# Patient Record
Sex: FEMALE | Race: BLACK OR AFRICAN AMERICAN | Employment: FULL TIME | URBAN - METROPOLITAN AREA
[De-identification: names, ages, dates, MRNs, and addresses within clinical notes are randomized per-mention and may not be internally consistent; named-entity substitution may affect disease eponyms.]

---

## 2017-02-22 ENCOUNTER — MED REC SCAN ONLY (OUTPATIENT)
Dept: NEUROLOGY | Facility: CLINIC | Age: 47
End: 2017-02-22

## 2017-02-23 ENCOUNTER — TELEPHONE (OUTPATIENT)
Dept: NEUROLOGY | Facility: CLINIC | Age: 47
End: 2017-02-23

## 2017-02-24 ENCOUNTER — OFFICE VISIT (OUTPATIENT)
Dept: NEUROLOGY | Facility: CLINIC | Age: 47
End: 2017-02-24

## 2017-02-24 ENCOUNTER — TELEPHONE (OUTPATIENT)
Dept: NEUROLOGY | Facility: CLINIC | Age: 47
End: 2017-02-24

## 2017-02-24 VITALS
RESPIRATION RATE: 16 BRPM | SYSTOLIC BLOOD PRESSURE: 128 MMHG | HEART RATE: 103 BPM | HEIGHT: 62 IN | DIASTOLIC BLOOD PRESSURE: 80 MMHG

## 2017-02-24 DIAGNOSIS — G89.29 CHRONIC BILATERAL LOW BACK PAIN WITH BILATERAL SCIATICA: ICD-10-CM

## 2017-02-24 DIAGNOSIS — M79.89 LEG SWELLING: ICD-10-CM

## 2017-02-24 DIAGNOSIS — M25.561 ACUTE PAIN OF BOTH KNEES: ICD-10-CM

## 2017-02-24 DIAGNOSIS — M54.41 CHRONIC BILATERAL LOW BACK PAIN WITH BILATERAL SCIATICA: ICD-10-CM

## 2017-02-24 DIAGNOSIS — D50.0 IRON DEFICIENCY ANEMIA DUE TO CHRONIC BLOOD LOSS: ICD-10-CM

## 2017-02-24 DIAGNOSIS — M54.42 CHRONIC BILATERAL LOW BACK PAIN WITH BILATERAL SCIATICA: ICD-10-CM

## 2017-02-24 DIAGNOSIS — Z79.01 ANTICOAGULATED BY ANTICOAGULATION TREATMENT: ICD-10-CM

## 2017-02-24 DIAGNOSIS — M54.16 LUMBAR RADICULOPATHY: ICD-10-CM

## 2017-02-24 DIAGNOSIS — M51.9 LUMBAR DISC DISEASE: Primary | ICD-10-CM

## 2017-02-24 DIAGNOSIS — M25.562 ACUTE PAIN OF BOTH KNEES: ICD-10-CM

## 2017-02-24 PROCEDURE — 99214 OFFICE O/P EST MOD 30 MIN: CPT | Performed by: PHYSICAL MEDICINE & REHABILITATION

## 2017-02-24 RX ORDER — METHOCARBAMOL 750 MG/1
TABLET ORAL WEEKLY
Refills: 6 | COMMUNITY
Start: 2017-01-20 | End: 2017-02-24 | Stop reason: DRUGHIGH

## 2017-02-24 RX ORDER — TRAMADOL HYDROCHLORIDE 50 MG/1
50 TABLET ORAL EVERY 6 HOURS PRN
Qty: 30 TABLET | Refills: 0 | Status: SHIPPED | OUTPATIENT
Start: 2017-02-24 | End: 2017-03-26

## 2017-02-24 RX ORDER — GABAPENTIN 300 MG/1
300 CAPSULE ORAL 3 TIMES DAILY
Qty: 90 CAPSULE | Refills: 2 | Status: SHIPPED | OUTPATIENT
Start: 2017-02-24 | End: 2018-08-22

## 2017-02-24 RX ORDER — INSULIN DETEMIR 100 [IU]/ML
40 INJECTION, SOLUTION SUBCUTANEOUS NIGHTLY
Status: ON HOLD | COMMUNITY
Start: 2017-02-23 | End: 2017-06-23 | Stop reason: ALTCHOICE

## 2017-02-24 RX ORDER — MECLIZINE HYDROCHLORIDE 25 MG/1
25 TABLET ORAL AS NEEDED
Refills: 0 | COMMUNITY
Start: 2016-12-15 | End: 2018-01-16

## 2017-02-24 RX ORDER — LIRAGLUTIDE 6 MG/ML
1.8 INJECTION SUBCUTANEOUS EVERY MORNING
Status: ON HOLD | COMMUNITY
Start: 2017-02-23 | End: 2017-06-23 | Stop reason: ALTCHOICE

## 2017-02-24 RX ORDER — INSULIN ASPART 100 [IU]/ML
25 INJECTION, SOLUTION INTRAVENOUS; SUBCUTANEOUS 2 TIMES DAILY
Status: ON HOLD | COMMUNITY
Start: 2017-02-23 | End: 2017-04-11

## 2017-02-24 RX ORDER — SPIRONOLACTONE 50 MG/1
TABLET, FILM COATED ORAL DAILY
Refills: 0 | COMMUNITY
Start: 2016-12-29 | End: 2017-02-24

## 2017-02-24 RX ORDER — CARVEDILOL 12.5 MG/1
25 TABLET ORAL 2 TIMES DAILY WITH MEALS
COMMUNITY
Start: 2017-02-23

## 2017-02-24 RX ORDER — BACLOFEN 20 MG/1
20 TABLET ORAL 2 TIMES DAILY
Qty: 60 TABLET | Refills: 2 | Status: SHIPPED | OUTPATIENT
Start: 2017-02-24 | End: 2018-06-27

## 2017-02-24 RX ORDER — FUROSEMIDE 20 MG/1
40 TABLET ORAL DAILY
COMMUNITY
Start: 2017-02-23

## 2017-02-24 NOTE — TELEPHONE ENCOUNTER
Below reviewed with Dr. Marge Haas who states to add patient to schedule today at 12:30p  Spoke to patient and offered her an appt today 2/24/17 at 12:30p.  Patient confirmed appt

## 2017-02-24 NOTE — PATIENT INSTRUCTIONS
As of October 6th 2014, the Drug Enforcement Agency [facility]) is reclassifying all hydrocodone combination medications from Schedule III to Schedule II. This includes medications such as Norco, Vicodin, Lortab, Zohydro, and Vicoprofen.     What this means for y will perform bilateral L5 TFESI(s). She will speak with her hematologist first about when she would be able to have this. She will speak to her PCP about what she needs to do to prevent a yeast infection after having the injections.     I refilled her Zayra Ybarra

## 2017-02-24 NOTE — TELEPHONE ENCOUNTER
Pt. left information with Christofer Kerr RN for us to request authorization through W/KIRSTIE L/M on Viridiana Muir v/m requesting authorization for bilateral L5 TFESIs cpt code [id number] and PT. Await return call.

## 2017-02-24 NOTE — PROGRESS NOTES
Low Back Pain H & P    Chief Complaint: Patient presents with:  Low Back Pain: pt here for follow up with c/o lower back pain and spasms that occur when applying pressure on the left leg with numbness in both feet. left>right.  LOV 11/16/16      Patient was History     Social History   Marital Status: Single  Spouse Name: N/A    Years of Education: N/A  Number of Children: N/A     Occupational History  None on file     Social History Main Topics   Smoking status: Never Smoker     Smokeless tobacco: Never Used Leg  arch of the LEFT foot  lateral RIGHT foot  first dorsal web space of the LEFT foot   LE Muscle Strength:  All LE strength measurements 5/5 except:  Hamstring RIGHT:   4+/5  Hamstring LEFT:   4-/5  Knee Extension RIGHT:   5/5  Knee Extension LEFT:   3+/

## 2017-02-28 NOTE — TELEPHONE ENCOUNTER
Faxed clinical notes to the Dept.  Of Labor W/C for authorization of bilateral L5 TFESIs and PT pending approval.

## 2017-04-10 ENCOUNTER — HOSPITAL ENCOUNTER (INPATIENT)
Facility: HOSPITAL | Age: 47
LOS: 1 days | Discharge: HOME | DRG: 812 | End: 2017-04-11
Attending: EMERGENCY MEDICINE | Admitting: INTERNAL MEDICINE
Payer: COMMERCIAL

## 2017-04-10 DIAGNOSIS — D64.9 ANEMIA, UNSPECIFIED TYPE: Primary | ICD-10-CM

## 2017-04-10 PROBLEM — D62 ANEMIA DUE TO BLOOD LOSS, ACUTE: Status: ACTIVE | Noted: 2017-04-10

## 2017-04-10 PROCEDURE — 86922 COMPATIBILITY TEST ANTIGLOB: CPT

## 2017-04-10 PROCEDURE — 86905 BLOOD TYPING RBC ANTIGENS: CPT | Performed by: EMERGENCY MEDICINE

## 2017-04-10 PROCEDURE — 86870 RBC ANTIBODY IDENTIFICATION: CPT | Performed by: EMERGENCY MEDICINE

## 2017-04-10 PROCEDURE — 80048 BASIC METABOLIC PNL TOTAL CA: CPT

## 2017-04-10 PROCEDURE — 86900 BLOOD TYPING SEROLOGIC ABO: CPT

## 2017-04-10 PROCEDURE — 30233N1 TRANSFUSION OF NONAUTOLOGOUS RED BLOOD CELLS INTO PERIPHERAL VEIN, PERCUTANEOUS APPROACH: ICD-10-PCS | Performed by: INTERNAL MEDICINE

## 2017-04-10 PROCEDURE — 93005 ELECTROCARDIOGRAM TRACING: CPT

## 2017-04-10 PROCEDURE — 93010 ELECTROCARDIOGRAM REPORT: CPT | Performed by: INTERNAL MEDICINE

## 2017-04-10 PROCEDURE — 86901 BLOOD TYPING SEROLOGIC RH(D): CPT

## 2017-04-10 PROCEDURE — 85060 BLOOD SMEAR INTERPRETATION: CPT | Performed by: EMERGENCY MEDICINE

## 2017-04-10 PROCEDURE — 93010 ELECTROCARDIOGRAM REPORT: CPT

## 2017-04-10 PROCEDURE — 85025 COMPLETE CBC W/AUTO DIFF WBC: CPT

## 2017-04-10 PROCEDURE — 86880 COOMBS TEST DIRECT: CPT | Performed by: EMERGENCY MEDICINE

## 2017-04-10 PROCEDURE — 82962 GLUCOSE BLOOD TEST: CPT

## 2017-04-10 PROCEDURE — 86850 RBC ANTIBODY SCREEN: CPT

## 2017-04-10 PROCEDURE — 99285 EMERGENCY DEPT VISIT HI MDM: CPT

## 2017-04-10 PROCEDURE — 36415 COLL VENOUS BLD VENIPUNCTURE: CPT

## 2017-04-10 RX ORDER — ZOLPIDEM TARTRATE 10 MG/1
10 TABLET ORAL NIGHTLY PRN
Status: DISCONTINUED | OUTPATIENT
Start: 2017-04-10 | End: 2017-04-11

## 2017-04-10 RX ORDER — MECLIZINE HYDROCHLORIDE 25 MG/1
25 TABLET ORAL AS NEEDED
Status: DISCONTINUED | OUTPATIENT
Start: 2017-04-10 | End: 2017-04-10

## 2017-04-10 RX ORDER — LOSARTAN POTASSIUM 100 MG/1
100 TABLET ORAL DAILY
Status: DISCONTINUED | OUTPATIENT
Start: 2017-04-10 | End: 2017-04-10

## 2017-04-10 RX ORDER — DIPHENHYDRAMINE HCL 25 MG
25 CAPSULE ORAL EVERY 6 HOURS PRN
Status: DISCONTINUED | OUTPATIENT
Start: 2017-04-10 | End: 2017-04-11

## 2017-04-10 RX ORDER — CARVEDILOL 12.5 MG/1
12.5 TABLET ORAL 2 TIMES DAILY WITH MEALS
Status: DISCONTINUED | OUTPATIENT
Start: 2017-04-10 | End: 2017-04-11

## 2017-04-10 RX ORDER — ASPIRIN 81 MG/1
81 TABLET ORAL DAILY
Status: DISCONTINUED | OUTPATIENT
Start: 2017-04-10 | End: 2017-04-11

## 2017-04-10 RX ORDER — METHYLPREDNISOLONE SODIUM SUCCINATE 40 MG/ML
40 INJECTION, POWDER, LYOPHILIZED, FOR SOLUTION INTRAMUSCULAR; INTRAVENOUS ONCE
Status: COMPLETED | OUTPATIENT
Start: 2017-04-10 | End: 2017-04-10

## 2017-04-10 RX ORDER — GABAPENTIN 300 MG/1
300 CAPSULE ORAL 3 TIMES DAILY
Status: DISCONTINUED | OUTPATIENT
Start: 2017-04-10 | End: 2017-04-11

## 2017-04-10 RX ORDER — CARVEDILOL 12.5 MG/1
12.5 TABLET ORAL 2 TIMES DAILY WITH MEALS
Status: DISCONTINUED | OUTPATIENT
Start: 2017-04-10 | End: 2017-04-10

## 2017-04-10 RX ORDER — IRBESARTAN 300 MG/1
300 TABLET ORAL
Status: ON HOLD | COMMUNITY
End: 2017-04-11

## 2017-04-10 RX ORDER — TRAMADOL HYDROCHLORIDE 50 MG/1
50 TABLET ORAL EVERY 6 HOURS PRN
Status: DISCONTINUED | OUTPATIENT
Start: 2017-04-10 | End: 2017-04-11

## 2017-04-10 RX ORDER — INSULIN LISPRO 100 [IU]/ML
25 INJECTION, SOLUTION INTRAVENOUS; SUBCUTANEOUS 2 TIMES DAILY
Status: ON HOLD | COMMUNITY
End: 2017-09-29

## 2017-04-10 RX ORDER — LOSARTAN POTASSIUM 100 MG/1
100 TABLET ORAL DAILY
Status: DISCONTINUED | OUTPATIENT
Start: 2017-04-10 | End: 2017-04-11

## 2017-04-10 RX ORDER — CARVEDILOL 12.5 MG/1
12.5 TABLET ORAL
Status: ON HOLD | COMMUNITY
End: 2017-04-11

## 2017-04-10 RX ORDER — BACLOFEN 10 MG/1
20 TABLET ORAL 2 TIMES DAILY
Status: DISCONTINUED | OUTPATIENT
Start: 2017-04-10 | End: 2017-04-11

## 2017-04-10 RX ORDER — SODIUM CHLORIDE 0.9 % (FLUSH) 0.9 %
10 SYRINGE (ML) INJECTION AS NEEDED
Status: DISCONTINUED | OUTPATIENT
Start: 2017-04-10 | End: 2017-04-11

## 2017-04-10 RX ORDER — ASPIRIN 81 MG/1
81 TABLET ORAL
Status: ON HOLD | COMMUNITY
End: 2017-06-24

## 2017-04-10 RX ORDER — SODIUM CHLORIDE 9 MG/ML
INJECTION, SOLUTION INTRAVENOUS ONCE
Status: DISCONTINUED | OUTPATIENT
Start: 2017-04-10 | End: 2017-04-11

## 2017-04-10 RX ORDER — SODIUM CHLORIDE 9 MG/ML
INJECTION, SOLUTION INTRAVENOUS
Status: COMPLETED
Start: 2017-04-10 | End: 2017-04-10

## 2017-04-10 RX ORDER — FUROSEMIDE 20 MG/1
20 TABLET ORAL DAILY
Status: DISCONTINUED | OUTPATIENT
Start: 2017-04-10 | End: 2017-04-11

## 2017-04-10 NOTE — ED PROVIDER NOTES
Patient Seen in: [facility] AND [facility] Emergency Department    History   Patient presents with:  Fatigue (constitutional, neurologic)    Stated Complaint: Fatigue     HPI    Patient is a 78-year-old female with a history of a PE she is on Xarelto she has his Take 300 mg by mouth daily. MetFORMIN HCl (GLUCOPHAGE) 1000 MG Oral Tab,  Take 1,000 mg by mouth 2 (two) times daily with meals. No family history on file.       Smoking Status: Never Smoker                      Smokeless Status: Never Used No cervical adenopathy. Neurological: Alert and oriented to person, place, and time. Normal reflexes. No cranial nerve deficit. No motor os sensory defecits noted Coordination normal.   Skin: Skin is warm and dry. Psychiatric: Normal mood and affect.  B axes as noted on EKG Report.   Rate: 92  Rhythm: Sinus Rhythm  Reading: Sinus rhythm no acute ST-T wave change            MDM           Disposition and Plan     Clinical Impression:  Anemia, unspecified type  (primary encounter diagnosis)    Disposition:  A

## 2017-04-10 NOTE — ED INITIAL ASSESSMENT (HPI)
Pt  States she has severe anemia, hx of blood transfusions, pt thinks she needs one now, c/o being tired x 2 wks, hx URI x 2 wks also

## 2017-04-11 VITALS
SYSTOLIC BLOOD PRESSURE: 112 MMHG | RESPIRATION RATE: 18 BRPM | HEIGHT: 62 IN | HEART RATE: 101 BPM | DIASTOLIC BLOOD PRESSURE: 68 MMHG | OXYGEN SATURATION: 99 % | WEIGHT: 227 LBS | TEMPERATURE: 99 F | BODY MASS INDEX: 41.77 KG/M2

## 2017-04-11 PROCEDURE — 82962 GLUCOSE BLOOD TEST: CPT

## 2017-04-11 PROCEDURE — 85025 COMPLETE CBC W/AUTO DIFF WBC: CPT | Performed by: INTERNAL MEDICINE

## 2017-04-11 RX ORDER — PREDNISONE 20 MG/1
20 TABLET ORAL ONCE
Status: COMPLETED | OUTPATIENT
Start: 2017-04-11 | End: 2017-04-11

## 2017-04-11 RX ORDER — DIPHENHYDRAMINE HCL 25 MG
25 CAPSULE ORAL EVERY 6 HOURS PRN
Qty: 50 CAPSULE | Refills: 1 | Status: SHIPPED | OUTPATIENT
Start: 2017-04-11 | End: 2018-11-14

## 2017-04-11 RX ORDER — TRAMADOL HYDROCHLORIDE 50 MG/1
50 TABLET ORAL EVERY 6 HOURS PRN
Qty: 50 TABLET | Refills: 0 | Status: SHIPPED | OUTPATIENT
Start: 2017-04-11 | End: 2017-07-26

## 2017-04-11 RX ORDER — PREDNISONE 10 MG/1
TABLET ORAL
Qty: 15 TABLET | Refills: 0 | Status: SHIPPED | OUTPATIENT
Start: 2017-04-11 | End: 2017-05-31

## 2017-04-11 NOTE — PLAN OF CARE
Diabetes/Glucose Control    • Glucose maintained within prescribed range Progressing        HEMATOLOGIC - ADULT    • Maintains hematologic stability Progressing    • Free from bleeding injury Progressing        PAIN - ADULT    • Verbalizes/displays adequat

## 2017-04-11 NOTE — PLAN OF CARE
Diabetes/Glucose Control    • Glucose maintained within prescribed range Adequate for Discharge        HEMATOLOGIC - ADULT    • Maintains hematologic stability Adequate for Discharge    • Free from bleeding injury Adequate for Discharge        PAIN - ADULT

## 2017-04-11 NOTE — H&P
[facility]    History and Physical    Lorilee Mcburney Patient Status:  Inpatient    1970 MRN [id number]   Location [facility] 4W/SW/SE Attending Will Fritz MD   Hosp Day # 0 PCP Alyssa So MD     Date:  4/10/2017  Date o (GLUCOPHAGE) 1000 MG Oral Tab Take 1,000 mg by mouth 2 (two) times daily with meals. carvedilol 12.5 MG Oral Tab Take 12.5 mg by mouth. insulin detemir 100 UNIT/ML Subcutaneous Solution Inject 40 Units into the skin nightly.    Irbesartan 300 MG Oral no tenderness. Neurological: She is alert and oriented to person, place, and time. No cranial nerve deficit, sensory deficit or motor deficit. Skin: There is pallor. Psychiatric: She has a normal mood and affect.      Cervical Papanicolaou done within

## 2017-04-12 ENCOUNTER — TELEPHONE (OUTPATIENT)
Dept: MEDSURG UNIT | Facility: HOSPITAL | Age: 47
End: 2017-04-12

## 2017-05-31 ENCOUNTER — TELEPHONE (OUTPATIENT)
Dept: NEUROLOGY | Facility: CLINIC | Age: 47
End: 2017-05-31

## 2017-05-31 ENCOUNTER — OFFICE VISIT (OUTPATIENT)
Dept: NEUROLOGY | Facility: CLINIC | Age: 47
End: 2017-05-31

## 2017-05-31 VITALS
OXYGEN SATURATION: 96 % | BODY MASS INDEX: 41.41 KG/M2 | WEIGHT: 225 LBS | RESPIRATION RATE: 98 BRPM | DIASTOLIC BLOOD PRESSURE: 102 MMHG | HEART RATE: 94 BPM | SYSTOLIC BLOOD PRESSURE: 192 MMHG | HEIGHT: 62 IN

## 2017-05-31 DIAGNOSIS — M54.42 CHRONIC BILATERAL LOW BACK PAIN WITH BILATERAL SCIATICA: ICD-10-CM

## 2017-05-31 DIAGNOSIS — M54.41 CHRONIC BILATERAL LOW BACK PAIN WITH BILATERAL SCIATICA: ICD-10-CM

## 2017-05-31 DIAGNOSIS — I89.0 LYMPHEDEMA OF BOTH LOWER EXTREMITIES: Primary | ICD-10-CM

## 2017-05-31 DIAGNOSIS — G89.29 CHRONIC BILATERAL LOW BACK PAIN WITH BILATERAL SCIATICA: ICD-10-CM

## 2017-05-31 DIAGNOSIS — M54.16 LUMBAR RADICULOPATHY: ICD-10-CM

## 2017-05-31 DIAGNOSIS — M51.9 LUMBAR DISC DISEASE: ICD-10-CM

## 2017-05-31 PROCEDURE — 99214 OFFICE O/P EST MOD 30 MIN: CPT | Performed by: PHYSICAL MEDICINE & REHABILITATION

## 2017-05-31 RX ORDER — HYDROCODONE BITARTRATE AND ACETAMINOPHEN 5; 325 MG/1; MG/1
1 TABLET ORAL EVERY 6 HOURS PRN
Qty: 20 TABLET | Refills: 0 | Status: SHIPPED | OUTPATIENT
Start: 2017-05-31 | End: 2017-06-10

## 2017-05-31 NOTE — PATIENT INSTRUCTIONS
Refill policies:    • Allow 2 business days for refills; controlled substances may take longer.   • Contact your pharmacy at least 5 days prior to running out of medication and have them send an electronic request or submit request through the “request re your physician has recommended that you have a procedure or additional testing performed. [facility] will contact your insurance carrier to obtain pre-certification or prior authorization.     Unfortunately, RADHA has seen an increas

## 2017-05-31 NOTE — PROGRESS NOTES
Low Back Pain H & P    Chief Complaint: Patient presents with: Follow - Up: LOV 02/24/17. Pt having low back pain with sciatica. Pt was to have bilateral L5 TFESI but because of anemia had blood tarnsfusion instead.  Pt stated that the injection can not be Family History   History reviewed. No pertinent family history.     Social History     Social History   Marital Status: Single  Spouse Name: N/A    Years of Education: N/A  Number of Children: N/A     Occupational History  None on file     Social Hist LEFT:   3/5  Hip Flexion LEFT:   3+/5  Extensor Hallucis Longus RIGHT:   3/5  Extensor Hallucis Longus LEFT:   3/5   RIGHT plantar reflexes: downward response   LEFT plantar reflexes: downward response   Reflexes: 2+ in bilateral lower extremities except:

## 2017-05-31 NOTE — TELEPHONE ENCOUNTER
Called patient to advise insurance was verified and PT is a covered benefit and does not require authorization for initial evaluation, patient stated that she was still in Dr [employer] and that she would schedule when she got home and thanked me for my

## 2017-06-16 ENCOUNTER — TELEPHONE (OUTPATIENT)
Dept: NEUROLOGY | Facility: CLINIC | Age: 47
End: 2017-06-16

## 2017-06-16 NOTE — TELEPHONE ENCOUNTER
Called patient re forms to be completed Patient states she does not need any forms completed and that she asked if we could hold on to forms and not to complete until she calls her supervisor.  Pt will call back if forms do need to be  completed

## 2017-06-23 ENCOUNTER — APPOINTMENT (OUTPATIENT)
Dept: GENERAL RADIOLOGY | Facility: HOSPITAL | Age: 47
DRG: 812 | End: 2017-06-23
Attending: EMERGENCY MEDICINE
Payer: COMMERCIAL

## 2017-06-23 ENCOUNTER — HOSPITAL ENCOUNTER (INPATIENT)
Facility: HOSPITAL | Age: 47
LOS: 1 days | Discharge: HOME | DRG: 812 | End: 2017-06-24
Attending: EMERGENCY MEDICINE | Admitting: HOSPITALIST
Payer: COMMERCIAL

## 2017-06-23 DIAGNOSIS — D64.9 ANEMIA, UNSPECIFIED TYPE: Primary | ICD-10-CM

## 2017-06-23 DIAGNOSIS — D50.0 IRON DEFICIENCY ANEMIA DUE TO CHRONIC BLOOD LOSS: ICD-10-CM

## 2017-06-23 PROCEDURE — 86900 BLOOD TYPING SEROLOGIC ABO: CPT | Performed by: EMERGENCY MEDICINE

## 2017-06-23 PROCEDURE — 82962 GLUCOSE BLOOD TEST: CPT

## 2017-06-23 PROCEDURE — 96360 HYDRATION IV INFUSION INIT: CPT

## 2017-06-23 PROCEDURE — 84484 ASSAY OF TROPONIN QUANT: CPT | Performed by: EMERGENCY MEDICINE

## 2017-06-23 PROCEDURE — 71010 XR CHEST AP PORTABLE  (CPT=71010): CPT | Performed by: EMERGENCY MEDICINE

## 2017-06-23 PROCEDURE — 86905 BLOOD TYPING RBC ANTIGENS: CPT | Performed by: EMERGENCY MEDICINE

## 2017-06-23 PROCEDURE — 99285 EMERGENCY DEPT VISIT HI MDM: CPT

## 2017-06-23 PROCEDURE — 80076 HEPATIC FUNCTION PANEL: CPT | Performed by: EMERGENCY MEDICINE

## 2017-06-23 PROCEDURE — 81001 URINALYSIS AUTO W/SCOPE: CPT | Performed by: EMERGENCY MEDICINE

## 2017-06-23 PROCEDURE — 83036 HEMOGLOBIN GLYCOSYLATED A1C: CPT | Performed by: HOSPITALIST

## 2017-06-23 PROCEDURE — 80048 BASIC METABOLIC PNL TOTAL CA: CPT | Performed by: EMERGENCY MEDICINE

## 2017-06-23 PROCEDURE — 93010 ELECTROCARDIOGRAM REPORT: CPT | Performed by: EMERGENCY MEDICINE

## 2017-06-23 PROCEDURE — 84466 ASSAY OF TRANSFERRIN: CPT | Performed by: HOSPITALIST

## 2017-06-23 PROCEDURE — 86850 RBC ANTIBODY SCREEN: CPT | Performed by: EMERGENCY MEDICINE

## 2017-06-23 PROCEDURE — 81025 URINE PREGNANCY TEST: CPT

## 2017-06-23 PROCEDURE — 86922 COMPATIBILITY TEST ANTIGLOB: CPT

## 2017-06-23 PROCEDURE — 30233N1 TRANSFUSION OF NONAUTOLOGOUS RED BLOOD CELLS INTO PERIPHERAL VEIN, PERCUTANEOUS APPROACH: ICD-10-PCS | Performed by: HOSPITALIST

## 2017-06-23 PROCEDURE — 85025 COMPLETE CBC W/AUTO DIFF WBC: CPT | Performed by: EMERGENCY MEDICINE

## 2017-06-23 PROCEDURE — 86901 BLOOD TYPING SEROLOGIC RH(D): CPT | Performed by: EMERGENCY MEDICINE

## 2017-06-23 PROCEDURE — 93005 ELECTROCARDIOGRAM TRACING: CPT

## 2017-06-23 PROCEDURE — 36430 TRANSFUSION BLD/BLD COMPNT: CPT

## 2017-06-23 PROCEDURE — 83540 ASSAY OF IRON: CPT | Performed by: HOSPITALIST

## 2017-06-23 PROCEDURE — 82728 ASSAY OF FERRITIN: CPT | Performed by: HOSPITALIST

## 2017-06-23 RX ORDER — SODIUM CHLORIDE 9 MG/ML
INJECTION, SOLUTION INTRAVENOUS CONTINUOUS
Status: DISCONTINUED | OUTPATIENT
Start: 2017-06-23 | End: 2017-06-23

## 2017-06-23 RX ORDER — LOSARTAN POTASSIUM 100 MG/1
100 TABLET ORAL DAILY
Status: DISCONTINUED | OUTPATIENT
Start: 2017-06-23 | End: 2017-06-25

## 2017-06-23 RX ORDER — MELATONIN
325 2 TIMES DAILY WITH MEALS
Status: DISCONTINUED | OUTPATIENT
Start: 2017-06-23 | End: 2017-06-25

## 2017-06-23 RX ORDER — HYDROCODONE BITARTRATE AND ACETAMINOPHEN 10; 325 MG/1; MG/1
1 TABLET ORAL EVERY 6 HOURS PRN
COMMUNITY
End: 2017-07-26

## 2017-06-23 RX ORDER — SODIUM CHLORIDE 9 MG/ML
INJECTION, SOLUTION INTRAVENOUS
Status: COMPLETED
Start: 2017-06-23 | End: 2017-06-23

## 2017-06-23 RX ORDER — BACLOFEN 10 MG/1
20 TABLET ORAL 2 TIMES DAILY
Status: DISCONTINUED | OUTPATIENT
Start: 2017-06-23 | End: 2017-06-25

## 2017-06-23 RX ORDER — DEXTROSE MONOHYDRATE 25 G/50ML
50 INJECTION, SOLUTION INTRAVENOUS AS NEEDED
Status: DISCONTINUED | OUTPATIENT
Start: 2017-06-23 | End: 2017-06-25

## 2017-06-23 RX ORDER — HYDROCODONE BITARTRATE AND ACETAMINOPHEN 5; 325 MG/1; MG/1
1 TABLET ORAL ONCE
Status: COMPLETED | OUTPATIENT
Start: 2017-06-23 | End: 2017-06-23

## 2017-06-23 RX ORDER — MORPHINE SULFATE 2 MG/ML
1 INJECTION, SOLUTION INTRAMUSCULAR; INTRAVENOUS ONCE
Status: COMPLETED | OUTPATIENT
Start: 2017-06-23 | End: 2017-06-23

## 2017-06-23 RX ORDER — ACETAMINOPHEN 325 MG/1
650 TABLET ORAL EVERY 6 HOURS PRN
Status: DISCONTINUED | OUTPATIENT
Start: 2017-06-23 | End: 2017-06-25

## 2017-06-23 RX ORDER — HYDROCODONE BITARTRATE AND ACETAMINOPHEN 10; 325 MG/1; MG/1
1 TABLET ORAL EVERY 6 HOURS PRN
Status: DISCONTINUED | OUTPATIENT
Start: 2017-06-23 | End: 2017-06-25

## 2017-06-23 RX ORDER — ONDANSETRON 2 MG/ML
4 INJECTION INTRAMUSCULAR; INTRAVENOUS EVERY 6 HOURS PRN
Status: DISCONTINUED | OUTPATIENT
Start: 2017-06-23 | End: 2017-06-25

## 2017-06-23 RX ORDER — CARVEDILOL 12.5 MG/1
12.5 TABLET ORAL 2 TIMES DAILY WITH MEALS
Status: DISCONTINUED | OUTPATIENT
Start: 2017-06-23 | End: 2017-06-25

## 2017-06-23 RX ORDER — ZOLPIDEM TARTRATE 10 MG/1
10 TABLET ORAL NIGHTLY PRN
COMMUNITY
End: 2018-11-27

## 2017-06-23 RX ORDER — GABAPENTIN 300 MG/1
300 CAPSULE ORAL 3 TIMES DAILY
Status: DISCONTINUED | OUTPATIENT
Start: 2017-06-23 | End: 2017-06-25

## 2017-06-23 RX ORDER — ZOLPIDEM TARTRATE 10 MG/1
10 TABLET ORAL NIGHTLY PRN
Status: DISCONTINUED | OUTPATIENT
Start: 2017-06-23 | End: 2017-06-25

## 2017-06-23 RX ORDER — DIPHENHYDRAMINE HCL 25 MG
25 CAPSULE ORAL EVERY 6 HOURS PRN
Status: DISCONTINUED | OUTPATIENT
Start: 2017-06-23 | End: 2017-06-25

## 2017-06-23 NOTE — H&P
[facility] Hospitalist H&P       CC: Patient presents with:  Fatigue (constitutional, neurologic)       PCP: Brooke Hunt MD    History of Present Illness: Patient is a 40year old female with PMH sig for DM, HTN, Hs of PE on xarelto, known iron def ane intact. Nose: Nares normal. Septum midline. Mucosa normal. No drainage. Throat: Lips, mucosa, and tongue normal. Teeth and gums normal.   Neck: Supple    Lungs:   Clear to auscultation bilaterally.  Normal effort   Chest wall:  No tenderness or deformi IS  Lines: PIV    Dispo: pending clinical course    Outpatient records or previous hospital records reviewed. Further recommendations pending patient's clinical course.   [facility] hospitalist to continue to follow patient while in house    Patient and/or pat

## 2017-06-23 NOTE — ED PROVIDER NOTES
102 no prescription yet  Patient Seen in: [facility] AND [facility] Emergency Department    History   Patient presents with:  Fatigue (constitutional, neurologic)    Stated Complaint:     HPI    Patient is a 43-year-old female with a history of hypertension diabet UNIT/ML Subcutaneous Solution Pen-injector,  Inject 40 Units into the skin nightly. VICTOZA 18 MG/3ML Subcutaneous Solution Pen-injector,  Inject 1.8 Units into the skin every morning. Meclizine HCl 25 MG Oral Tab,  Take by mouth as needed.    armando clear and moist.  patient has a tender discolored right upper premolar. Eyes: Conjunctivae and EOM are normal. Pupils are equal, round, and reactive to light. Neck: Neck supple.    Cardiovascular: Normal rate, regular rhythm, normal heart sounds and [place] WITH DIFFERENTIAL WITH PLATELET.   Procedure                               Abnormality         Status                     ---------                               -----------         ------                     CBC W/ DIFFERENTIAL[[id number]]          Abnormal

## 2017-06-23 NOTE — ED INITIAL ASSESSMENT (HPI)
C/o feeling weak, dizzy, sob, achy all over. Started approx 2 wks ago.   Saw pcp, had labs drawn, hgb 6.5 on 6/21

## 2017-06-24 VITALS
HEART RATE: 84 BPM | WEIGHT: 220 LBS | TEMPERATURE: 99 F | OXYGEN SATURATION: 100 % | RESPIRATION RATE: 18 BRPM | DIASTOLIC BLOOD PRESSURE: 70 MMHG | BODY MASS INDEX: 40 KG/M2 | SYSTOLIC BLOOD PRESSURE: 153 MMHG

## 2017-06-24 PROCEDURE — 82962 GLUCOSE BLOOD TEST: CPT

## 2017-06-24 PROCEDURE — 85025 COMPLETE CBC W/AUTO DIFF WBC: CPT | Performed by: HOSPITALIST

## 2017-06-24 PROCEDURE — 85018 HEMOGLOBIN: CPT | Performed by: HOSPITALIST

## 2017-06-24 RX ORDER — SODIUM CHLORIDE 0.9 % (FLUSH) 0.9 %
10 SYRINGE (ML) INJECTION AS NEEDED
Status: DISCONTINUED | OUTPATIENT
Start: 2017-06-24 | End: 2017-06-25

## 2017-06-24 RX ORDER — SODIUM CHLORIDE 9 MG/ML
INJECTION, SOLUTION INTRAVENOUS ONCE
Status: DISCONTINUED | OUTPATIENT
Start: 2017-06-24 | End: 2017-06-25

## 2017-06-24 NOTE — PROGRESS NOTES
[facility] Hospitalist Progress Note     CC: Hospital Follow up    PCP: Charlie Vela MD       Assessment/Plan:   Patient is a 40year old female with PMH sig for DM, HTN, Hs of PE on xarelto, known iron def anemia, menorrhagia, who presents with fatigue a pressure 129/68, pulse 83, temperature 98.3 °F (36.8 °C), temperature source Oral, resp. rate 18, weight 220 lb (99.8 kg), last menstrual period 06/09/2017, SpO2 100 %, not currently breastfeeding. Temp:  [97.9 °F (36.6 °C)-98.6 °F (37 °C)] 98.3 °F (36. mg Oral Daily   • ferrous sulfate  325 mg Oral BID with meals   • insulin aspart  1-11 Units Subcutaneous TID CC   • Rivaroxaban  20 mg Oral Daily with food   • insulin detemir  40 Units Subcutaneous Nightly        Normal Saline Flush, acetaminophen, ondan

## 2017-06-25 ENCOUNTER — TELEPHONE (OUTPATIENT)
Dept: MEDSURG UNIT | Facility: HOSPITAL | Age: 47
End: 2017-06-25

## 2017-06-25 RX ORDER — DIPHENHYDRAMINE HYDROCHLORIDE 12.5 MG/5ML
50 SOLUTION ORAL 4 TIMES DAILY PRN
Qty: 236 ML | Refills: 0 | Status: SHIPPED | OUTPATIENT
Start: 2017-06-25 | End: 2018-11-27

## 2017-06-25 RX ORDER — PREDNISONE 10 MG/1
TABLET ORAL
Qty: 15 TABLET | Refills: 0 | Status: SHIPPED | OUTPATIENT
Start: 2017-06-25 | End: 2017-08-30

## 2017-06-25 NOTE — DISCHARGE SUMMARY
[facility] Hospitalist Discharge Summary   Patient ID:  Dennis Stevens  [id number]  37 year old  7/24/1970    Admit date: 6/23/2017  Discharge date: 6/24/2017  Primary Care Physician: Isidro Castorena MD   Attending Physician: No att. providers found   Consult lispro 25 units TID  - pt should likely be on lispro 25 TID rather than BID, can address with PCP on follow-up     HTN   - resumed home meds     Multiple joint pain  -pain at baseline but slightly worse here, states she usually gets discharged with steroid Tabs  Commonly known as:  LIORESAL  Take 1 tablet (20 mg total) by mouth 2 (two) times daily.      carvedilol 12.5 MG Tabs  Commonly known as:  COREG     cholecalciferol 5000 units Caps  Commonly known as:  VITAMIN D3     furosemide 20 MG Tabs  Commonly kno visit in 1 week.     Specialty:  OBSTETRICS & GYNECOLOGY  Why:  regarding your heavy menstrual cycle  Contact information:  [address]  [address] [phone]                   -PCP in [x] within 7 days [] within 14 days [] other

## 2017-07-07 ENCOUNTER — TELEPHONE (OUTPATIENT)
Dept: NEUROLOGY | Facility: CLINIC | Age: 47
End: 2017-07-07

## 2017-07-07 NOTE — TELEPHONE ENCOUNTER
Pt called stated that her pain medication is not helping her pain since she has been in hospitalized, newly diagnosed with lupus. Pt told that with new diagnosis she need to follow with rheumatologist. Discussed with Dr Silveira Sic, needs to se pt.  Pt called inf

## 2017-07-20 ENCOUNTER — TELEPHONE (OUTPATIENT)
Dept: NEUROLOGY | Facility: CLINIC | Age: 47
End: 2017-07-20

## 2017-07-21 NOTE — TELEPHONE ENCOUNTER
Both #  tried unable to leave message. NINI # to office left. Unable to contact pt. Missed visit 07/12/17.

## 2017-07-25 NOTE — TELEPHONE ENCOUNTER
Pt calling in a lot of pain, mostly in legs and hips. Pt PCP was referring her back to Dr Stanley Coburn for pain management and back issues.  Pt was given appointment 07/26/17 910am.

## 2017-07-26 ENCOUNTER — OFFICE VISIT (OUTPATIENT)
Dept: NEUROLOGY | Facility: CLINIC | Age: 47
End: 2017-07-26

## 2017-07-26 VITALS
WEIGHT: 228 LBS | DIASTOLIC BLOOD PRESSURE: 90 MMHG | HEIGHT: 62 IN | HEART RATE: 92 BPM | OXYGEN SATURATION: 96 % | SYSTOLIC BLOOD PRESSURE: 142 MMHG | RESPIRATION RATE: 16 BRPM | BODY MASS INDEX: 41.96 KG/M2

## 2017-07-26 DIAGNOSIS — M51.9 LUMBAR DISC DISEASE: Primary | ICD-10-CM

## 2017-07-26 DIAGNOSIS — M54.16 LUMBAR RADICULOPATHY: ICD-10-CM

## 2017-07-26 DIAGNOSIS — G89.29 CHRONIC BILATERAL LOW BACK PAIN WITH BILATERAL SCIATICA: ICD-10-CM

## 2017-07-26 DIAGNOSIS — M54.42 CHRONIC BILATERAL LOW BACK PAIN WITH BILATERAL SCIATICA: ICD-10-CM

## 2017-07-26 DIAGNOSIS — M54.41 CHRONIC BILATERAL LOW BACK PAIN WITH BILATERAL SCIATICA: ICD-10-CM

## 2017-07-26 PROCEDURE — 99214 OFFICE O/P EST MOD 30 MIN: CPT | Performed by: PHYSICAL MEDICINE & REHABILITATION

## 2017-07-26 RX ORDER — OXYCODONE AND ACETAMINOPHEN 10; 325 MG/1; MG/1
1 TABLET ORAL EVERY 6 HOURS PRN
Qty: 30 TABLET | Refills: 0 | Status: SHIPPED | OUTPATIENT
Start: 2017-07-26 | End: 2017-09-27

## 2017-07-26 NOTE — PROGRESS NOTES
Low Back Pain H & P    Chief Complaint: Patient presents with:  Back Pain: LOV 05/31/17. Pt continues to have back pain radiating to both hips down back of legs to ankle. Pt unable to lie right side, both feet go numb, tingling.  Pt legs are restless, pt ha [facility])        Past Surgical History   Past Surgical History:  No date: ANESTH, SECTION      Comment: 3x  No date: CHOLECYSTECTOMY  No date: INGUINAL HERNIA REPAIR  No date: REPAIR ROTATOR CUFF,ACUTE Left    Family History   Family History   Problem Piriformis Muscle: Tender at  bilateral Piriformis muscle(s)   Greater Trochanteric Bursa: Tender at  bilateral Greater Trochanteric Bursa(s)     Vascular lower extremity:   Dorsalis pedis pulse-RIGHT 1+   Dorsalis pedis pulse-LEFT 1+   Tibialis posterio

## 2017-07-26 NOTE — PATIENT INSTRUCTIONS
Refill policies:    • Allow 2 business days for refills; controlled substances may take longer.   • Contact your pharmacy at least 5 days prior to running out of medication and have them send an electronic request or submit request through the “request re your physician has recommended that you have a procedure or additional testing performed. [facility] will contact your insurance carrier to obtain pre-certification or prior authorization.     Unfortunately, RADHA has seen an increas

## 2017-08-30 ENCOUNTER — OFFICE VISIT (OUTPATIENT)
Dept: NEUROLOGY | Facility: CLINIC | Age: 47
End: 2017-08-30

## 2017-08-30 VITALS
RESPIRATION RATE: 16 BRPM | SYSTOLIC BLOOD PRESSURE: 132 MMHG | HEIGHT: 62 IN | DIASTOLIC BLOOD PRESSURE: 80 MMHG | WEIGHT: 223 LBS | BODY MASS INDEX: 41.04 KG/M2 | HEART RATE: 88 BPM

## 2017-08-30 DIAGNOSIS — G89.29 CHRONIC BILATERAL LOW BACK PAIN WITH BILATERAL SCIATICA: ICD-10-CM

## 2017-08-30 DIAGNOSIS — M54.42 CHRONIC BILATERAL LOW BACK PAIN WITH BILATERAL SCIATICA: ICD-10-CM

## 2017-08-30 DIAGNOSIS — M54.41 CHRONIC BILATERAL LOW BACK PAIN WITH BILATERAL SCIATICA: ICD-10-CM

## 2017-08-30 DIAGNOSIS — M54.16 LUMBAR RADICULOPATHY: Primary | ICD-10-CM

## 2017-08-30 DIAGNOSIS — M51.9 LUMBAR DISC DISEASE: ICD-10-CM

## 2017-08-30 PROCEDURE — 99214 OFFICE O/P EST MOD 30 MIN: CPT | Performed by: PHYSICAL MEDICINE & REHABILITATION

## 2017-08-30 RX ORDER — BUPRENORPHINE 7.5 UG/H
1 PATCH TRANSDERMAL WEEKLY
COMMUNITY
End: 2017-09-27

## 2017-08-30 NOTE — PROGRESS NOTES
Low Back Pain H & P    Chief Complaint: Patient presents with:  Low Back Pain: LOV: 7/26/17. Pt states she had episode last week pt states low back was severe. Pt states she was feeling muscle spams on low back and radiating towards spine.  Pt states due to Problem Relation Age of Onset   • Heart Disorder Father    • Hypertension Father    • Heart Disease Father    • Hypertension Mother    • Psychiatric Mother    • Heart Disease Other    • Blood Disorder Other        Social History     Social History  Socia of the RIGHT foot  first dorsal web space of the LEFT foot   LE Muscle Strength: Difficult to test due to her pain   RIGHT plantar reflexes: downward response   LEFT plantar reflexes: downward response   Reflexes: 2+ in bilateral lower extremities     Asse

## 2017-08-30 NOTE — PATIENT INSTRUCTIONS
As of October 6th 2014, the Drug Enforcement Agency [facility]) is reclassifying all hydrocodone combination medications from Schedule III to Schedule II. This includes medications such as Norco, Vicodin, Lortab, Zohydro, and Vicoprofen.     What this means for y chart.      Plan  She will await worker's compensation approval for the PT. She will continue to use the TENS unit. She will continue with the Butrans patch at 7.5 micrograms for the week. She will not take the Percocet at this time.     She will t

## 2017-09-19 ENCOUNTER — TELEPHONE (OUTPATIENT)
Dept: NEUROLOGY | Facility: CLINIC | Age: 47
End: 2017-09-19

## 2017-09-22 ENCOUNTER — MED REC SCAN ONLY (OUTPATIENT)
Dept: NEUROLOGY | Facility: CLINIC | Age: 47
End: 2017-09-22

## 2017-09-27 ENCOUNTER — OFFICE VISIT (OUTPATIENT)
Dept: NEUROLOGY | Facility: CLINIC | Age: 47
End: 2017-09-27

## 2017-09-27 VITALS
HEIGHT: 62 IN | DIASTOLIC BLOOD PRESSURE: 94 MMHG | SYSTOLIC BLOOD PRESSURE: 166 MMHG | RESPIRATION RATE: 18 BRPM | BODY MASS INDEX: 41.41 KG/M2 | OXYGEN SATURATION: 98 % | HEART RATE: 94 BPM | WEIGHT: 225 LBS

## 2017-09-27 DIAGNOSIS — G89.29 CHRONIC BILATERAL LOW BACK PAIN WITH BILATERAL SCIATICA: ICD-10-CM

## 2017-09-27 DIAGNOSIS — M54.16 LUMBAR RADICULOPATHY: ICD-10-CM

## 2017-09-27 DIAGNOSIS — M54.42 CHRONIC BILATERAL LOW BACK PAIN WITH BILATERAL SCIATICA: ICD-10-CM

## 2017-09-27 DIAGNOSIS — M54.41 CHRONIC BILATERAL LOW BACK PAIN WITH BILATERAL SCIATICA: ICD-10-CM

## 2017-09-27 DIAGNOSIS — M51.9 LUMBAR DISC DISEASE: Primary | ICD-10-CM

## 2017-09-27 PROCEDURE — 99214 OFFICE O/P EST MOD 30 MIN: CPT | Performed by: PHYSICAL MEDICINE & REHABILITATION

## 2017-09-27 RX ORDER — AZILSARTAN KAMEDOXOMIL 40 MG/1
40 TABLET ORAL DAILY
COMMUNITY

## 2017-09-27 RX ORDER — BUPRENORPHINE 15 UG/H
1 PATCH TRANSDERMAL WEEKLY
Qty: 4 PATCH | Refills: 0 | Status: SHIPPED | OUTPATIENT
Start: 2017-09-27 | End: 2017-12-20

## 2017-09-27 RX ORDER — OXYCODONE AND ACETAMINOPHEN 10; 325 MG/1; MG/1
1 TABLET ORAL EVERY 6 HOURS PRN
Qty: 30 TABLET | Refills: 0 | Status: SHIPPED | OUTPATIENT
Start: 2017-09-27 | End: 2017-12-20

## 2017-09-27 NOTE — PROGRESS NOTES
Low Back Pain H & P    Chief Complaint: Patient presents with:  Low Back Pain: LOV 08/30 Pt continues to do [facility] with Miguel A Woodruff. Pt continues to have low back pain and spasm. Left side is worse.  Pt havin pain and spasm going into lesley Smoking status: Former Smoker     Smokeless tobacco: Never Used    Alcohol use Yes  0.0 oz/week     Comment:  Occ    Drug use: No    Sexual activity: Not on file     Other Topics Concern    Caffeine Concern No    Exercise No     Social History Narrative discussing patient care and treatment. The patient understands and agrees with the stated plan. Swapnil Guadarrama MD  9/27/2017

## 2017-09-27 NOTE — PATIENT INSTRUCTIONS
Plan  She will continue with the PT. She will take the Baclofen as follows: one tablet every 6 hours. She will continue with the Percocet 10/325 one tablet at night time. She will take the Neurontin 300 mg 3 times a day.     I increased the Brittany

## 2017-09-28 ENCOUNTER — HOSPITAL ENCOUNTER (OUTPATIENT)
Facility: HOSPITAL | Age: 47
Setting detail: OBSERVATION
Discharge: HOME | End: 2017-10-01
Attending: EMERGENCY MEDICINE | Admitting: HOSPITALIST
Payer: COMMERCIAL

## 2017-09-28 DIAGNOSIS — D64.9 SYMPTOMATIC ANEMIA: Primary | ICD-10-CM

## 2017-09-29 PROBLEM — D64.9 SYMPTOMATIC ANEMIA: Status: ACTIVE | Noted: 2017-09-29

## 2017-09-29 PROCEDURE — 99220 INITIAL OBSERVATION CARE,LEVL III: CPT | Performed by: HOSPITALIST

## 2017-09-29 PROCEDURE — 30233N1 TRANSFUSION OF NONAUTOLOGOUS RED BLOOD CELLS INTO PERIPHERAL VEIN, PERCUTANEOUS APPROACH: ICD-10-PCS | Performed by: HOSPITALIST

## 2017-09-29 PROCEDURE — 99226 SUBSEQUENT OBSERVATION CARE: CPT | Performed by: HOSPITALIST

## 2017-09-29 RX ORDER — FUROSEMIDE 40 MG/1
40 TABLET ORAL DAILY
Status: DISCONTINUED | OUTPATIENT
Start: 2017-09-29 | End: 2017-10-01

## 2017-09-29 RX ORDER — OXYCODONE AND ACETAMINOPHEN 10; 325 MG/1; MG/1
1 TABLET ORAL EVERY 6 HOURS PRN
Status: DISCONTINUED | OUTPATIENT
Start: 2017-09-29 | End: 2017-10-01

## 2017-09-29 RX ORDER — GABAPENTIN 300 MG/1
300 CAPSULE ORAL 3 TIMES DAILY
Status: DISCONTINUED | OUTPATIENT
Start: 2017-09-29 | End: 2017-09-29

## 2017-09-29 RX ORDER — SODIUM CHLORIDE 9 MG/ML
INJECTION, SOLUTION INTRAVENOUS ONCE
Status: COMPLETED | OUTPATIENT
Start: 2017-09-29 | End: 2017-09-29

## 2017-09-29 RX ORDER — BACLOFEN 10 MG/1
20 TABLET ORAL 2 TIMES DAILY
Status: DISCONTINUED | OUTPATIENT
Start: 2017-09-29 | End: 2017-09-29

## 2017-09-29 RX ORDER — SODIUM CHLORIDE 0.9 % (FLUSH) 0.9 %
10 SYRINGE (ML) INJECTION AS NEEDED
Status: DISCONTINUED | OUTPATIENT
Start: 2017-09-29 | End: 2017-10-01

## 2017-09-29 RX ORDER — SODIUM CHLORIDE 9 MG/ML
INJECTION, SOLUTION INTRAVENOUS
Status: COMPLETED
Start: 2017-09-29 | End: 2017-09-29

## 2017-09-29 RX ORDER — LOSARTAN POTASSIUM 100 MG/1
100 TABLET ORAL DAILY
Status: DISCONTINUED | OUTPATIENT
Start: 2017-09-29 | End: 2017-10-01

## 2017-09-29 RX ORDER — CARVEDILOL 12.5 MG/1
12.5 TABLET ORAL 2 TIMES DAILY WITH MEALS
Status: DISCONTINUED | OUTPATIENT
Start: 2017-09-29 | End: 2017-10-01

## 2017-09-29 RX ORDER — DIPHENHYDRAMINE HYDROCHLORIDE 50 MG/ML
25 INJECTION INTRAMUSCULAR; INTRAVENOUS ONCE
Status: COMPLETED | OUTPATIENT
Start: 2017-09-29 | End: 2017-09-29

## 2017-09-29 RX ORDER — 0.9 % SODIUM CHLORIDE 0.9 %
VIAL (ML) INJECTION
Status: COMPLETED
Start: 2017-09-29 | End: 2017-09-29

## 2017-09-29 RX ORDER — BACLOFEN 10 MG/1
40 TABLET ORAL 2 TIMES DAILY
Status: DISCONTINUED | OUTPATIENT
Start: 2017-09-29 | End: 2017-10-01

## 2017-09-29 RX ORDER — ONDANSETRON 2 MG/ML
4 INJECTION INTRAMUSCULAR; INTRAVENOUS EVERY 6 HOURS PRN
Status: DISCONTINUED | OUTPATIENT
Start: 2017-09-29 | End: 2017-10-01

## 2017-09-29 RX ORDER — GABAPENTIN 300 MG/1
600 CAPSULE ORAL 2 TIMES DAILY
Status: DISCONTINUED | OUTPATIENT
Start: 2017-09-29 | End: 2017-10-01

## 2017-09-29 RX ORDER — INSULIN ASPART 100 [IU]/ML
15 INJECTION, SOLUTION INTRAVENOUS; SUBCUTANEOUS
COMMUNITY
End: 2018-11-27

## 2017-09-29 RX ORDER — DEXTROSE MONOHYDRATE 25 G/50ML
50 INJECTION, SOLUTION INTRAVENOUS AS NEEDED
Status: DISCONTINUED | OUTPATIENT
Start: 2017-09-29 | End: 2017-10-01

## 2017-09-29 RX ORDER — ACETAMINOPHEN 325 MG/1
650 TABLET ORAL EVERY 6 HOURS PRN
Status: DISCONTINUED | OUTPATIENT
Start: 2017-09-29 | End: 2017-10-01

## 2017-09-29 RX ORDER — DIPHENHYDRAMINE HCL 25 MG
25 CAPSULE ORAL EVERY 6 HOURS PRN
Status: DISCONTINUED | OUTPATIENT
Start: 2017-09-29 | End: 2017-10-01

## 2017-09-29 RX ORDER — ZOLPIDEM TARTRATE 10 MG/1
10 TABLET ORAL NIGHTLY PRN
Status: DISCONTINUED | OUTPATIENT
Start: 2017-09-29 | End: 2017-10-01

## 2017-09-29 RX ORDER — INSULIN ASPART 100 [IU]/ML
15 INJECTION, SOLUTION INTRAVENOUS; SUBCUTANEOUS
Status: DISCONTINUED | OUTPATIENT
Start: 2017-09-29 | End: 2017-10-01

## 2017-09-29 RX ORDER — SODIUM CHLORIDE 9 MG/ML
INJECTION, SOLUTION INTRAVENOUS
Status: DISPENSED
Start: 2017-09-29 | End: 2017-09-30

## 2017-09-29 NOTE — H&P
[facility] Patient Status:  Emergency    1970 MRN [id number]   Location [address] Attending Monica Paul MD   Hosp Day # 0 PCP Benito Sharp MD     Nish that she does not use drugs. Allergies:    Latex                   Hives    Comment:Pt also reports swelling    Home Medications:  Prior to Admission Medications   Prescriptions Last Dose Informant Patient Reported? Taking?    Azilsartan Medoxomil (EDARB Take 1 capsule (300 mg total) by mouth 3 (three) times daily. insulin detemir 100 UNIT/ML Subcutaneous Solution Taking  Yes Yes   Sig: Inject 40 Units into the skin nightly.    oxyCODONE-acetaminophen (PERCOCET)  MG Oral Tab Taking  No Yes   Sig: Ta Data:     Lab Results  Component Value Date   WBC 6.5 09/28/2017   HGB 7.1 09/28/2017   HCT 23.7 09/28/2017    09/28/2017       Imaging:  No exam resulted this encounter.     Assessment and Plan:    Acute blood loss anemia  Likely secondary to severe

## 2017-09-29 NOTE — PROGRESS NOTES
[facility]    Progress Note    Ishmael Otero Patient Status:  Observation    1970 MRN [id number]   Location [address] [facility] Attending Dorothea Browne,    Hosp Day # 0 PCP Adolfo Tijerina MD       Subjective:   Nettie Perdomo To 09/28/2017   HGB 9.0 (L) 06/24/2017      Lab Results  Component Value Date    (H) 09/29/2017    (H) 09/28/2017    06/24/2017       Recent Labs   Lab  09/29/17   0925   GLU  345*   BUN  8   CREATSERUM  0.70   GFRAA  >60   GFRNAA  >60

## 2017-09-29 NOTE — PLAN OF CARE
Problem: Patient Centered Care  Goal: Patient preferences are identified and integrated in the patient's plan of care  Interventions:  - What would you like us to know as we care for you?   - Provide timely, complete, and accurate information to patient/fa limb and body alignment per provider's orders  - Instruct and reinforce with patient and family use of appropriate assistive device and precautions (e.g. spinal or hip dislocation precautions)   Outcome: Progressing      Problem: PAIN - ADULT  Goal: Verbal

## 2017-09-29 NOTE — ED PROVIDER NOTES
Patient Seen in: [facility] AND [facility] Emergency Department    History   Patient presents with:  Abnormal Result (metabolic, cardiac)    Stated Complaint: Weakness, Sent from doctor \"Needs Transfusion\"    HPI    57 yo female with anemia.  She is on xarelto Physical Exam   Constitutional: She is oriented to person, place, and time. She appears well-developed and well-nourished. No distress. HENT:   Head: Normocephalic and atraumatic.    Mouth/Throat: Oropharynx is clear and moist.   Eyes: Conjunctiva Abnormality         Status                     ---------                               -----------         ------                     ABORH (BLOOD [facility])[[id number]]                               Final result               ANTIBODY AMJTAM[[id number]]

## 2017-09-30 ENCOUNTER — APPOINTMENT (OUTPATIENT)
Dept: GENERAL RADIOLOGY | Facility: HOSPITAL | Age: 47
End: 2017-09-30
Attending: HOSPITALIST
Payer: COMMERCIAL

## 2017-09-30 ENCOUNTER — APPOINTMENT (OUTPATIENT)
Dept: CV DIAGNOSTICS | Facility: HOSPITAL | Age: 47
End: 2017-09-30
Attending: HOSPITALIST
Payer: COMMERCIAL

## 2017-09-30 PROCEDURE — 93306 TTE W/DOPPLER COMPLETE: CPT | Performed by: HOSPITALIST

## 2017-09-30 PROCEDURE — 71010 XR CHEST AP PORTABLE  (CPT=71010): CPT | Performed by: HOSPITALIST

## 2017-09-30 NOTE — PLAN OF CARE
Problem: HEMATOLOGIC - ADULT  Goal: Maintains hematologic stability  INTERVENTIONS  - Assess for signs and symptoms of bleeding or hemorrhage  - Monitor labs and vital signs for trends  - Administer supportive blood products/factors, fluids and medications pt to call for assistance with activity based on assessment  - Modify environment to reduce risk of injury  - Provide assistive devices as appropriate  - Consider OT/PT consult to assist with strengthening/mobility  - Encourage toileting schedule   Outcome

## 2017-09-30 NOTE — PROGRESS NOTES
[facility]    Progress Note    Marshall Richter Patient Status:  Observation    1970 MRN [id number]   Location [address] [facility] Attending Salome Sneed,    Hosp Day # 0 PCP Alex Lund MD       Subjective:   Martir Razo To 09/29/2017        Lab Results  Component Value Date    09/30/2017    (H) 09/29/2017    (H) 09/28/2017       Recent Labs   Lab  09/29/17   0925   GLU  345*   BUN  8   CREATSERUM  0.70   GFRAA  >60   GFRNAA  >60   CA  8.7   NA  131*   K

## 2017-10-01 VITALS
TEMPERATURE: 98 F | HEIGHT: 62 IN | WEIGHT: 227.31 LBS | DIASTOLIC BLOOD PRESSURE: 78 MMHG | RESPIRATION RATE: 16 BRPM | SYSTOLIC BLOOD PRESSURE: 150 MMHG | OXYGEN SATURATION: 98 % | BODY MASS INDEX: 41.83 KG/M2 | HEART RATE: 82 BPM

## 2017-10-01 PROCEDURE — 99217 OBSERVATION CARE DISCHARGE: CPT | Performed by: HOSPITALIST

## 2017-10-01 NOTE — PLAN OF CARE
Per patient request, patient wanted it noted in her chart that when the nurse went to give insulin mid morning she had missed and went into the upper arm area. Patient c/o of some discomfort but wanted to let someone know the incident occurred.  Patient sh

## 2017-10-01 NOTE — PLAN OF CARE
Problem: SKIN/TISSUE INTEGRITY - ADULT  Goal: Skin integrity remains intact  INTERVENTIONS  - Assess and document risk factors for pressure ulcer development  - Assess and document skin integrity  - Monitor for areas of redness and/or skin breakdown  - Ini unsuccessful or patient reports new pain   Outcome: Progressing      Problem: SAFETY ADULT - FALL  Goal: Free from fall injury  INTERVENTIONS:  - Assess pt frequently for physical needs  - Identify cognitive and physical deficits and behaviors that affect

## 2017-10-01 NOTE — PLAN OF CARE
Problem: Patient Centered Care  Goal: Patient preferences are identified and integrated in the patient's plan of care  Interventions:  - What would you like us to know as we care for you?   - Provide timely, complete, and accurate information to patient/fa assistance  - Assess pain using appropriate pain scale  - Administer analgesics based on type and severity of pain and evaluate response  - Implement non-pharmacological measures as appropriate and evaluate response  - Consider cultural and social influenc

## 2017-10-02 ENCOUNTER — TELEPHONE (OUTPATIENT)
Dept: CARDIOLOGY UNIT | Facility: HOSPITAL | Age: 47
End: 2017-10-02

## 2017-10-04 NOTE — DISCHARGE SUMMARY
[facility]    Discharge Summary    Von [place]ol Patient Status:  Observation    1970 MRN [id number]   Location [facility] Attending No att. providers found   Hosp Day # 0 PCP Truong Rosado MD     Date of Admission female, with past medical history significant for pulmonary embolism chronic microcytic anemia secondary to dysfunctional uterine bleeding presents with complaint of generalized weakness fatigue and shortness of breath particularly on exertion.   She also c LIORESAL  What changed:  how much to take      Take 1 tablet (20 mg total) by mouth 2 (two) times daily.    Quantity:  60 tablet  Refills:  2     DiphenhydrAMINE HCl 25 MG Caps  Commonly known as:  BENADRYL  What changed:  Another medication with the same n Take 25 mg by mouth as needed. Refills:  0     MetFORMIN HCl 1000 MG Tabs  Commonly known as:  GLUCOPHAGE      Take 1,000 mg by mouth 2 (two) times daily with meals.    Refills:  0     oxyCODONE-acetaminophen  MG Tabs  Commonly known as:  PERCOCET

## 2017-11-28 PROCEDURE — 86356 MONONUCLEAR CELL ANTIGEN: CPT | Performed by: INTERNAL MEDICINE

## 2017-11-28 PROCEDURE — 83010 ASSAY OF HAPTOGLOBIN QUANT: CPT | Performed by: INTERNAL MEDICINE

## 2017-11-28 PROCEDURE — 86880 COOMBS TEST DIRECT: CPT | Performed by: INTERNAL MEDICINE

## 2017-12-13 NOTE — TELEPHONE ENCOUNTER
Medication request: Percocet 10-325mg daily prn pain and Butrans 15mcg weekly    LOV 9/27/17  NOV 1/24/18    [place]: Tramadol 50mg #90 filled on 10/30/17 prescribed by Nan Bryan, Percocet 10-325mg #30 and Butrans #4 were both filled on 10

## 2017-12-14 NOTE — TELEPHONE ENCOUNTER
Why was she given Ultram and who is this doctor. If she is getting pain medications form another provider, than she is in violation of the pain contract. ,

## 2017-12-14 NOTE — TELEPHONE ENCOUNTER
Dr. Dwayne Cooper is orthopedic surgeon.     LMTCB-to notify patient of the below message from Dr. Reba Rivera

## 2017-12-20 NOTE — TELEPHONE ENCOUNTER
Patient states she received the Tramadol from her orthopedic surgeon who did rotator cuff surgery in the past but now she is unable to have another sx due to blood transfusion and blood disorder.  Patient is aware that only one provider can prescribe her pa

## 2017-12-21 RX ORDER — OXYCODONE AND ACETAMINOPHEN 10; 325 MG/1; MG/1
1 TABLET ORAL DAILY PRN
Qty: 30 TABLET | Refills: 0 | Status: SHIPPED | OUTPATIENT
Start: 2017-12-21 | End: 2018-02-09

## 2017-12-21 RX ORDER — BUPRENORPHINE 15 UG/H
1 PATCH TRANSDERMAL WEEKLY
Qty: 4 PATCH | Refills: 0 | Status: SHIPPED | OUTPATIENT
Start: 2017-12-21 | End: 2018-02-09

## 2017-12-21 NOTE — TELEPHONE ENCOUNTER
Rx for patient Butran 15 mcg/HR transdermal patch one patch weekly #4 patiches and Oxycodone-Acetaminophen 325 mg Take one daily as needed for pain Both Rx placed printed and left at  for patient

## 2018-01-07 ENCOUNTER — HOSPITAL ENCOUNTER (INPATIENT)
Facility: HOSPITAL | Age: 48
LOS: 2 days | Discharge: HOME | DRG: 812 | End: 2018-01-09
Attending: EMERGENCY MEDICINE | Admitting: INTERNAL MEDICINE
Payer: COMMERCIAL

## 2018-01-07 DIAGNOSIS — I89.0 LYMPHEDEMA OF BOTH LOWER EXTREMITIES: ICD-10-CM

## 2018-01-07 DIAGNOSIS — D62 ANEMIA DUE TO BLOOD LOSS, ACUTE: Primary | ICD-10-CM

## 2018-01-07 DIAGNOSIS — D50.0 IRON DEFICIENCY ANEMIA DUE TO CHRONIC BLOOD LOSS: ICD-10-CM

## 2018-01-07 DIAGNOSIS — Q21.1 ATRIAL SEPTAL DEFECT: ICD-10-CM

## 2018-01-07 DIAGNOSIS — I26.99 RECURRENT PULMONARY EMBOLISM (HCC): ICD-10-CM

## 2018-01-07 LAB
ANION GAP SERPL CALC-SCNC: 8 MMOL/L (ref 0–18)
ANTIBODY SCREEN: NEGATIVE
APTT PPP: 26.4 SECONDS (ref 23.2–35.3)
BASOPHILS # BLD: 0 K/UL (ref 0–0.2)
BASOPHILS NFR BLD: 1 %
BUN SERPL-MCNC: 5 MG/DL (ref 8–20)
BUN/CREAT SERPL: 8.3 (ref 10–20)
CALCIUM SERPL-MCNC: 8.7 MG/DL (ref 8.5–10.5)
CHLORIDE SERPL-SCNC: 98 MMOL/L (ref 95–110)
CO2 SERPL-SCNC: 27 MMOL/L (ref 22–32)
CREAT SERPL-MCNC: 0.6 MG/DL (ref 0.5–1.5)
EOSINOPHIL # BLD: 0.2 K/UL (ref 0–0.7)
EOSINOPHIL NFR BLD: 4 %
ERYTHROCYTE [DISTWIDTH] IN BLOOD BY AUTOMATED COUNT: 21.1 % (ref 11–15)
GLUCOSE BLDC GLUCOMTR-MCNC: 300 MG/DL (ref 70–99)
GLUCOSE BLDC GLUCOMTR-MCNC: 305 MG/DL (ref 70–99)
GLUCOSE BLDC GLUCOMTR-MCNC: 370 MG/DL (ref 70–99)
GLUCOSE SERPL-MCNC: 312 MG/DL (ref 70–99)
HBA1C MFR BLD: 11.7 % (ref 4–6)
HCT VFR BLD AUTO: 24.2 % (ref 35–48)
HGB BLD-MCNC: 7.1 G/DL (ref 12–16)
INR BLD: 1.1 (ref 0.9–1.2)
LYMPHOCYTES # BLD: 1.6 K/UL (ref 1–4)
LYMPHOCYTES NFR BLD: 33 %
MCH RBC QN AUTO: 17.9 PG (ref 27–32)
MCHC RBC AUTO-ENTMCNC: 29.3 G/DL (ref 32–37)
MCV RBC AUTO: 61.2 FL (ref 80–100)
MONOCYTES # BLD: 0.5 K/UL (ref 0–1)
MONOCYTES NFR BLD: 10 %
NEUTROPHILS # BLD AUTO: 2.6 K/UL (ref 1.8–7.7)
NEUTROPHILS NFR BLD: 53 %
OSMOLALITY UR CALC.SUM OF ELEC: 285 MOSM/KG (ref 275–295)
PLATELET # BLD AUTO: 436 K/UL (ref 140–400)
PMV BLD AUTO: 8.6 FL (ref 7.4–10.3)
POTASSIUM SERPL-SCNC: 3.7 MMOL/L (ref 3.3–5.1)
PROTHROMBIN TIME: 13.6 SECONDS (ref 11.8–14.5)
RBC # BLD AUTO: 3.96 M/UL (ref 3.7–5.4)
RGTSCRN: 2
RH BLOOD TYPE: NEGATIVE
SODIUM SERPL-SCNC: 133 MMOL/L (ref 136–144)
WBC # BLD AUTO: 4.9 K/UL (ref 4–11)

## 2018-01-07 PROCEDURE — 85025 COMPLETE CBC W/AUTO DIFF WBC: CPT

## 2018-01-07 PROCEDURE — 83036 HEMOGLOBIN GLYCOSYLATED A1C: CPT | Performed by: INTERNAL MEDICINE

## 2018-01-07 PROCEDURE — 86850 RBC ANTIBODY SCREEN: CPT

## 2018-01-07 PROCEDURE — 86905 BLOOD TYPING RBC ANTIGENS: CPT | Performed by: EMERGENCY MEDICINE

## 2018-01-07 PROCEDURE — 86850 RBC ANTIBODY SCREEN: CPT | Performed by: EMERGENCY MEDICINE

## 2018-01-07 PROCEDURE — 85730 THROMBOPLASTIN TIME PARTIAL: CPT

## 2018-01-07 PROCEDURE — 86900 BLOOD TYPING SEROLOGIC ABO: CPT | Performed by: EMERGENCY MEDICINE

## 2018-01-07 PROCEDURE — 85025 COMPLETE CBC W/AUTO DIFF WBC: CPT | Performed by: EMERGENCY MEDICINE

## 2018-01-07 PROCEDURE — 36430 TRANSFUSION BLD/BLD COMPNT: CPT

## 2018-01-07 PROCEDURE — 85610 PROTHROMBIN TIME: CPT

## 2018-01-07 PROCEDURE — 80048 BASIC METABOLIC PNL TOTAL CA: CPT

## 2018-01-07 PROCEDURE — 85610 PROTHROMBIN TIME: CPT | Performed by: EMERGENCY MEDICINE

## 2018-01-07 PROCEDURE — 30233N1 TRANSFUSION OF NONAUTOLOGOUS RED BLOOD CELLS INTO PERIPHERAL VEIN, PERCUTANEOUS APPROACH: ICD-10-PCS | Performed by: INTERNAL MEDICINE

## 2018-01-07 PROCEDURE — 99285 EMERGENCY DEPT VISIT HI MDM: CPT

## 2018-01-07 PROCEDURE — 93005 ELECTROCARDIOGRAM TRACING: CPT

## 2018-01-07 PROCEDURE — 85730 THROMBOPLASTIN TIME PARTIAL: CPT | Performed by: EMERGENCY MEDICINE

## 2018-01-07 PROCEDURE — 36415 COLL VENOUS BLD VENIPUNCTURE: CPT

## 2018-01-07 PROCEDURE — 80048 BASIC METABOLIC PNL TOTAL CA: CPT | Performed by: EMERGENCY MEDICINE

## 2018-01-07 PROCEDURE — 86900 BLOOD TYPING SEROLOGIC ABO: CPT

## 2018-01-07 PROCEDURE — 82962 GLUCOSE BLOOD TEST: CPT

## 2018-01-07 PROCEDURE — 86901 BLOOD TYPING SEROLOGIC RH(D): CPT | Performed by: EMERGENCY MEDICINE

## 2018-01-07 PROCEDURE — 93010 ELECTROCARDIOGRAM REPORT: CPT | Performed by: EMERGENCY MEDICINE

## 2018-01-07 PROCEDURE — 86922 COMPATIBILITY TEST ANTIGLOB: CPT

## 2018-01-07 PROCEDURE — 86901 BLOOD TYPING SEROLOGIC RH(D): CPT

## 2018-01-07 RX ORDER — GABAPENTIN 300 MG/1
600 CAPSULE ORAL 2 TIMES DAILY
Status: DISCONTINUED | OUTPATIENT
Start: 2018-01-07 | End: 2018-01-09

## 2018-01-07 RX ORDER — DIPHENHYDRAMINE HCL 25 MG
12.5 CAPSULE ORAL ONCE
Status: COMPLETED | OUTPATIENT
Start: 2018-01-07 | End: 2018-01-07

## 2018-01-07 RX ORDER — DIPHENHYDRAMINE HCL 25 MG
25 CAPSULE ORAL EVERY 6 HOURS PRN
Status: DISCONTINUED | OUTPATIENT
Start: 2018-01-07 | End: 2018-01-07

## 2018-01-07 RX ORDER — INSULIN ASPART 100 [IU]/ML
15 INJECTION, SOLUTION INTRAVENOUS; SUBCUTANEOUS
Status: DISCONTINUED | OUTPATIENT
Start: 2018-01-07 | End: 2018-01-07

## 2018-01-07 RX ORDER — SODIUM CHLORIDE 9 MG/ML
INJECTION, SOLUTION INTRAVENOUS
Status: COMPLETED
Start: 2018-01-07 | End: 2018-01-07

## 2018-01-07 RX ORDER — SODIUM CHLORIDE 9 MG/ML
INJECTION, SOLUTION INTRAVENOUS ONCE
Status: COMPLETED | OUTPATIENT
Start: 2018-01-07 | End: 2018-01-07

## 2018-01-07 RX ORDER — ZOLPIDEM TARTRATE 5 MG/1
10 TABLET ORAL NIGHTLY PRN
Status: DISCONTINUED | OUTPATIENT
Start: 2018-01-07 | End: 2018-01-09

## 2018-01-07 RX ORDER — DIPHENHYDRAMINE HCL 25 MG
12.5 CAPSULE ORAL ONCE
Status: DISCONTINUED | OUTPATIENT
Start: 2018-01-07 | End: 2018-01-07

## 2018-01-07 RX ORDER — ACETAMINOPHEN 325 MG/1
650 TABLET ORAL EVERY 4 HOURS PRN
Status: DISCONTINUED | OUTPATIENT
Start: 2018-01-07 | End: 2018-01-09

## 2018-01-07 RX ORDER — DEXTROSE MONOHYDRATE 25 G/50ML
50 INJECTION, SOLUTION INTRAVENOUS AS NEEDED
Status: DISCONTINUED | OUTPATIENT
Start: 2018-01-07 | End: 2018-01-09

## 2018-01-07 RX ORDER — GABAPENTIN 300 MG/1
300 CAPSULE ORAL 3 TIMES DAILY
Status: DISCONTINUED | OUTPATIENT
Start: 2018-01-07 | End: 2018-01-07

## 2018-01-07 RX ORDER — ONDANSETRON 2 MG/ML
4 INJECTION INTRAMUSCULAR; INTRAVENOUS EVERY 6 HOURS PRN
Status: DISCONTINUED | OUTPATIENT
Start: 2018-01-07 | End: 2018-01-09

## 2018-01-07 RX ORDER — CARVEDILOL 12.5 MG/1
12.5 TABLET ORAL 2 TIMES DAILY WITH MEALS
Status: DISCONTINUED | OUTPATIENT
Start: 2018-01-07 | End: 2018-01-09

## 2018-01-07 RX ORDER — OXYCODONE AND ACETAMINOPHEN 10; 325 MG/1; MG/1
1 TABLET ORAL DAILY PRN
Status: DISCONTINUED | OUTPATIENT
Start: 2018-01-07 | End: 2018-01-09

## 2018-01-07 RX ORDER — FUROSEMIDE 20 MG/1
20 TABLET ORAL DAILY
Status: DISCONTINUED | OUTPATIENT
Start: 2018-01-07 | End: 2018-01-09

## 2018-01-07 RX ORDER — HYDRALAZINE HYDROCHLORIDE 25 MG/1
25 TABLET, FILM COATED ORAL 3 TIMES DAILY
Status: DISCONTINUED | OUTPATIENT
Start: 2018-01-07 | End: 2018-01-09

## 2018-01-07 RX ORDER — LOSARTAN POTASSIUM 100 MG/1
100 TABLET ORAL DAILY
Status: DISCONTINUED | OUTPATIENT
Start: 2018-01-07 | End: 2018-01-09

## 2018-01-07 RX ORDER — SODIUM CHLORIDE 0.9 % (FLUSH) 0.9 %
10 SYRINGE (ML) INJECTION AS NEEDED
Status: DISCONTINUED | OUTPATIENT
Start: 2018-01-07 | End: 2018-01-09

## 2018-01-07 NOTE — ED PROVIDER NOTES
Patient Seen in: [facility] AND [facility] Emergency Department    History   Patient presents with:  Fatigue (constitutional, neurologic)    Stated Complaint: weakness/states may need blood transfusion     HPI    Patient is a 60-year-old female with a history of (Room air)    Current:BP (!) 187/96   Pulse 87   Temp 98 °F (36.7 °C) (Oral)   Resp 18   Wt 103.4 kg   LMP 12/31/2017   SpO2 100%   BMI 41.70 kg/m²         Physical Exam    Constitutional: Oriented to person, place, and time.  Appears well-developed and wel DIFFERENTIAL WITH PLATELET    Narrative: The following orders were created for panel order CBC WITH DIFFERENTIAL WITH PLATELET.   Procedure                               Abnormality         Status                     ---------

## 2018-01-07 NOTE — ED NOTES
Pt to ed22 from home for c/o weakness and fatigue, w/ a low hgb from [facility] on Wednesday. Pt has hx of anemia w/ blood transfusions. Pt states she has generalized body aches as well. Pt on xarelto for hx of blood clots.  Pt aox4 w/ full rom, speaking

## 2018-01-07 NOTE — PROGRESS NOTES
Therapeutic Substitution Note    Azilsartan 40mg is non-formulary and was auto-substituted to Losartan 100mg per [employer] Policy.     Carmela Aviles, 01/07/18, 11:18 AM

## 2018-01-07 NOTE — ED INITIAL ASSESSMENT (HPI)
Weakness/fatigue/light-headedness. Started on Monday. Pt has history of blood transfusion from anemia.  Pt states her hgb was 6.9 at [facility] earlier this week, but didn't get blood transfusion because she has a special needs child at home, and is a [employer]
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
## 2018-01-08 LAB
BASOPHILS # BLD: 0 K/UL (ref 0–0.2)
BASOPHILS NFR BLD: 1 %
EOSINOPHIL # BLD: 0.2 K/UL (ref 0–0.7)
EOSINOPHIL NFR BLD: 3 %
ERYTHROCYTE [DISTWIDTH] IN BLOOD BY AUTOMATED COUNT: 23.9 % (ref 11–15)
GLUCOSE BLDC GLUCOMTR-MCNC: 186 MG/DL (ref 70–99)
GLUCOSE BLDC GLUCOMTR-MCNC: 205 MG/DL (ref 70–99)
GLUCOSE BLDC GLUCOMTR-MCNC: 224 MG/DL (ref 70–99)
GLUCOSE BLDC GLUCOMTR-MCNC: 225 MG/DL (ref 70–99)
HCT VFR BLD AUTO: 25 % (ref 35–48)
HCT VFR BLD AUTO: 27.9 % (ref 35–48)
HGB BLD-MCNC: 7.5 G/DL (ref 12–16)
HGB BLD-MCNC: 8.6 G/DL (ref 12–16)
LYMPHOCYTES # BLD: 2 K/UL (ref 1–4)
LYMPHOCYTES NFR BLD: 37 %
MCH RBC QN AUTO: 19.2 PG (ref 27–32)
MCHC RBC AUTO-ENTMCNC: 30.2 G/DL (ref 32–37)
MCV RBC AUTO: 63.7 FL (ref 80–100)
MONOCYTES # BLD: 0.5 K/UL (ref 0–1)
MONOCYTES NFR BLD: 10 %
NEUTROPHILS # BLD AUTO: 2.7 K/UL (ref 1.8–7.7)
NEUTROPHILS NFR BLD: 49 %
PLATELET # BLD AUTO: 413 K/UL (ref 140–400)
PMV BLD AUTO: 8.5 FL (ref 7.4–10.3)
RBC # BLD AUTO: 3.93 M/UL (ref 3.7–5.4)
WBC # BLD AUTO: 5.5 K/UL (ref 4–11)

## 2018-01-08 PROCEDURE — 82962 GLUCOSE BLOOD TEST: CPT

## 2018-01-08 PROCEDURE — 85025 COMPLETE CBC W/AUTO DIFF WBC: CPT | Performed by: INTERNAL MEDICINE

## 2018-01-08 PROCEDURE — 85018 HEMOGLOBIN: CPT | Performed by: INTERNAL MEDICINE

## 2018-01-08 PROCEDURE — 85014 HEMATOCRIT: CPT | Performed by: INTERNAL MEDICINE

## 2018-01-08 PROCEDURE — 36430 TRANSFUSION BLD/BLD COMPNT: CPT

## 2018-01-08 RX ORDER — SODIUM CHLORIDE 9 MG/ML
INJECTION, SOLUTION INTRAVENOUS
Status: DISPENSED
Start: 2018-01-08 | End: 2018-01-09

## 2018-01-08 RX ORDER — SODIUM CHLORIDE 9 MG/ML
INJECTION, SOLUTION INTRAVENOUS ONCE
Status: COMPLETED | OUTPATIENT
Start: 2018-01-08 | End: 2018-01-08

## 2018-01-08 RX ORDER — DIPHENHYDRAMINE HCL 25 MG
25 CAPSULE ORAL EVERY 8 HOURS PRN
Status: DISCONTINUED | OUTPATIENT
Start: 2018-01-08 | End: 2018-01-09

## 2018-01-08 RX ORDER — DIPHENHYDRAMINE HYDROCHLORIDE 50 MG/ML
25 INJECTION INTRAMUSCULAR; INTRAVENOUS ONCE
Status: COMPLETED | OUTPATIENT
Start: 2018-01-08 | End: 2018-01-08

## 2018-01-08 RX ORDER — 0.9 % SODIUM CHLORIDE 0.9 %
VIAL (ML) INJECTION
Status: COMPLETED
Start: 2018-01-08 | End: 2018-01-08

## 2018-01-08 RX ORDER — SODIUM CHLORIDE 0.9 % (FLUSH) 0.9 %
10 SYRINGE (ML) INJECTION AS NEEDED
Status: DISCONTINUED | OUTPATIENT
Start: 2018-01-08 | End: 2018-01-09

## 2018-01-08 NOTE — PAYOR COMM NOTE
--------------  ADMISSION REVIEW     Payor: CHAR REYES  Subscriber #:  [id number]  Authorization Number: N/A    Admit date: 1/7/18  Admit time: 4855       Admitting Physician: Timo Kolb MD  Attending Physician:  Timo Kolb, *  Kip date: CHOLECYSTECTOMY  No date: INGUINAL HERNIA REPAIR  No date: REPAIR ROTATOR CUFF,ACUTE Left        Smoking status: Former Smoker                                                              Packs/day: 0.00      Years: 0.00      Smokeless tobacco: Never Skin is warm and dry. Psychiatric: Normal mood and affect. Behavior is normal. Judgment and thought content normal.   Nursing note and vitals reviewed.           ED Course[KV.1]     Labs Reviewed   BASIC METABOLIC PANEL (8) - Abnormal; Notable for the fol EKG    Rate, intervals and axes as noted on EKG Report. Rate:[KV.1] 84[KV. 3]  Rhythm:[KV.1] Sinus Rhythm[KV. 3]  Reading:[KV.1] normal[KV. 3]           ED Course as of Jan 07 0832  ------------------------------------------------------------[KV. 2] Date Action Dose Route User    1/7/2018 1804 Given 5 Units Subcutaneous (Left Upper Arm) Oneyda Hercules RN      Insulin Degludec-Liraglutide 100-3.6 UNIT-MG/ML SOPN 40 Units     Date Action Dose Route User    1/7/2018 2131 Given 40 Units Subcutaneous (R

## 2018-01-08 NOTE — PLAN OF CARE
Diabetes/Glucose Control    • Glucose maintained within prescribed range Progressing        Patient/Family Goals    • Patient/Family Long Term Goal Progressing    • Patient/Family Short Term Goal Progressing        Patient received one unit PRBC, awaiting

## 2018-01-08 NOTE — PLAN OF CARE
Diabetes/Glucose Control    • Glucose maintained within prescribed range Progressing        Patient/Family Goals    • Patient/Family Long Term Goal Progressing    • Patient/Family Short Term Goal Progressing        Tolerating meals, voiding freely, feeling

## 2018-01-08 NOTE — H&P
[facility] Patient Status:  Inpatient    1970 MRN [id number]   Location [facility] 4W/SW/SE Attending Gian Soares, *   Hosp Day # 1 PCP Adolfo Tijerina MD     Date:   Packs/day: 0.00      Years: 0.00      Smokeless tobacco: Never Used                      Alcohol use: Yes           0.0 oz/week     Comment: Occ    Allergies/Medications:    Allergies:   Latex                   Hives    Comment: Oral Tab Take 25 mg by mouth as needed. cholecalciferol 5000 units Oral Cap Take 25,000 Units by mouth daily. Review of Systems:   Pertinent items are noted in HPI.     Physical Exam:   Vital Signs:  Blood pressure 140/80, pulse 90, temperature hospital rather than going to several different hospitals. –Hemodynamically stable  - will give another unit of prbc    Pulmonary embolism  –Xarelto  –According patient, hematologist do not want to discontinue Xarelto.     DM 2  Hypertension  Generalized p

## 2018-01-09 VITALS
OXYGEN SATURATION: 97 % | SYSTOLIC BLOOD PRESSURE: 147 MMHG | BODY MASS INDEX: 42 KG/M2 | RESPIRATION RATE: 18 BRPM | WEIGHT: 228 LBS | TEMPERATURE: 98 F | HEART RATE: 90 BPM | DIASTOLIC BLOOD PRESSURE: 79 MMHG

## 2018-01-09 PROBLEM — D50.9 MICROCYTIC ANEMIA: Status: ACTIVE | Noted: 2018-01-09

## 2018-01-09 LAB
BLOOD TYPE BARCODE: 9500
BLOOD TYPE BARCODE: 9500
FERRITIN SERPL IA-MCNC: 10 NG/ML (ref 11–307)
GLUCOSE BLDC GLUCOMTR-MCNC: 141 MG/DL (ref 70–99)
GLUCOSE BLDC GLUCOMTR-MCNC: 155 MG/DL (ref 70–99)
IRON SATN MFR SERPL: 106 % (ref 15–50)
IRON SERPL-MCNC: 355 MCG/DL (ref 28–170)
TIBC SERPL-MCNC: 335 MCG/DL (ref 228–428)
TRANSFERRIN SERPL-MCNC: 254 MG/DL (ref 192–382)

## 2018-01-09 PROCEDURE — 82728 ASSAY OF FERRITIN: CPT | Performed by: INTERNAL MEDICINE

## 2018-01-09 PROCEDURE — 82962 GLUCOSE BLOOD TEST: CPT

## 2018-01-09 PROCEDURE — 83540 ASSAY OF IRON: CPT | Performed by: INTERNAL MEDICINE

## 2018-01-09 PROCEDURE — 84466 ASSAY OF TRANSFERRIN: CPT | Performed by: INTERNAL MEDICINE

## 2018-01-09 RX ORDER — BACLOFEN 10 MG/1
20 TABLET ORAL 2 TIMES DAILY
Status: DISCONTINUED | OUTPATIENT
Start: 2018-01-09 | End: 2018-01-09

## 2018-01-09 NOTE — PLAN OF CARE
Diabetes/Glucose Control    • Glucose maintained within prescribed range Progressing        Integumentary status not within defined limits    • Pt's integumentary status will be adequate for discharge Progressing        Patient/Family Goals    • Patient/Fa

## 2018-01-09 NOTE — CONSULTS
[facility] HOSP - [facility]    Report of Consultation    Alfonzo Gallego Patient Status:  Inpatient    1970 MRN [id number]   Location [facility] 4W/SW/SE Attending Ze Stephens, *   Hosp Day # 1 PCP Truong Rosado MD     Date of Used                        Alcohol use: Yes           0.0 oz/week       Comment:  Occ    Drug use: No            Other Topics            Concern  Caffeine Concern        No  Exercise                No    Social History Narrative    The patient uses the fol 40 Units Subcutaneous Nightly       Prescriptions Prior to Admission:  oxyCODONE-acetaminophen (PERCOCET)  MG Oral Tab Take 1 tablet by mouth daily as needed for Pain.    insulin aspart 100 UNIT/ML Subcutaneous Solution Inject 15 Units into the skin 3 systems is negative with the exception  Prolonged menstrual periods, discomfort from abdominal hernia  Physical Exam:   Blood pressure 147/79, pulse 96, temperature 97.9 °F (36.6 °C), temperature source Oral, resp.  rate 20, weight 103.4 kg (228 lb), last m Percent      13 - 45 % 2.6 (L)     Imaging:           Impression:       Anemia due to blood loss, acute  Patient is now received 2 units of packed red blood cells. She has a long history of iron deficiency anemia related to chronic blood loss.     History

## 2018-01-09 NOTE — DISCHARGE SUMMARY
[facility]    Discharge Summary    Obed Malcolm Patient Status:  Inpatient    1970 MRN [id number]   Location [facility] 4W/SW/SE Attending Christy Echevarria, *   Hosp Day # 2 PCP Sang Gallardo MD     Date of Ad left AGAINST MEDICAL ADVICE. When patient presented to ER, she was afebrile and hemodynamically stable. Lab workup indicates hemoglobin of 7.1. She received 1 unit of PRBC. Hemoglobin improved to 7.5.     Hospital Course:   Severe microcytic anemia  Sym (three) times daily. Quantity:  90 capsule  Refills:  2        CONTINUE taking these medications      Instructions Prescription details   carvedilol 12.5 MG Tabs  Commonly known as:  COREG      Take 12.5 mg by mouth 2 (two) times daily with meals.    Refi

## 2018-01-10 ENCOUNTER — TELEPHONE (OUTPATIENT)
Dept: CARDIOLOGY UNIT | Facility: HOSPITAL | Age: 48
End: 2018-01-10

## 2018-01-24 ENCOUNTER — TELEPHONE (OUTPATIENT)
Dept: NEUROLOGY | Facility: CLINIC | Age: 48
End: 2018-01-24

## 2018-01-24 ENCOUNTER — OFFICE VISIT (OUTPATIENT)
Dept: NEUROLOGY | Facility: CLINIC | Age: 48
End: 2018-01-24

## 2018-01-24 VITALS
SYSTOLIC BLOOD PRESSURE: 144 MMHG | DIASTOLIC BLOOD PRESSURE: 86 MMHG | HEIGHT: 62 IN | HEART RATE: 96 BPM | BODY MASS INDEX: 41.96 KG/M2 | WEIGHT: 228 LBS | RESPIRATION RATE: 18 BRPM

## 2018-01-24 DIAGNOSIS — M54.16 LUMBAR RADICULOPATHY: ICD-10-CM

## 2018-01-24 DIAGNOSIS — M54.41 CHRONIC BILATERAL LOW BACK PAIN WITH BILATERAL SCIATICA: ICD-10-CM

## 2018-01-24 DIAGNOSIS — D64.9 ANEMIA, UNSPECIFIED TYPE: ICD-10-CM

## 2018-01-24 DIAGNOSIS — M54.42 CHRONIC BILATERAL LOW BACK PAIN WITH BILATERAL SCIATICA: ICD-10-CM

## 2018-01-24 DIAGNOSIS — G89.29 CHRONIC BILATERAL LOW BACK PAIN WITH BILATERAL SCIATICA: ICD-10-CM

## 2018-01-24 DIAGNOSIS — M51.9 LUMBAR DISC DISEASE: Primary | ICD-10-CM

## 2018-01-24 PROCEDURE — 99214 OFFICE O/P EST MOD 30 MIN: CPT | Performed by: PHYSICAL MEDICINE & REHABILITATION

## 2018-01-24 NOTE — PATIENT INSTRUCTIONS
Refill policies:    • Allow 2 business days for refills; controlled substances may take longer.   • Contact your pharmacy at least 5 days prior to running out of medication and have them send an electronic request or submit request through the “request re your physician has recommended that you have a procedure or additional testing performed. [facility] will contact your insurance carrier to obtain pre-certification or prior authorization.     Unfortunately, RADHA has seen an increas

## 2018-01-24 NOTE — PROGRESS NOTES
Low Back Pain H & P    Chief Complaint: Patient presents with:  Low Back Pain: LOV 09/27/17 Pt stopped physical therapy unable to finish due to family issues. Pt continues to have low back pain, shotting pain into both legs.  Pt states she is having spasm i after walking a long time and sitting for awhile.      Past Medical History   Past Medical History:   Diagnosis Date   • Anemia    • Asthma    • Atrial septal defect    • Back problem    • Diabetes [facility])    • Essential hypertension    • Fibromyalgia    • Hy Gait:    Gait: bilateral antalgic gait   Sit to Stand: moderate difficulty      Lumbar Spine:    Scoliosis: No scoliosis present with increased lordosis     Lumbar Spine Palpation:    Spinous Processes: Tender at L5 and S1   Z-joints: Tender at  [employer]

## 2018-01-24 NOTE — TELEPHONE ENCOUNTER
Pt was called message was left that procedure was approved. Pt was asked to call office so procedure can be scheduled.

## 2018-01-31 NOTE — PAYOR COMM NOTE
--------------  CONTINUED STAY REVIEW    Payor: [facility] PPO  Subscriber #:  [id number]  Authorization Number: N/A    Admit date: 1/7/18  Admit time: 7923    Admitting Physician: Huy Garcia MD  Attending Physician:  No att. providers found  Primary

## 2018-02-01 NOTE — PAYOR COMM NOTE
--------------  CONTINUED STAY REVIEW    Payor: [facility] PPO  Subscriber #:  [id number]  Authorization Number: N/A    Admit date: 1/7/18  Admit time: 2023    Admitting Physician: Ada Sarabia MD  Attending Physician:  No att. providers found  Primary

## 2018-02-07 ENCOUNTER — TELEPHONE (OUTPATIENT)
Dept: NEUROLOGY | Facility: CLINIC | Age: 48
End: 2018-02-07

## 2018-02-08 NOTE — TELEPHONE ENCOUNTER
LMTCB. Pt called stated she is having increased pain resuming butrans patch, pt has been auth for TFESI but will need medical clearance.

## 2018-02-09 RX ORDER — OXYCODONE AND ACETAMINOPHEN 10; 325 MG/1; MG/1
1 TABLET ORAL DAILY PRN
Qty: 30 TABLET | Refills: 0 | Status: SHIPPED | OUTPATIENT
Start: 2018-02-09 | End: 2018-03-11

## 2018-02-09 RX ORDER — BUPRENORPHINE 15 UG/H
1 PATCH TRANSDERMAL WEEKLY
Qty: 4 PATCH | Refills: 0 | Status: SHIPPED | OUTPATIENT
Start: 2018-02-09 | End: 2018-02-16

## 2018-02-09 NOTE — TELEPHONE ENCOUNTER
Since the narcotics make her tired, she would probably not be safe driving with the Fentanyl patch. I will refill the Buntrans patch and the Percocet.

## 2018-02-09 NOTE — TELEPHONE ENCOUNTER
Spoke to patient.  has been totally off Butrans patches. Had held Percocet for few days and then restarted 4 days ago. Continues to have pain on Percocet, went to urgent care yesterday due to pain and elevated b/p.    has 2 Percocet pills left a

## 2018-02-12 RX ORDER — BUPRENORPHINE 15 UG/H
1 PATCH TRANSDERMAL WEEKLY
Qty: 4 PATCH | Refills: 0 | Status: CANCELLED | OUTPATIENT
Start: 2018-02-12 | End: 2018-02-19

## 2018-02-13 ENCOUNTER — HOSPITAL ENCOUNTER (OUTPATIENT)
Age: 48
Discharge: HOME | End: 2018-02-13
Attending: EMERGENCY MEDICINE
Payer: COMMERCIAL

## 2018-02-13 ENCOUNTER — APPOINTMENT (OUTPATIENT)
Dept: GENERAL RADIOLOGY | Age: 48
End: 2018-02-13
Attending: EMERGENCY MEDICINE
Payer: COMMERCIAL

## 2018-02-13 VITALS
HEIGHT: 62 IN | RESPIRATION RATE: 18 BRPM | WEIGHT: 228 LBS | OXYGEN SATURATION: 100 % | DIASTOLIC BLOOD PRESSURE: 86 MMHG | TEMPERATURE: 99 F | BODY MASS INDEX: 41.96 KG/M2 | HEART RATE: 98 BPM | SYSTOLIC BLOOD PRESSURE: 165 MMHG

## 2018-02-13 DIAGNOSIS — J11.1 INFLUENZA-LIKE ILLNESS: ICD-10-CM

## 2018-02-13 DIAGNOSIS — I10 HYPERTENSION, UNSPECIFIED TYPE: Primary | ICD-10-CM

## 2018-02-13 DIAGNOSIS — R53.1 WEAKNESS GENERALIZED: ICD-10-CM

## 2018-02-13 DIAGNOSIS — IMO0002 HISTORY OF LUPUS: ICD-10-CM

## 2018-02-13 DIAGNOSIS — E11.65 TYPE 2 DIABETES MELLITUS WITH HYPERGLYCEMIA, WITH LONG-TERM CURRENT USE OF INSULIN (HCC): ICD-10-CM

## 2018-02-13 DIAGNOSIS — Z79.52 CURRENT CHRONIC USE OF SYSTEMIC STEROIDS: ICD-10-CM

## 2018-02-13 DIAGNOSIS — Z79.4 TYPE 2 DIABETES MELLITUS WITH HYPERGLYCEMIA, WITH LONG-TERM CURRENT USE OF INSULIN (HCC): ICD-10-CM

## 2018-02-13 LAB
GLUCOSE BLDC GLUCOMTR-MCNC: 495 MG/DL (ref 70–99)
URINE BILIRUBIN: NEGATIVE
URINE CLARITY: CLEAR
URINE COLOR: YELLOW
URINE GLUCOSE: >=1000 MG/DL
URINE KETONES: NEGATIVE MG/DL
URINE LEUKOCYTE ESTERASE: NEGATIVE
URINE NITRITE: NEGATIVE
URINE PH: 6
URINE PROTEIN: NEGATIVE MG /DL
URINE SPECIFIC GRAVITY: 1.01
URINE UROBILINOGEN: 0.2 MG/DL

## 2018-02-13 PROCEDURE — 82962 GLUCOSE BLOOD TEST: CPT

## 2018-02-13 PROCEDURE — 99214 OFFICE O/P EST MOD 30 MIN: CPT

## 2018-02-13 PROCEDURE — 99213 OFFICE O/P EST LOW 20 MIN: CPT

## 2018-02-13 PROCEDURE — 81002 URINALYSIS NONAUTO W/O SCOPE: CPT

## 2018-02-13 PROCEDURE — 71046 X-RAY EXAM CHEST 2 VIEWS: CPT | Performed by: EMERGENCY MEDICINE

## 2018-02-13 RX ORDER — OSELTAMIVIR PHOSPHATE 75 MG/1
75 CAPSULE ORAL 2 TIMES DAILY
Qty: 10 CAPSULE | Refills: 0 | Status: SHIPPED | OUTPATIENT
Start: 2018-02-13 | End: 2018-02-18

## 2018-02-14 NOTE — ED NOTES
Pt wanting to speak to MD at this time does not want to got to ED for hyperglycemia pt requesting prescription for Tamiflu, states will go home take evening dose of insulin and drink PO fluids.  Pt reports has not been drinking water has only been drinking

## 2018-02-14 NOTE — ED INITIAL ASSESSMENT (HPI)
Per pt having cough and body aches since yesterday reports chills denies fevers. Pt states child diagnosed with flu on Saturday.

## 2018-02-14 NOTE — ED PROVIDER NOTES
Patient Seen in: [address]    History   Patient presents with:  Cough/URI    Stated Complaint: FLU    HPI    The patient is a 23-year-old female with history of lupus, asthma, diabetes, hypertension, pulmonary embolism Pulse 98   Temp 98.7 °F (37.1 °C) (Oral)   Resp 18   Ht 157.5 cm (5' 2\")   Wt 103.4 kg   LMP 02/03/2018   SpO2 100%   BMI 41.70 kg/m²         Physical Exam    Alert female who appears fatigued  HEENT: Normocephalic atraumatic  Eyes: Conjunctiva noninjecte she is at risk for this. Patient continues to refuse and states she just wants a prescription for Tamiflu and that she will get home and hydrate herself, use her insulin, and follow-up with her doctor tomorrow.        Disposition and Plan     Clinical Impr

## 2018-03-08 ENCOUNTER — TELEPHONE (OUTPATIENT)
Dept: HEMATOLOGY/ONCOLOGY | Facility: HOSPITAL | Age: 48
End: 2018-03-08

## 2018-03-08 NOTE — TELEPHONE ENCOUNTER
Spoke with Romero Fitzpatrick- she states that she did see Dr. Judy Taveras after her discharge and received one dose of feraheme and then she got the flu.  She states she would like to have all MDs in one location so she would like to see Dr. Louisa Fernandez from now on-- let her kn

## 2018-03-12 ENCOUNTER — TELEPHONE (OUTPATIENT)
Dept: HEMATOLOGY/ONCOLOGY | Facility: HOSPITAL | Age: 48
End: 2018-03-12

## 2018-04-25 RX ORDER — BUPRENORPHINE 15 UG/H
1 PATCH TRANSDERMAL WEEKLY
Qty: 4 PATCH | Refills: 0 | Status: CANCELLED | OUTPATIENT
Start: 2018-04-25 | End: 2018-05-02

## 2018-04-25 RX ORDER — OXYCODONE AND ACETAMINOPHEN 10; 325 MG/1; MG/1
1 TABLET ORAL DAILY PRN
Qty: 30 TABLET | Refills: 0 | Status: CANCELLED | OUTPATIENT
Start: 2018-04-25 | End: 2018-05-25

## 2018-04-25 NOTE — TELEPHONE ENCOUNTER
Drug Butrans 15 mcg patch #4 pend to Dr Ronald Panda. Last refill 2/12/18    Last office visit 1/24/18    Next appointment 5/2/18    [place] oxycodone  #30 pend to Dr Ronald Panda    Last refill 2/12/18    [place]

## 2018-04-25 NOTE — TELEPHONE ENCOUNTER
Spoke to patient, states she is working part time but can not always go to work due to pain. States patches are too strong when she is driving so she cuts them into pieces.  States she went to [facility] yesterday and given norco po and unknown medication

## 2018-04-26 ENCOUNTER — HOSPITAL ENCOUNTER (EMERGENCY)
Facility: HOSPITAL | Age: 48
Discharge: HOME | End: 2018-04-26
Payer: COMMERCIAL

## 2018-04-26 ENCOUNTER — APPOINTMENT (OUTPATIENT)
Dept: GENERAL RADIOLOGY | Facility: HOSPITAL | Age: 48
End: 2018-04-26
Payer: COMMERCIAL

## 2018-04-26 VITALS
SYSTOLIC BLOOD PRESSURE: 110 MMHG | DIASTOLIC BLOOD PRESSURE: 70 MMHG | WEIGHT: 220 LBS | TEMPERATURE: 98 F | HEIGHT: 62 IN | RESPIRATION RATE: 18 BRPM | BODY MASS INDEX: 40.48 KG/M2 | OXYGEN SATURATION: 97 % | HEART RATE: 98 BPM

## 2018-04-26 DIAGNOSIS — E87.6 HYPOKALEMIA: Primary | ICD-10-CM

## 2018-04-26 DIAGNOSIS — R00.2 PALPITATIONS: ICD-10-CM

## 2018-04-26 PROCEDURE — 36415 COLL VENOUS BLD VENIPUNCTURE: CPT

## 2018-04-26 PROCEDURE — 85025 COMPLETE CBC W/AUTO DIFF WBC: CPT

## 2018-04-26 PROCEDURE — 85379 FIBRIN DEGRADATION QUANT: CPT | Performed by: EMERGENCY MEDICINE

## 2018-04-26 PROCEDURE — 93010 ELECTROCARDIOGRAM REPORT: CPT | Performed by: INTERNAL MEDICINE

## 2018-04-26 PROCEDURE — 84484 ASSAY OF TROPONIN QUANT: CPT

## 2018-04-26 PROCEDURE — 80048 BASIC METABOLIC PNL TOTAL CA: CPT

## 2018-04-26 PROCEDURE — 93005 ELECTROCARDIOGRAM TRACING: CPT

## 2018-04-26 PROCEDURE — 99284 EMERGENCY DEPT VISIT MOD MDM: CPT

## 2018-04-26 RX ORDER — POTASSIUM CHLORIDE 20 MEQ/1
80 TABLET, EXTENDED RELEASE ORAL ONCE
Status: COMPLETED | OUTPATIENT
Start: 2018-04-26 | End: 2018-04-26

## 2018-04-26 RX ORDER — POTASSIUM CHLORIDE 1.5 G/1.77G
20 POWDER, FOR SOLUTION ORAL 2 TIMES DAILY
Qty: 60 PACKET | Refills: 0 | Status: SHIPPED | OUTPATIENT
Start: 2018-04-26 | End: 2018-05-26

## 2018-04-26 NOTE — TELEPHONE ENCOUNTER
She cannot be cutting the patches. They are refilled. She can get them now. Does she want a lower dose of the Butrans patch since they are too strong?

## 2018-04-27 NOTE — TELEPHONE ENCOUNTER
I will not refill the medications until I know if she wants a lower dose on the Butrans patch since they are making her tired.

## 2018-04-27 NOTE — TELEPHONE ENCOUNTER
Pt called stated she is having recurrent back pain. Pt stated that she stopped Prednisone after taking for a week cause of yeast infection. When questioned discovered that pt was taking one per day and not as Rx.  Pt told that Dr Gato Pickering could not order medic Yes

## 2018-04-27 NOTE — ED PROVIDER NOTES
Patient Seen in: [facility] AND [facility] Emergency Department    History   Patient presents with:  Arrythmia/Palpitations (cardiovascular)      HPI    Patient presents to the ED complaining of intermittent palpitations, worse with exertion and associated short Narrative    The patient uses the following assistive device(s):  single-point cane. The patient does live in a home with stairs.             ROS  Pertinent Positives: Shortness of breath, palpitations  All other organ systems are reviewed and are nega CBC WITH DIFFERENTIAL WITH PLATELET    Narrative: The following orders were created for panel order CBC WITH DIFFERENTIAL WITH PLATELET.   Procedure                               Abnormality         Status                     --------- far lateral bulging discs; Urinary incontinence; Anemia; Anemia, unspecified type; Congenital anomaly of heart; Anemia due to blood loss, acute; Atrial septal defect; Hypercoagulable state ([facility] Lymphedema of both lower extremities;  Symptomatic anemia; an

## 2018-05-01 NOTE — TELEPHONE ENCOUNTER
Spoke to patient, states she does not cut Butrans patches, uses 1 patch weekly. Has not been refilled recently due to cost.States will be able to refill now.    Patient states she cuts percocett in half when she takes it at work, unable to work or drive if

## 2018-05-02 ENCOUNTER — OFFICE VISIT (OUTPATIENT)
Dept: NEUROLOGY | Facility: CLINIC | Age: 48
End: 2018-05-02

## 2018-05-02 ENCOUNTER — TELEPHONE (OUTPATIENT)
Dept: NEUROLOGY | Facility: CLINIC | Age: 48
End: 2018-05-02

## 2018-05-02 VITALS
DIASTOLIC BLOOD PRESSURE: 70 MMHG | BODY MASS INDEX: 40.12 KG/M2 | SYSTOLIC BLOOD PRESSURE: 130 MMHG | RESPIRATION RATE: 16 BRPM | HEART RATE: 88 BPM | WEIGHT: 218 LBS | HEIGHT: 62 IN

## 2018-05-02 DIAGNOSIS — M51.9 LUMBAR DISC DISEASE: ICD-10-CM

## 2018-05-02 DIAGNOSIS — M54.41 CHRONIC BILATERAL LOW BACK PAIN WITH BILATERAL SCIATICA: ICD-10-CM

## 2018-05-02 DIAGNOSIS — G89.29 CHRONIC BILATERAL LOW BACK PAIN WITH BILATERAL SCIATICA: ICD-10-CM

## 2018-05-02 DIAGNOSIS — M54.42 CHRONIC BILATERAL LOW BACK PAIN WITH BILATERAL SCIATICA: ICD-10-CM

## 2018-05-02 DIAGNOSIS — M54.16 LUMBAR RADICULOPATHY: Primary | ICD-10-CM

## 2018-05-02 PROCEDURE — 99214 OFFICE O/P EST MOD 30 MIN: CPT | Performed by: PHYSICAL MEDICINE & REHABILITATION

## 2018-05-02 RX ORDER — BUPRENORPHINE 20 UG/H
1 PATCH TRANSDERMAL
Qty: 4 PATCH | Refills: 0 | Status: SHIPPED | OUTPATIENT
Start: 2018-05-02 | End: 2018-06-27

## 2018-05-02 RX ORDER — OXYCODONE AND ACETAMINOPHEN 10; 325 MG/1; MG/1
TABLET ORAL
Qty: 30 TABLET | Refills: 0 | Status: SHIPPED | OUTPATIENT
Start: 2018-05-02 | End: 2018-06-27

## 2018-05-02 NOTE — PROGRESS NOTES
Low Back Pain H & P    Chief Complaint: Patient presents with:  Low Back Pain: Patient presents for follow up on low back pain, last office visit 1/24/18. Pain radiates down both legs down to her feet.  Rated pain a 10/10, taking baclofen 20 mg bid, gabapen Relation Age of Onset   • Heart Disorder Father    • Hypertension Father    • Heart Disease Father    • Hypertension Mother    • Psychiatric Mother    • Heart Disease Other    • Blood Disorder Other        Social History     Social History  Social History Tibialis posterior pulse-LEFT 2+     Neurological Lower Extremity:    Light Touch Sensation: Intact in bilateral LE except:  Decreased in the  medial Left thigh  medial  Left Lower Leg  arch of the LEFT foot  lateral LEFT foot  first dorsal web space of

## 2018-05-02 NOTE — PATIENT INSTRUCTIONS
As of October 6th 2014, the Drug Enforcement Agency [facility]) is reclassifying all hydrocodone combination medications from Schedule III to Schedule II. This includes medications such as Norco, Vicodin, Lortab, Zohydro, and Vicoprofen.     What this means for y will perform bilateral L5 TFESI(s) under MAC if this is ok with her hematologist.    The patient will continue with their current pain medications, but I will increase the Butrans dose to 20 micrograms from the 15.   I will not adjust the doses until I see

## 2018-05-02 NOTE — TELEPHONE ENCOUNTER
Called [facility] for authorization of approval of bilateral L5 TFESIs cpt codes [id number], [id number]. They are closed.  Will call in the am.

## 2018-05-03 NOTE — TELEPHONE ENCOUNTER
Called [facility] for authorization of approval of bilateral L5 TFESIs. Talked to Bravo THACKERwho states no authorization is required. Reference # [id number]. Will  inform Nursing.

## 2018-05-04 NOTE — TELEPHONE ENCOUNTER
LMTCB-to schedule below injection. Patient instructed to call back if she would like to proceed. -second attempt to contact patient

## 2018-06-04 ENCOUNTER — OFFICE VISIT (OUTPATIENT)
Dept: FAMILY MEDICINE CLINIC | Facility: CLINIC | Age: 48
End: 2018-06-04

## 2018-06-04 VITALS
RESPIRATION RATE: 14 BRPM | DIASTOLIC BLOOD PRESSURE: 82 MMHG | TEMPERATURE: 98 F | SYSTOLIC BLOOD PRESSURE: 122 MMHG | HEART RATE: 82 BPM

## 2018-06-04 DIAGNOSIS — J01.00 SUBACUTE MAXILLARY SINUSITIS: Primary | ICD-10-CM

## 2018-06-04 PROCEDURE — 99202 OFFICE O/P NEW SF 15 MIN: CPT | Performed by: NURSE PRACTITIONER

## 2018-06-04 RX ORDER — METOLAZONE 5 MG/1
5 TABLET ORAL
COMMUNITY
Start: 2018-05-09

## 2018-06-04 RX ORDER — AMOXICILLIN AND CLAVULANATE POTASSIUM 875; 125 MG/1; MG/1
1 TABLET, FILM COATED ORAL 2 TIMES DAILY
Qty: 14 TABLET | Refills: 0 | Status: SHIPPED | OUTPATIENT
Start: 2018-06-04 | End: 2018-06-11

## 2018-06-04 RX ORDER — POTASSIUM CHLORIDE 20 MEQ/1
20 TABLET, EXTENDED RELEASE ORAL 3 TIMES DAILY
COMMUNITY
Start: 2018-05-11

## 2018-06-04 NOTE — PROGRESS NOTES
CHIEF COMPLAINT:   Patient presents with:  Sinusitis      HPI:   Sunitha Miller is a 56year old female who presents with URI symptoms for 4 days. Onset was quick, made worse by recent air travel. Symptoms are progressing.  Location is reported as mid face gabapentin 300 MG Oral Cap Take 1 capsule (300 mg total) by mouth 3 (three) times daily.  (Patient taking differently: Take 600 mg by mouth 2 (two) times daily.  ) Disp: 90 capsule Rfl: 2   Rivaroxaban (XARELTO) 20 MG Oral Tab Take 1 tablet (20 mg total) by REVIEW OF SYSTEMS:   GENERAL: Patient feels sick without fever. Denies chills, sweats or fatigue. SKIN: no rashes or abnormal skin lesions  HEAD: See HPI  EYES: reports stable vision, no eye symptoms.   EARS: reports mild ear pressure, but denies any heari NEURO: Motor function and sensation grossly intact bilaterally. Cranial nerves II through XII are grossly intact. ASSESSMENT AND PLAN:   Win Fitzpatrick is a 56year old female who presents with Sinusitis.  Symptoms are consistent with: Subacute maxil The sinuses are air-filled spaces within the bones of the face. They connect to the inside of the nose. Sinusitis is an inflammation of the tissue lining the sinus cavity. Sinus inflammation can occur during a cold.  It can also be due to allergies to polle · Do not use nasal rinses or irrigation during an acute sinus infection, unless told to by your health care provider. Rinsing may spread the infection to other sinuses.   · Use acetaminophen or ibuprofen to control pain, unless another pain medicine was pre

## 2018-06-27 NOTE — TELEPHONE ENCOUNTER
Medication request: Percocet 10-325mg ~0.5 tab bid, Baclofen 20mg bid and Butrans 20mcg q 7 days    LOV 5/2/18  NOV 8/22/18    [place]: Percocet 10-325mg #30 and Butrans 20mcg #4 both filled 5/3/18  Baclofen 20mg #60 r-2 last filled 2/24/17 per E

## 2018-06-28 RX ORDER — BUPRENORPHINE 20 UG/H
1 PATCH TRANSDERMAL
Qty: 4 PATCH | Refills: 0 | Status: SHIPPED | OUTPATIENT
Start: 2018-06-28 | End: 2018-08-15

## 2018-06-28 RX ORDER — BACLOFEN 20 MG/1
20 TABLET ORAL 2 TIMES DAILY
Qty: 60 TABLET | Refills: 0 | Status: SHIPPED | OUTPATIENT
Start: 2018-06-28

## 2018-06-28 RX ORDER — OXYCODONE AND ACETAMINOPHEN 10; 325 MG/1; MG/1
TABLET ORAL
Qty: 30 TABLET | Refills: 0 | Status: SHIPPED | OUTPATIENT
Start: 2018-06-28 | End: 2018-08-15

## 2018-08-15 DIAGNOSIS — M54.42 CHRONIC BILATERAL LOW BACK PAIN WITH BILATERAL SCIATICA: ICD-10-CM

## 2018-08-15 DIAGNOSIS — G89.29 CHRONIC BILATERAL LOW BACK PAIN WITH BILATERAL SCIATICA: ICD-10-CM

## 2018-08-15 DIAGNOSIS — M51.9 LUMBAR DISC DISEASE: ICD-10-CM

## 2018-08-15 DIAGNOSIS — M54.41 CHRONIC BILATERAL LOW BACK PAIN WITH BILATERAL SCIATICA: ICD-10-CM

## 2018-08-15 DIAGNOSIS — N39.41 URGE INCONTINENCE OF URINE: ICD-10-CM

## 2018-08-15 DIAGNOSIS — M54.16 LUMBAR RADICULOPATHY: Primary | ICD-10-CM

## 2018-08-15 NOTE — TELEPHONE ENCOUNTER
LMTCB - regarding urination issue. Medication refill orders have been pended. Medication request: oxyCODONE-acetaminophen (PERCOCET), #30, no refill  Take a half of a tablet 2 times a day.     [place] 7/10/18    Medication request: Keo Diaz

## 2018-08-17 RX ORDER — OXYCODONE AND ACETAMINOPHEN 10; 325 MG/1; MG/1
TABLET ORAL
Qty: 30 TABLET | Refills: 0 | Status: SHIPPED | OUTPATIENT
Start: 2018-08-17 | End: 2018-10-15

## 2018-08-17 RX ORDER — BUPRENORPHINE 20 UG/H
1 PATCH TRANSDERMAL
Qty: 4 PATCH | Refills: 0 | Status: SHIPPED | OUTPATIENT
Start: 2018-08-17 | End: 2018-10-15

## 2018-08-17 NOTE — TELEPHONE ENCOUNTER
2 Prescriptions ready for  at the [place] front office. Contacted patient who states she has been experiencing urinary incontinence with back spasms in the past 1-2 months.  Patient states she has a weakened bladder with leakage but feels inconti

## 2018-08-20 ENCOUNTER — TELEPHONE (OUTPATIENT)
Dept: NEUROLOGY | Facility: CLINIC | Age: 48
End: 2018-08-20

## 2018-08-20 NOTE — TELEPHONE ENCOUNTER
Left detailed message (per telephone preferences) with direction to get an MRI done prior to her NOV 8/22/18. Left number for central scheduling ([phone]) and told patient the order can be accessed when she arrives for MRI.

## 2018-08-20 NOTE — TELEPHONE ENCOUNTER
[employer] for authorization of approval for MRI L-spine wo. Approval was given with Authorization # [id number] effective 08/20/18 to 09/18/18. Will call Pt. To inform. Pt. Informed of approval. She will call later to schedule   appt.

## 2018-08-22 ENCOUNTER — OFFICE VISIT (OUTPATIENT)
Dept: NEUROLOGY | Facility: CLINIC | Age: 48
End: 2018-08-22
Payer: COMMERCIAL

## 2018-08-22 VITALS
DIASTOLIC BLOOD PRESSURE: 90 MMHG | HEIGHT: 62 IN | SYSTOLIC BLOOD PRESSURE: 140 MMHG | WEIGHT: 218 LBS | HEART RATE: 90 BPM | BODY MASS INDEX: 40.12 KG/M2

## 2018-08-22 DIAGNOSIS — M51.9 LUMBAR DISC DISEASE: ICD-10-CM

## 2018-08-22 DIAGNOSIS — M54.41 CHRONIC BILATERAL LOW BACK PAIN WITH BILATERAL SCIATICA: Primary | ICD-10-CM

## 2018-08-22 DIAGNOSIS — G89.29 CHRONIC BILATERAL LOW BACK PAIN WITH BILATERAL SCIATICA: Primary | ICD-10-CM

## 2018-08-22 DIAGNOSIS — D64.9 ANEMIA, UNSPECIFIED TYPE: ICD-10-CM

## 2018-08-22 DIAGNOSIS — N39.41 URGE INCONTINENCE OF URINE: ICD-10-CM

## 2018-08-22 DIAGNOSIS — M54.42 CHRONIC BILATERAL LOW BACK PAIN WITH BILATERAL SCIATICA: Primary | ICD-10-CM

## 2018-08-22 DIAGNOSIS — M54.16 LUMBAR RADICULOPATHY: ICD-10-CM

## 2018-08-22 PROCEDURE — 99214 OFFICE O/P EST MOD 30 MIN: CPT | Performed by: PHYSICAL MEDICINE & REHABILITATION

## 2018-08-22 RX ORDER — INSULIN ASPART 100 [IU]/ML
INJECTION, SOLUTION INTRAVENOUS; SUBCUTANEOUS
COMMUNITY
Start: 2018-08-18

## 2018-08-22 RX ORDER — GABAPENTIN 300 MG/1
300 CAPSULE ORAL 3 TIMES DAILY
Qty: 90 CAPSULE | Refills: 2 | Status: SHIPPED | OUTPATIENT
Start: 2018-08-22

## 2018-08-22 RX ORDER — SUVOREXANT 20 MG/1
TABLET, FILM COATED ORAL
Refills: 1 | COMMUNITY
Start: 2018-07-21

## 2018-08-22 RX ORDER — ROSUVASTATIN CALCIUM 5 MG/1
5 TABLET, COATED ORAL NIGHTLY
COMMUNITY
Start: 2018-08-18

## 2018-08-22 NOTE — PATIENT INSTRUCTIONS
As of October 6th 2014, the Drug Enforcement Agency [facility]) is reclassifying all hydrocodone combination medications from Schedule III to Schedule II. This includes medications such as Norco, Vicodin, Lortab, Zohydro, and Vicoprofen.     What this means for y will call me once she has had the MRI scan. The patient will continue with their current pain medications.     If the MRI does not show any evidence of worsening, then she will need to see a urologist.    If the MRI scan shows worsening, then she might n

## 2018-08-22 NOTE — TELEPHONE ENCOUNTER
Pt called stating that her daughter Avis Severance will be picking up her Rx. Daughter picked up, ID verified.

## 2018-08-22 NOTE — PROGRESS NOTES
Low Back Pain H & P    Chief Complaint: Patient presents with:  Low Back Pain: LOV: 05/02/18. Patient presents for a f/u for back pain. Patient states she has not done her MRI and was not aware of an MRI.  She did state that she skipped through voicemail me throbbing and muslce spasms sensation. · The pain is worse when bending, sitting, going from sitting to standing and in the morning. · The pain is better with sleeping. · There is weakness in both legs after she has been standing for a long time. kern icterus. Pupils are equal, round, and reactive to light. No redness or discharge bilaterally. Skin:  There are no rashes or lesions.     Vitals:   08/22/18  1154   BP: 140/90   Pulse: 90       Gait:    Gait: Normal gait   Sit to Stand: moderate diff need to see a urologist.    If the MRI scan shows worsening, then she might need to see as pine surgeon or try the lumbar MARYBETH's. She will continue with her current activities. She will follow up after having the MRI scan.     The patient understands a

## 2018-10-15 ENCOUNTER — TELEPHONE (OUTPATIENT)
Dept: NEUROLOGY | Facility: CLINIC | Age: 48
End: 2018-10-15

## 2018-10-15 RX ORDER — BUPRENORPHINE 20 UG/H
1 PATCH TRANSDERMAL
Qty: 4 PATCH | Refills: 0 | Status: SHIPPED | OUTPATIENT
Start: 2018-10-15 | End: 2018-11-14

## 2018-10-15 RX ORDER — OXYCODONE AND ACETAMINOPHEN 10; 325 MG/1; MG/1
TABLET ORAL
Qty: 30 TABLET | Refills: 0 | Status: SHIPPED | OUTPATIENT
Start: 2018-10-15 | End: 2018-11-14

## 2018-10-15 NOTE — TELEPHONE ENCOUNTER
Patient came to office to have work letter written on 8/22/18 signed by Dr Rito Schroeder.    Patient was given letter on 8/22/18 that did not have written signature, letter also had hand written in by medical assistant ELENA Leigh \"patient may work 5-6 hours minimal p

## 2018-10-15 NOTE — TELEPHONE ENCOUNTER
Medication request: Percocet 10-325mg daily and Butrans 20mcg q7 days    LOV 8/22/18  NOV 11/14/18    [place] both meds last filled 8[place]29/18  [place] consistently fills every 1-2 months.     Patient would like to  in [facility]

## 2018-10-26 NOTE — TELEPHONE ENCOUNTER
Pt would like to schedule MRI but approval has , would requesting MRI to be processed again, please advise

## 2018-11-06 NOTE — TELEPHONE ENCOUNTER
[employer] for authorization of approval for MRI L-spine wo. Approval was given with   New Authorization # [id number] effective 11/06/18 to 12/05/18. Will call Pt. To inform.   L/m advising of approval. Also left scheduling number so she can schedule MRI ap

## 2018-11-14 ENCOUNTER — OFFICE VISIT (OUTPATIENT)
Dept: NEUROLOGY | Facility: CLINIC | Age: 48
End: 2018-11-14
Payer: COMMERCIAL

## 2018-11-14 ENCOUNTER — TELEPHONE (OUTPATIENT)
Dept: NEUROLOGY | Facility: CLINIC | Age: 48
End: 2018-11-14

## 2018-11-14 VITALS
BODY MASS INDEX: 37.36 KG/M2 | HEIGHT: 62 IN | RESPIRATION RATE: 16 BRPM | SYSTOLIC BLOOD PRESSURE: 140 MMHG | WEIGHT: 203 LBS | HEART RATE: 84 BPM | DIASTOLIC BLOOD PRESSURE: 90 MMHG

## 2018-11-14 DIAGNOSIS — M54.41 CHRONIC BILATERAL LOW BACK PAIN WITH BILATERAL SCIATICA: ICD-10-CM

## 2018-11-14 DIAGNOSIS — D64.9 SYMPTOMATIC ANEMIA: ICD-10-CM

## 2018-11-14 DIAGNOSIS — I89.0 LYMPHEDEMA OF BOTH LOWER EXTREMITIES: ICD-10-CM

## 2018-11-14 DIAGNOSIS — D62 ANEMIA DUE TO BLOOD LOSS, ACUTE: ICD-10-CM

## 2018-11-14 DIAGNOSIS — M54.42 CHRONIC BILATERAL LOW BACK PAIN WITH BILATERAL SCIATICA: ICD-10-CM

## 2018-11-14 DIAGNOSIS — M51.9 LUMBAR DISC DISEASE: ICD-10-CM

## 2018-11-14 DIAGNOSIS — D68.59 HYPERCOAGULABLE STATE (HCC): ICD-10-CM

## 2018-11-14 DIAGNOSIS — G89.29 CHRONIC BILATERAL LOW BACK PAIN WITH BILATERAL SCIATICA: ICD-10-CM

## 2018-11-14 DIAGNOSIS — M54.16 LUMBAR RADICULOPATHY: Primary | ICD-10-CM

## 2018-11-14 PROCEDURE — 99214 OFFICE O/P EST MOD 30 MIN: CPT | Performed by: PHYSICAL MEDICINE & REHABILITATION

## 2018-11-14 RX ORDER — OXYCODONE AND ACETAMINOPHEN 10; 325 MG/1; MG/1
1 TABLET ORAL 2 TIMES DAILY
Qty: 60 TABLET | Refills: 0 | Status: SHIPPED | OUTPATIENT
Start: 2018-11-14

## 2018-11-14 RX ORDER — BUPRENORPHINE 20 UG/H
1 PATCH TRANSDERMAL
Qty: 4 PATCH | Refills: 0 | Status: SHIPPED | OUTPATIENT
Start: 2018-11-24 | End: 2018-12-22

## 2018-11-14 NOTE — PROGRESS NOTES
Chief Complaint:  Patient presents with:  Low Back Pain: Patient presents for follow up on low back pain, LOV:8/22/18. Patient states pain has worsened since last office visit, using tens unit more often.  Has MRI scheduled 11/20/18, requesting a medication Disease Other    • Blood Disorder Other        Social History   Social History    Socioeconomic History      Marital status: Single      Spouse name: Not on file      Number of children: Not on file      Years of education: Not on file      Highest educati rashes or lesions. She is pale. Lymph Nodes: The patient has no palpable submandibular, supraclavicular, and cervical lymph nodes. .    Vitals:   11/14/18  1416   BP: 140/90   Pulse: 84   Resp: 16     Hands:  Mild swelling of the bilateral hands in the her fatigue. She will continue with her current activities. She will follow up in 3 months or sooner depending on the results of the MRI scan. The patient understands and agrees with the stated plan. Clinton Hall MD  11/14/2018

## 2018-11-14 NOTE — PATIENT INSTRUCTIONS
Plan  She will get the MRI of the lumbar spine as scheduled. She will call me once she has had the imaging studies and I will review them and my office will get back in touch with the patient with any changes in the plan within 7-10 days.     She will

## 2018-11-20 ENCOUNTER — HOSPITAL ENCOUNTER (INPATIENT)
Facility: HOSPITAL | Age: 48
LOS: 3 days | Discharge: HOME | DRG: 812 | End: 2018-11-23
Attending: EMERGENCY MEDICINE | Admitting: HOSPITALIST
Payer: COMMERCIAL

## 2018-11-20 DIAGNOSIS — I26.99 OTHER PULMONARY EMBOLISM WITHOUT ACUTE COR PULMONALE, UNSPECIFIED CHRONICITY (HCC): ICD-10-CM

## 2018-11-20 DIAGNOSIS — D64.9 SYMPTOMATIC ANEMIA: Primary | ICD-10-CM

## 2018-11-20 DIAGNOSIS — D50.9 IRON DEFICIENCY ANEMIA, UNSPECIFIED IRON DEFICIENCY ANEMIA TYPE: ICD-10-CM

## 2018-11-20 DIAGNOSIS — D68.59 HYPERCOAGULABLE STATE (HCC): ICD-10-CM

## 2018-11-20 PROCEDURE — 30233N1 TRANSFUSION OF NONAUTOLOGOUS RED BLOOD CELLS INTO PERIPHERAL VEIN, PERCUTANEOUS APPROACH: ICD-10-PCS | Performed by: EMERGENCY MEDICINE

## 2018-11-20 PROCEDURE — 99222 1ST HOSP IP/OBS MODERATE 55: CPT | Performed by: HOSPITALIST

## 2018-11-20 RX ORDER — SODIUM CHLORIDE 9 MG/ML
INJECTION, SOLUTION INTRAVENOUS CONTINUOUS
Status: DISCONTINUED | OUTPATIENT
Start: 2018-11-20 | End: 2018-11-21

## 2018-11-20 RX ORDER — DEXTROSE MONOHYDRATE 25 G/50ML
50 INJECTION, SOLUTION INTRAVENOUS AS NEEDED
Status: DISCONTINUED | OUTPATIENT
Start: 2018-11-20 | End: 2018-11-23

## 2018-11-20 RX ORDER — SODIUM CHLORIDE 9 MG/ML
INJECTION, SOLUTION INTRAVENOUS
Status: COMPLETED
Start: 2018-11-20 | End: 2018-11-20

## 2018-11-20 RX ORDER — MORPHINE SULFATE 4 MG/ML
4 INJECTION, SOLUTION INTRAMUSCULAR; INTRAVENOUS ONCE
Status: COMPLETED | OUTPATIENT
Start: 2018-11-20 | End: 2018-11-20

## 2018-11-20 RX ORDER — DIPHENHYDRAMINE HYDROCHLORIDE 50 MG/ML
25 INJECTION INTRAMUSCULAR; INTRAVENOUS
Status: COMPLETED | OUTPATIENT
Start: 2018-11-21 | End: 2018-11-20

## 2018-11-20 RX ORDER — METOCLOPRAMIDE HYDROCHLORIDE 5 MG/ML
10 INJECTION INTRAMUSCULAR; INTRAVENOUS EVERY 8 HOURS PRN
Status: DISCONTINUED | OUTPATIENT
Start: 2018-11-20 | End: 2018-11-23

## 2018-11-20 RX ORDER — OXYCODONE AND ACETAMINOPHEN 10; 325 MG/1; MG/1
1 TABLET ORAL EVERY 4 HOURS PRN
Status: DISCONTINUED | OUTPATIENT
Start: 2018-11-20 | End: 2018-11-20

## 2018-11-20 RX ORDER — ONDANSETRON 2 MG/ML
4 INJECTION INTRAMUSCULAR; INTRAVENOUS EVERY 6 HOURS PRN
Status: DISCONTINUED | OUTPATIENT
Start: 2018-11-20 | End: 2018-11-23

## 2018-11-20 RX ORDER — SODIUM CHLORIDE 9 MG/ML
INJECTION, SOLUTION INTRAVENOUS ONCE
Status: COMPLETED | OUTPATIENT
Start: 2018-11-20 | End: 2018-11-20

## 2018-11-21 ENCOUNTER — APPOINTMENT (OUTPATIENT)
Dept: ULTRASOUND IMAGING | Facility: HOSPITAL | Age: 48
DRG: 812 | End: 2018-11-21
Attending: HOSPITALIST
Payer: COMMERCIAL

## 2018-11-21 ENCOUNTER — APPOINTMENT (OUTPATIENT)
Dept: CT IMAGING | Facility: HOSPITAL | Age: 48
DRG: 812 | End: 2018-11-21
Attending: HOSPITALIST
Payer: COMMERCIAL

## 2018-11-21 PROCEDURE — 93970 EXTREMITY STUDY: CPT | Performed by: HOSPITALIST

## 2018-11-21 PROCEDURE — 99223 1ST HOSP IP/OBS HIGH 75: CPT | Performed by: INTERNAL MEDICINE

## 2018-11-21 PROCEDURE — 74177 CT ABD & PELVIS W/CONTRAST: CPT | Performed by: HOSPITALIST

## 2018-11-21 PROCEDURE — 99233 SBSQ HOSP IP/OBS HIGH 50: CPT | Performed by: HOSPITALIST

## 2018-11-21 PROCEDURE — 90792 PSYCH DIAG EVAL W/MED SRVCS: CPT | Performed by: OTHER

## 2018-11-21 RX ORDER — LOSARTAN POTASSIUM 100 MG/1
100 TABLET ORAL DAILY
Status: DISCONTINUED | OUTPATIENT
Start: 2018-11-21 | End: 2018-11-23

## 2018-11-21 RX ORDER — MORPHINE SULFATE 2 MG/ML
2 INJECTION, SOLUTION INTRAMUSCULAR; INTRAVENOUS ONCE
Status: COMPLETED | OUTPATIENT
Start: 2018-11-21 | End: 2018-11-21

## 2018-11-21 RX ORDER — POTASSIUM CHLORIDE 20 MEQ/1
40 TABLET, EXTENDED RELEASE ORAL EVERY 4 HOURS
Status: DISCONTINUED | OUTPATIENT
Start: 2018-11-21 | End: 2018-11-21

## 2018-11-21 RX ORDER — OXYCODONE AND ACETAMINOPHEN 10; 325 MG/1; MG/1
1 TABLET ORAL 2 TIMES DAILY
Status: DISCONTINUED | OUTPATIENT
Start: 2018-11-21 | End: 2018-11-23

## 2018-11-21 RX ORDER — ROSUVASTATIN CALCIUM 5 MG/1
5 TABLET, COATED ORAL NIGHTLY
Status: DISCONTINUED | OUTPATIENT
Start: 2018-11-21 | End: 2018-11-23

## 2018-11-21 RX ORDER — MORPHINE SULFATE 2 MG/ML
2 INJECTION, SOLUTION INTRAMUSCULAR; INTRAVENOUS EVERY 4 HOURS PRN
Status: DISCONTINUED | OUTPATIENT
Start: 2018-11-21 | End: 2018-11-22

## 2018-11-21 RX ORDER — SODIUM CHLORIDE 9 MG/ML
INJECTION, SOLUTION INTRAVENOUS ONCE
Status: COMPLETED | OUTPATIENT
Start: 2018-11-21 | End: 2018-11-21

## 2018-11-21 RX ORDER — SODIUM CHLORIDE 0.9 % (FLUSH) 0.9 %
10 SYRINGE (ML) INJECTION AS NEEDED
Status: DISCONTINUED | OUTPATIENT
Start: 2018-11-21 | End: 2018-11-23

## 2018-11-21 RX ORDER — BUPRENORPHINE 20 UG/H
1 PATCH TRANSDERMAL
Status: DISCONTINUED | OUTPATIENT
Start: 2018-11-21 | End: 2018-11-21

## 2018-11-21 RX ORDER — DIPHENHYDRAMINE HYDROCHLORIDE 12.5 MG/5ML
50 SOLUTION ORAL 4 TIMES DAILY PRN
Status: DISCONTINUED | OUTPATIENT
Start: 2018-11-20 | End: 2018-11-23

## 2018-11-21 RX ORDER — BACLOFEN 20 MG/1
20 TABLET ORAL 2 TIMES DAILY
Status: DISCONTINUED | OUTPATIENT
Start: 2018-11-21 | End: 2018-11-23

## 2018-11-21 RX ORDER — GABAPENTIN 300 MG/1
300 CAPSULE ORAL 3 TIMES DAILY
Status: DISCONTINUED | OUTPATIENT
Start: 2018-11-21 | End: 2018-11-23

## 2018-11-21 RX ORDER — FUROSEMIDE 40 MG/1
40 TABLET ORAL DAILY
Status: DISCONTINUED | OUTPATIENT
Start: 2018-11-21 | End: 2018-11-23

## 2018-11-21 RX ORDER — ZOLPIDEM TARTRATE 10 MG/1
10 TABLET ORAL NIGHTLY PRN
Status: DISCONTINUED | OUTPATIENT
Start: 2018-11-20 | End: 2018-11-23

## 2018-11-21 RX ORDER — SODIUM CHLORIDE 9 MG/ML
INJECTION, SOLUTION INTRAVENOUS
Status: COMPLETED
Start: 2018-11-21 | End: 2018-11-21

## 2018-11-21 RX ORDER — POTASSIUM CHLORIDE 20 MEQ/1
20 TABLET, EXTENDED RELEASE ORAL 3 TIMES DAILY
Status: DISCONTINUED | OUTPATIENT
Start: 2018-11-21 | End: 2018-11-23

## 2018-11-21 RX ORDER — PANTOPRAZOLE SODIUM 40 MG/1
40 TABLET, DELAYED RELEASE ORAL
Status: DISCONTINUED | OUTPATIENT
Start: 2018-11-21 | End: 2018-11-23

## 2018-11-21 RX ORDER — FUROSEMIDE 10 MG/ML
40 INJECTION INTRAMUSCULAR; INTRAVENOUS ONCE
Status: COMPLETED | OUTPATIENT
Start: 2018-11-21 | End: 2018-11-21

## 2018-11-21 RX ORDER — BUPROPION HYDROCHLORIDE 150 MG/1
150 TABLET ORAL DAILY
Status: DISCONTINUED | OUTPATIENT
Start: 2018-11-21 | End: 2018-11-23

## 2018-11-21 RX ORDER — CARVEDILOL 25 MG/1
25 TABLET ORAL 2 TIMES DAILY WITH MEALS
Status: DISCONTINUED | OUTPATIENT
Start: 2018-11-21 | End: 2018-11-23

## 2018-11-21 RX ORDER — SODIUM CHLORIDE 9 MG/ML
INJECTION, SOLUTION INTRAVENOUS ONCE
Status: DISCONTINUED | OUTPATIENT
Start: 2018-11-21 | End: 2018-11-23

## 2018-11-21 NOTE — ED NOTES
Orders for admission, patient is aware of plan and ready to go upstairs.  Any questions, please call ED RN Alethea  at extension [phone]

## 2018-11-21 NOTE — PLAN OF CARE
Problem: Diabetes/Glucose Control  Goal: Glucose maintained within prescribed range  INTERVENTIONS:  - Monitor Blood Glucose as ordered  - Assess for signs and symptoms of hyperglycemia and hypoglycemia  - Administer ordered medications to maintain glucose Progressing      Problem: HEMATOLOGIC - ADULT  Goal: Maintains hematologic stability  INTERVENTIONS  - Assess for signs and symptoms of bleeding or hemorrhage  - Monitor labs and vital signs for trends  - Administer supportive blood products/factors, fluid

## 2018-11-21 NOTE — PROGRESS NOTES
IR asked to see patient with anemia, history of PE, DVT to discuss IVC filter placement. Explained procedure to patient including benefits and risks. Should she require hysterectomy, IVC filter can be placed prior to her surgery.

## 2018-11-21 NOTE — ED INITIAL ASSESSMENT (HPI)
Pt to ER with c/o increased generalized pain to all of body. 10/10. Pt states she is seen at Pain clinic for chronic pain \"fibromyalgia\". Pt states she also got a call from her PCP that her hgb low \"6.4\" that was drawn last week.  Pt states she just go

## 2018-11-21 NOTE — ED PROVIDER NOTES
Patient Seen in: [facility] AND [facility] Emergency Department    History   Patient presents with:  Fatigue (constitutional, neurologic)  Pain (neurologic)    Stated Complaint: abnormal blood work    HPI    The patient is a 52-year-old female with a history of Exam    Constitutional: Oriented to person, place, and time. Appears well-developed and well-nourished appears pale and slightly uncomfortable. Almeida Claude HEENT:   Head: Normocephalic and atraumatic.    Right Ear: External ear normal.   Left Ear: External ear zach Status                     ---------                               -----------         ------                     CBC W/ DIFFERENTIAL[[id number]]          Abnormal            Final result                 Please view results for these tests on the individual

## 2018-11-21 NOTE — PAYOR COMM NOTE
--------------  ADMISSION REVIEW     Payor: CHAR REYES  Subscriber #:  [id number]  Authorization Number: [id number]    Admit date: 11/20/18  Admit time: 2214         Patient Seen in: [facility] Emergency Department    History   Patient presents with: supple. Cardiovascular: Normal rate, regular rhythm, normal heart sounds and intact distal pulses. Pulmonary/Chest: Effort normal and breath sounds normal. No respiratory distress. Abdominal: Soft.  Bowel sounds are normal. Exhibits no distension and abdominal pain. No nausea or vomiting. She states that she always knows when her hemoglobin is low because she aches all over. She said she can feel it \"in her bones. \"  She was seen by her primary care physician last week and had a blood count drawn a quickly either. Again, she had no chest discomfort.     PAST MEDICAL HISTORY:  Significant for chronic anemia with history of multiple transfusions in the past; heavy menstrual periods, worse since she has been on Xarelto; asthma, which the patient is real distention. EXTREMITIES:  No clubbing, cyanosis. There is significant edema, some pitting and nonpitting, in the lower extremities, consistent with edema on top of lymphedema. Calves are very tender to palpation, as are her thighs.   I did not appreciate Xarelto, although there are no active signs of bleeding and consider CAT scan to rule out occult bleeding given her anticoagulation. We will check CT of abdomen to rule out occult bleeding. 8.   Lower extremity edema.   There is a chronic component that a

## 2018-11-21 NOTE — PROGRESS NOTES
[facility]    Progress Note    Holland Michael Patient Status:  Inpatient    1970 MRN [id number]   Location [facility] 5SW/SE Attending Maria De Jesus Gunn MD   Hosp Day # 1 PCP Ely Hills MD       Subjective:   Don Palmer 97 11/21/2018    CA 8.4 (L) 11/21/2018    ALB 3.5 06/23/2017    ALKPHO 51 06/23/2017    BILT 0.5 06/23/2017    TP 6.7 06/23/2017    AST 16 06/23/2017    ALT 12 (L) 06/23/2017    PTT 26.4 01/07/2018    INR 1.1 01/07/2018    DDIMER <0.27 04/26/2018    TROP 0 Symptomatic anemia/Acute blood loss anemia  Likely secondary to severe menstrual bleed  S/p 2 units prbcs  still symptomatic. Transfuse 1 more unit.  Repeat Hb after.      Menorrhagia  -has been advised to have hysterectomy  -with recurrent PE, will lik

## 2018-11-21 NOTE — PROGRESS NOTES
ADMISSION NOTE    53year old female with chronic anemia secondary to heavy menses. Has needed transfusions multiple times in the past presents with hemoglobin 5.8 . Available medical records partially reviewed. Dictation to follow.     Victor M Camilo

## 2018-11-21 NOTE — H&P
[facility]    PATIENT'S NAME: Bharati Torres   ATTENDING PHYSICIAN: Mabel Neville MD   PATIENT ACCOUNT#:   [de-identified]    LOCATION:  [address] [place] [address] #:   [id number]       YOB: 1970  ADMISSION DATE:       1 sciatica. This has not changed significantly for her. The patient states that her discomfort was so bad today at work that a coworker drove her to the emergency room for evaluation.   In the ER, a repeat hemoglobin was 5.8 with hematocrit of 19.2 and a pl gabapentin 300 mg 3 times a day; insulin NovoLog 15 units subcutaneous 3 times daily before meals; Victoza 1.8 units daily; metformin 1000 mg twice a day with meals; metolazone 5 mg Monday, Wednesday, and Friday; oxycodone/acetaminophen 10/325 one tablet t significant rashes. NEUROLOGIC:  The patient was awake, alert, oriented x3 with no focal neurologic deficits. PSYCHIATRIC:  Her mood and affect were pleasant and cooperative. BACK:  There was no costovertebral angle tenderness.     LABORATORY DATA:  Gluc This may simply be on the basis of high-output heart failure given her profound anemia. We will, however, rule out DVT. The patient does report compliance with Xarelto. 9.   GI prophylaxis. Protonix.   10.   The patient's current clinical status and pro

## 2018-11-21 NOTE — PROGRESS NOTES
I was asked to see this pt for heavy bleeding/symptomatic anemia requiring transfusion, on anticoagulation due to recurrent PE. I have not seen her in the office previously, but I have taken care of her other family members.  I have made an appt for this pt

## 2018-11-21 NOTE — CM/SW NOTE
CORNELL met with the pt. To provide resources. The pt. Is homeless. The pt. Stated she has been staying with family and friends and it changes typically daily. CORNELL provided housing resources for the [facility] and the pt.  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

## 2018-11-22 PROCEDURE — 99233 SBSQ HOSP IP/OBS HIGH 50: CPT | Performed by: HOSPITALIST

## 2018-11-22 PROCEDURE — 99232 SBSQ HOSP IP/OBS MODERATE 35: CPT | Performed by: INTERNAL MEDICINE

## 2018-11-22 PROCEDURE — 99233 SBSQ HOSP IP/OBS HIGH 50: CPT | Performed by: OTHER

## 2018-11-22 RX ORDER — DOCUSATE SODIUM 100 MG/1
100 CAPSULE, LIQUID FILLED ORAL 2 TIMES DAILY
Status: DISCONTINUED | OUTPATIENT
Start: 2018-11-22 | End: 2018-11-23

## 2018-11-22 RX ORDER — MAGNESIUM SULFATE HEPTAHYDRATE 40 MG/ML
2 INJECTION, SOLUTION INTRAVENOUS ONCE
Status: COMPLETED | OUTPATIENT
Start: 2018-11-22 | End: 2018-11-22

## 2018-11-22 RX ORDER — CYCLOBENZAPRINE HCL 10 MG
10 TABLET ORAL 3 TIMES DAILY PRN
Status: DISCONTINUED | OUTPATIENT
Start: 2018-11-22 | End: 2018-11-23

## 2018-11-22 RX ORDER — FUROSEMIDE 10 MG/ML
20 INJECTION INTRAMUSCULAR; INTRAVENOUS ONCE
Status: DISCONTINUED | OUTPATIENT
Start: 2018-11-22 | End: 2018-11-23

## 2018-11-22 RX ORDER — BISACODYL 10 MG
10 SUPPOSITORY, RECTAL RECTAL
Status: DISCONTINUED | OUTPATIENT
Start: 2018-11-22 | End: 2018-11-23

## 2018-11-22 RX ORDER — FUROSEMIDE 10 MG/ML
20 INJECTION INTRAMUSCULAR; INTRAVENOUS ONCE
Status: COMPLETED | OUTPATIENT
Start: 2018-11-22 | End: 2018-11-22

## 2018-11-22 RX ORDER — POTASSIUM CHLORIDE 20 MEQ/1
40 TABLET, EXTENDED RELEASE ORAL EVERY 4 HOURS
Status: COMPLETED | OUTPATIENT
Start: 2018-11-22 | End: 2018-11-22

## 2018-11-22 RX ORDER — DOCUSATE SODIUM 100 MG/1
100 CAPSULE, LIQUID FILLED ORAL 2 TIMES DAILY
Status: DISCONTINUED | OUTPATIENT
Start: 2018-11-22 | End: 2018-11-22

## 2018-11-22 RX ORDER — MORPHINE SULFATE 2 MG/ML
2 INJECTION, SOLUTION INTRAMUSCULAR; INTRAVENOUS
Status: DISCONTINUED | OUTPATIENT
Start: 2018-11-22 | End: 2018-11-23

## 2018-11-22 NOTE — PROGRESS NOTES
[place] [facility] - [facility]    Progress Note    [place] Patient Status:  Inpatient    1970 MRN [id number]   Location [facility] 5SW/SE Attending Brianna Porras # 2 PCP Santy German MD        Subjective: embolism  Continue Xarelto    Joint swelling/pain check joselo and lasix times one perhaps from fluid      obesity  BMI 38. 23.  Patient counseled encouraged diet and exercise.     Depression  -will ask psych to eval     Prophylaxis  Xarelto as per dr Nikhil Sánchez    diverticulosis. 6. Uterus with intramural and subserosal fibroids, some of which demonstrate calcific degeneration. 7. Lesser incidental findings as above. A preliminary report was issued by the [address] [place] [address] Radiology teleradiology service.  There are no donis

## 2018-11-22 NOTE — CONSULTS
[facility]    PATIENT'S NAME: Therese Syeda   ATTENDING PHYSICIAN: Darío Floyd MD   CONSULTING PHYSICIAN: Qiana Payton.  MD Elfego   PATIENT ACCOUNT#:   [id number]    LOCATION:  [address] [place] [address] Ave #:   [id number]       DATE OF BIRTH: fibromyalgia, history of cholecystectomy, inguinal hernia repair. MEDICATIONS:  Carvedilol, diphenhydramine, Lasix, insulin, losartan, morphine, Protonix, rivaroxaban, rosuvastatin. ALLERGIES:  No known drug allergies.       SOCIAL HISTORY:  She is a last episode of thrombosis was in 2014. Her first episode was provoked. She has no evidence of DVT at this time.   If she is planning to proceed with any surgical intervention, she does not necessarily need an IVC filter given she does not have any eviden

## 2018-11-22 NOTE — PLAN OF CARE
Problem: Diabetes/Glucose Control  Goal: Glucose maintained within prescribed range  INTERVENTIONS:  - Monitor Blood Glucose as ordered  - Assess for signs and symptoms of hyperglycemia and hypoglycemia  - Administer ordered medications to maintain glucose educated on the purpose of the medication.     Problem: HEMATOLOGIC - ADULT  Goal: Maintains hematologic stability  INTERVENTIONS  - Assess for signs and symptoms of bleeding or hemorrhage  - Monitor labs and vital signs for trends  - Administer supportive

## 2018-11-22 NOTE — PLAN OF CARE
Problem: Diabetes/Glucose Control  Goal: Glucose maintained within prescribed range  INTERVENTIONS:  - Monitor Blood Glucose as ordered  - Assess for signs and symptoms of hyperglycemia and hypoglycemia  - Administer ordered medications to maintain glucose HEMATOLOGIC - ADULT  Goal: Maintains hematologic stability  INTERVENTIONS  - Assess for signs and symptoms of bleeding or hemorrhage  - Monitor labs and vital signs for trends  - Administer supportive blood products/factors, fluids and medications as order complete, and accurate information to patient/family  - Incorporate patient and family knowledge, values, beliefs, and cultural backgrounds into the planning and delivery of care  - Encourage patient/family to participate in care and decision-making at the

## 2018-11-22 NOTE — CONSULTS
[facility]    Report of Consultation    [place] Michael Patient Status:  Inpatient    1970 MRN [id number]   Location [facility] 5SW/SE Attending Maria De Jesus Gunn MD   Hosp Day # 1 PCP Ely Hills MD     Date of Adm that she has been depressed and she has been crying frequently with difficulty sleeping, lack of energy, lack of motivation, poor concentration and sense of hopelessness and worthlessness.   Patient reported feeling guilty for not being there for her childr Disease Father    • Hypertension Mother    • Psychiatric Mother    • Heart Disease Other    • Blood Disorder Other        Social History  Social History    Tobacco Use      Smoking status: Former Smoker      Smokeless tobacco: Never Used    Alcohol use: Ehsan Mott liquid 15 g 15 g Oral Q15 Min PRN   Insulin Aspart Pen (NOVOLOG) 100 UNIT/ML flexpen 1-5 Units 1-5 Units Subcutaneous TID CC and HS       Medications Prior to Admission:  oxyCODONE-acetaminophen (PERCOCET)  MG Oral Tab Take 1 tablet by mouth 2 (two) Allergies    Latex                   HIVES    Comment:Pt also reports swelling      Review of Systems:     As by Admitting/Attending    Results:     Laboratory Data:  Lab Results   Component Value Date    WBC 4.4 11/21/2018    HGB 7.7 (L) 11/21/2018 major discrepancies.    Dictated by (CST): Zachariah Bonilla MD on 11/21/2018 at 9:16     Approved by (CST): Zachariah Bonilla MD on 11/21/2018 at 9:25            Vital Signs:     Blood pressure 130/69, pulse 88, temperature 98.3 °F (36.8 °C), temperature sourc Metabolic Panel (8)      CBC With Differential With Platelet      Basic Metabolic Panel (8)      Hemoglobin A1C      Potassium      Hemoglobin      Iron And Tibc      Basic Metabolic Panel (8)      CBC With Differential With Platelet      Magnesium      Ba

## 2018-11-23 VITALS
HEIGHT: 62 IN | RESPIRATION RATE: 18 BRPM | DIASTOLIC BLOOD PRESSURE: 82 MMHG | BODY MASS INDEX: 38.46 KG/M2 | WEIGHT: 209 LBS | SYSTOLIC BLOOD PRESSURE: 146 MMHG | TEMPERATURE: 98 F | HEART RATE: 86 BPM | OXYGEN SATURATION: 98 %

## 2018-11-23 DIAGNOSIS — D25.9 UTERINE LEIOMYOMA, UNSPECIFIED LOCATION: Primary | ICD-10-CM

## 2018-11-23 PROCEDURE — 99232 SBSQ HOSP IP/OBS MODERATE 35: CPT | Performed by: INTERNAL MEDICINE

## 2018-11-23 PROCEDURE — 99239 HOSP IP/OBS DSCHRG MGMT >30: CPT | Performed by: HOSPITALIST

## 2018-11-23 PROCEDURE — 99233 SBSQ HOSP IP/OBS HIGH 50: CPT | Performed by: OTHER

## 2018-11-23 RX ORDER — MAGNESIUM OXIDE 400 MG (241.3 MG MAGNESIUM) TABLET
400 TABLET ONCE
Status: COMPLETED | OUTPATIENT
Start: 2018-11-23 | End: 2018-11-23

## 2018-11-23 RX ORDER — BUPROPION HYDROCHLORIDE 150 MG/1
150 TABLET ORAL DAILY
Qty: 30 TABLET | Refills: 0 | Status: SHIPPED | OUTPATIENT
Start: 2018-11-23 | End: 2018-11-23

## 2018-11-23 RX ORDER — BUPROPION HYDROCHLORIDE 300 MG/1
300 TABLET ORAL DAILY
Status: DISCONTINUED | OUTPATIENT
Start: 2018-11-24 | End: 2018-11-23

## 2018-11-23 RX ORDER — FUROSEMIDE 10 MG/ML
20 INJECTION INTRAMUSCULAR; INTRAVENOUS ONCE
Status: COMPLETED | OUTPATIENT
Start: 2018-11-23 | End: 2018-11-23

## 2018-11-23 RX ORDER — POTASSIUM CHLORIDE 20 MEQ/1
40 TABLET, EXTENDED RELEASE ORAL EVERY 4 HOURS
Status: DISPENSED | OUTPATIENT
Start: 2018-11-23 | End: 2018-11-23

## 2018-11-23 RX ORDER — BUPROPION HYDROCHLORIDE 300 MG/1
300 TABLET ORAL DAILY
Qty: 1 TABLET | Refills: 0 | Status: SHIPPED | OUTPATIENT
Start: 2018-11-24

## 2018-11-23 NOTE — PROGRESS NOTES
[facility]    Hematology/Oncology   Progress Note    Win Fitzpatrick Patient Status:  Inpatient    1970 MRN [id number]   Location [facility] 5SW/SE Attending Brianna Porras Day # 3 PCP [address], have a similar appearance. 2. No gross imaging evidence of retroperitoneal hematoma. 3. Status post cholecystectomy with extrahepatic biliary dilatation. 4. Hepatomegaly with underlying hepatic steatosis. 5. Uncomplicated distal colonic diverticulosis. CBC and iron studies      Shy Ramírez MD

## 2018-11-23 NOTE — PROGRESS NOTES
Patient is a 53year old -American single mother female with multiple medical condition including fibromyalgia, diabetes, hypertension, asthma and anemia who has been exhibiting heavy menstrual periods and on Xarelto who presented to the hospital • Hypertension Mother    • Psychiatric Mother    • Heart Disease Other    • Blood Disorder Other        Social History  Social History    Tobacco Use      Smoking status: Former Smoker      Smokeless tobacco: Never Used    Alcohol use:  Yes      Alcohol/w HCl (REGLAN) injection 10 mg 10 mg Intravenous Q8H PRN   dextrose 50 % injection 50 mL 50 mL Intravenous PRN   Glucose-Vitamin C (DEX-4) 4-6 GM-MG chewable tab 4 tablet 4 tablet Oral Q15 Min PRN   glucose (DEX4) oral liquid 15 g 15 g Oral Q15 Min PRN   Ins times daily as needed for Itching. (Patient taking differently: Take 12.5 mg by mouth 4 (four) times daily as needed for Itching.  )   Zolpidem Tartrate 10 MG Oral Tab Take 10 mg by mouth nightly as needed for Sleep.        Allergies    Latex Uterus with intramural and subserosal fibroids, some of which demonstrate calcific degeneration. 7. Lesser incidental findings as above. A preliminary report was issued by the [address] [place] [address] Radiology teleradiology service. There are no major discrepancies. psychiatrist upon discharge. 6.  Follow-up the patient during this admission.     Orders This Visit:  Orders Placed This Encounter      CBC With Differential With Platelet      Basic Metabolic Panel (8)      CBC With Differential With Platelet      Basic M

## 2018-11-23 NOTE — DISCHARGE SUMMARY
[place] [facility] - [facility]    Discharge Summary    Paolo Interiano Patient Status:  Inpatient    1970 MRN [id number]   Location [facility] 5SW/SE Attending Nany Dwyer MD   Hosp Day # 3 PCP Bhakti Everett MD     Date of Admissio Anemia due to blood loss, acute     Atrial septal defect     Hypercoagulable state ([address]     Lymphedema of both lower extremities     Symptomatic anemia     Microcytic anemia     Other pulmonary embolism without acute cor pulmonale (HCC)     Severe episode history of diabetes, hypertension, fibromyalgia, hypercoagulable state and chronic pain syndrome for which she sees Dr. Ivan Villarreal.   She was to have a lumbar spine MRI done on November 20, but because of her pain and weakness was not able to present for the exa need to follow-up result  - lasix given for swelling (likely related to prbc given)    Obesity  BMI 38. 21.  Patient counseled encouraged diet and exercise.     Depression  -will ask psych to eval  -started wellbutrin    Prophylaxis  Xarelto as per dr Nikhil Sánchez furosemide 20 MG Tabs  Commonly known as:  LASIX      Take 40 mg by mouth daily. Refills:  0     gabapentin 300 MG Caps  Commonly known as:  NEURONTIN      Take 1 capsule (300 mg total) by mouth 3 (three) times daily.    Quantity:  90 capsule  Refills: MD  11/23/2018  8:54 AM    > 35 min

## 2018-11-23 NOTE — PLAN OF CARE
Problem: Diabetes/Glucose Control  Goal: Glucose maintained within prescribed range  INTERVENTIONS:  - Monitor Blood Glucose as ordered  - Assess for signs and symptoms of hyperglycemia and hypoglycemia  - Administer ordered medications to maintain glucose PAIN - ADULT  Goal: Verbalizes/displays adequate comfort level or patient's stated pain goal  INTERVENTIONS:  - Encourage pt to monitor pain and request assistance  - Assess pain using appropriate pain scale  - Administer analgesics based on type and sever

## 2018-11-23 NOTE — PROGRESS NOTES
Patient is a 53year old -American single mother female with multiple medical condition including fibromyalgia, diabetes, hypertension, asthma and anemia who has been exhibiting heavy menstrual periods and on Xarelto who presented to the hospital • CHOLECYSTECTOMY     • INGUINAL HERNIA REPAIR     • REPAIR ROTATOR CUFF,ACUTE Left        Family History  Family History   Problem Relation Age of Onset   • Heart Disorder Father    • Hypertension Father    • Heart Disease Father    • Hypertension Charles 0.9 % injection 10 mL 10 mL Intravenous PRN   0.9%  NaCl infusion  Intravenous Once   rivaroxaban (XARELTO) tab 10 mg 10 mg Oral Daily with food   iron sucrose (VENOFER) 400 mg in sodium chloride 0.9 % 250 mL IVPB 400 mg Intravenous Q24H   ondansetron HCl Inject 15 Units into the skin 3 (three) times daily before meals. Azilsartan Medoxomil (EDARBI) 40 MG Oral Tab Take 40 mg by mouth daily.    diphenhydrAMINE 12.5 MG/5ML Oral Elixir Take 20 mL (50 mg total) by mouth 4 (four) times daily as needed for Automatic Data may reflect ingested contents, but acute gastrointestinal hemorrhage could have a similar appearance. 2. No gross imaging evidence of retroperitoneal hematoma. 3. Status post cholecystectomy with extrahepatic biliary dilatation.   4. Hepatomegaly with und approach:     1. Focus on education and support. 2. Psychotherapy focusing on insight orientation and restructuring the negative thought. 3.  Increase Wellbutrin XL to 300 mg  every morning to improve energy and mood.   4.  Utilize Ambien as needed for in

## 2018-11-27 ENCOUNTER — OFFICE VISIT (OUTPATIENT)
Dept: OBGYN CLINIC | Facility: CLINIC | Age: 48
End: 2018-11-27
Payer: COMMERCIAL

## 2018-11-27 ENCOUNTER — HOSPITAL ENCOUNTER (OUTPATIENT)
Dept: ULTRASOUND IMAGING | Age: 48
Discharge: HOME | End: 2018-11-27
Attending: OBSTETRICS & GYNECOLOGY
Payer: COMMERCIAL

## 2018-11-27 VITALS
HEIGHT: 62 IN | WEIGHT: 225 LBS | SYSTOLIC BLOOD PRESSURE: 140 MMHG | BODY MASS INDEX: 41.41 KG/M2 | DIASTOLIC BLOOD PRESSURE: 88 MMHG

## 2018-11-27 DIAGNOSIS — N92.4 MENORRHAGIA, PREMENOPAUSAL: Primary | ICD-10-CM

## 2018-11-27 DIAGNOSIS — D25.9 UTERINE LEIOMYOMA, UNSPECIFIED LOCATION: ICD-10-CM

## 2018-11-27 PROBLEM — E66.9 OBESITY (BMI 30-39.9): Status: ACTIVE | Noted: 2018-11-27

## 2018-11-27 PROCEDURE — 76856 US EXAM PELVIC COMPLETE: CPT | Performed by: OBSTETRICS & GYNECOLOGY

## 2018-11-27 PROCEDURE — 99204 OFFICE O/P NEW MOD 45 MIN: CPT | Performed by: OBSTETRICS & GYNECOLOGY

## 2018-11-27 PROCEDURE — 76830 TRANSVAGINAL US NON-OB: CPT | Performed by: OBSTETRICS & GYNECOLOGY

## 2018-11-27 PROCEDURE — 88305 TISSUE EXAM BY PATHOLOGIST: CPT | Performed by: OBSTETRICS & GYNECOLOGY

## 2018-11-27 NOTE — PROGRESS NOTES
GYN H&P     2018  3:27 PM    CC: Patient is here for evaluation of heavy vaginal bleeding.     HPI: Patient is a 56year old  with h/o DM, CHTN, and recurrent PE on chronic Xarelto presents for evaluation of heavy vaginal bleeding with fibroid na  SEXUALLY ACTIVE: no  CONDOM USE: na  HPV VACCINE na  LAST MAMMOGRAM: 2016 @ [facility]. Has appt for FU.        Past Medical History:   Diagnosis Date   • Anemia     due to blood loss   • Asthma    • Atrial septal defect    • Chronic back pain    • Chronic con social security as     Tobacco Use      Smoking status: Former Smoker        Types: Cigarettes        Quit date: 2011        Years since quittin.3      Smokeless tobacco: Never Used    Substance and Sexual Activity      Alcohol use:  Yes appearing, no lesions, normal hair distribution   Urethral meatus appear wnl, no abnormal discharge or lesions noted.    Bladder: well supported, urethra wnl, no palpable tenderness or masses, no discharge  Vagina: normal pink mucosa, no lesions, normal jeb 1.6 x 1.7 cm. Calcified intramural fibroid in the lower uterine segment measuring 2.0 x 1.6 x 2.1 cm. Intramural fibroid in the superior fundus measuring 2.4   x 2.0 x 2.1 cm.      OVARIES AND ADNEXA:                RIGHT:              Normal appearance w

## 2018-11-29 ENCOUNTER — TELEPHONE (OUTPATIENT)
Dept: OBGYN CLINIC | Facility: CLINIC | Age: 48
End: 2018-11-29

## 2018-12-07 NOTE — TELEPHONE ENCOUNTER
Called pt. She is having heavy bleeding and is in [facility]. Recommend she see Her daughter has had her baby. Called and dw pt normal endometrial biopsy. She plans on returning to [place] [address] in 2 week.  I dw her option of submucous resection with endometrial abl

## 2020-01-25 NOTE — TELEPHONE ENCOUNTER
Contacted Nirmal at [facility] and transferred call to Dr. Rick Euceda
I spoke to Nirmal at [facility] and discussed the patient's treatment plan. She is concerned that the patient's pain is very limiting for her.
Please call [facility] tomorrow while I am in clinic and get the PT, Nirmal, so that I can talk to her about this patient.
She completed her PT eval last night. The therapist would like to speak with Dr Ivan Villarreal.
None

## 2020-09-21 NOTE — PROGRESS NOTES
[facility]    Hematology/Oncology   Progress Note    Sho Andre Patient Status:  Inpatient    1970 MRN [id number]   Location [facility] 5SW/SE Attending Brianna Porras Day # 2 PCP [address], a similar appearance. 2. No gross imaging evidence of retroperitoneal hematoma. 3. Status post cholecystectomy with extrahepatic biliary dilatation. 4. Hepatomegaly with underlying hepatic steatosis. 5. Uncomplicated distal colonic diverticulosis.   6. [FreeTextEntry1] : RIGHT Pulsatile tinnitus, likely from venous etiology.\par \par We discussed possible causes of pulsatile tinnitus. These include:\par ENT causes such as semicircular canal dehiscence.\par Cardiac causes such as aortic stenosis, valve disease, heart murmur.\par Hypertension.\par Anemia.\par Thyroid disease.\par Cerebrovascular problems: arteriovenous malformation (AVM), dural arteriovenous fistula (DAVF), Venous sinus stenosis, venous aneurysm, large trans-mastoid emissary vein, carotid stenosis.\par Idiopathic Intracranial Hypertension.\par \par Will review images and decide next steps.\par \par I also recommended she sees ENT and ophthalmologist.\par \par PLAN\par [1] Will send instructions to upload scans on power [facility]\par [2] ENT\par [3] Ophthalmology to rule out papilledema \par [4] Will have a follow-up call after I review the images

## 2021-11-02 NOTE — TELEPHONE ENCOUNTER
"Chief Complaint   Patient presents with     RECHECK     Patient is here for Adrenal insufficiency follow up       BP (!) 87/54 (BP Location: Left arm, Patient Position: Fowlers, Cuff Size: Adult Small)   Pulse 118   Temp 98  F (36.7  C) (Axillary)   Resp 24   Ht 1.194 m (3' 11.01\")   Wt 21.9 kg (48 lb 4.5 oz)   SpO2 100%   BMI 15.36 kg/m      Standing Height #119.5 cm   Standing Height #2 119.7 cm   Standing Height #3 119 cm   Average Standing Height 119.4 cm        Sitting Height #1 63.5 cm   Sitting Height #2 64 cm   Sitting Height #3 64.7 cm   Average Sitting Height 64.1 cm    I have reviewed the patient's allergy and medication lists.    Mari Jones, EMT  November 2, 2021  " Called [facility] [phone] for Authorization of Approval for Bilateral L5-S1 TFESI with CPT codes 23030-09 / 95373, t/t Onur Zavaleta, stated No Authorization needed with Ref #[id number], please call patient to inform of the Approval and to schedule at [facility]

## 2022-08-30 ENCOUNTER — TELEPHONE (OUTPATIENT)
Dept: HEMATOLOGY/ONCOLOGY | Facility: HOSPITAL | Age: 52
End: 2022-08-30

## 2022-08-30 NOTE — TELEPHONE ENCOUNTER
Call received to the Call Center from patient. Patient shares she recently relocated from [place] and is seeking care for a breast cancer diagnosis. Introduced myself to patient and shared my role as RN Navigator. Patient shares she was diagnosed with left breast cancer in January of 2022 at [facility] in [place]. Patient shares she did not seek care for this new diagnosis. Patient has recently relocated to the [facility] to be with family where she has more support, and wishes to pursue care at this time. Shared with patient the need to have records of her prior work-up. Patient will need all breast imaging on disc as well as pathology report. Patient shares she has limited medical records in transit from [place]. Asked that patient contact my office when she has records, and can make arrangements for her to present to the [facility] to sign NARINDER for any additional records required for consultation. My contact information provided to patient. She acknowledged and denied questions.

## 2022-10-12 ENCOUNTER — TELEPHONE (OUTPATIENT)
Dept: HEMATOLOGY/ONCOLOGY | Facility: HOSPITAL | Age: 52
End: 2022-10-12

## 2023-01-29 ENCOUNTER — HOSPITAL ENCOUNTER (OUTPATIENT)
Age: 53
Discharge: ANOTHER HOSPITAL | End: 2023-01-29
Payer: COMMERCIAL

## 2023-01-29 ENCOUNTER — HOSPITAL ENCOUNTER (EMERGENCY)
Facility: HOSPITAL | Age: 53
Discharge: HOME | End: 2023-01-29
Attending: EMERGENCY MEDICINE
Payer: COMMERCIAL

## 2023-01-29 ENCOUNTER — APPOINTMENT (OUTPATIENT)
Dept: CT IMAGING | Facility: HOSPITAL | Age: 53
End: 2023-01-29
Attending: EMERGENCY MEDICINE
Payer: COMMERCIAL

## 2023-01-29 VITALS
HEART RATE: 73 BPM | DIASTOLIC BLOOD PRESSURE: 81 MMHG | OXYGEN SATURATION: 97 % | TEMPERATURE: 99 F | SYSTOLIC BLOOD PRESSURE: 154 MMHG | WEIGHT: 205 LBS | RESPIRATION RATE: 12 BRPM | BODY MASS INDEX: 37 KG/M2

## 2023-01-29 VITALS
DIASTOLIC BLOOD PRESSURE: 106 MMHG | HEART RATE: 88 BPM | TEMPERATURE: 98 F | SYSTOLIC BLOOD PRESSURE: 182 MMHG | RESPIRATION RATE: 20 BRPM | OXYGEN SATURATION: 100 %

## 2023-01-29 DIAGNOSIS — R03.0 ELEVATED BLOOD PRESSURE READING: ICD-10-CM

## 2023-01-29 DIAGNOSIS — K59.00 CONSTIPATION, UNSPECIFIED CONSTIPATION TYPE: Primary | ICD-10-CM

## 2023-01-29 DIAGNOSIS — I10 HYPERTENSION, UNSPECIFIED TYPE: ICD-10-CM

## 2023-01-29 DIAGNOSIS — R10.9 ABDOMINAL PAIN, ACUTE: Primary | ICD-10-CM

## 2023-01-29 DIAGNOSIS — K46.9 HERNIA: ICD-10-CM

## 2023-01-29 LAB
ALBUMIN SERPL-MCNC: 3.6 G/DL (ref 3.4–5)
ALBUMIN/GLOB SERPL: 0.9 {RATIO} (ref 1–2)
ALP LIVER SERPL-CCNC: 115 U/L
ALT SERPL-CCNC: 21 U/L
ANION GAP SERPL CALC-SCNC: 3 MMOL/L (ref 0–18)
AST SERPL-CCNC: 13 U/L (ref 15–37)
BASOPHILS # BLD AUTO: 0.05 X10(3) UL (ref 0–0.2)
BASOPHILS NFR BLD AUTO: 1 %
BILIRUB SERPL-MCNC: 0.4 MG/DL (ref 0.1–2)
BILIRUB UR QL: NEGATIVE
BUN BLD-MCNC: 8 MG/DL (ref 7–18)
BUN/CREAT SERPL: 10.7 (ref 10–20)
CALCIUM BLD-MCNC: 9.5 MG/DL (ref 8.5–10.1)
CHLORIDE SERPL-SCNC: 100 MMOL/L (ref 98–112)
CO2 SERPL-SCNC: 31 MMOL/L (ref 21–32)
COLOR UR: YELLOW
CREAT BLD-MCNC: 0.75 MG/DL
DEPRECATED RDW RBC AUTO: 41.3 FL (ref 35.1–46.3)
EOSINOPHIL # BLD AUTO: 0.2 X10(3) UL (ref 0–0.7)
EOSINOPHIL NFR BLD AUTO: 3.8 %
ERYTHROCYTE [DISTWIDTH] IN BLOOD BY AUTOMATED COUNT: 13.5 % (ref 11–15)
GFR SERPLBLD BASED ON 1.73 SQ M-ARVRAT: 96 ML/MIN/1.73M2 (ref 60–?)
GLOBULIN PLAS-MCNC: 4.2 G/DL (ref 2.8–4.4)
GLUCOSE BLD-MCNC: 283 MG/DL (ref 70–99)
GLUCOSE UR-MCNC: 500 MG/DL
HCT VFR BLD AUTO: 41.9 %
HGB BLD-MCNC: 14 G/DL
HGB UR QL STRIP.AUTO: NEGATIVE
IMM GRANULOCYTES # BLD AUTO: 0.01 X10(3) UL (ref 0–1)
IMM GRANULOCYTES NFR BLD: 0.2 %
KETONES UR-MCNC: NEGATIVE MG/DL
LEUKOCYTE ESTERASE UR QL STRIP.AUTO: NEGATIVE
LIPASE SERPL-CCNC: 106 U/L (ref 73–393)
LYMPHOCYTES # BLD AUTO: 2.74 X10(3) UL (ref 1–4)
LYMPHOCYTES NFR BLD AUTO: 52.2 %
MCH RBC QN AUTO: 28 PG (ref 26–34)
MCHC RBC AUTO-ENTMCNC: 33.4 G/DL (ref 31–37)
MCV RBC AUTO: 83.8 FL
MONOCYTES # BLD AUTO: 0.42 X10(3) UL (ref 0.1–1)
MONOCYTES NFR BLD AUTO: 8 %
NEUTROPHILS # BLD AUTO: 1.83 X10 (3) UL (ref 1.5–7.7)
NEUTROPHILS # BLD AUTO: 1.83 X10(3) UL (ref 1.5–7.7)
NEUTROPHILS NFR BLD AUTO: 34.8 %
NITRITE UR QL STRIP.AUTO: NEGATIVE
OSMOLALITY SERPL CALC.SUM OF ELEC: 287 MOSM/KG (ref 275–295)
PH UR: 7.5 [PH] (ref 5–8)
PLATELET # BLD AUTO: 255 10(3)UL (ref 150–450)
POTASSIUM SERPL-SCNC: 3.1 MMOL/L (ref 3.5–5.1)
PROT SERPL-MCNC: 7.8 G/DL (ref 6.4–8.2)
RBC # BLD AUTO: 5 X10(6)UL
SODIUM SERPL-SCNC: 134 MMOL/L (ref 136–145)
SP GR UR STRIP: 1.02 (ref 1–1.03)
UROBILINOGEN UR STRIP-ACNC: 0.2
WBC # BLD AUTO: 5.3 X10(3) UL (ref 4–11)

## 2023-01-29 PROCEDURE — 99284 EMERGENCY DEPT VISIT MOD MDM: CPT

## 2023-01-29 PROCEDURE — 81015 MICROSCOPIC EXAM OF URINE: CPT | Performed by: EMERGENCY MEDICINE

## 2023-01-29 PROCEDURE — 99215 OFFICE O/P EST HI 40 MIN: CPT | Performed by: NURSE PRACTITIONER

## 2023-01-29 PROCEDURE — 93005 ELECTROCARDIOGRAM TRACING: CPT

## 2023-01-29 PROCEDURE — 85025 COMPLETE CBC W/AUTO DIFF WBC: CPT | Performed by: EMERGENCY MEDICINE

## 2023-01-29 PROCEDURE — 99285 EMERGENCY DEPT VISIT HI MDM: CPT

## 2023-01-29 PROCEDURE — 80053 COMPREHEN METABOLIC PANEL: CPT | Performed by: EMERGENCY MEDICINE

## 2023-01-29 PROCEDURE — 96374 THER/PROPH/DIAG INJ IV PUSH: CPT

## 2023-01-29 PROCEDURE — 81001 URINALYSIS AUTO W/SCOPE: CPT | Performed by: EMERGENCY MEDICINE

## 2023-01-29 PROCEDURE — 96375 TX/PRO/DX INJ NEW DRUG ADDON: CPT

## 2023-01-29 PROCEDURE — 83690 ASSAY OF LIPASE: CPT | Performed by: EMERGENCY MEDICINE

## 2023-01-29 PROCEDURE — 93010 ELECTROCARDIOGRAM REPORT: CPT

## 2023-01-29 PROCEDURE — 74177 CT ABD & PELVIS W/CONTRAST: CPT | Performed by: EMERGENCY MEDICINE

## 2023-01-29 RX ORDER — MORPHINE SULFATE 4 MG/ML
4 INJECTION, SOLUTION INTRAMUSCULAR; INTRAVENOUS ONCE
Status: COMPLETED | OUTPATIENT
Start: 2023-01-29 | End: 2023-01-29

## 2023-01-29 RX ORDER — HALOPERIDOL 5 MG/ML
5 INJECTION INTRAMUSCULAR ONCE
Status: COMPLETED | OUTPATIENT
Start: 2023-01-29 | End: 2023-01-29

## 2023-01-29 RX ORDER — HYDROCODONE BITARTRATE AND ACETAMINOPHEN 5; 325 MG/1; MG/1
1 TABLET ORAL EVERY 6 HOURS PRN
Qty: 10 TABLET | Refills: 0 | Status: SHIPPED | OUTPATIENT
Start: 2023-01-29 | End: 2023-02-05

## 2023-01-29 RX ORDER — POTASSIUM CHLORIDE 20 MEQ/1
40 TABLET, EXTENDED RELEASE ORAL ONCE
Status: COMPLETED | OUTPATIENT
Start: 2023-01-29 | End: 2023-01-29

## 2023-01-29 RX ORDER — ONDANSETRON 2 MG/ML
4 INJECTION INTRAMUSCULAR; INTRAVENOUS ONCE
Status: COMPLETED | OUTPATIENT
Start: 2023-01-29 | End: 2023-01-29

## 2023-01-29 RX ORDER — DIPHENHYDRAMINE HYDROCHLORIDE 50 MG/ML
25 INJECTION INTRAMUSCULAR; INTRAVENOUS ONCE
Status: COMPLETED | OUTPATIENT
Start: 2023-01-29 | End: 2023-01-29

## 2023-01-29 NOTE — ED INITIAL ASSESSMENT (HPI)
Pt came in due to upper left side abdominal pain radiating to left side and back for the past few days. Pt stated her pain became severe this morning. Pt denies any fall or injury. Pt stated she has a hard time moving or walking due to pain. Pt has hx of fibroids and hernia repair. Pt c/o of nausea. Pt stated she ahs hx of kidney disease. Pt stated she recently got diagnosed with breast CA. Pt denies any sob, cp, VD, ha, or any dizziness. Provider informed.

## 2023-01-29 NOTE — DISCHARGE INSTRUCTIONS
Take 1-2 caps of MiraLAX daily until you have a soft, regular bowel movement. Take Colace daily as directed on the package. See primary care and surgery for follow-up appointment. Return to the ER if you develop worsening symptoms, vomiting, or any emergent concerns.

## 2023-01-29 NOTE — ED INITIAL ASSESSMENT (HPI)
Patient with c/o abdominal pain, +bloating, back pain, since the 19th. Hx hernia. +nausea and constipation.

## 2023-01-30 LAB
ATRIAL RATE: 76 BPM
P AXIS: 42 DEGREES
P-R INTERVAL: 172 MS
Q-T INTERVAL: 414 MS
QRS DURATION: 86 MS
QTC CALCULATION (BEZET): 465 MS
R AXIS: -6 DEGREES
T AXIS: 18 DEGREES
VENTRICULAR RATE: 76 BPM

## 2023-02-20 ENCOUNTER — TELEPHONE (OUTPATIENT)
Dept: ENDOCRINOLOGY CLINIC | Facility: CLINIC | Age: 53
End: 2023-02-20

## 2023-02-20 NOTE — TELEPHONE ENCOUNTER
Pt was released from hospital and needs to be seen within a week for diabetes with Endo. The schedule is booked out.  Please follow up

## 2023-02-20 NOTE — TELEPHONE ENCOUNTER
Spoke to patient to offer apt in OP with KODAK Vicente - patient scheduled 3/7/23 with [facility]

## 2023-02-20 NOTE — TELEPHONE ENCOUNTER
Dr. Jamshid Ordaz,  Please advise if ok to schedule with [facility] - she has opening on 3/1 in [facility] and 3/7 in OP (patient lives in [address])  Thanks

## 2023-02-28 ENCOUNTER — OFFICE VISIT (OUTPATIENT)
Dept: SURGERY | Facility: CLINIC | Age: 53
End: 2023-02-28

## 2023-02-28 VITALS — BODY MASS INDEX: 36.25 KG/M2 | HEIGHT: 62 IN | WEIGHT: 197 LBS

## 2023-02-28 DIAGNOSIS — K43.9 VENTRAL HERNIA WITHOUT OBSTRUCTION OR GANGRENE: Primary | ICD-10-CM

## 2023-02-28 PROCEDURE — 99205 OFFICE O/P NEW HI 60 MIN: CPT | Performed by: SURGERY

## 2023-02-28 PROCEDURE — 3008F BODY MASS INDEX DOCD: CPT | Performed by: SURGERY

## 2023-02-28 RX ORDER — TRAMADOL HYDROCHLORIDE 50 MG/1
50 TABLET ORAL EVERY 6 HOURS PRN
Qty: 20 TABLET | Refills: 0 | Status: SHIPPED | OUTPATIENT
Start: 2023-02-28

## 2023-02-28 NOTE — PATIENT INSTRUCTIONS
Stop xarelto 2 days before surgery. Contact hematology Dr Reina Knows to ask any special prep before surgery [phone]    Plan laparoscopic repair of ventral hernia with mesh at [address] your preoperative testing. Prescription for tramadol. MiraLAX for constipation.

## 2023-03-07 ENCOUNTER — OFFICE VISIT (OUTPATIENT)
Dept: ENDOCRINOLOGY CLINIC | Facility: CLINIC | Age: 53
End: 2023-03-07

## 2023-03-07 ENCOUNTER — OFFICE VISIT (OUTPATIENT)
Dept: OBGYN CLINIC | Facility: CLINIC | Age: 53
End: 2023-03-07
Payer: COMMERCIAL

## 2023-03-07 VITALS
BODY MASS INDEX: 39.93 KG/M2 | WEIGHT: 217 LBS | SYSTOLIC BLOOD PRESSURE: 160 MMHG | HEIGHT: 62 IN | DIASTOLIC BLOOD PRESSURE: 102 MMHG

## 2023-03-07 VITALS
HEART RATE: 92 BPM | BODY MASS INDEX: 40 KG/M2 | DIASTOLIC BLOOD PRESSURE: 95 MMHG | SYSTOLIC BLOOD PRESSURE: 163 MMHG | WEIGHT: 217 LBS

## 2023-03-07 DIAGNOSIS — Z01.419 ENCOUNTER FOR GYNECOLOGICAL EXAMINATION WITHOUT ABNORMAL FINDING: Primary | ICD-10-CM

## 2023-03-07 DIAGNOSIS — Z12.11 COLON CANCER SCREENING: ICD-10-CM

## 2023-03-07 DIAGNOSIS — E11.65 UNCONTROLLED TYPE 2 DIABETES MELLITUS WITH HYPERGLYCEMIA (HCC): Primary | ICD-10-CM

## 2023-03-07 DIAGNOSIS — N63.11 MASS OF UPPER OUTER QUADRANT OF RIGHT BREAST: ICD-10-CM

## 2023-03-07 DIAGNOSIS — F32.2 CURRENT SEVERE EPISODE OF MAJOR DEPRESSIVE DISORDER WITHOUT PSYCHOTIC FEATURES, UNSPECIFIED WHETHER RECURRENT (HCC): ICD-10-CM

## 2023-03-07 LAB
CARTRIDGE LOT#: ABNORMAL NUMERIC
GLUCOSE BLOOD: 399
HEMOGLOBIN A1C: 13.4 % (ref 4.3–5.6)
TEST STRIP LOT #: NORMAL NUMERIC

## 2023-03-07 PROCEDURE — 3080F DIAST BP >= 90 MM HG: CPT

## 2023-03-07 PROCEDURE — 3077F SYST BP >= 140 MM HG: CPT

## 2023-03-07 PROCEDURE — 3077F SYST BP >= 140 MM HG: CPT | Performed by: OBSTETRICS & GYNECOLOGY

## 2023-03-07 PROCEDURE — 82947 ASSAY GLUCOSE BLOOD QUANT: CPT

## 2023-03-07 PROCEDURE — 87106 FUNGI IDENTIFICATION YEAST: CPT | Performed by: OBSTETRICS & GYNECOLOGY

## 2023-03-07 PROCEDURE — 87808 TRICHOMONAS ASSAY W/OPTIC: CPT | Performed by: OBSTETRICS & GYNECOLOGY

## 2023-03-07 PROCEDURE — 3046F HEMOGLOBIN A1C LEVEL >9.0%: CPT

## 2023-03-07 PROCEDURE — 3008F BODY MASS INDEX DOCD: CPT | Performed by: OBSTETRICS & GYNECOLOGY

## 2023-03-07 PROCEDURE — 3080F DIAST BP >= 90 MM HG: CPT | Performed by: OBSTETRICS & GYNECOLOGY

## 2023-03-07 PROCEDURE — 99243 OFF/OP CNSLTJ NEW/EST LOW 30: CPT

## 2023-03-07 PROCEDURE — 87491 CHLMYD TRACH DNA AMP PROBE: CPT | Performed by: OBSTETRICS & GYNECOLOGY

## 2023-03-07 PROCEDURE — 83036 HEMOGLOBIN GLYCOSYLATED A1C: CPT

## 2023-03-07 PROCEDURE — 87205 SMEAR GRAM STAIN: CPT | Performed by: OBSTETRICS & GYNECOLOGY

## 2023-03-07 PROCEDURE — 87591 N.GONORRHOEAE DNA AMP PROB: CPT | Performed by: OBSTETRICS & GYNECOLOGY

## 2023-03-07 PROCEDURE — 99204 OFFICE O/P NEW MOD 45 MIN: CPT | Performed by: OBSTETRICS & GYNECOLOGY

## 2023-03-07 PROCEDURE — 99386 PREV VISIT NEW AGE 40-64: CPT | Performed by: OBSTETRICS & GYNECOLOGY

## 2023-03-07 PROCEDURE — 87624 HPV HI-RISK TYP POOLED RSLT: CPT | Performed by: OBSTETRICS & GYNECOLOGY

## 2023-03-07 RX ORDER — PREDNISONE 20 MG/1
40 TABLET ORAL DAILY
COMMUNITY
Start: 2023-03-01

## 2023-03-07 RX ORDER — LOSARTAN POTASSIUM 50 MG/1
50 TABLET ORAL DAILY
Qty: 30 TABLET | Refills: 0 | COMMUNITY
Start: 2023-03-07 | End: 2023-03-07 | Stop reason: DRUGHIGH

## 2023-03-07 RX ORDER — CELECOXIB 200 MG/1
CAPSULE ORAL
COMMUNITY
Start: 2022-10-11

## 2023-03-07 RX ORDER — INSULIN ASPART 100 [IU]/ML
15 INJECTION, SOLUTION INTRAVENOUS; SUBCUTANEOUS 3 TIMES DAILY
Qty: 30 ML | Refills: 3 | Status: SHIPPED | OUTPATIENT
Start: 2023-03-07

## 2023-03-07 RX ORDER — CHLORHEXIDINE GLUCONATE 0.12 MG/ML
RINSE ORAL
COMMUNITY
Start: 2023-02-24

## 2023-03-07 RX ORDER — PEN NEEDLE, DIABETIC 30 GX3/16"
4 NEEDLE, DISPOSABLE MISCELLANEOUS DAILY
Qty: 200 EACH | Refills: 1 | Status: SHIPPED | OUTPATIENT
Start: 2023-03-07

## 2023-03-07 RX ORDER — ZOLPIDEM TARTRATE 10 MG/1
TABLET ORAL
COMMUNITY
Start: 2023-02-13

## 2023-03-07 RX ORDER — SEMAGLUTIDE 1.34 MG/ML
0.5 INJECTION, SOLUTION SUBCUTANEOUS WEEKLY
Qty: 1 EACH | Refills: 3 | Status: SHIPPED | OUTPATIENT
Start: 2023-03-07

## 2023-03-07 RX ORDER — CARVEDILOL 25 MG/1
25 TABLET ORAL 2 TIMES DAILY WITH MEALS
Qty: 60 TABLET | Refills: 0 | COMMUNITY
Start: 2023-03-07

## 2023-03-07 RX ORDER — EMPAGLIFLOZIN 25 MG/1
1 TABLET, FILM COATED ORAL DAILY
Qty: 90 TABLET | Refills: 1 | Status: SHIPPED | OUTPATIENT
Start: 2023-03-07 | End: 2023-04-06

## 2023-03-07 RX ORDER — FUROSEMIDE 40 MG/1
40 TABLET ORAL DAILY
Qty: 30 TABLET | Refills: 0 | COMMUNITY
Start: 2023-03-07

## 2023-03-07 RX ORDER — INSULIN GLARGINE 100 [IU]/ML
22 INJECTION, SOLUTION SUBCUTANEOUS NIGHTLY
Qty: 15 ML | Refills: 0 | Status: SHIPPED | OUTPATIENT
Start: 2023-03-07

## 2023-03-07 RX ORDER — MOLNUPIRAVIR 200 MG/1
CAPSULE ORAL
COMMUNITY
Start: 2023-03-04

## 2023-03-07 RX ORDER — FLUCONAZOLE 150 MG/1
150 TABLET ORAL WEEKLY
Qty: 4 TABLET | Refills: 0 | Status: SHIPPED | OUTPATIENT
Start: 2023-03-07 | End: 2023-03-29

## 2023-03-07 RX ORDER — LOSARTAN POTASSIUM 100 MG/1
100 TABLET ORAL DAILY
Qty: 30 TABLET | Refills: 3 | Status: SHIPPED | OUTPATIENT
Start: 2023-03-07

## 2023-03-07 NOTE — PATIENT INSTRUCTIONS
There are many great primary care physicians near our office. Here is a list.     Dr. Benji England and Dr. Sherry Magana see patients in our office. Dr. Robert Medina  [address]  [phone]  [email or web address]    Fredo Julio, Dr. Wyatt Charles, Dr. Rossy Villa, Dr. Argelia Bui  [email or web address]  [phone]    [facility]  Dr. Mae Jesus, Dr. Millie Miller, Dr. Jolie Dhillon, Dr. Gaviota Borjas, Dr. Anabel aRwls  [email or web address]  [phone]    My  is an internal medicine doctor in [facility].    oJseph Alicea MD  [phone]

## 2023-03-07 NOTE — PATIENT INSTRUCTIONS
Increase Basaglar to 22 units subcutaneous daily     Continue current dose of Novolog    Stop victoza    Start Ozempic- 0.5mg subcutaneous weekly     Start Jardiance 25mg by mouth once daily with breakfast    Continue Metformin     Start Diflucan 150mg once weekly to prevent yeast infection

## 2023-03-08 LAB
C TRACH DNA SPEC QL NAA+PROBE: NEGATIVE
HPV I/H RISK 1 DNA SPEC QL NAA+PROBE: NEGATIVE
N GONORRHOEA DNA SPEC QL NAA+PROBE: NEGATIVE

## 2023-03-09 LAB
GENITAL VAGINOSIS SCREEN: NEGATIVE
TRICHOMONAS SCREEN: NEGATIVE

## 2023-03-14 ENCOUNTER — OFFICE VISIT (OUTPATIENT)
Dept: HEMATOLOGY/ONCOLOGY | Facility: HOSPITAL | Age: 53
End: 2023-03-14
Attending: STUDENT IN AN ORGANIZED HEALTH CARE EDUCATION/TRAINING PROGRAM
Payer: COMMERCIAL

## 2023-03-14 VITALS
TEMPERATURE: 98 F | BODY MASS INDEX: 40 KG/M2 | HEART RATE: 101 BPM | WEIGHT: 216.63 LBS | DIASTOLIC BLOOD PRESSURE: 83 MMHG | OXYGEN SATURATION: 98 % | RESPIRATION RATE: 18 BRPM | SYSTOLIC BLOOD PRESSURE: 155 MMHG

## 2023-03-14 DIAGNOSIS — Z79.01 ANTICOAGULATION MANAGEMENT ENCOUNTER: ICD-10-CM

## 2023-03-14 DIAGNOSIS — Z51.81 ANTICOAGULATION MANAGEMENT ENCOUNTER: ICD-10-CM

## 2023-03-14 DIAGNOSIS — I26.99 RECURRENT PULMONARY EMBOLISM (HCC): Primary | ICD-10-CM

## 2023-03-14 PROCEDURE — 99244 OFF/OP CNSLTJ NEW/EST MOD 40: CPT | Performed by: STUDENT IN AN ORGANIZED HEALTH CARE EDUCATION/TRAINING PROGRAM

## 2023-03-16 ENCOUNTER — TELEPHONE (OUTPATIENT)
Dept: SURGERY | Facility: CLINIC | Age: 53
End: 2023-03-16

## 2023-03-16 ENCOUNTER — TELEPHONE (OUTPATIENT)
Dept: ENDOCRINOLOGY CLINIC | Facility: CLINIC | Age: 53
End: 2023-03-16

## 2023-03-16 NOTE — TELEPHONE ENCOUNTER
Merlyn Kaur PA department-   Mamie Shea over the phone-- PA approved by pharmacist Patti Valdes with A1C of 13.4% while patient is currently on Metformin   Approval 2/14/23- 3/14/23    Freestyle chayito 2 sensor- PA is not required. Called patient, no answer, left   Staff: relay message regarding Nathanael Alicea sensors update listed above and to follow up with pharmacy          .

## 2023-03-16 NOTE — TELEPHONE ENCOUNTER
Patient requesting to speak with nurse regarding upcoming procedure on 3/31. Please call at [phone].

## 2023-03-16 NOTE — TELEPHONE ENCOUNTER
Patient indicates she contacted [facility] and they show no record for script rx Jardiance or rx Blood Gluc Sensor. Please call to update patient at [phone],CNML.

## 2023-03-17 ENCOUNTER — LAB ENCOUNTER (OUTPATIENT)
Dept: LAB | Age: 53
End: 2023-03-17
Attending: OBSTETRICS & GYNECOLOGY
Payer: COMMERCIAL

## 2023-03-17 ENCOUNTER — HOSPITAL ENCOUNTER (OUTPATIENT)
Dept: MAMMOGRAPHY | Age: 53
Discharge: HOME | End: 2023-03-17
Attending: OBSTETRICS & GYNECOLOGY
Payer: COMMERCIAL

## 2023-03-17 ENCOUNTER — EKG ENCOUNTER (OUTPATIENT)
Dept: LAB | Age: 53
End: 2023-03-17
Attending: OBSTETRICS & GYNECOLOGY
Payer: COMMERCIAL

## 2023-03-17 DIAGNOSIS — N63.11 MASS OF UPPER OUTER QUADRANT OF RIGHT BREAST: ICD-10-CM

## 2023-03-17 DIAGNOSIS — E11.65 UNCONTROLLED TYPE 2 DIABETES MELLITUS WITH HYPERGLYCEMIA (HCC): ICD-10-CM

## 2023-03-17 DIAGNOSIS — K43.9 VENTRAL HERNIA WITHOUT OBSTRUCTION OR GANGRENE: ICD-10-CM

## 2023-03-17 LAB
ALBUMIN SERPL-MCNC: 3.4 G/DL (ref 3.4–5)
ALBUMIN/GLOB SERPL: 0.9 {RATIO} (ref 1–2)
ALP LIVER SERPL-CCNC: 110 U/L
ALT SERPL-CCNC: 19 U/L
ANION GAP SERPL CALC-SCNC: 7 MMOL/L (ref 0–18)
AST SERPL-CCNC: 16 U/L (ref 15–37)
ATRIAL RATE: 98 BPM
BASOPHILS # BLD AUTO: 0.03 X10(3) UL (ref 0–0.2)
BASOPHILS NFR BLD AUTO: 0.5 %
BILIRUB SERPL-MCNC: 0.6 MG/DL (ref 0.1–2)
BUN BLD-MCNC: 9 MG/DL (ref 7–18)
BUN/CREAT SERPL: 11.5 (ref 10–20)
CALCIUM BLD-MCNC: 9.1 MG/DL (ref 8.5–10.1)
CHLORIDE SERPL-SCNC: 104 MMOL/L (ref 98–112)
CHOLEST SERPL-MCNC: 258 MG/DL (ref ?–200)
CO2 SERPL-SCNC: 31 MMOL/L (ref 21–32)
CREAT BLD-MCNC: 0.78 MG/DL
CREAT UR-SCNC: 148 MG/DL
DEPRECATED RDW RBC AUTO: 42.5 FL (ref 35.1–46.3)
EOSINOPHIL # BLD AUTO: 0.14 X10(3) UL (ref 0–0.7)
EOSINOPHIL NFR BLD AUTO: 2.3 %
ERYTHROCYTE [DISTWIDTH] IN BLOOD BY AUTOMATED COUNT: 13.3 % (ref 11–15)
FASTING PATIENT LIPID ANSWER: NO
FASTING STATUS PATIENT QL REPORTED: NO
GFR SERPLBLD BASED ON 1.73 SQ M-ARVRAT: 92 ML/MIN/1.73M2 (ref 60–?)
GLOBULIN PLAS-MCNC: 3.9 G/DL (ref 2.8–4.4)
GLUCOSE BLD-MCNC: 192 MG/DL (ref 70–99)
HCT VFR BLD AUTO: 41.3 %
HDLC SERPL-MCNC: 44 MG/DL (ref 40–59)
HGB BLD-MCNC: 13.7 G/DL
IMM GRANULOCYTES # BLD AUTO: 0.01 X10(3) UL (ref 0–1)
IMM GRANULOCYTES NFR BLD: 0.2 %
LDLC SERPL CALC-MCNC: 149 MG/DL (ref ?–100)
LYMPHOCYTES # BLD AUTO: 3.12 X10(3) UL (ref 1–4)
LYMPHOCYTES NFR BLD AUTO: 50.8 %
MCH RBC QN AUTO: 28.8 PG (ref 26–34)
MCHC RBC AUTO-ENTMCNC: 33.2 G/DL (ref 31–37)
MCV RBC AUTO: 86.8 FL
MICROALBUMIN UR-MCNC: 9.13 MG/DL
MICROALBUMIN/CREAT 24H UR-RTO: 61.7 UG/MG (ref ?–30)
MONOCYTES # BLD AUTO: 0.47 X10(3) UL (ref 0.1–1)
MONOCYTES NFR BLD AUTO: 7.7 %
NEUTROPHILS # BLD AUTO: 2.37 X10 (3) UL (ref 1.5–7.7)
NEUTROPHILS # BLD AUTO: 2.37 X10(3) UL (ref 1.5–7.7)
NEUTROPHILS NFR BLD AUTO: 38.5 %
NONHDLC SERPL-MCNC: 214 MG/DL (ref ?–130)
OSMOLALITY SERPL CALC.SUM OF ELEC: 298 MOSM/KG (ref 275–295)
P AXIS: 55 DEGREES
P-R INTERVAL: 170 MS
PLATELET # BLD AUTO: 313 10(3)UL (ref 150–450)
POTASSIUM SERPL-SCNC: 3.5 MMOL/L (ref 3.5–5.1)
PROT SERPL-MCNC: 7.3 G/DL (ref 6.4–8.2)
Q-T INTERVAL: 370 MS
QRS DURATION: 86 MS
QTC CALCULATION (BEZET): 472 MS
R AXIS: -17 DEGREES
RBC # BLD AUTO: 4.76 X10(6)UL
SODIUM SERPL-SCNC: 142 MMOL/L (ref 136–145)
T AXIS: 20 DEGREES
TRIGL SERPL-MCNC: 349 MG/DL (ref 30–149)
VENTRICULAR RATE: 98 BPM
VLDLC SERPL CALC-MCNC: 68 MG/DL (ref 0–30)
WBC # BLD AUTO: 6.1 X10(3) UL (ref 4–11)

## 2023-03-17 PROCEDURE — 93010 ELECTROCARDIOGRAM REPORT: CPT | Performed by: INTERNAL MEDICINE

## 2023-03-17 PROCEDURE — 82043 UR ALBUMIN QUANTITATIVE: CPT

## 2023-03-17 PROCEDURE — 82570 ASSAY OF URINE CREATININE: CPT

## 2023-03-17 PROCEDURE — 3060F POS MICROALBUMINURIA REV: CPT | Performed by: FAMILY MEDICINE

## 2023-03-17 PROCEDURE — 3061F NEG MICROALBUMINURIA REV: CPT | Performed by: FAMILY MEDICINE

## 2023-03-17 PROCEDURE — 80061 LIPID PANEL: CPT

## 2023-03-17 PROCEDURE — 80053 COMPREHEN METABOLIC PANEL: CPT

## 2023-03-17 PROCEDURE — 36415 COLL VENOUS BLD VENIPUNCTURE: CPT

## 2023-03-17 PROCEDURE — 85025 COMPLETE CBC W/AUTO DIFF WBC: CPT

## 2023-03-17 PROCEDURE — 93005 ELECTROCARDIOGRAM TRACING: CPT

## 2023-03-17 NOTE — TELEPHONE ENCOUNTER
Letter written for recovery after surgery. Provider MD ANTONETTE gave me verbal permission. Letter emailed to Eliazar Loera .    CLARA

## 2023-03-17 NOTE — TELEPHONE ENCOUNTER
Received hard copy of PA from [facility] stating that authorization for Yris Cousins was approved and valid from 2/14/23 through 3/15/2024. Per TE below pt was notified. PA approval was placed in yellow scan bag.

## 2023-03-20 ENCOUNTER — TELEPHONE (OUTPATIENT)
Dept: ENDOCRINOLOGY CLINIC | Facility: CLINIC | Age: 53
End: 2023-03-20

## 2023-03-20 DIAGNOSIS — E11.65 UNCONTROLLED TYPE 2 DIABETES MELLITUS WITH HYPERGLYCEMIA (HCC): Primary | ICD-10-CM

## 2023-03-20 RX ORDER — ROSUVASTATIN CALCIUM 10 MG/1
10 TABLET, COATED ORAL NIGHTLY
Qty: 30 TABLET | Refills: 3 | Status: SHIPPED | OUTPATIENT
Start: 2023-03-20

## 2023-03-20 NOTE — TELEPHONE ENCOUNTER
Please call patient. I received her test results. Labs show normal kidney and liver function. Her urine protein test was elevated which can be the first sign that the blood sugars are affecting the kidneys. Continue to work on good blood sugar control. Cholesterol levels are very elevated. She has been taking her rosuvastatin 5mg daily? I do recommend we increase the dose to 10mg PO nightly. Please repeat lipids in 3 months.

## 2023-03-20 NOTE — TELEPHONE ENCOUNTER
Spoke to patient to relay message below - patient stated understanding and will call to schedule apt/establish care with PCP

## 2023-03-20 NOTE — TELEPHONE ENCOUNTER
Carmen Lowe and spoke to patient regarding your lab results below. Patient verbalized understanding. Patient requested if you could refill her prescription for Ambien. I told patient that you typically do not prescribe this, but I would send message. I told her to get in touch with [place] [facility] and schedule an appointment for refills on psychiatric medications.

## 2023-03-24 ENCOUNTER — TELEPHONE (OUTPATIENT)
Dept: OBGYN CLINIC | Facility: CLINIC | Age: 53
End: 2023-03-24

## 2023-03-24 DIAGNOSIS — N63.11 MASS OF UPPER OUTER QUADRANT OF RIGHT BREAST: Primary | ICD-10-CM

## 2023-03-24 NOTE — TELEPHONE ENCOUNTER
Pt contacted,  and name verified. Pt provided with lab result and msg from provider. Pt requested that new mammo order be placed and RN placed order. CS number provided. Pt verbalized understanding and agreed with POC.

## 2023-03-29 ENCOUNTER — OFFICE VISIT (OUTPATIENT)
Dept: PHYSICAL MEDICINE AND REHAB | Facility: CLINIC | Age: 53
End: 2023-03-29

## 2023-03-29 VITALS
SYSTOLIC BLOOD PRESSURE: 140 MMHG | HEIGHT: 62 IN | HEART RATE: 96 BPM | DIASTOLIC BLOOD PRESSURE: 94 MMHG | BODY MASS INDEX: 36.44 KG/M2 | WEIGHT: 198 LBS | OXYGEN SATURATION: 99 %

## 2023-03-29 DIAGNOSIS — M54.42 CHRONIC BILATERAL LOW BACK PAIN WITH BILATERAL SCIATICA: Primary | ICD-10-CM

## 2023-03-29 DIAGNOSIS — M54.41 CHRONIC BILATERAL LOW BACK PAIN WITH BILATERAL SCIATICA: Primary | ICD-10-CM

## 2023-03-29 DIAGNOSIS — G89.29 CHRONIC BILATERAL LOW BACK PAIN WITH BILATERAL SCIATICA: Primary | ICD-10-CM

## 2023-03-29 RX ORDER — CYCLOBENZAPRINE HCL 10 MG
10 TABLET ORAL NIGHTLY PRN
Qty: 30 TABLET | Refills: 0 | Status: SHIPPED | OUTPATIENT
Start: 2023-03-29

## 2023-03-29 RX ORDER — DULOXETIN HYDROCHLORIDE 60 MG/1
60 CAPSULE, DELAYED RELEASE ORAL DAILY
Qty: 30 CAPSULE | Refills: 0 | Status: SHIPPED | OUTPATIENT
Start: 2023-03-29

## 2023-03-30 ENCOUNTER — OFFICE VISIT (OUTPATIENT)
Facility: CLINIC | Age: 53
End: 2023-03-30
Payer: COMMERCIAL

## 2023-03-30 ENCOUNTER — TELEPHONE (OUTPATIENT)
Dept: HEMATOLOGY/ONCOLOGY | Facility: HOSPITAL | Age: 53
End: 2023-03-30

## 2023-03-30 VITALS
OXYGEN SATURATION: 98 % | BODY MASS INDEX: 40.65 KG/M2 | HEIGHT: 62 IN | DIASTOLIC BLOOD PRESSURE: 98 MMHG | HEART RATE: 94 BPM | WEIGHT: 220.88 LBS | SYSTOLIC BLOOD PRESSURE: 150 MMHG

## 2023-03-30 DIAGNOSIS — G89.29 OTHER CHRONIC PAIN: ICD-10-CM

## 2023-03-30 DIAGNOSIS — Z79.4 UNCONTROLLED TYPE 2 DIABETES MELLITUS WITH HYPERGLYCEMIA, WITH LONG-TERM CURRENT USE OF INSULIN (HCC): ICD-10-CM

## 2023-03-30 DIAGNOSIS — I26.99 RECURRENT PULMONARY EMBOLISM (HCC): Primary | ICD-10-CM

## 2023-03-30 DIAGNOSIS — Z86.16 HISTORY OF COVID-19: ICD-10-CM

## 2023-03-30 DIAGNOSIS — G47.00 INSOMNIA, UNSPECIFIED TYPE: Primary | ICD-10-CM

## 2023-03-30 DIAGNOSIS — F41.9 ANXIETY: ICD-10-CM

## 2023-03-30 DIAGNOSIS — F20.9 SCHIZOPHRENIA, UNSPECIFIED TYPE (HCC): ICD-10-CM

## 2023-03-30 DIAGNOSIS — G47.30 SLEEP APNEA, UNSPECIFIED TYPE: ICD-10-CM

## 2023-03-30 DIAGNOSIS — I10 ESSENTIAL HYPERTENSION: ICD-10-CM

## 2023-03-30 DIAGNOSIS — E66.01 CLASS 3 SEVERE OBESITY WITHOUT SERIOUS COMORBIDITY WITH BODY MASS INDEX (BMI) OF 40.0 TO 44.9 IN ADULT, UNSPECIFIED OBESITY TYPE (HCC): ICD-10-CM

## 2023-03-30 DIAGNOSIS — F31.60 BIPOLAR AFFECTIVE DISORDER, CURRENT EPISODE MIXED, CURRENT EPISODE SEVERITY UNSPECIFIED (HCC): ICD-10-CM

## 2023-03-30 DIAGNOSIS — E11.65 UNCONTROLLED TYPE 2 DIABETES MELLITUS WITH HYPERGLYCEMIA, WITH LONG-TERM CURRENT USE OF INSULIN (HCC): ICD-10-CM

## 2023-03-30 DIAGNOSIS — E78.5 DYSLIPIDEMIA: ICD-10-CM

## 2023-03-30 DIAGNOSIS — R41.3 MEMORY CHANGES: ICD-10-CM

## 2023-03-30 PROCEDURE — 99204 OFFICE O/P NEW MOD 45 MIN: CPT | Performed by: FAMILY MEDICINE

## 2023-03-30 PROCEDURE — 3080F DIAST BP >= 90 MM HG: CPT | Performed by: FAMILY MEDICINE

## 2023-03-30 PROCEDURE — 3077F SYST BP >= 140 MM HG: CPT | Performed by: FAMILY MEDICINE

## 2023-03-30 PROCEDURE — 3008F BODY MASS INDEX DOCD: CPT | Performed by: FAMILY MEDICINE

## 2023-03-30 RX ORDER — HYDROXYZINE HYDROCHLORIDE 25 MG/1
25 TABLET, FILM COATED ORAL NIGHTLY PRN
Qty: 60 TABLET | Refills: 0 | Status: SHIPPED | OUTPATIENT
Start: 2023-03-30

## 2023-03-30 RX ORDER — DULOXETIN HYDROCHLORIDE 30 MG/1
30 CAPSULE, DELAYED RELEASE ORAL DAILY
Qty: 30 CAPSULE | Refills: 0 | Status: SHIPPED | OUTPATIENT
Start: 2023-03-30

## 2023-03-30 NOTE — TELEPHONE ENCOUNTER
Writer called patient to confirm current dose. Patient stated that she is on 20mg of Xarelto and that this was ordered and filled with prescription from her doctor in [facility]. She confirmed that she has been holding her Xarelto in preparation for surgery tomorrow. She stated she only has one tablet left for when med is resumed.

## 2023-03-30 NOTE — TELEPHONE ENCOUNTER
Writer called [facility] to discuss prescriber for Xarelto 20mg. Per pharmacy staff, patient was never prescribed xarelto 20mg at their location, patient filled a prescription for 10mg on 1/31/23.

## 2023-03-31 ENCOUNTER — HOSPITAL ENCOUNTER (OUTPATIENT)
Facility: HOSPITAL | Age: 53
Setting detail: HOSPITAL OUTPATIENT SURGERY
Discharge: HOME | End: 2023-03-31
Attending: SURGERY | Admitting: SURGERY
Payer: COMMERCIAL

## 2023-03-31 ENCOUNTER — ANESTHESIA EVENT (OUTPATIENT)
Dept: SURGERY | Facility: HOSPITAL | Age: 53
End: 2023-03-31
Payer: COMMERCIAL

## 2023-03-31 ENCOUNTER — ANESTHESIA (OUTPATIENT)
Dept: SURGERY | Facility: HOSPITAL | Age: 53
End: 2023-03-31
Payer: COMMERCIAL

## 2023-03-31 ENCOUNTER — TELEPHONE (OUTPATIENT)
Facility: CLINIC | Age: 53
End: 2023-03-31

## 2023-03-31 ENCOUNTER — TELEPHONE (OUTPATIENT)
Dept: SURGERY | Facility: CLINIC | Age: 53
End: 2023-03-31

## 2023-03-31 VITALS
TEMPERATURE: 98 F | HEART RATE: 86 BPM | WEIGHT: 220 LBS | OXYGEN SATURATION: 98 % | BODY MASS INDEX: 40.48 KG/M2 | RESPIRATION RATE: 14 BRPM | DIASTOLIC BLOOD PRESSURE: 92 MMHG | HEIGHT: 62 IN | SYSTOLIC BLOOD PRESSURE: 175 MMHG

## 2023-03-31 DIAGNOSIS — Z01.818 PRE-OP TESTING: Primary | ICD-10-CM

## 2023-03-31 DIAGNOSIS — K43.2 INCISIONAL HERNIA, WITHOUT OBSTRUCTION OR GANGRENE: Primary | ICD-10-CM

## 2023-03-31 LAB — GLUCOSE BLDC GLUCOMTR-MCNC: 215 MG/DL (ref 70–99)

## 2023-03-31 PROCEDURE — 0WQF0ZZ REPAIR ABDOMINAL WALL, OPEN APPROACH: ICD-10-PCS | Performed by: SURGERY

## 2023-03-31 PROCEDURE — 82962 GLUCOSE BLOOD TEST: CPT

## 2023-03-31 RX ORDER — NICOTINE POLACRILEX 4 MG
30 LOZENGE BUCCAL
Status: DISCONTINUED | OUTPATIENT
Start: 2023-03-31 | End: 2023-03-31 | Stop reason: HOSPADM

## 2023-03-31 RX ORDER — DEXTROSE MONOHYDRATE 25 G/50ML
50 INJECTION, SOLUTION INTRAVENOUS
Status: DISCONTINUED | OUTPATIENT
Start: 2023-03-31 | End: 2023-03-31 | Stop reason: HOSPADM

## 2023-03-31 RX ORDER — ACETAMINOPHEN 500 MG
1000 TABLET ORAL ONCE
Status: COMPLETED | OUTPATIENT
Start: 2023-03-31 | End: 2023-03-31

## 2023-03-31 RX ORDER — SODIUM CHLORIDE, SODIUM LACTATE, POTASSIUM CHLORIDE, CALCIUM CHLORIDE 600; 310; 30; 20 MG/100ML; MG/100ML; MG/100ML; MG/100ML
INJECTION, SOLUTION INTRAVENOUS CONTINUOUS
Status: DISCONTINUED | OUTPATIENT
Start: 2023-03-31 | End: 2023-03-31

## 2023-03-31 RX ORDER — MIDAZOLAM HYDROCHLORIDE 1 MG/ML
INJECTION INTRAMUSCULAR; INTRAVENOUS AS NEEDED
Status: DISCONTINUED | OUTPATIENT
Start: 2023-03-31 | End: 2023-03-31 | Stop reason: SURG

## 2023-03-31 RX ORDER — AMLODIPINE BESYLATE 5 MG/1
5 TABLET ORAL DAILY
Qty: 30 TABLET | Refills: 0 | Status: SHIPPED | OUTPATIENT
Start: 2023-03-31

## 2023-03-31 RX ORDER — HYDRALAZINE HYDROCHLORIDE 20 MG/ML
INJECTION INTRAMUSCULAR; INTRAVENOUS AS NEEDED
Status: DISCONTINUED | OUTPATIENT
Start: 2023-03-31 | End: 2023-03-31 | Stop reason: SURG

## 2023-03-31 RX ORDER — FAMOTIDINE 20 MG/1
20 TABLET, FILM COATED ORAL ONCE
Status: COMPLETED | OUTPATIENT
Start: 2023-03-31 | End: 2023-03-31

## 2023-03-31 RX ORDER — CEFAZOLIN SODIUM/WATER 2 G/20 ML
2 SYRINGE (ML) INTRAVENOUS ONCE
Status: DISCONTINUED | OUTPATIENT
Start: 2023-03-31 | End: 2023-03-31 | Stop reason: HOSPADM

## 2023-03-31 RX ORDER — NICOTINE POLACRILEX 4 MG
15 LOZENGE BUCCAL
Status: DISCONTINUED | OUTPATIENT
Start: 2023-03-31 | End: 2023-03-31 | Stop reason: HOSPADM

## 2023-03-31 RX ORDER — METOCLOPRAMIDE 10 MG/1
10 TABLET ORAL ONCE
Status: COMPLETED | OUTPATIENT
Start: 2023-03-31 | End: 2023-03-31

## 2023-03-31 RX ADMIN — MIDAZOLAM HYDROCHLORIDE 2 MG: 1 INJECTION INTRAMUSCULAR; INTRAVENOUS at 07:42:00

## 2023-03-31 RX ADMIN — HYDRALAZINE HYDROCHLORIDE 5 MG: 20 INJECTION INTRAMUSCULAR; INTRAVENOUS at 07:45:00

## 2023-03-31 NOTE — TELEPHONE ENCOUNTER
Calledd patient states  need to reschedule surgery due elevated blood pressure. Will speak to Dr. Muriel Mae for next steps.

## 2023-03-31 NOTE — TELEPHONE ENCOUNTER
Patient was seen for the first time yesterday. Was scheduled for surgery this am but was canceled due to elevated BP. Patient was unable to take her BP meds this am prior to surgery. BP was 180/100 and patient was sent home and advised to follow up with PCP. She c/o headache today. Denies visual disturbance or blurred vision. Denies facial drooping or unilateral weakness, numbness or tingling. Denies chest pain or shortness of breath. She will call today for cardiology appt. She will purchase home BP monitor. Advised close monitoring of symptoms. If headache progresses or other symptoms develop she is to proceed to ER. She verbalized understanding and compliance. Dr. Bernadette Kang please advise.

## 2023-03-31 NOTE — TELEPHONE ENCOUNTER
To start amlodipine 5mg daily. I sent rx to pharmacy. I recommend she f/u with me in 1 week (any time next week, and it's okay to use SDA slot) if she cannot see cardiology. To go to ED for new/worsening symptoms in the meantime. Thank you.

## 2023-03-31 NOTE — TELEPHONE ENCOUNTER
Spoke with patient,  verified. Notified of Dr Maurizio Danielson orders and amlodipine script. She was not able to get through to cardiology. Scheduled follow up with Dr Zayra Ambrocio. Advised if symptoms worsen, go to ER. Patient verbalized understanding.     Future Appointments   Date Time Provider Brianna Guevara   4/3/2023 12:00 PM Emerson Mailman EMMG 14 FP EMMG 10 OP   2023 11:00 AM Bibb Mailman EMMG 14 FP EMMG 10 OP   5/10/2023 11:30 AM Juan Stiles MD PM&R [facility] 1100   2023 11:30 [facility]   2023  1:00 PM Pavel Otoole DO [facility] HEM ONC EMO

## 2023-03-31 NOTE — PROGRESS NOTES
Pt aware that BP is starting to come down after given medication. Choosing to follow up with PCP, will call Dr Zayra Ambrocio office today. She did c/o headache, but states she had it this morning when she woke up. Instructed to go to ER if headache persists or gets worse d/t elevated BP and risk for stroke. Pt in agreement. Daughter here to take patient home.

## 2023-04-03 ENCOUNTER — OFFICE VISIT (OUTPATIENT)
Facility: CLINIC | Age: 53
End: 2023-04-03
Payer: COMMERCIAL

## 2023-04-03 VITALS
SYSTOLIC BLOOD PRESSURE: 114 MMHG | WEIGHT: 218 LBS | HEART RATE: 89 BPM | DIASTOLIC BLOOD PRESSURE: 64 MMHG | HEIGHT: 62 IN | OXYGEN SATURATION: 97 % | BODY MASS INDEX: 40.12 KG/M2

## 2023-04-03 DIAGNOSIS — G89.29 OTHER CHRONIC PAIN: ICD-10-CM

## 2023-04-03 DIAGNOSIS — I10 PRIMARY HYPERTENSION: Primary | ICD-10-CM

## 2023-04-03 DIAGNOSIS — G47.00 INSOMNIA, UNSPECIFIED TYPE: ICD-10-CM

## 2023-04-03 PROCEDURE — 3008F BODY MASS INDEX DOCD: CPT | Performed by: FAMILY MEDICINE

## 2023-04-03 PROCEDURE — 3078F DIAST BP <80 MM HG: CPT | Performed by: FAMILY MEDICINE

## 2023-04-03 PROCEDURE — 99214 OFFICE O/P EST MOD 30 MIN: CPT | Performed by: FAMILY MEDICINE

## 2023-04-03 PROCEDURE — 3074F SYST BP LT 130 MM HG: CPT | Performed by: FAMILY MEDICINE

## 2023-04-03 RX ORDER — ADHESIVE BANDAGE 3/4"
BANDAGE TOPICAL
Qty: 1 EACH | Refills: 0 | Status: SHIPPED | OUTPATIENT
Start: 2023-04-03

## 2023-04-03 NOTE — TELEPHONE ENCOUNTER
Patient states she is returning a call she received this morning to schedule surgery.  Please advise

## 2023-04-06 ENCOUNTER — ANESTHESIA EVENT (OUTPATIENT)
Dept: SURGERY | Facility: HOSPITAL | Age: 53
End: 2023-04-06
Payer: COMMERCIAL

## 2023-04-06 ENCOUNTER — HOSPITAL ENCOUNTER (OUTPATIENT)
Facility: HOSPITAL | Age: 53
Setting detail: HOSPITAL OUTPATIENT SURGERY
Discharge: HOME | End: 2023-04-06
Attending: SURGERY | Admitting: SURGERY
Payer: COMMERCIAL

## 2023-04-06 ENCOUNTER — ANESTHESIA (OUTPATIENT)
Dept: SURGERY | Facility: HOSPITAL | Age: 53
End: 2023-04-06
Payer: COMMERCIAL

## 2023-04-06 VITALS
WEIGHT: 216 LBS | DIASTOLIC BLOOD PRESSURE: 80 MMHG | TEMPERATURE: 98 F | RESPIRATION RATE: 16 BRPM | BODY MASS INDEX: 39.75 KG/M2 | HEART RATE: 83 BPM | HEIGHT: 62 IN | SYSTOLIC BLOOD PRESSURE: 141 MMHG | OXYGEN SATURATION: 96 %

## 2023-04-06 LAB
GLUCOSE BLDC GLUCOMTR-MCNC: 135 MG/DL (ref 70–99)
GLUCOSE BLDC GLUCOMTR-MCNC: 170 MG/DL (ref 70–99)

## 2023-04-06 PROCEDURE — 49616 RPR AA HRN RCR 3-10 NCR/STRN: CPT | Performed by: SURGERY

## 2023-04-06 PROCEDURE — 0WUF0JZ SUPPLEMENT ABDOMINAL WALL WITH SYNTHETIC SUBSTITUTE, OPEN APPROACH: ICD-10-PCS | Performed by: SURGERY

## 2023-04-06 DEVICE — VENTRIO ST HERNIA PATCH
Type: IMPLANTABLE DEVICE | Site: ABDOMEN | Status: FUNCTIONAL
Brand: VENTRIO ST HERNIA PATCH

## 2023-04-06 RX ORDER — FAMOTIDINE 20 MG/1
20 TABLET, FILM COATED ORAL ONCE
Status: COMPLETED | OUTPATIENT
Start: 2023-04-06 | End: 2023-04-06

## 2023-04-06 RX ORDER — ACETAMINOPHEN 500 MG
1000 TABLET ORAL ONCE
Status: COMPLETED | OUTPATIENT
Start: 2023-04-06 | End: 2023-04-06

## 2023-04-06 RX ORDER — HYDROMORPHONE HYDROCHLORIDE 1 MG/ML
0.4 INJECTION, SOLUTION INTRAMUSCULAR; INTRAVENOUS; SUBCUTANEOUS EVERY 5 MIN PRN
Status: DISCONTINUED | OUTPATIENT
Start: 2023-04-06 | End: 2023-04-06

## 2023-04-06 RX ORDER — NICOTINE POLACRILEX 4 MG
30 LOZENGE BUCCAL
Status: DISCONTINUED | OUTPATIENT
Start: 2023-04-06 | End: 2023-04-06

## 2023-04-06 RX ORDER — BUPIVACAINE HYDROCHLORIDE 2.5 MG/ML
INJECTION, SOLUTION EPIDURAL; INFILTRATION; INTRACAUDAL AS NEEDED
Status: DISCONTINUED | OUTPATIENT
Start: 2023-04-06 | End: 2023-04-06 | Stop reason: HOSPADM

## 2023-04-06 RX ORDER — HYDROCODONE BITARTRATE AND ACETAMINOPHEN 10; 325 MG/1; MG/1
1 TABLET ORAL EVERY 6 HOURS PRN
Qty: 20 TABLET | Refills: 0 | Status: SHIPPED | OUTPATIENT
Start: 2023-04-06

## 2023-04-06 RX ORDER — HYDROMORPHONE HYDROCHLORIDE 1 MG/ML
0.2 INJECTION, SOLUTION INTRAMUSCULAR; INTRAVENOUS; SUBCUTANEOUS EVERY 5 MIN PRN
Status: DISCONTINUED | OUTPATIENT
Start: 2023-04-06 | End: 2023-04-06

## 2023-04-06 RX ORDER — NALOXONE HYDROCHLORIDE 0.4 MG/ML
80 INJECTION, SOLUTION INTRAMUSCULAR; INTRAVENOUS; SUBCUTANEOUS AS NEEDED
Status: DISCONTINUED | OUTPATIENT
Start: 2023-04-06 | End: 2023-04-06

## 2023-04-06 RX ORDER — SODIUM CHLORIDE, SODIUM LACTATE, POTASSIUM CHLORIDE, CALCIUM CHLORIDE 600; 310; 30; 20 MG/100ML; MG/100ML; MG/100ML; MG/100ML
INJECTION, SOLUTION INTRAVENOUS CONTINUOUS
Status: DISCONTINUED | OUTPATIENT
Start: 2023-04-06 | End: 2023-04-06

## 2023-04-06 RX ORDER — EPHEDRINE SULFATE 50 MG/ML
INJECTION, SOLUTION INTRAVENOUS AS NEEDED
Status: DISCONTINUED | OUTPATIENT
Start: 2023-04-06 | End: 2023-04-06 | Stop reason: SURG

## 2023-04-06 RX ORDER — HYDROMORPHONE HYDROCHLORIDE 1 MG/ML
0.6 INJECTION, SOLUTION INTRAMUSCULAR; INTRAVENOUS; SUBCUTANEOUS EVERY 5 MIN PRN
Status: DISCONTINUED | OUTPATIENT
Start: 2023-04-06 | End: 2023-04-06

## 2023-04-06 RX ORDER — NICOTINE POLACRILEX 4 MG
15 LOZENGE BUCCAL
Status: DISCONTINUED | OUTPATIENT
Start: 2023-04-06 | End: 2023-04-06

## 2023-04-06 RX ORDER — HYDROCODONE BITARTRATE AND ACETAMINOPHEN 10; 325 MG/1; MG/1
1 TABLET ORAL ONCE
Status: COMPLETED | OUTPATIENT
Start: 2023-04-06 | End: 2023-04-06

## 2023-04-06 RX ORDER — DEXTROSE MONOHYDRATE 25 G/50ML
50 INJECTION, SOLUTION INTRAVENOUS
Status: DISCONTINUED | OUTPATIENT
Start: 2023-04-06 | End: 2023-04-06 | Stop reason: HOSPADM

## 2023-04-06 RX ORDER — ROCURONIUM BROMIDE 10 MG/ML
INJECTION, SOLUTION INTRAVENOUS AS NEEDED
Status: DISCONTINUED | OUTPATIENT
Start: 2023-04-06 | End: 2023-04-06 | Stop reason: SURG

## 2023-04-06 RX ORDER — MORPHINE SULFATE 4 MG/ML
4 INJECTION, SOLUTION INTRAMUSCULAR; INTRAVENOUS EVERY 10 MIN PRN
Status: DISCONTINUED | OUTPATIENT
Start: 2023-04-06 | End: 2023-04-06

## 2023-04-06 RX ORDER — NICOTINE POLACRILEX 4 MG
30 LOZENGE BUCCAL
Status: DISCONTINUED | OUTPATIENT
Start: 2023-04-06 | End: 2023-04-06 | Stop reason: HOSPADM

## 2023-04-06 RX ORDER — ONDANSETRON 2 MG/ML
INJECTION INTRAMUSCULAR; INTRAVENOUS AS NEEDED
Status: DISCONTINUED | OUTPATIENT
Start: 2023-04-06 | End: 2023-04-06 | Stop reason: SURG

## 2023-04-06 RX ORDER — MIDAZOLAM HYDROCHLORIDE 1 MG/ML
INJECTION INTRAMUSCULAR; INTRAVENOUS AS NEEDED
Status: DISCONTINUED | OUTPATIENT
Start: 2023-04-06 | End: 2023-04-06 | Stop reason: SURG

## 2023-04-06 RX ORDER — NICOTINE POLACRILEX 4 MG
15 LOZENGE BUCCAL
Status: DISCONTINUED | OUTPATIENT
Start: 2023-04-06 | End: 2023-04-06 | Stop reason: HOSPADM

## 2023-04-06 RX ORDER — IBUPROFEN 600 MG/1
600 TABLET ORAL EVERY 6 HOURS PRN
Qty: 15 TABLET | Refills: 1 | Status: SHIPPED | OUTPATIENT
Start: 2023-04-06 | End: 2023-04-13

## 2023-04-06 RX ORDER — MORPHINE SULFATE 4 MG/ML
2 INJECTION, SOLUTION INTRAMUSCULAR; INTRAVENOUS EVERY 10 MIN PRN
Status: DISCONTINUED | OUTPATIENT
Start: 2023-04-06 | End: 2023-04-06

## 2023-04-06 RX ORDER — DEXTROSE MONOHYDRATE 25 G/50ML
50 INJECTION, SOLUTION INTRAVENOUS
Status: DISCONTINUED | OUTPATIENT
Start: 2023-04-06 | End: 2023-04-06

## 2023-04-06 RX ORDER — CEFAZOLIN SODIUM/WATER 2 G/20 ML
2 SYRINGE (ML) INTRAVENOUS ONCE
Status: COMPLETED | OUTPATIENT
Start: 2023-04-06 | End: 2023-04-06

## 2023-04-06 RX ORDER — MORPHINE SULFATE 10 MG/ML
6 INJECTION, SOLUTION INTRAMUSCULAR; INTRAVENOUS EVERY 10 MIN PRN
Status: DISCONTINUED | OUTPATIENT
Start: 2023-04-06 | End: 2023-04-06

## 2023-04-06 RX ADMIN — ROCURONIUM BROMIDE 30 MG: 10 INJECTION, SOLUTION INTRAVENOUS at 14:18:00

## 2023-04-06 RX ADMIN — EPHEDRINE SULFATE 5 MG: 50 INJECTION, SOLUTION INTRAVENOUS at 14:25:00

## 2023-04-06 RX ADMIN — SODIUM CHLORIDE, SODIUM LACTATE, POTASSIUM CHLORIDE, CALCIUM CHLORIDE: 600; 310; 30; 20 INJECTION, SOLUTION INTRAVENOUS at 14:06:00

## 2023-04-06 RX ADMIN — MIDAZOLAM HYDROCHLORIDE 2 MG: 1 INJECTION INTRAMUSCULAR; INTRAVENOUS at 14:06:00

## 2023-04-06 RX ADMIN — ONDANSETRON 4 MG: 2 INJECTION INTRAMUSCULAR; INTRAVENOUS at 14:37:00

## 2023-04-06 RX ADMIN — CEFAZOLIN SODIUM/WATER 2 G: 2 G/20 ML SYRINGE (ML) INTRAVENOUS at 14:17:00

## 2023-04-06 NOTE — ANESTHESIA PROCEDURE NOTES
Airway  Date/Time: 4/6/2023 2:15 PM  Urgency: Elective      General Information and Staff    Patient location during procedure: OR  Anesthesiologist: Tere Burgess MD  Resident/CRNA: Jorge Paredes CRNA  Performed: CRNA   Performed by: Jorge Paredes CRNA  Authorized by: Tere Burgess MD      Indications and Patient Condition  Indications for airway management: anesthesia  Sedation level: deep  Preoxygenated: yes  Patient position: sniffing  Mask difficulty assessment: 1 - vent by mask    Final Airway Details  Final airway type: endotracheal airway      Successful airway: ETT  Cuffed: yes   Successful intubation technique: direct laryngoscopy  Endotracheal tube insertion site: oral  Blade: GlideScope  Blade size: #3  ETT size (mm): 7.0    Cormack-Lehane Classification: grade I - full view of glottis  Placement verified by: chest auscultation and capnometry   Measured from: lips  ETT to lips (cm): 21  Number of attempts at approach: 1

## 2023-04-06 NOTE — INTERVAL H&P NOTE
Pre-op Diagnosis: Incisional hernia, without obstruction or gangrene [K43.2]    The above referenced H&P was reviewed by Killian Joaquin MD on 4/6/2023, the patient was examined and no significant changes have occurred in the patient's condition since the H&P was performed. I discussed with the patient and/or legal representative the potential benefits, risks and side effects of this procedure; the likelihood of the patient achieving goals; and potential problems that might occur during recuperation. I discussed reasonable alternatives to the procedure, including risks, benefits and side effects related to the alternatives and risks related to not receiving this procedure. We will proceed with procedure as planned.     Patient cleared from a medical standpoint blood pressure under control

## 2023-04-07 NOTE — OPERATIVE REPORT
[facility]    PATIENT'S NAME: Anastacio Snowden   ATTENDING PHYSICIAN: Yoli Rodrigez MD   OPERATING PHYSICIAN: Yoli Rodrigez MD   PATIENT ACCOUNT#:   [de-identified]    LOCATION:  [place] [address]  MEDICAL RECORD #:   [id number]       YOB: 1970  ADMISSION DATE:       04/06/2023      OPERATION DATE:  04/06/2023    OPERATIVE REPORT    PREOPERATIVE DIAGNOSIS:  Recurrent incarcerated incisional hernia. POSTOPERATIVE DIAGNOSIS:  Recurrent incarcerated incisional hernia, extensive adhesions. PROCEDURE:  Open repair of incarcerated recurrent ventral hernia with Ventrio mesh, lysis of adhesions. ASSISTANT:  ASHANTI Rene. ESTIMATED BLOOD LOSS:  10 mL. COMPLICATIONS:  None. ANESTHESIA:  General.    DISPOSITION:  To Recovery, tolerated well. INDICATIONS:  Patient is 47 who presents with a painful bulge in the upper abdomen. CT scan confirms a hernia. Consent obtained. OPERATIVE TECHNIQUE:  He was taken to surgery. He was prepped and draped in usual sterile fashion. I attempted to place a Veress needle at [place] point but could not access it. Therefore, I decided to make an incision directly over the hernia. I was able to gain access to the abdominal cavity where I found extremely matted and dense adhesions which would not make the laparoscopic approach viable. We freed up enough adhesions. There are 3 separate Swiss cheese-like defects in the abdominal wall, totally measuring 8 cm. The adhesiolysis is continued so the mesh will lay flat. A Ventrio with approximately 3 cm of underlap is placed into the abdomen. Then, 4-0 Nurolon tension setting sutures were placed in the corners, and using SecureStrap, it was secured to the posterior fascia circumferentially. The redundant sac and fascia are closed over using 0 looped PDS. Skin closed with 2-0 chromic, 3-0 Monocryl, and Dermabond. Marcaine infiltrated for local block.     Dictated By Yoli Rodrigez, MD  d: 04/06/2023 14:56:42  t: 04/06/2023 20:52:35  [facility] [id number]  GB/    cc: Santi Baker MD

## 2023-04-13 ENCOUNTER — TELEPHONE (OUTPATIENT)
Dept: SURGERY | Facility: CLINIC | Age: 53
End: 2023-04-13

## 2023-04-13 NOTE — TELEPHONE ENCOUNTER
Spoke with patient - she is in severe pain she states. Unable to sleep. Is wearing her binder, applying ice to the area and taking Motrin alternating with her Norco.  Is having bowel movements. No fevers. Please advise.

## 2023-04-13 NOTE — TELEPHONE ENCOUNTER
Patient states her incision is in a lot of pain and her whole body is in a lot of pain. Surgery done 04/06.   Please advise

## 2023-04-14 RX ORDER — GUAIFENESIN 100 MG/5ML
1 SYRUP ORAL EVERY 6 HOURS PRN
Qty: 10 TABLET | Refills: 0 | Status: SHIPPED | OUTPATIENT
Start: 2023-04-14

## 2023-04-14 NOTE — TELEPHONE ENCOUNTER
Patient calling to f/u on medication and would also wants to talk to a nurse. Please advise thank you.

## 2023-04-17 ENCOUNTER — TELEPHONE (OUTPATIENT)
Dept: OBGYN CLINIC | Facility: CLINIC | Age: 53
End: 2023-04-17

## 2023-04-17 NOTE — TELEPHONE ENCOUNTER
Pt aware mammogram overdue central scheduling number given. Pt expressed understanding with no concerns.

## 2023-04-24 RX ORDER — DULOXETIN HYDROCHLORIDE 60 MG/1
CAPSULE, DELAYED RELEASE ORAL
Qty: 30 CAPSULE | Refills: 0 | Status: SHIPPED | OUTPATIENT
Start: 2023-04-24

## 2023-04-24 NOTE — TELEPHONE ENCOUNTER
Refill Request    Medication request: DULoxetine (CYMBALTA) 60 MG Oral Cap DR Particles    LOV: 3/29/23 Jakob Payan DO   RTC: 6 weeks  NOV: 5/10/2023 Kim Waggoner MD      [facility] 3/29/23 #76    UDS: (if applicable): None  Pain contract: None    LOV plan (if weaning or changing medications): Cymbalta 60mg daily

## 2023-04-26 ENCOUNTER — OFFICE VISIT (OUTPATIENT)
Dept: SURGERY | Facility: CLINIC | Age: 53
End: 2023-04-26

## 2023-04-26 VITALS — HEIGHT: 62 IN | BODY MASS INDEX: 39.75 KG/M2 | WEIGHT: 216 LBS

## 2023-04-26 DIAGNOSIS — Z98.890 POST-OPERATIVE STATE: Primary | ICD-10-CM

## 2023-04-26 DIAGNOSIS — K59.00 CONSTIPATION, UNSPECIFIED CONSTIPATION TYPE: ICD-10-CM

## 2023-04-26 PROCEDURE — 3008F BODY MASS INDEX DOCD: CPT | Performed by: SURGERY

## 2023-04-26 PROCEDURE — 99213 OFFICE O/P EST LOW 20 MIN: CPT | Performed by: SURGERY

## 2023-04-26 RX ORDER — OXYCODONE HCL 10 MG/1
10 TABLET, FILM COATED, EXTENDED RELEASE ORAL EVERY 12 HOURS
Qty: 20 TABLET | Refills: 0 | Status: SHIPPED | OUTPATIENT
Start: 2023-04-26

## 2023-04-26 RX ORDER — KETOROLAC TROMETHAMINE 10 MG/1
10 TABLET, FILM COATED ORAL EVERY 6 HOURS PRN
Qty: 20 TABLET | Refills: 0 | Status: SHIPPED | OUTPATIENT
Start: 2023-04-26

## 2023-04-26 NOTE — PROGRESS NOTES
Postoperative Patient Follow-up      4/26/2023    HPI      Piedad Regalado is a 47year old female postop after open repair of incarcerated ventral hernia with extensive lysis of adhesions. Exam  Abdomen is soft, incisions clean dry intact      Assessment/Plan  Assessment   Post-operative state  (primary encounter diagnosis)    Anticipate a full 6-week recovery.          Matt Garnica MD

## 2023-04-26 NOTE — PATIENT INSTRUCTIONS
Take MiraLAX every other day if no bowel movement. Use a suppository or enema if trouble starting a bowel movement. Sure you drink plenty of fluids throughout the day. Alternate pain medication as needed.   Follow-up with me 3 weeks

## 2023-04-27 ENCOUNTER — OFFICE VISIT (OUTPATIENT)
Facility: CLINIC | Age: 53
End: 2023-04-27
Payer: COMMERCIAL

## 2023-04-27 VITALS
HEIGHT: 62 IN | BODY MASS INDEX: 39.38 KG/M2 | SYSTOLIC BLOOD PRESSURE: 132 MMHG | HEART RATE: 83 BPM | DIASTOLIC BLOOD PRESSURE: 84 MMHG | WEIGHT: 214 LBS | OXYGEN SATURATION: 98 %

## 2023-04-27 DIAGNOSIS — I10 PRIMARY HYPERTENSION: Primary | ICD-10-CM

## 2023-04-27 DIAGNOSIS — G89.29 OTHER CHRONIC PAIN: ICD-10-CM

## 2023-04-27 DIAGNOSIS — G47.00 INSOMNIA, UNSPECIFIED TYPE: ICD-10-CM

## 2023-04-27 DIAGNOSIS — F41.9 ANXIETY: ICD-10-CM

## 2023-04-27 DIAGNOSIS — F31.60 BIPOLAR AFFECTIVE DISORDER, CURRENT EPISODE MIXED, CURRENT EPISODE SEVERITY UNSPECIFIED (HCC): ICD-10-CM

## 2023-04-27 DIAGNOSIS — F20.9 SCHIZOPHRENIA, UNSPECIFIED TYPE (HCC): ICD-10-CM

## 2023-04-27 PROCEDURE — 3075F SYST BP GE 130 - 139MM HG: CPT | Performed by: FAMILY MEDICINE

## 2023-04-27 PROCEDURE — 3079F DIAST BP 80-89 MM HG: CPT | Performed by: FAMILY MEDICINE

## 2023-04-27 PROCEDURE — 99214 OFFICE O/P EST MOD 30 MIN: CPT | Performed by: FAMILY MEDICINE

## 2023-04-27 PROCEDURE — 3008F BODY MASS INDEX DOCD: CPT | Performed by: FAMILY MEDICINE

## 2023-04-27 RX ORDER — ZOLPIDEM TARTRATE 10 MG/1
10 TABLET ORAL NIGHTLY PRN
Qty: 30 TABLET | Refills: 1 | Status: SHIPPED | OUTPATIENT
Start: 2023-04-27

## 2023-04-28 ENCOUNTER — TELEPHONE (OUTPATIENT)
Dept: ENDOCRINOLOGY CLINIC | Facility: CLINIC | Age: 53
End: 2023-04-28

## 2023-04-28 NOTE — TELEPHONE ENCOUNTER
Pt states that she needs RX for Linares 2 sensor sent to [facility] in [facility]. Pt is out of sensors. Pt has a coupon for this because insurance will state that it is too early but sensor was defective. Please call.

## 2023-05-02 ENCOUNTER — TELEPHONE (OUTPATIENT)
Dept: GASTROENTEROLOGY | Facility: CLINIC | Age: 53
End: 2023-05-02

## 2023-05-10 ENCOUNTER — TELEPHONE (OUTPATIENT)
Dept: PHYSICAL MEDICINE AND REHAB | Facility: CLINIC | Age: 53
End: 2023-05-10

## 2023-05-10 ENCOUNTER — OFFICE VISIT (OUTPATIENT)
Dept: PHYSICAL MEDICINE AND REHAB | Facility: CLINIC | Age: 53
End: 2023-05-10
Payer: COMMERCIAL

## 2023-05-10 VITALS
HEIGHT: 62 IN | HEART RATE: 93 BPM | SYSTOLIC BLOOD PRESSURE: 140 MMHG | OXYGEN SATURATION: 98 % | DIASTOLIC BLOOD PRESSURE: 90 MMHG | WEIGHT: 207 LBS | BODY MASS INDEX: 38.09 KG/M2

## 2023-05-10 DIAGNOSIS — F33.2 SEVERE EPISODE OF RECURRENT MAJOR DEPRESSIVE DISORDER, WITHOUT PSYCHOTIC FEATURES (HCC): ICD-10-CM

## 2023-05-10 DIAGNOSIS — E66.9 OBESITY (BMI 30-39.9): ICD-10-CM

## 2023-05-10 DIAGNOSIS — M51.9 LUMBAR DISC DISEASE: Primary | ICD-10-CM

## 2023-05-10 DIAGNOSIS — M65.331 ACQUIRED TRIGGER FINGER OF RIGHT MIDDLE FINGER: ICD-10-CM

## 2023-05-10 DIAGNOSIS — M54.16 LUMBAR RADICULOPATHY: ICD-10-CM

## 2023-05-10 PROCEDURE — 3077F SYST BP >= 140 MM HG: CPT | Performed by: PHYSICAL MEDICINE & REHABILITATION

## 2023-05-10 PROCEDURE — 20550 NJX 1 TENDON SHEATH/LIGAMENT: CPT | Performed by: PHYSICAL MEDICINE & REHABILITATION

## 2023-05-10 PROCEDURE — 76942 ECHO GUIDE FOR BIOPSY: CPT | Performed by: PHYSICAL MEDICINE & REHABILITATION

## 2023-05-10 PROCEDURE — 3008F BODY MASS INDEX DOCD: CPT | Performed by: PHYSICAL MEDICINE & REHABILITATION

## 2023-05-10 PROCEDURE — 3080F DIAST BP >= 90 MM HG: CPT | Performed by: PHYSICAL MEDICINE & REHABILITATION

## 2023-05-10 PROCEDURE — 99214 OFFICE O/P EST MOD 30 MIN: CPT | Performed by: PHYSICAL MEDICINE & REHABILITATION

## 2023-05-10 RX ORDER — LIDOCAINE HYDROCHLORIDE 10 MG/ML
0.5 INJECTION, SOLUTION INFILTRATION; PERINEURAL ONCE
Status: COMPLETED | OUTPATIENT
Start: 2023-05-10 | End: 2023-05-10

## 2023-05-10 RX ORDER — NALTREXONE 100 %
POWDER (GRAM) MISCELLANEOUS
Qty: 30 EACH | Refills: 3 | Status: SHIPPED | OUTPATIENT
Start: 2023-05-10

## 2023-05-10 RX ORDER — TRIAMCINOLONE ACETONIDE 40 MG/ML
20 INJECTION, SUSPENSION INTRA-ARTICULAR; INTRAMUSCULAR ONCE
Status: COMPLETED | OUTPATIENT
Start: 2023-05-10 | End: 2023-05-10

## 2023-05-10 RX ADMIN — LIDOCAINE HYDROCHLORIDE 0.5 ML: 10 INJECTION, SOLUTION INFILTRATION; PERINEURAL at 15:51:00

## 2023-05-10 NOTE — TELEPHONE ENCOUNTER
Initiated authorization for Right 3rd finger flexor tendon sheath injection at the site of the A1 pulley under ultrasound guidance CPT 44691, , [id number] with FEP  Confirmation #[id number]  Status: Approved-authorization is not required per health plan    inj done in office

## 2023-05-10 NOTE — PATIENT INSTRUCTIONS
Plan  I performed a right 3rd finger trigger finger injection under ultrasound guidance. (see procedure note)    She will try low dose naltrexone for the pain. She will try Voltaren gel for the pain along with the CBD. She will look into being evaluated for a spinal cord stimulator. The patient will follow up in 2 months, but the patient will call me in 2 weeks to let me know how the injection worked.

## 2023-05-10 NOTE — PROCEDURES
I did a right 3rd finger trigger finger/flexor digitorum superficialis tendon sheath injection in the office under ultrasound guidance at the level of the A1 pulley. Under ultrasound guidance the right 3rd flexor digitorum superficialis tendon and A1 pulley were identified. A medhat was placed on the patient's skin and it was cleaned with betadyne swabs x 3 and anesthetized with ethyl chloride spray. A 27 gauge needle was inserted and directed to the A1 pulley just superior to the flexor digitorum superficialis tendon. Aspiration was performed and no blood, fluid, or air was aspirated and the tendon sheath was injected with 0.5 ml of 40 mg of Kenalog/ml and 0.5 ml of 1% lidocaine without epinephrine. The patient tolerated the procedure well. A band aid and triple antibiotic ointment were applied. Ultrasound images were saved to the unit and will be transferred to the PACS system at a later date.

## 2023-05-11 ENCOUNTER — TELEPHONE (OUTPATIENT)
Dept: ENDOCRINOLOGY CLINIC | Facility: CLINIC | Age: 53
End: 2023-05-11

## 2023-05-11 ENCOUNTER — HOSPITAL ENCOUNTER (OUTPATIENT)
Dept: MAMMOGRAPHY | Facility: HOSPITAL | Age: 53
Discharge: HOME | End: 2023-05-11
Attending: OBSTETRICS & GYNECOLOGY
Payer: COMMERCIAL

## 2023-05-11 DIAGNOSIS — N63.11 MASS OF UPPER OUTER QUADRANT OF RIGHT BREAST: ICD-10-CM

## 2023-05-11 NOTE — TELEPHONE ENCOUNTER
Called patient and states dr Elizabeth Martinez was asking provider to increase ozempic dose, currently on 0.5 mg and states not helping with appetite, or wt loss, and pt wearing chayito sensor but not connected to our practice. Booked lorne 5/15  [facility] please advise about ozempic, pt denies side effects no n/v/d      Per lov 3/7/23  Increase Basaglar to 22 units subcutaneous daily   -Continue current dose of Novolog  15 22 15   -  -Start Ozempic- 0.5mg subcutaneous weekly. Reviewed side effects and risks vs benefits of medication. Reviewed pen injection in office today  -Start Jardiance 25mg PO daily. Reviewed side effects and risks vs benefits of medication. Reviewed importance of staying very well hydrated on medication. Instructed to call office if any symptoms of dysuria.   -Continue Metformin 1000mg PO BID  -Start diflucan 150mg PO weekly for 4 weeks to prevent yeast infection.

## 2023-05-11 NOTE — TELEPHONE ENCOUNTER
We can very likely increase her dose but I would like to wait until appt coming up next week if this is ok with her. I want to look at CGM reports to make sure insulin doesn't need to be adjusted with increase in Ozempic especially since we also started Jardiance at last visit.

## 2023-05-11 NOTE — TELEPHONE ENCOUNTER
Patient requesting to speak with RN regarding insulin dose. Patient had spoken to Dr Miky Miranda and recommended to have insulin increased. Please call. Thank you.

## 2023-05-12 NOTE — TELEPHONE ENCOUNTER
Spoke with patient states that she has not lost any weight with current Ozempic dosage, States she will discuss increase in dose at her appt on 5/15/23 @8:15 in OP.

## 2023-05-22 ENCOUNTER — TELEPHONE (OUTPATIENT)
Dept: ENDOCRINOLOGY CLINIC | Facility: CLINIC | Age: 53
End: 2023-05-22

## 2023-05-22 NOTE — TELEPHONE ENCOUNTER
Rosuvastatin 10 MG Oral Tab, Take 1 tablet (10 mg total) by mouth nightly., Disp: 90 tablet, Rfl: 2    metFORMIN HCl 1000 MG Oral Tab, Take 1 tablet (1,000 mg total) by mouth 2 (two) times daily with meals. , Disp: 90 tablet, Rfl: 2    Losartan 100 MG Oral Tab, Take 1 tablet (100 mg total) by mouth daily. , Disp: 90 tablet, Rfl: 2

## 2023-05-22 NOTE — TELEPHONE ENCOUNTER
Refill Request    Medication request: DULOXETINE 60 MG Oral Cap DR Particles. TAKE 1 CAPSULE BY MOUTH EVERY DAY.     LOV:5/10/2023 with ANDREW Zhang Reusin2023 with Daryl Lai D.O. Due back to clinic per last office note: Per Dr. Christine Stuart: \"The patient will follow up in 2 months, but the patient will call me in 2 weeks to let me know how the injection worked. \"  NOV: 2023 Remy Lozano MD      [facility]    Urine drug screen (if applicable): n/a  Pain contract: n/a    LOV plan (if weaning or changing medications): Not mentioned by Dr. Christine Stuart. - Per Dr. Asia Weinberg in [facility] 2023: \" I did prescribe her Cymbalta 60 mg daily today to help with her myofascial pain and chronic pain component. \"      Per this RN's search - no transfer of care was ever done between Dr. Christine Stuart and Dr. Asia Weinberg. Pt was only seen by Dr. Asia Weinberg on 23. Rx will be sent to Dr. Christine Stuart as next appointment is with Dr. Christine Stuart.

## 2023-05-23 NOTE — TELEPHONE ENCOUNTER
Response Requested: patient requests new RX:  XARELTO 20mg TABLET  [facility]/pharmacy #[id number]- [facility] [address] AT [address] [place] [address], [phone], [phone]  Thanks Asif nixon

## 2023-05-24 RX ORDER — CARVEDILOL 25 MG/1
25 TABLET ORAL 2 TIMES DAILY WITH MEALS
Qty: 60 TABLET | Refills: 0 | Status: SHIPPED | OUTPATIENT
Start: 2023-05-24

## 2023-05-24 RX ORDER — DULOXETIN HYDROCHLORIDE 60 MG/1
CAPSULE, DELAYED RELEASE ORAL
Qty: 30 CAPSULE | Refills: 0 | Status: SHIPPED | OUTPATIENT
Start: 2023-05-24

## 2023-05-24 RX ORDER — ROSUVASTATIN CALCIUM 10 MG/1
TABLET, COATED ORAL
Qty: 90 TABLET | Refills: 1 | Status: SHIPPED | OUTPATIENT
Start: 2023-05-24

## 2023-05-24 RX ORDER — LOSARTAN POTASSIUM 100 MG/1
TABLET ORAL
Qty: 90 TABLET | Refills: 1 | Status: SHIPPED | OUTPATIENT
Start: 2023-05-24

## 2023-05-24 NOTE — TELEPHONE ENCOUNTER
LOV: 3/7/2023     RTC: 2 Months    FU: No FU Appt Scheduled    Last Refill: 3/7/2023    3 Month Supply Pending

## 2023-05-25 ENCOUNTER — HOSPITAL ENCOUNTER (OUTPATIENT)
Dept: ULTRASOUND IMAGING | Facility: HOSPITAL | Age: 53
Discharge: HOME | End: 2023-05-25
Attending: OBSTETRICS & GYNECOLOGY
Payer: COMMERCIAL

## 2023-05-25 ENCOUNTER — TELEPHONE (OUTPATIENT)
Dept: HEMATOLOGY/ONCOLOGY | Facility: HOSPITAL | Age: 53
End: 2023-05-25

## 2023-05-25 ENCOUNTER — HOSPITAL ENCOUNTER (OUTPATIENT)
Dept: MAMMOGRAPHY | Facility: HOSPITAL | Age: 53
Discharge: HOME | End: 2023-05-25
Attending: OBSTETRICS & GYNECOLOGY
Payer: COMMERCIAL

## 2023-05-25 DIAGNOSIS — I26.99 RECURRENT PULMONARY EMBOLISM (HCC): ICD-10-CM

## 2023-05-25 DIAGNOSIS — N63.11 MASS OF UPPER OUTER QUADRANT OF RIGHT BREAST: ICD-10-CM

## 2023-05-25 PROCEDURE — 76642 ULTRASOUND BREAST LIMITED: CPT | Performed by: OBSTETRICS & GYNECOLOGY

## 2023-05-25 PROCEDURE — 77062 BREAST TOMOSYNTHESIS BI: CPT | Performed by: OBSTETRICS & GYNECOLOGY

## 2023-05-25 PROCEDURE — 77066 DX MAMMO INCL CAD BI: CPT | Performed by: OBSTETRICS & GYNECOLOGY

## 2023-05-25 NOTE — TELEPHONE ENCOUNTER
[facility]/pharmacy #[id number]- [place], [place] - [address], [phone], [phone]; is requesting a new prescription for Xarelto 20 mg oral tablet

## 2023-06-02 ENCOUNTER — TELEPHONE (OUTPATIENT)
Facility: CLINIC | Age: 53
End: 2023-06-02

## 2023-06-02 NOTE — TELEPHONE ENCOUNTER
Spoke with pt,  verified, she is looking for a sooner appt with Dr Fran Gary. Pt stated her BP today 181/96, c/o bilat legs are swollen for a while now. Pt was last seen by PCP  and her edema was bad. Pt started on amlodipine first week of April, she is not sure if her swelling is related to amlodipine but she had swelling before she even started meds,  now its worse, its painful to walk sometimes, legs are tight, hurts when takes the stairs. Pt saw Dr Alicia Pina for chronic pain from fibromyalgia. Pt in losartan 100 mg, lasix 40 mg, amlodipine 5 mg, carvedilol 25 mg. Pt denies chest pain, shortness of breath, headache, numbness/ tingling, no other sx. Pt was advised if sx persist or gets worse to go to ER/ IC, she agreed and stated understanding. She declined to go to ER, just waiting for PCP recommendations or sooner appt. pls advise, thanks in advance.

## 2023-06-03 NOTE — TELEPHONE ENCOUNTER
Spoke with patient,  verified. Scheduled for 2023. Declined offer on 2023 due to work.     Future Appointments   Date Time Provider Brianna Guevara   2023 11:30 AM [place], [facility]   2023  1:30 PM Endy Dickinson DO EMMG 14 FP EMMG 10 OP   2023  1:00 PM [place]  [facility] HEM ONC EMO   2023  4:30 PM Meghna Dobbs MD PM&R [facility]

## 2023-06-06 ENCOUNTER — OFFICE VISIT (OUTPATIENT)
Dept: ENDOCRINOLOGY CLINIC | Facility: CLINIC | Age: 53
End: 2023-06-06

## 2023-06-06 ENCOUNTER — TELEPHONE (OUTPATIENT)
Dept: ENDOCRINOLOGY CLINIC | Facility: CLINIC | Age: 53
End: 2023-06-06

## 2023-06-06 ENCOUNTER — OFFICE VISIT (OUTPATIENT)
Facility: CLINIC | Age: 53
End: 2023-06-06
Payer: COMMERCIAL

## 2023-06-06 VITALS
BODY MASS INDEX: 39.79 KG/M2 | HEART RATE: 90 BPM | SYSTOLIC BLOOD PRESSURE: 159 MMHG | DIASTOLIC BLOOD PRESSURE: 98 MMHG | WEIGHT: 219 LBS | HEIGHT: 62.01 IN

## 2023-06-06 VITALS
HEART RATE: 73 BPM | WEIGHT: 218 LBS | DIASTOLIC BLOOD PRESSURE: 78 MMHG | BODY MASS INDEX: 40.12 KG/M2 | HEIGHT: 62 IN | SYSTOLIC BLOOD PRESSURE: 130 MMHG | OXYGEN SATURATION: 98 %

## 2023-06-06 DIAGNOSIS — Z79.01 CHRONIC ANTICOAGULATION: ICD-10-CM

## 2023-06-06 DIAGNOSIS — R53.82 CHRONIC FATIGUE: ICD-10-CM

## 2023-06-06 DIAGNOSIS — I26.99 RECURRENT PULMONARY EMBOLISM (HCC): ICD-10-CM

## 2023-06-06 DIAGNOSIS — R60.9 PERIPHERAL EDEMA: Primary | ICD-10-CM

## 2023-06-06 DIAGNOSIS — I10 ESSENTIAL HYPERTENSION: ICD-10-CM

## 2023-06-06 DIAGNOSIS — E11.65 UNCONTROLLED TYPE 2 DIABETES MELLITUS WITH HYPERGLYCEMIA (HCC): Primary | ICD-10-CM

## 2023-06-06 LAB
CARTRIDGE LOT#: ABNORMAL NUMERIC
GLUCOSE BLOOD: 155
HEMOGLOBIN A1C: 7.3 % (ref 4.3–5.6)
TEST STRIP LOT #: NORMAL NUMERIC

## 2023-06-06 PROCEDURE — 82947 ASSAY GLUCOSE BLOOD QUANT: CPT

## 2023-06-06 PROCEDURE — 3051F HG A1C>EQUAL 7.0%<8.0%: CPT

## 2023-06-06 PROCEDURE — 3077F SYST BP >= 140 MM HG: CPT

## 2023-06-06 PROCEDURE — 3078F DIAST BP <80 MM HG: CPT | Performed by: FAMILY MEDICINE

## 2023-06-06 PROCEDURE — 99214 OFFICE O/P EST MOD 30 MIN: CPT | Performed by: FAMILY MEDICINE

## 2023-06-06 PROCEDURE — 3008F BODY MASS INDEX DOCD: CPT | Performed by: FAMILY MEDICINE

## 2023-06-06 PROCEDURE — 3008F BODY MASS INDEX DOCD: CPT

## 2023-06-06 PROCEDURE — 99214 OFFICE O/P EST MOD 30 MIN: CPT

## 2023-06-06 PROCEDURE — 3080F DIAST BP >= 90 MM HG: CPT

## 2023-06-06 PROCEDURE — 3075F SYST BP GE 130 - 139MM HG: CPT | Performed by: FAMILY MEDICINE

## 2023-06-06 PROCEDURE — 83036 HEMOGLOBIN GLYCOSYLATED A1C: CPT

## 2023-06-06 RX ORDER — SPIRONOLACTONE 25 MG/1
25 TABLET ORAL DAILY
Qty: 60 TABLET | Refills: 0 | Status: SHIPPED | OUTPATIENT
Start: 2023-06-06

## 2023-06-06 RX ORDER — TIRZEPATIDE 7.5 MG/.5ML
7.5 INJECTION, SOLUTION SUBCUTANEOUS WEEKLY
Qty: 2 ML | Refills: 0 | Status: SHIPPED | OUTPATIENT
Start: 2023-06-06

## 2023-06-06 RX ORDER — TIRZEPATIDE 10 MG/.5ML
10 INJECTION, SOLUTION SUBCUTANEOUS WEEKLY
Qty: 2 ML | Refills: 2 | Status: SHIPPED | OUTPATIENT
Start: 2023-07-06

## 2023-06-06 NOTE — PATIENT INSTRUCTIONS
Continue Metformin 1000mg twice daily     Continue Jardiance 25 mg daily     Continue current doses of insulin     Stop Ozempic     Start Mounjaro 7.5mg weekly for 4 weeks and then increase to 10mg weekly. Keep me updated if sugars are not improving or if weight loss is not improving and we can increase the dose.

## 2023-06-06 NOTE — TELEPHONE ENCOUNTER
Medication PA Requested:Tirzepatide (MOUNJARO) 7.5 MG/0.5ML Subcutaneous Solution Pen-injector                                                          CoverMyMeds Used:  Key:  Quantity: 2 mL  Day Supply:  Sig: Inject 7.5 mg into the skin once a week.   DX Code: E11.65                                    CPT code (if applicable):   Case Number/Pending Ref#:

## 2023-06-08 NOTE — TELEPHONE ENCOUNTER
Medication PA Requested:Tirzepatide (MOUNJARO) 7.5 MG/0.5ML Subcutaneous Solution Pen-injector                                                          CoverMyMeds Used:  Key:  Quantity: 2 mL  Day Supply:  Sig: Inject 7.5 mg into the skin once a week.   DX Code: E11.65     epa submitted with LOV % a1c 6/6/2023  Awaiting determination

## 2023-06-14 ENCOUNTER — MED REC SCAN ONLY (OUTPATIENT)
Dept: PHYSICAL MEDICINE AND REHAB | Facility: CLINIC | Age: 53
End: 2023-06-14

## 2023-06-14 ENCOUNTER — TELEPHONE (OUTPATIENT)
Dept: ENDOCRINOLOGY CLINIC | Facility: CLINIC | Age: 53
End: 2023-06-14

## 2023-06-14 NOTE — TELEPHONE ENCOUNTER
Patient states Onel Fernandez is too expensive ($500) and even she used a discount coupon it only brings the cost to $400. Patient states she spoke with her insurance and was told that they will pay for Trulicity. Please call. Thank you.

## 2023-06-15 RX ORDER — SEMAGLUTIDE 1.34 MG/ML
1 INJECTION, SOLUTION SUBCUTANEOUS WEEKLY
Qty: 9 ML | Refills: 0 | Status: SHIPPED | OUTPATIENT
Start: 2023-06-15

## 2023-06-15 NOTE — TELEPHONE ENCOUNTER
KODAK Cortes,     Please sign Rx pending for approval. Patient is agreeable with Ozempic 1mg with added weight loss benefit. Confirmed preferred pharmacy.      LOV: 6/6/23  RTC: 8/7/23

## 2023-06-15 NOTE — TELEPHONE ENCOUNTER
She was on Ozempic and then we changed to [facility] for added weight loss benefit. I do think that Kishan Cea will be more effective than Trulicity for her. Was the Ozempic that she was getting previously affordable. If so I would prefer that we change her back to Ozempic but we can increase the dose to 1mg subcutaneous weekly.

## 2023-06-15 NOTE — TELEPHONE ENCOUNTER
APN Mary Herring,     Please advise Trulicity dose    LOV: 5/6/62  Start Mounjaro 7.5mg subcutaneous weekly for 4 weeks and then increase to 10mg subcutaneous weekly

## 2023-06-18 NOTE — TELEPHONE ENCOUNTER
Please review; protocol failed.     Requested Prescriptions   Pending Prescriptions Disp Refills    ZOLPIDEM 10 MG Oral Tab [Pharmacy Med Name: ZOLPIDEM TARTRATE 10 MG TABLET] 30 tablet 1     Sig: TAKE 1 TABLET BY MOUTH NIGHTLY AS NEEDED FOR SLEEP       There is no refill protocol information for this order          Future Appointments         Provider Department Appt Notes    In 3 weeks Carolyn Raymundo, DO [address], [address], [place]     In 1 month Angle Stiles MD [address], [address], [place] 2/3 Month F/U    In 1 month Mili Torres, [address], [facility], [facility]              1 week ago Peripheral edema    [facility], [address], [place] Ray[place]undo, DO    Office Visit    1 week ago Uncontrolled type 2 diabetes mellitus with hyperglycemia [facility])    [address], [address], Mili Jonas, APRN    Office Visit    1 month ago L5-S1 right mild foraminal & left mild far lateral, L4-5 mild diffuse, L2-3 right mild far lateral bulging discs    [facility], [address], Sherry Gardner MD    Office Visit    1 month ago Primary hypertension    [facility], [address], [facility] APOORVA Ragsdale DO    Office Visit    1 month ago Post-operative state    [address], [address], Ernesto Cortes MD    Office Visit

## 2023-06-19 RX ORDER — ZOLPIDEM TARTRATE 10 MG/1
TABLET ORAL
Qty: 30 TABLET | Refills: 1 | Status: SHIPPED | OUTPATIENT
Start: 2023-06-19

## 2023-06-20 NOTE — TELEPHONE ENCOUNTER
carvedilol 25 MG Oral Tab, Take 1 tablet (25 mg total) by mouth 2 (two) times daily with meals. , Disp: 60 tablet, Rfl: 0

## 2023-06-21 RX ORDER — CARVEDILOL 25 MG/1
25 TABLET ORAL 2 TIMES DAILY WITH MEALS
Qty: 60 TABLET | Refills: 0 | Status: SHIPPED | OUTPATIENT
Start: 2023-06-21

## 2023-06-28 ENCOUNTER — TELEPHONE (OUTPATIENT)
Facility: CLINIC | Age: 53
End: 2023-06-28

## 2023-06-28 RX ORDER — ZOLPIDEM TARTRATE 10 MG/1
10 TABLET ORAL NIGHTLY PRN
Qty: 30 TABLET | Refills: 1 | Status: SHIPPED | OUTPATIENT
Start: 2023-06-28

## 2023-07-10 ENCOUNTER — TELEPHONE (OUTPATIENT)
Dept: PHYSICAL MEDICINE AND REHAB | Facility: CLINIC | Age: 53
End: 2023-07-10

## 2023-07-10 NOTE — TELEPHONE ENCOUNTER
Pt called stating pain medication prescribed during last visit is not helping with her pain.  She was hoping to speak with clinical team and see if she could get represcribed the old pain medication she was on

## 2023-07-11 NOTE — TELEPHONE ENCOUNTER
Pt asking for medication change. Pt states she used to take Percocet 10 mg x times  a day and lidocaine 5% patches BID and were working. Then she states she was prescribed Naltrexone-compound medication (not working) Pt also states she is out of Naltrexone as well. - Current pain level:  10    - Pain location: lower back traveling posterior to the knees and ankles L>R.  - Pain description: throbbing, stabbing, worse with sitting  - Current pain treatment: Pt stating taking ibuprofen, and \"anything she can find at this point\". - LOV: 5/10/2023 CouriCorrinne Lindsay, MD    - NOV: 8/1/2023 Jose Logan MD     Pt asking if she can get injections in her knees. & also if she can receive an order of Percocet 10 mg TID and Lidocaine 5%patches as they were working prior. Patient placed on waitlist for a sooner appointment.

## 2023-07-15 RX ORDER — LIDOCAINE 50 MG/G
1 PATCH TOPICAL EVERY 24 HOURS
Qty: 30 PATCH | Refills: 2 | Status: SHIPPED | OUTPATIENT
Start: 2023-07-15

## 2023-07-15 NOTE — TELEPHONE ENCOUNTER
What was the maximum dose of the naltrexone that she got up to when she was taking it? I will need to reevaluate her before prescribing any new medications, since she last stated that the narcotics made her too loopy and she was unable to work using them. It is okay for her to use the Lidoderm patches.

## 2023-07-17 NOTE — TELEPHONE ENCOUNTER
Spoke with patient and reviewed message below. Patient stated she was up to 4-5 mg of the LDN for 2 weeks, but this did not help her at all. She is currently taking Motrin 4-5 tabs twice per day to help with the pain. She also takes the muscle relaxer for her muscle spasms, but she doesn't take them very often as they make her drowsy and she can't function like that. Patient asking for a possible left knee injection, as pain is bad when going from a sitting to standing position and cannot put pressure on it. Patient does not have any lidocaine patches to use, so she is asking for a prescription for these and some other medication (prednisone?) to temporarily hold her over until her appointment on 8/1/23. Message forwarded to [facility] for recommendations.

## 2023-07-18 NOTE — TELEPHONE ENCOUNTER
Please move up her appointment so that I can do the knee injection if needed and re-evaluate her medication needs.

## 2023-07-19 ENCOUNTER — OFFICE VISIT (OUTPATIENT)
Dept: SURGERY | Facility: CLINIC | Age: 53
End: 2023-07-19

## 2023-07-19 DIAGNOSIS — R14.0 ABDOMINAL BLOATING: Primary | ICD-10-CM

## 2023-07-19 PROCEDURE — 99214 OFFICE O/P EST MOD 30 MIN: CPT | Performed by: SURGERY

## 2023-07-19 RX ORDER — POLYETHYLENE GLYCOL 3350, SODIUM CHLORIDE, SODIUM BICARBONATE, POTASSIUM CHLORIDE 420; 11.2; 5.72; 1.48 G/4L; G/4L; G/4L; G/4L
420 POWDER, FOR SOLUTION ORAL ONCE
Qty: 1 EACH | Refills: 0 | Status: SHIPPED | OUTPATIENT
Start: 2023-07-19 | End: 2023-07-19

## 2023-07-19 NOTE — PATIENT INSTRUCTIONS
Take GoLytely for complete colon washout. Follow the instructions.     Make an appointment at the  with Florentino Weber

## 2023-07-19 NOTE — PROGRESS NOTES
[facility]    Progress Note    Joyce Mancera Patient Status:  Specimen    1970 MRN [id number]   Location [facility] Attending No att. providers found   Hosp Day # 0 PCP Archana Kate,      Assessment and Plan:     Abdominal bloating especially left upper quadrant. Does take MiraLAX daily. Operative report reviewed with her. No evidence of recurrence or hernia. She has never seen a gastroenterologist.  -Trial of a flush out with GoLytely  -Make an appointment with GI APN    Subjective:   Complains of abdominal bloating    Objective:   No data found. Exam: Abdomen is soft, slightly distended, firm over the area of mesh repair in the midline. Results:     Lab Results   Component Value Date    WBC 6.1 2023    HGB 13.7 2023    HCT 41.3 2023    .0 2023    CREATSERUM 0.78 2023    BUN 9 2023     2023    K 3.5 2023     2023    CO2 31.0 2023     (H) 2023    CA 9.1 2023    ALB 3.4 2023    ALKPHO 110 (H) 2023    BILT 0.6 2023    TP 7.3 2023    AST 16 2023    ALT 19 2023    PTT 26.4 2018    INR 1.1 2018     2023    DDIMER <0.27 2018    MG 1.8 2018    TROP 0.01 2018    B12 813 2016       No results found.             Keri Rosado MD  2023

## 2023-07-26 ENCOUNTER — OFFICE VISIT (OUTPATIENT)
Dept: PHYSICAL MEDICINE AND REHAB | Facility: CLINIC | Age: 53
End: 2023-07-26
Payer: COMMERCIAL

## 2023-07-26 ENCOUNTER — TELEPHONE (OUTPATIENT)
Dept: PHYSICAL MEDICINE AND REHAB | Facility: CLINIC | Age: 53
End: 2023-07-26

## 2023-07-26 VITALS — SYSTOLIC BLOOD PRESSURE: 144 MMHG | HEART RATE: 74 BPM | DIASTOLIC BLOOD PRESSURE: 82 MMHG | OXYGEN SATURATION: 100 %

## 2023-07-26 DIAGNOSIS — M65.331 ACQUIRED TRIGGER FINGER OF RIGHT MIDDLE FINGER: ICD-10-CM

## 2023-07-26 DIAGNOSIS — M54.42 CHRONIC BILATERAL LOW BACK PAIN WITH BILATERAL SCIATICA: ICD-10-CM

## 2023-07-26 DIAGNOSIS — G89.29 CHRONIC BILATERAL LOW BACK PAIN WITH BILATERAL SCIATICA: ICD-10-CM

## 2023-07-26 DIAGNOSIS — G89.29 CHRONIC PAIN OF LEFT KNEE: ICD-10-CM

## 2023-07-26 DIAGNOSIS — M54.41 CHRONIC BILATERAL LOW BACK PAIN WITH BILATERAL SCIATICA: ICD-10-CM

## 2023-07-26 DIAGNOSIS — I89.0 LYMPHEDEMA OF BOTH LOWER EXTREMITIES: ICD-10-CM

## 2023-07-26 DIAGNOSIS — F33.2 SEVERE EPISODE OF RECURRENT MAJOR DEPRESSIVE DISORDER, WITHOUT PSYCHOTIC FEATURES (HCC): ICD-10-CM

## 2023-07-26 DIAGNOSIS — M25.562 CHRONIC PAIN OF LEFT KNEE: ICD-10-CM

## 2023-07-26 DIAGNOSIS — M51.9 LUMBAR DISC DISEASE: Primary | ICD-10-CM

## 2023-07-26 DIAGNOSIS — M54.16 LUMBAR RADICULOPATHY: ICD-10-CM

## 2023-07-26 NOTE — PATIENT INSTRUCTIONS
Plan  She will get a left knee x-ray. I will did a left knee injection in the office today under ultrasound guidance. (See procedure note)    She will get a new MRI of the lumbar spine. She will do PT on the lumbar spine and the left knee. The patient will follow up in 2-3 months, but the patient will call me in 2 weeks to let me know how the injection worked.

## 2023-07-26 NOTE — TELEPHONE ENCOUNTER
Initiated authorization for Left knee steroid injection under ultrasound CPT 64282,  with FEP  Confirmation #[id number]  Status: Approved-authorization is not required per health plan    Inj done in office

## 2023-07-26 NOTE — PROCEDURES
The patient is here for a left knee injection done under ultrasound guidance. Under ultrasound guidance the left suprapatellar bursa was identified and a medhat was placed on the patient's skin. The skin was cleaned with betadyne swabs x 3 and anesthetized with cold spray. Then a 25 gauge needle was inserted under ultrasound guidance into the left suprapatellar bursa. Aspiration was performed and no blood, fluid, or air was aspirated. The patient was then injected with 4 ml of 1.5 ml of 40 mg of Kenalog/ml and 2.5 ml of 1% lidocaine without epinephrine. The needle was removed and a band aid was applied. The patient will follow up in 2 weeks with a message. These images are permanently stored in the ultrasound unit or PACS system.

## 2023-07-26 NOTE — PROGRESS NOTES
Low Back Pain H & P    Chief Complaint: Patient presents with: Follow - Up: LOV; 5/10/2023 ight 3rd finger trigger finger/flexor digitorum superficialis tendon sheath injection Patient states that the injection was 92% better,Left sided lower back pain radiating to the leg and pain is a Pain can be a 9/10. States that the oxycodone is not working as good as she thought it was going to work. Naltroxone is also not working well. Nursing note reviewed and verified. Patient was last seen on 5/10/2023. Her right 3rd finger is still doing well without the catching or any significant pain. She has left medial knee pain when she puts weight on the left knee. She has some right knee pain, but it is not as bad as the left knee pain. She states that she took 4 of the Percocet at one time which did not help with the pain. The Naltrexone did not help her. Description of the Pain  The pain is located in the left low back. The pain radiates to the left buttock and left lateral hip. The more that she sits, she will get knee and ankle pain.       Past Medical History   Past Medical History:   Diagnosis Date    Anemia     due to blood loss    Anxiety     Asthma     Atrial septal defect     Back problem     Bipolar 2 disorder ([address]     Breast cancer ([address] 2021    L sided, treated with chemo, radiation    Chronic back pain     Chronic constipation     COVID-19 long hauler     Depression     Diabetes ([address]     Essential hypertension     Fibromyalgia     History of blood transfusion 2014    4-5 per year    Hx of motion sickness     Hypercoagulable state ([address]     Incontinence     Lupus ([address]     Migraines     Muscle weakness     Neuropathy     PE (pulmonary thromboembolism) ([address] 1999, 2014 1999 after child birth and again 2014 (bilateral)    Pneumonia due to organism 2020    PONV (postoperative nausea and vomiting)     Pulmonary embolism ([address]     Schizophrenia (HCC)     Visual impairment     Glasses Past Surgical History   Past Surgical History:   Procedure Laterality Date    BREAST BIOPSY Left     for breast cancer          x 3    CHOLECYSTECTOMY      INGUINAL HERNIA REPAIR  2011    Had inguinal and ventral hernia repair together @ [place]    NEEDLE BIOPSY LEFT  2022    us guided bx axilla    REMOVAL ANAL FISTULA,SUBMUSCULAR      repair of anal vaginal fistula after child birth    [address] Left     after trauma    VENTRAL HERNIA REPAIR  2011    after last c/s       Family History   Family History   Problem Relation Age of Onset    Breast Cancer Self 53            Depression Mother     Hypertension Mother     Psychiatric Mother     Heart Disorder Father     Hypertension Father     Heart Disease Father     Other (a-Fib) Father     Heart Disease Brother     No Known Problems Maternal Grandmother     Heart Attack Maternal Grandfather     Dementia Paternal Grandmother     Heart Attack Paternal Grandfather     DVT/VTE Paternal Aunt 37         from PE    DVT/VTE Paternal Aunt 59         from PE    Heart Disease Other     Blood Disorder Other     Ovarian Cancer Other     Colon Cancer Neg        Social History   Social History    Socioeconomic History      Marital status: Single      Spouse name: Not on file      Number of children: Not on file      Years of education: Not on file      Highest education level: Not on file    Occupational History      Occupation: works for social security as [employer]    Tobacco Use      Smoking status: Former        Types: Cigarettes        Quit date: 2010        Years since quittin.9      Smokeless tobacco: Never    Vaping Use      Vaping Use: Never used    Substance and Sexual Activity      Alcohol use: Yes        Alcohol/week: 0.0 standard drinks of alcohol        Comment:  Occ      Drug use: Yes        Types: Cannabis        Comment: Tea      Sexual activity: Not Currently        Partners: Male Other Topics      Concerns:         Service: Not Asked        Blood Transfusions: No        Caffeine Concern: No        Occupational Exposure: Not Asked        Hobby Hazards: Not Asked        Sleep Concern: Not Asked        Stress Concern: Not Asked        Weight Concern: Not Asked        Special Diet: Not Asked        Back Care: Not Asked        Exercise: No        Bike Helmet: Not Asked        Seat Belt: Not Asked        Self-Exams: Not Asked    Social History Narrative            Her daughter in [facility] is due at the end of December 2018, but just recently had cerclage out. She is currently premed. She has daughters 21, 25, 23 and 6. Her 5 y/o is disabled b/c she was a 33 W premie with PPROM at [facility]. Patient is currently homeless b/c she lost her home after prolonged disability. She is currently living with friends and family members. Plans to transfer her job to [place] [id number] Strain: Not on [employer] Insecurity: Not on file  Transportation Needs: Not on file  Physical Activity: Not on file  Stress: Not on file  Social Connections: Not on file  Housing Stability: Not on file    PE:  The patient does appear in her stated age in severe distress. The patient is well groomed. Psychiatric:  The patient is alert and oriented x 3. The patient has a normal affect and mood. Respiratory:  No acute respiratory distress. Patient does not have a cough. HEENT:  Extraocular muscles are intact. There is no kern icterus. Pupils are equal, round, and reactive to light. No redness or discharge bilaterally. Skin:  There are no rashes or lesions.     Vitals:   07/26/23  1327   BP: 144/82   Pulse: 74       Gait:    Gait: left antalgic gait   Sit to Stand: moderate-severe difficulty      Left Knee  Inspection: No ecchymosis, no erythema, no swelling   Palpation: Tender at  left medial joint line   ROM: Extension full   Tests: No medial laxity, lateral laxity, anterior drawer sign, or posterior drawer sign     Neurological Lower Extremity:    Light Touch Sensation: Intact in bilateral LE except: Increased in the medial Left thigh  Decreased in the  medial  Left Lower Leg  arch of the RIGHT foot  first dorsal web space of the LEFT foot   LE Muscle Strength: All LE strength measurements 5/5 except:  Hamstring RIGHT:   4/5  Hamstring LEFT:   3+/5  Extensor Hallucis Longus LEFT:   3+/5     Assessment  1. L5-S1 right mild foraminal & left mild far lateral, L4-5 mild diffuse, L2-3 right mild far lateral bulging discs    2. left > right L5 radiculopathies    3. Severe episode of recurrent major depressive disorder, without psychotic features [facility]    4. Acquired trigger finger of right middle finger    5. Chronic bilateral low back pain with bilateral sciatica    6. Chronic pain of left knee    7. Lymphedema of both lower extremities         Plan  She will get a left knee x-ray. I will did a left knee injection in the office today under ultrasound guidance. (See procedure note)    She will get a new MRI of the lumbar spine. She will do PT on the lumbar spine and the left knee. The patient will follow up in 2-3 months, but the patient will call me in 2 weeks to let me know how the injection worked. The patient understands and agrees with the stated plan. Sonja Sánchez MD  7/26/2023

## 2023-07-31 RX ORDER — TIRZEPATIDE 7.5 MG/.5ML
7.5 INJECTION, SOLUTION SUBCUTANEOUS WEEKLY
Qty: 2 ML | Refills: 3 | Status: SHIPPED | OUTPATIENT
Start: 2023-07-31

## 2023-07-31 NOTE — TELEPHONE ENCOUNTER
LOV: 6/6/23    RTC:  3 Months    FU: 8/7/23      Per TE 6/14/23, Kobe Oddi was too expensive for patient to pay for and was transitioned to Massiel Pedersen.  Please refuse if appropriate

## 2023-08-07 ENCOUNTER — OFFICE VISIT (OUTPATIENT)
Dept: ENDOCRINOLOGY CLINIC | Facility: CLINIC | Age: 53
End: 2023-08-07

## 2023-08-07 VITALS
BODY MASS INDEX: 40.7 KG/M2 | HEART RATE: 94 BPM | HEIGHT: 62.01 IN | SYSTOLIC BLOOD PRESSURE: 160 MMHG | WEIGHT: 224 LBS | DIASTOLIC BLOOD PRESSURE: 92 MMHG

## 2023-08-07 DIAGNOSIS — E11.65 UNCONTROLLED TYPE 2 DIABETES MELLITUS WITH HYPERGLYCEMIA (HCC): Primary | ICD-10-CM

## 2023-08-07 LAB
GLUCOSE BLOOD: 230
TEST STRIP LOT #: NORMAL NUMERIC

## 2023-08-07 PROCEDURE — 99214 OFFICE O/P EST MOD 30 MIN: CPT

## 2023-08-07 PROCEDURE — 82947 ASSAY GLUCOSE BLOOD QUANT: CPT

## 2023-08-07 PROCEDURE — 3008F BODY MASS INDEX DOCD: CPT

## 2023-08-07 PROCEDURE — 3080F DIAST BP >= 90 MM HG: CPT

## 2023-08-07 PROCEDURE — 3077F SYST BP >= 140 MM HG: CPT

## 2023-08-07 RX ORDER — TIRZEPATIDE 12.5 MG/.5ML
12.5 INJECTION, SOLUTION SUBCUTANEOUS WEEKLY
Qty: 2 ML | Refills: 0 | Status: SHIPPED | OUTPATIENT
Start: 2023-08-07

## 2023-08-07 RX ORDER — TIRZEPATIDE 10 MG/.5ML
10 INJECTION, SOLUTION SUBCUTANEOUS WEEKLY
Qty: 2 ML | Refills: 0 | Status: SHIPPED | OUTPATIENT
Start: 2023-08-07 | End: 2023-09-07

## 2023-08-07 RX ORDER — TIRZEPATIDE 15 MG/.5ML
15 INJECTION, SOLUTION SUBCUTANEOUS WEEKLY
Qty: 2 ML | Refills: 3 | Status: SHIPPED | OUTPATIENT
Start: 2023-08-07

## 2023-08-07 NOTE — PROGRESS NOTES
Name: Jorge Adams  Date: 23    Referring Physician: No ref. provider found    HISTORY OF PRESENT ILLNESS   Jorge Adams is a 47year old female who presents for follow up for diabetes mellitus     Has had more family stress and unfortunately another death in the family- just returned from a  in the last day. She has noticed pattern of stress eating and feels diet has continued to be poor since last visit. Is trying to work on this but struggling with increased appetite and cravings despite change from [facility] to [facility] although sugars are improved. Diabetes History:  Diagnosed- age 17   Patient has had hospitalizations for blood sugar issues- last time     Prior glycohemoglobin were: 13.4% 3/2023; 7.3% 2023  Glucose in clinic today - 230 mg/dl    Dietary compliance: Poor- Admits has been eating larger portions, more stress eating and craving more sweets. Recall:  Breakfast- coffee, skips   Lunch- turkey sandwich   Dinner- protein like chicken with beans, or rice, gravy   Snack- nuts, popcorn   Beverages- water sometimes with 0 sugar flavoring, sometimes soda- small cans- 1 every 2 days. Exercise: No. Walking more since last visit   Polyuria/polydipsia: No- improved   Blurred vision: No     Episodes of hypoglycemia:Yes- occ. In the 60's typically if skips meals. Blood Glucose:  Using Freestyle Meagan   Reviewed data for 2 weeks prior to visit: (23-23)    64% of glucose readings in target range ()  35% of glucose readings above target range (>180)  1% of glucose readings very high   0% of glucose readings below target range     Trends: Still having some post prandial hyperglycemia with meals- mainly in the afternoons and evenings due to higher carb foods and snacking. No hypoglycemia noted on reports in the past two weeks. Sugars still overall improved in the past 6 months with improving HgA1c.      Current DM Regimen:  Metformin 1000mg PO BID  Novolog - 12 units TID with meals   Mounjaro- 7.5mg subcutaneous weekly   Jardiance -25mg PO daily   Basagalar- 17 units subcutaneous daily     Modifying factors:  Medication adherence: Yes   Recent steroids, illness or infections: No       REVIEW OF SYSTEMS  Eyes: Diabetic retinopathy present: No             Most recent visit to eye doctor in last 12 months: Yes- within last year     CV: Cardiovascular disease present: No          Hypertension present: Yes          Hyperlipidemia present: Yes - on statin therapy          Peripheral Vascular Disease present: No     : Nephropathy present: No     Neuro: Neuropathy present: Yes- numbness in feet bilaterally     Skin: Infection or ulceration: No     Osteoporosis: No     Thyroid disease: No     Medications:     Current Outpatient Medications:     Tirzepatide (MOUNJARO) 10 MG/0.5ML Subcutaneous Solution Pen-injector, Inject 10 mg into the skin once a week., Disp: 2 mL, Rfl: 0    Tirzepatide (MOUNJARO) 12.5 MG/0.5ML Subcutaneous Solution Pen-injector, Inject 12.5 mg into the skin once a week., Disp: 2 mL, Rfl: 0    Tirzepatide (MOUNJARO) 15 MG/0.5ML Subcutaneous Solution Pen-injector, Inject 15 mg into the skin once a week., Disp: 2 mL, Rfl: 3    oxyCODONE-acetaminophen (PERCOCET) 5-325 MG Oral Tab, Take 1 tablet by mouth every 8 (eight) hours as needed for Pain., Disp: 30 tablet, Rfl: 0    lidocaine 5 % External Patch, Place 1 patch onto the skin daily. , Disp: 30 patch, Rfl: 2    PEG 3350-KCl-Na Bicarb-NaCl (NULYTELY LEMON-LIME) 420 g Oral Recon Soln, Take 420 g by mouth once for 1 dose., Disp: 1 each, Rfl: 0    lidocaine 5 % External Patch, Place 1 patch onto the skin daily. , Disp: 30 patch, Rfl: 2    zolpidem 10 MG Oral Tab, Take 1 tablet (10 mg total) by mouth nightly as needed for Sleep., Disp: 30 tablet, Rfl: 1    carvedilol 25 MG Oral Tab, Take 1 tablet (25 mg total) by mouth 2 (two) times daily with meals. , Disp: 60 tablet, Rfl: 0    spironolactone 25 MG Oral Tab, Take 1 tablet (25 mg total) by mouth daily. , Disp: 60 tablet, Rfl: 0    DULOXETINE 60 MG Oral Cap DR Particles, TAKE 1 CAPSULE BY MOUTH EVERY DAY, Disp: 30 capsule, Rfl: 0    METFORMIN HCL 1000 MG Oral Tab, TAKE 1 TABLET BY MOUTH TWICE A DAY WITH MEALS, Disp: 180 tablet, Rfl: 1    LOSARTAN 100 MG Oral Tab, TAKE 1 TABLET BY MOUTH EVERY DAY, Disp: 90 tablet, Rfl: 1    ROSUVASTATIN 10 MG Oral Tab, TAKE 1 TABLET BY MOUTH EVERY DAY AT NIGHT, Disp: 90 tablet, Rfl: 1    Naltrexone Does not apply Powder, compound 1 mg capsules to be taken orally daily. , Disp: 30 each, Rfl: 3    Continuous Blood Gluc Sensor (FREESTYLE NAA 2 SENSOR) Does not apply Misc, 1 each every 14 (fourteen) days. , Disp: 2 each, Rfl: 3    oxyCODONE ER (OXYCONTIN) 10 MG Oral Tablet Extended Release 12 hour Abuse-Deterrent, Take 1 tablet (10 mg total) by mouth Q12H., Disp: 20 tablet, Rfl: 0    Ketorolac Tromethamine 10 MG Oral Tab, Take 1 tablet (10 mg total) by mouth every 6 (six) hours as needed for Pain., Disp: 20 tablet, Rfl: 0    Blood Pressure Monitoring (BLOOD PRESSURE CUFF) Does not apply Misc, Use as directed., Disp: 1 each, Rfl: 0    cyclobenzaprine 10 MG Oral Tab, Take 1 tablet (10 mg total) by mouth nightly as needed for Muscle spasms. , Disp: 30 tablet, Rfl: 0    furosemide (LASIX) 40 MG Oral Tab, Take 1 tablet (40 mg total) by mouth daily. , Disp: 30 tablet, Rfl: 0    insulin glargine (BASAGLAR KWIKPEN) 100 UNIT/ML Subcutaneous Solution Pen-injector, Inject 22 Units into the skin nightly., Disp: 15 mL, Rfl: 0    insulin aspart (NOVOLOG FLEXPEN) 100 Units/mL Subcutaneous Solution Pen-injector, Inject 15 Units into the skin in the morning, at noon, and at bedtime. , Disp: 30 mL, Rfl: 3    Insulin Pen Needle (PEN NEEDLES) 32G X 4 MM Does not apply Misc, 4 each daily. , Disp: 200 each, Rfl: 1    NOVOLOG FLEXPEN 100 UNIT/ML Subcutaneous Solution Pen-injector, , Disp: , Rfl:      Allergies:     Latex                   HIVES, SWELLING  Influenza Vaccines      OTHER (SEE COMMENTS)    Comment:Pt passed out  Radiology Contrast *    ITCHING    Social History:   Social History    Socioeconomic History      Marital status: Single    Occupational History      Occupation: works for [employer] as [employer]    Tobacco Use      Smoking status: Former        Types: Cigarettes        Quit date: 2010        Years since quittin.0      Smokeless tobacco: Never    Vaping Use      Vaping Use: Never used    Substance and Sexual Activity      Alcohol use: Yes        Alcohol/week: 0.0 standard drinks of alcohol        Comment:  Occ      Drug use: Yes        Types: Cannabis        Comment: Tea      Sexual activity: Not Currently        Partners: Male    Other Topics      Concerns:        Blood Transfusions: No        Caffeine Concern: No        Exercise: No      Medical History:   Past Medical History:   Diagnosis Date    Anemia     due to blood loss    Anxiety     Asthma     Atrial septal defect     Back problem     Bipolar 2 disorder ([address]     Breast cancer ([address]     L sided, treated with chemo, radiation    Chronic back pain     Chronic constipation     COVID-19 long hauler     Depression     Diabetes ([address]     Essential hypertension     Fibromyalgia     History of blood transfusion     4-5 per year    Hx of motion sickness     Hypercoagulable state ([address]     Incontinence     Lupus ([address]     Migraines     Muscle weakness     Neuropathy     PE (pulmonary thromboembolism) ([address] , 2014 after child birth and again  (bilateral)    Pneumonia due to organism     PONV (postoperative nausea and vomiting)     Pulmonary embolism ([address]     Schizophrenia ([address]     Visual impairment     Glasses       Surgical history:   Past Surgical History:   Procedure Laterality Date    BREAST BIOPSY Left     for breast cancer          x 3    CHOLECYSTECTOMY      INGUINAL HERNIA REPAIR  2011    Had inguinal and ventral hernia repair together @ [facility]    NEEDLE BIOPSY LEFT  02/01/2022    us guided bx axilla    REMOVAL ANAL FISTULA,SUBMUSCULAR  1995    repair of anal vaginal fistula after child birth    [address] Left 2013    after trauma    VENTRAL HERNIA REPAIR  03/2011    after last c/s         PHYSICAL EXAM   08/07/23  1322 08/07/23  1407   BP: (!) 168/104 (!) 160/92   Pulse: 94    Weight: 224 lb (101.6 kg)    Height: 5' 2.01\" (1.575 m)          General Appearance:  alert, well developed, in no acute distress  Eyes:  normal conjunctivae, sclera, and normal pupils  Neck: Trachea midline: Normal  Back: no kyphosis or back tenderness  Respiratory:  clear to auscultation bilaterally  Cardiovascular:  regular rate, rhythm, , no murmurs, S3 or S4  Lymph Nodes:  No abnormal nodes noted  Musculoskeletal:  normal muscle strength and tone  Skin:  normal moisture and skin texture  Hair & Nails:  normal scalp hair     Hematologic:  no excessive bruising  Neuro:  sensory grossly intact and motor grossly intact. Psychiatric:  oriented to time, self, and place  Nutritional:  no abnormal weight gain or loss      ASSESSMENT/PLAN:    Diabetes mellitus type 2 uncontrolled  -HgA1c- 7.3% 6/2023  -Congratulated patient on improved glycemic control   -Reviewed ABC's of diabetes   - Reviewed pathogenesis of diabetes.    - Reviewed importance of good glycemic control to prevent microvascular and macrovascular complications including nephropathy, neuropathy, retinopathy, and cardiovascular disease.  - Reviewed importance of SBGM- check glucose 1-2 times daily   - Reviewed target glucose goals for patient  fasting and <180 post prandially   - Reviewed importance of following diabetic diet- recommended 135 grams of CHO per day or 45 grams per meal.   - Provided patient education materials    -Continue Basaglar 17 units subcutaneous daily   -Continue current dose of Novolog- 12 units TID with meals     -Increase Mounjaro to 10mg subcutaneous weekly for 1 month and then increase to 12.5mg subcutaneous weekly for one month and then increase to 15mg subcutaneous weekly. Reviewed pen injection in office today. Reviewed side effects and risks vs benefits of medication.     -Continue Jardiance 25mg PO daily. Reviewed side effects and risks vs benefits of medication. Reviewed importance of staying very well hydrated on medication. Instructed to call office if any symptoms of dysuria.   -Continue Metformin 1000mg PO BID    -Lipids 3/2023- LDL - 149- Rosuvastatin was increased from 5mg to 10mg daily- will recheck lipids before next appointment. Reviewed low fat diet. - UTD with optho   -Foot exam 3/2023  -Continue Freestyle Meagan 2    Hypertension  - BP significantly elevated in clinic today and not improved on repeat  - Per patient BP has been running high recently  - On carvedilol 25mg PO BID and Losartan 100mg PO daily  -Almodipine 5mg daily was added by PCP with improved BP  -Did not take diuretic today- she will take medication when she returns home and will recheck BP at home.   -Denies HA, blurry vision, chest pain, SOB. Discussed contacting PCP today to discuss BP readings and she is agreeable. RTC in 5 months  8/7/23  FELICITAS Ta    A total of  35 minutes was spent on obtaining history, reviewing pertinent imaging/labs and specialists notes, evaluating patient, providing multiple treatment options, reinforcing diet/exercise and compliance, and completing documentation.

## 2023-08-07 NOTE — PATIENT INSTRUCTIONS
Continue Metformin   Continue current doses of insulin  Continue Jardiance     Increase Mounjaro to 10mg subcutaneous weekly for 4 weeks and then increase to 12.5mg subcutaneous weekly and then increase to 15mg subcutaneous weekly. Get savings card online for next refill but let me know if the medication is too costly.

## 2023-08-09 RX ORDER — SPIRONOLACTONE 25 MG/1
25 TABLET ORAL DAILY
Qty: 60 TABLET | Refills: 0 | Status: SHIPPED | OUTPATIENT
Start: 2023-08-09

## 2023-08-09 NOTE — TELEPHONE ENCOUNTER
Please review. Protocol failed / No Protocol. LOV 6/2023    -Since symptoms seem to have worsened since starting amlodipine, will discontinue and start spironolactone 25mg daily. -RTC in 4-6 weeks for f/u. Plan to repeat BMP at that time. To call sooner prn.   -Consider referral back to vascular surgery if no/minimal improvement with above. Future Appointments   Date Time Provider Brianna Guevara   8/23/2023  2:30 PM [facility] 14 FP [facility] 10 OP   9/6/2023  3:30 PM FELICITAS Marie Henry [facility]   12/11/2023  2:00 PM FELICITAS Medina         Requested Prescriptions   Pending Prescriptions Disp Refills    SPIRONOLACTONE 25 MG Oral Tab [Pharmacy Med Name: SPIRONOLACTONE 25 MG TABLET] 60 tablet 0     Sig: Take 1 tablet (25 mg total) by mouth daily.        Hypertensive Medications Protocol Failed - 8/8/2023  8:32 AM        Failed - Last BP reading less than 140/90     BP Readings from Last 1 Encounters:  08/07/23 : (!) 160/92              Passed - In person appointment in the past 12 or next 3 months     Recent Outpatient Visits              Yesterday Uncontrolled type 2 diabetes mellitus with hyperglycemia [facility])    [address] [facility], Dariel Jonas, FELICITAS    Office Visit    1 week ago L5-S1 right mild foraminal & left mild far lateral, L4-5 mild diffuse, L2-3 right mild far lateral bulging discs    [facility], [address], Dee Feliz MD    Office Visit    2 weeks ago Abdominal bloating    [facility], [facility], Dari Cloud MD    Office Visit    2 months ago Peripheral edema    [address] [facility], [facility] [address] Peter Galvan DO    Office Visit    2 months ago Uncontrolled type 2 diabetes mellitus with hyperglycemia [facility])    [address], [address], Dariel Jonas APRN    Office Visit Future Appointments         Provider Department Appt Notes    In 2 weeks Frances Ramsey DO [facility], [place] [id number], [facility]     In 4 weeks FELICITAS Lockhart [facility], [place] [address], [facility] Np consult    In 4 months FELICITAS Still [facility], [facility], [address] [place] [address] - CMP or BMP in past 6 months     Recent Results (from the past 4392 hour(s))   COMP METABOLIC PANEL (14)    Collection Time: 03/17/23  3:31 PM   Result Value Ref Range    Glucose 192 (H) 70 - 99 mg/dL    Sodium 142 136 - 145 mmol/L    Potassium 3.5 3.5 - 5.1 mmol/L    Chloride 104 98 - 112 mmol/L    CO2 31.0 21.0 - 32.0 mmol/L    Anion Gap 7 0 - 18 mmol/L    BUN 9 7 - 18 mg/dL    Creatinine 0.78 0.55 - 1.02 mg/dL    BUN/CREA Ratio 11.5 10.0 - 20.0    Calcium, Total 9.1 8.5 - 10.1 mg/dL    Calculated Osmolality 298 (H) 275 - 295 mOsm/kg    eGFR-Cr 92 >=60 mL/min/1.73m2    ALT 19 13 - 56 U/L    AST 16 15 - 37 U/L    Alkaline Phosphatase 110 (H) 41 - 108 U/L    Bilirubin, Total 0.6 0.1 - 2.0 mg/dL    Total Protein 7.3 6.4 - 8.2 g/dL    Albumin 3.4 3.4 - 5.0 g/dL    Globulin  3.9 2.8 - 4.4 g/dL    A/G Ratio 0.9 (L) 1.0 - 2.0    Patient Fasting for CMP? No      *Note: Due to a large number of results and/or encounters for the requested time period, some results have not been displayed. A complete set of results can be found in Results Review.                Passed - In person appointment or virtual visit in the past 6 months     Recent Outpatient Visits              Yesterday Uncontrolled type 2 diabetes mellitus with hyperglycemia [facility])    Pritesh Allen Vickye Coulter, APRN    Office Visit    1 week ago L5-S1 right mild foraminal & left mild far lateral, L4-5 mild diffuse, L2-3 right mild far lateral bulging discs    [address], [address] [facility],[address], Delores Real MD    Office Visit    2 weeks ago Abdominal bloating    [facility], Mary Peres MD    Office Visit    2 months ago Peripheral edema    [facility], [address] [place] [address]    Office Visit    2 months ago Uncontrolled type 2 diabetes mellitus with hyperglycemia [facility])    Anita Albarran, Sandra Felder, Armando Jonas APRN    Office Visit          Future Appointments         Provider Department Appt Notes    In 2 weeks Choco Shaw DO [facility], Sandra Felder, Las[place] le     In 4 weeks FELICITAS Alvarenga [facility], Sandra Felder, [place] Np consult    In 4 months Andrei Sunshine, [address], [address] [place] [address] or GFRAA > 50     GFR Evaluation  EGFRCR: 92 , resulted on 3/17/2023

## 2023-09-11 DIAGNOSIS — G89.29 CHRONIC BILATERAL LOW BACK PAIN WITH BILATERAL SCIATICA: ICD-10-CM

## 2023-09-11 DIAGNOSIS — M54.16 LUMBAR RADICULOPATHY: ICD-10-CM

## 2023-09-11 DIAGNOSIS — M54.42 CHRONIC BILATERAL LOW BACK PAIN WITH BILATERAL SCIATICA: ICD-10-CM

## 2023-09-11 DIAGNOSIS — M54.41 CHRONIC BILATERAL LOW BACK PAIN WITH BILATERAL SCIATICA: ICD-10-CM

## 2023-09-11 DIAGNOSIS — M51.9 LUMBAR DISC DISEASE: ICD-10-CM

## 2023-09-12 ENCOUNTER — HOSPITAL ENCOUNTER (OUTPATIENT)
Age: 53
Discharge: ANOTHER HOSPITAL | End: 2023-09-12
Payer: COMMERCIAL

## 2023-09-12 ENCOUNTER — HOSPITAL ENCOUNTER (EMERGENCY)
Facility: HOSPITAL | Age: 53
Discharge: STILL ADMITTED OR ADMINISTRATIVE | End: 2023-09-13
Payer: COMMERCIAL

## 2023-09-12 VITALS
DIASTOLIC BLOOD PRESSURE: 97 MMHG | HEART RATE: 80 BPM | RESPIRATION RATE: 18 BRPM | SYSTOLIC BLOOD PRESSURE: 153 MMHG | OXYGEN SATURATION: 99 % | TEMPERATURE: 99 F

## 2023-09-12 DIAGNOSIS — M79.10 MYALGIA: Primary | ICD-10-CM

## 2023-09-12 DIAGNOSIS — Z86.59 HISTORY OF BIPOLAR DISORDER: ICD-10-CM

## 2023-09-12 DIAGNOSIS — M79.7 FIBROMYALGIA: ICD-10-CM

## 2023-09-12 DIAGNOSIS — Z86.2 HISTORY OF ANEMIA: ICD-10-CM

## 2023-09-12 DIAGNOSIS — R21 RASH OF BACK: ICD-10-CM

## 2023-09-12 DIAGNOSIS — G89.4 CHRONIC PAIN SYNDROME: ICD-10-CM

## 2023-09-12 DIAGNOSIS — Z86.39 HISTORY OF TYPE 2 DIABETES MELLITUS: ICD-10-CM

## 2023-09-12 DIAGNOSIS — M32.9 HISTORY OF SYSTEMIC LUPUS ERYTHEMATOSUS (HCC): ICD-10-CM

## 2023-09-12 PROCEDURE — 99213 OFFICE O/P EST LOW 20 MIN: CPT | Performed by: PHYSICIAN ASSISTANT

## 2023-09-12 RX ORDER — OXYCODONE HYDROCHLORIDE AND ACETAMINOPHEN 5; 325 MG/1; MG/1
1 TABLET ORAL EVERY 8 HOURS PRN
Qty: 30 TABLET | Refills: 0 | Status: SHIPPED | OUTPATIENT
Start: 2023-09-12

## 2023-09-14 ENCOUNTER — TELEPHONE (OUTPATIENT)
Dept: PHYSICAL MEDICINE AND REHAB | Facility: CLINIC | Age: 53
End: 2023-09-14

## 2023-09-19 NOTE — TELEPHONE ENCOUNTER
Per Pt she she states She wants percocet 20 mg instead of 5 as that was the dose she was taking when in [facility]. Educated the patient that on our record the last dispensed dosage was 5mg, however we are not able to access the records from [facility]. Pt in addition stated that she uses this medication only a two to thee times as needed per week and not daily. Pt also was asking on approval for a back injection. Per pt this was discussed on her last appt with Dr Makeda Gallego on  07/26/2023. Informed pt that I don't see an order for that type of injection and last order was for a knee injection. Per Dr Stiles's Rekha Pore note Get Arciniegar will get a left knee x-ray. I will did a left knee injection in the office today under ultrasound guidance. (See procedure note) She will get a new MRI of the lumbar spine. She will do PT on the lumbar spine and the left knee. The patient will follow up in 2-3 months, but the patient will call me in 2 weeks to let me know how the injection worked. \"     Pt requested a video visit to review medications and discuss about further planning with Dr Makeda Gallego. Pt scheduled for 10/04/2023 with Dr Makeda Gallego for a video appt. Routing to Dr Makeda Gallego for any additional recommendations if needed.

## 2023-09-29 RX ORDER — BUSPIRONE HYDROCHLORIDE 5 MG/1
5 TABLET ORAL 2 TIMES DAILY
Qty: 60 TABLET | Refills: 0 | Status: SHIPPED | OUTPATIENT
Start: 2023-09-29

## 2023-09-29 NOTE — TELEPHONE ENCOUNTER
Please review. Protocol failed / Has no protocol. New medication from 8/24/23, written for 30 day, zero refills. Is refill appropriate? No follow up appt scheduled. Requested Prescriptions   Pending Prescriptions Disp Refills    BUSPIRONE 5 MG Oral Tab [Pharmacy Med Name: BUSPIRONE HCL 5 MG TABLET] 60 tablet 0     Sig: TAKE 1 TABLET (5 MG TOTAL) BY MOUTH IN THE MORNING AND BEFORE BEDTIME       There is no refill protocol information for this order        Future Appointments         Provider Department Appt Notes    In 5 days Ward Agrawal MD [facility] Discussing plan/possible injections and medication review.     In 2 months Brigitte Molina APRN [facility] FOLLOW UP           Recent Outpatient Visits              1 month ago INESSA (generalized anxiety disorder)    [facility], Karen[place]aribel , [place], [place]    Office Visit    1 month ago Uncontrolled type 2 diabetes mellitus with hyperglycemia [facility])    Kaern Fregosonorbert Felder, Brigitte Jonas APRN    Office Visit    2 months ago L5-S1 right mild foraminal & left mild far lateral, L4-5 mild diffuse, L2-3 right mild far lateral bulging discs    [facility], [address], Aroldo Feliz MD    Office Visit    2 months ago Abdominal bloating    [facility] Celestino Dee MD    Office Visit    3 months ago Peripheral edema    Sandra Fregoso, [place], [place]    Office Visit

## 2023-10-04 ENCOUNTER — TELEPHONE (OUTPATIENT)
Facility: CLINIC | Age: 53
End: 2023-10-04

## 2023-10-04 ENCOUNTER — TELEMEDICINE (OUTPATIENT)
Dept: PHYSICAL MEDICINE AND REHAB | Facility: CLINIC | Age: 53
End: 2023-10-04
Payer: COMMERCIAL

## 2023-10-04 DIAGNOSIS — M51.9 LUMBAR DISC DISEASE: ICD-10-CM

## 2023-10-04 DIAGNOSIS — M25.562 CHRONIC PAIN OF LEFT KNEE: ICD-10-CM

## 2023-10-04 DIAGNOSIS — F33.2 SEVERE EPISODE OF RECURRENT MAJOR DEPRESSIVE DISORDER, WITHOUT PSYCHOTIC FEATURES (HCC): ICD-10-CM

## 2023-10-04 DIAGNOSIS — M54.42 CHRONIC BILATERAL LOW BACK PAIN WITH BILATERAL SCIATICA: ICD-10-CM

## 2023-10-04 DIAGNOSIS — M54.16 LUMBAR RADICULOPATHY: Primary | ICD-10-CM

## 2023-10-04 DIAGNOSIS — G89.29 CHRONIC PAIN OF LEFT KNEE: ICD-10-CM

## 2023-10-04 DIAGNOSIS — I89.0 LYMPHEDEMA OF BOTH LOWER EXTREMITIES: ICD-10-CM

## 2023-10-04 DIAGNOSIS — M54.41 CHRONIC BILATERAL LOW BACK PAIN WITH BILATERAL SCIATICA: ICD-10-CM

## 2023-10-04 DIAGNOSIS — G89.29 CHRONIC BILATERAL LOW BACK PAIN WITH BILATERAL SCIATICA: ICD-10-CM

## 2023-10-04 DIAGNOSIS — D68.59 HYPERCOAGULABLE STATE (HCC): ICD-10-CM

## 2023-10-04 DIAGNOSIS — D50.9 MICROCYTIC ANEMIA: ICD-10-CM

## 2023-10-04 PROCEDURE — 99214 OFFICE O/P EST MOD 30 MIN: CPT | Performed by: PHYSICAL MEDICINE & REHABILITATION

## 2023-10-04 RX ORDER — OXYCODONE AND ACETAMINOPHEN 10; 325 MG/1; MG/1
1 TABLET ORAL EVERY 4 HOURS PRN
Qty: 30 TABLET | Refills: 0 | Status: SHIPPED | OUTPATIENT
Start: 2023-10-09

## 2023-10-04 NOTE — TELEPHONE ENCOUNTER
The patient is calling for a follow up appointment  after her Urgent care visit. She had shingles and was told to follow up with her PCP.

## 2023-10-05 ENCOUNTER — TELEPHONE (OUTPATIENT)
Facility: CLINIC | Age: 53
End: 2023-10-05

## 2023-10-05 ENCOUNTER — TELEMEDICINE (OUTPATIENT)
Facility: CLINIC | Age: 53
End: 2023-10-05
Payer: COMMERCIAL

## 2023-10-05 DIAGNOSIS — B02.9 HERPES ZOSTER WITHOUT COMPLICATION: Primary | ICD-10-CM

## 2023-10-05 DIAGNOSIS — B02.29 POSTHERPETIC NEURALGIA: ICD-10-CM

## 2023-10-05 PROCEDURE — 99214 OFFICE O/P EST MOD 30 MIN: CPT | Performed by: FAMILY MEDICINE

## 2023-10-05 RX ORDER — ARIPIPRAZOLE 5 MG/1
TABLET ORAL
COMMUNITY
Start: 2023-10-05

## 2023-10-05 RX ORDER — GABAPENTIN 300 MG/1
300 CAPSULE ORAL 3 TIMES DAILY
Qty: 90 CAPSULE | Refills: 0 | Status: SHIPPED | OUTPATIENT
Start: 2023-10-05

## 2023-10-05 NOTE — TELEPHONE ENCOUNTER
Patient has a 3pm appointment today. She would like to know if she can do a phone call instead. Patient states she is not feeling well enough to come in.

## 2023-10-05 NOTE — PROGRESS NOTES
HPI:    Patient ID: Daniel Franklin is a 47year old female who presents for shingles f/u. HPI  Was seen in UC on 9/12/23 for rash on right side of back. Was recommended she go to ED, but she did not go. Ended up going to another UC the following day and was prescribed valacyclovir & gabapentin 100mg x3-4 days. Then developed blistered rash on scalp and in b/t buttock. Lesions are crusted over now and has scabs, but are still painful. Took valacyclovir as prescribed. Took gabapentin 100mg TID for a couple days, but self-discontinued as it did not help. She has previously been on 600mg BID. No prior shingles infection or vaccine. Of note, she is still taking xarelto. Past Medical History:   Diagnosis Date    Anemia     due to blood loss    Anxiety     Asthma     Atrial septal defect     Back problem     Bipolar 2 disorder ([address]     Breast cancer ([address] 2021    L sided, treated with chemo, radiation    Chronic back pain     Chronic constipation     COVID-19 long hauler     Depression     Diabetes ([address]     Essential hypertension     Fibromyalgia     History of blood transfusion 2014    4-5 per year    Hx of motion sickness     Hypercoagulable state ([address]     Incontinence     Lupus ([address]     Migraines     Muscle weakness     Neuropathy     PE (pulmonary thromboembolism) ([address] 1999, 2014 1999 after child birth and again 2014 (bilateral)    Pneumonia due to organism 2020    PONV (postoperative nausea and vomiting)     Pulmonary embolism (HCC)     Schizophrenia (HCC)     Visual impairment     Glasses        Current Outpatient Medications   Medication Sig Dispense Refill    ARIPiprazole 5 MG Oral Tab       gabapentin 300 MG Oral Cap Take 1 capsule (300 mg total) by mouth 3 (three) times daily. 90 capsule 0    [START ON 10/9/2023] oxyCODONE-acetaminophen  MG Oral Tab Take 1 tablet by mouth every 4 (four) hours as needed for Pain.  30 tablet 0    JARDIANCE 25 MG Oral Tab       XARELTO 20 MG Oral Tab Take 1 tablet (20 mg total) by mouth daily with food. spironolactone 25 MG Oral Tab Take 1 tablet (25 mg total) by mouth daily. 60 tablet 0    lidocaine 5 % External Patch Place 1 patch onto the skin daily. 30 patch 2    zolpidem 10 MG Oral Tab Take 1 tablet (10 mg total) by mouth nightly as needed for Sleep. 30 tablet 1    carvedilol 25 MG Oral Tab Take 1 tablet (25 mg total) by mouth 2 (two) times daily with meals. 60 tablet 0    METFORMIN HCL 1000 MG Oral Tab TAKE 1 TABLET BY MOUTH TWICE A DAY WITH MEALS 180 tablet 1    LOSARTAN 100 MG Oral Tab TAKE 1 TABLET BY MOUTH EVERY DAY 90 tablet 1    ROSUVASTATIN 10 MG Oral Tab TAKE 1 TABLET BY MOUTH EVERY DAY AT NIGHT 90 tablet 1    furosemide (LASIX) 40 MG Oral Tab Take 1 tablet (40 mg total) by mouth daily. 30 tablet 0    insulin glargine (BASAGLAR KWIKPEN) 100 UNIT/ML Subcutaneous Solution Pen-injector Inject 22 Units into the skin nightly. 15 mL 0    insulin aspart (NOVOLOG FLEXPEN) 100 Units/mL Subcutaneous Solution Pen-injector Inject 15 Units into the skin in the morning, at noon, and at bedtime. 30 mL 3    Insulin Pen Needle (PEN NEEDLES) 32G X 4 MM Does not apply Misc 4 each daily. 200 each 1          Latex                   HIVES, SWELLING  Influenza Vaccines      OTHER (SEE COMMENTS)    Comment:Pt passed out  Radiology Contrast *    ITCHING    Review of Systems   See HPI        LMP 10/29/2018     PHYSICAL EXAM:   Physical Exam  Constitutional:       General: She is not in acute distress. Appearance: Normal appearance. She is not ill-appearing, toxic-appearing or diaphoretic. Pulmonary:      Effort: Pulmonary effort is normal.   Neurological:      General: No focal deficit present. Mental Status: She is alert. Psychiatric:         Mood and Affect: Mood normal.         Behavior: Behavior normal.         Thought Content:  Thought content normal.         Judgment: Judgment normal.             ASSESSMENT/PLAN:   Herpes zoster without complication  (primary encounter diagnosis)  Postherpetic neuralgia    1. Herpes zoster without complication    2. Postherpetic neuralgia    -s/p valtrex with postherpetic neuralgia.  -On Xarelto thus will avoid NSAIDs.  -Resume gabapentin, but will trial increased dose (300mg TID). SE profile reviewed. -Pt to call/message if minimal improvement over next week, or if pain worsens. Meds This Visit:  Requested Prescriptions     Signed Prescriptions Disp Refills    gabapentin 300 MG Oral Cap 90 capsule 0     Sig: Take 1 capsule (300 mg total) by mouth 3 (three) times daily.        Imaging & Referrals:  None       Johnson DO Nicholas  [id number]

## 2023-10-05 NOTE — PROGRESS NOTES
[address] [facility]    Telemedicine Visit - Evaluation    Tex Slaughter verbally consents to a Telemedicine Visit on 10/04/23. This visit is conducted using Telemedicine with live, interactive audio and video. Patient understands and accepts financial responsibility for any deductible, co-insurance and/or co-pays associated with this service. Chief Complaint: low back pain    HISTORY OF PRESENT ILLNESS:   The patient is a 47year old female who was last seen on 7/26/2023. I did a left knee injection at that time. This helped about over 50% for one month. Once the relief wore off, she started falling again due to having the pain with weight bearing. She has not had a left knee x-ray yet. She has not had the lumbar MRI scan yet either. She has not done the PT yet for the lumbar spine or the left knee. She is limited with her funds. She would like to have the left knee injection again. She had shingles in the middle of her back and then in her head and under the left breast.  Her back is healing, but the sores on her head are . She is taking the Percocet 5/325 3-4 tablets at one time when she takes it. She will only take it every 2-3 days. She is loopy when she takes them more than once a day.     She would like to have the injections into the lumbar spine which have helped her low back pain in the past.        PAST MEDICAL HISTORY:     Past Medical History:   Diagnosis Date    Anemia     due to blood loss    Anxiety     Asthma     Atrial septal defect     Back problem     Bipolar 2 disorder ([address]     Breast cancer ([address] 2021    L sided, treated with chemo, radiation    Chronic back pain     Chronic constipation     COVID-19 long hauler     Depression     Diabetes ([address]     Essential hypertension     Fibromyalgia     History of blood transfusion 2014    4-5 per year    Hx of motion sickness     Hypercoagulable state ([address]     Incontinence Lupus ([address]     Migraines     Muscle weakness     Neuropathy     PE (pulmonary thromboembolism) ([address] , 2014 after child birth and again  (bilateral)    Pneumonia due to organism     PONV (postoperative nausea and vomiting)     Pulmonary embolism ([address]     Schizophrenia ([address]     Visual impairment     Glasses         PAST SURGICAL HISTORY:     Past Surgical History:   Procedure Laterality Date    BREAST BIOPSY Left     for breast cancer          x 3    CHOLECYSTECTOMY      INGUINAL HERNIA REPAIR  2011    Had inguinal and ventral hernia repair together @ [facility]    NEEDLE BIOPSY LEFT  2022    us guided bx axilla    REMOVAL ANAL FISTULA,SUBMUSCULAR      repair of anal vaginal fistula after child birth    [address] Left     after trauma    VENTRAL HERNIA REPAIR  2011    after last c/s         CURRENT MEDICATIONS:     Current Outpatient Medications   Medication Sig Dispense Refill    busPIRone 5 MG Oral Tab Take 1 tablet (5 mg total) by mouth 2 (two) times daily. 60 tablet 0    oxyCODONE-acetaminophen (PERCOCET) 5-325 MG Oral Tab Take 1 tablet by mouth every 8 (eight) hours as needed for Pain. 30 tablet 0    JARDIANCE 25 MG Oral Tab       XARELTO 20 MG Oral Tab Take 1 tablet (20 mg total) by mouth daily with food. spironolactone 25 MG Oral Tab Take 1 tablet (25 mg total) by mouth daily. 60 tablet 0    lidocaine 5 % External Patch Place 1 patch onto the skin daily. 30 patch 2    zolpidem 10 MG Oral Tab Take 1 tablet (10 mg total) by mouth nightly as needed for Sleep. 30 tablet 1    carvedilol 25 MG Oral Tab Take 1 tablet (25 mg total) by mouth 2 (two) times daily with meals.  60 tablet 0    DULOXETINE 60 MG Oral Cap DR Particles TAKE 1 CAPSULE BY MOUTH EVERY DAY 30 capsule 0    METFORMIN HCL 1000 MG Oral Tab TAKE 1 TABLET BY MOUTH TWICE A DAY WITH MEALS 180 tablet 1    LOSARTAN 100 MG Oral Tab TAKE 1 TABLET BY MOUTH EVERY DAY 90 tablet 1 ROSUVASTATIN 10 MG Oral Tab TAKE 1 TABLET BY MOUTH EVERY DAY AT NIGHT 90 tablet 1    cyclobenzaprine 10 MG Oral Tab Take 1 tablet (10 mg total) by mouth nightly as needed for Muscle spasms. 30 tablet 0    furosemide (LASIX) 40 MG Oral Tab Take 1 tablet (40 mg total) by mouth daily. 30 tablet 0    insulin glargine (BASAGLAR KWIKPEN) 100 UNIT/ML Subcutaneous Solution Pen-injector Inject 22 Units into the skin nightly. 15 mL 0    insulin aspart (NOVOLOG FLEXPEN) 100 Units/mL Subcutaneous Solution Pen-injector Inject 15 Units into the skin in the morning, at noon, and at bedtime. 30 mL 3    Insulin Pen Needle (PEN NEEDLES) 32G X 4 MM Does not apply Misc 4 each daily. 200 each 1         ALLERGIES:     Latex                   HIVES, SWELLING  Influenza Vaccines      OTHER (SEE COMMENTS)    Comment:Pt passed out  Radiology Contrast *    ITCHING      FAMILY HISTORY:     Family History   Problem Relation Age of Onset    Breast Cancer Self 52            Depression Mother     Hypertension Mother     Psychiatric Mother     Heart Disorder Father     Hypertension Father     Heart Disease Father     Other (a-Fib) Father     Heart Disease Brother     No Known Problems Maternal Grandmother     Heart Attack Maternal Grandfather     Dementia Paternal Grandmother     Heart Attack Paternal Grandfather     DVT/VTE Paternal Aunt 37         from PE    DVT/VTE Paternal Aunt 59         from PE    Heart Disease Other     Blood Disorder Other     Ovarian Cancer Other     Colon Cancer Neg           SOCIAL HISTORY:     Social History     Socioeconomic History    Marital status: Single   Occupational History    Occupation: works for social security as [employer]   Tobacco Use    Smoking status: Former     Types: Cigarettes     Quit date: 2010     Years since quittin.1    Smokeless tobacco: Never   Vaping Use    Vaping Use: Never used   Substance and Sexual Activity    Alcohol use:  Yes     Alcohol/week: 0.0 standard drinks of alcohol     Comment: Occ    Drug use: Yes     Types: Cannabis     Comment: Tea    Sexual activity: Not Currently     Partners: Male   Other Topics Concern    Blood Transfusions No    Caffeine Concern No    Exercise No   Social History Narrative        [facility] the end of December 2018, but just recently had cerclage out. She is currently premed. She has daughters 21, 25, 23 and 6. Her 7 y/o is disabled b/c she was a 33 W premie with PPROM at [facility]. Patient is currently homeless b/c she lost her home after prolonged disability. She is currently living with friends and family members.  Plans to transfer her job to [address] [place] [address]:   As per above HPI    PHYSICAL EXAM:   General: No immediate distress  Head: Normocephalic/ Atraumatic  Eyes: Extra-occular movements intact  Ears/Nose/Throat:  External appearance identifies normal appearance without obvious deformity  Cardiovascular: No cyanosis, clubbing or edema  Respiratory: Non-labored respirations  Skin: No lesions noted   Neurological: alert & oriented x 3, attentive, able to follow commands, comprehention intact, spontaneous speech intact  Psychiatric: Mood and affect appropriate    Musculoskeletal Exam:  Gait:    Gait: left antalgic gait     LABS:     Lab Results   Component Value Date     (H) 11/20/2018    A1C 7.3 (A) 06/06/2023     Lab Results   Component Value Date    WBC 6.1 03/17/2023    RBC 4.76 03/17/2023    HGB 13.7 03/17/2023    HCT 41.3 03/17/2023    MCV 86.8 03/17/2023    MCH 28.8 03/17/2023    MCHC 33.2 03/17/2023    RDW 13.3 03/17/2023    .0 03/17/2023    MPV 8.5 11/23/2018     Lab Results   Component Value Date     (H) 03/17/2023    BUN 9 03/17/2023    BUNCREA 11.5 03/17/2023    CREATSERUM 0.78 03/17/2023    ANIONGAP 7 03/17/2023    GFRNAA >60 11/23/2018    GFRAA >60 11/23/2018    CA 9.1 03/17/2023    OSMOCALC 298 (H) 03/17/2023    ALKPHO 110 (H) 03/17/2023    AST 16 03/17/2023 ALT 19 03/17/2023    BILT 0.6 03/17/2023    TP 7.3 03/17/2023    ALB 3.4 03/17/2023    GLOBULIN 3.9 03/17/2023     03/17/2023    K 3.5 03/17/2023     03/17/2023    CO2 31.0 03/17/2023     Lab Results   Component Value Date    PTP 13.6 01/07/2018    INR 1.1 01/07/2018     No results found for: \"VITD\", \"QVITD\", \"[id number]\"      ASSESSMENT:     1. left > right L5 radiculopathies    2. Severe episode of recurrent major depressive disorder, without psychotic features ([address]    3. L5-S1 right mild foraminal & left mild far lateral, L4-5 mild diffuse, L2-3 right mild far lateral bulging discs    4. Chronic bilateral low back pain with bilateral sciatica    5. Hypercoagulable state ([address]    6. Microcytic anemia    7. Lymphedema of both lower extremities    8. Chronic pain of left knee        PLAN:   I will do a repeat left knee injection later this month. She will get the left knee x-ray as soon as she can. She will get a MRI of the lumbar spine and then I will be able to do the lumbar injections. She will need to do the PT once she is able. I will change the Percocet to 10/325 2 tablets every other day as needed for the pain. The patient will follow up in 2-3 months, but the patient will call me 2 weeks after having the injection to let me know how the injection worked. We discussed that a telemedicine visit is in place of an office visit; however, this limits the ability to perform a thorough physical examination which may affect objective findings related to a specific condition and can affect treatment. The patient verbalized understanding with this plan and was in agreement. There are no barriers to learning. All questions were answered. Genie Mari M.D.   Physical Medicine and Rehabilitation   10/4/2023    Telehealth outside of [address] Verbal Consent   I conducted a telehealth visit with Lazaro Jacob today, 10/04/23, which was completed using two-way, real-time interactive audio and video communication. This has been done in good aren to provide continuity of care in the best interest of the provider-patient relationship, due to the COVID -19 public health crisis/national emergency where restrictions of face-to-face office visits are ongoing. Every conscious effort was taken to allow for sufficient and adequate time to complete the visit. The patient was made aware of the limitations of the telehealth visit, including treatment limitations as no physical exam could be performed. The patient was advised to call 911 or to go to the ER in case there was an emergency. The patient was also advised of the potential privacy & security concerns related to the telehealth platform. The patient was made aware of where to find [facility] notice of privacy practices, telehealth consent form and other related consent forms and documents. which are located on the [facility] website. The patient verbally agreed to telehealth consent form, related consents and the risks discussed. Lastly, the patient confirmed that they were in [place]. Included in this visit, time may have been spent reviewing labs, medications, radiology tests and decision making. Appropriate medical decision-making and tests are ordered as detailed in the plan of care above. Coding/billing information is submitted for this visit based on complexity of care and/or time spent for the visit.

## 2023-10-06 ENCOUNTER — TELEPHONE (OUTPATIENT)
Dept: PHYSICAL MEDICINE AND REHAB | Facility: CLINIC | Age: 53
End: 2023-10-06

## 2023-10-06 NOTE — TELEPHONE ENCOUNTER
Spoke with Arlene Hernandez with [employer] to Initiate authorization for Left knee steroid injection under ultrasound  procedure.  Dx code R21.945 with [id number] CPT Code 85211, [id number]   Reference # [id number]   Status No Auth required

## 2023-10-16 ENCOUNTER — HOSPITAL ENCOUNTER (OUTPATIENT)
Age: 53
Discharge: HOME | End: 2023-10-16
Payer: COMMERCIAL

## 2023-10-16 ENCOUNTER — APPOINTMENT (OUTPATIENT)
Dept: GENERAL RADIOLOGY | Age: 53
End: 2023-10-16
Attending: PHYSICIAN ASSISTANT
Payer: COMMERCIAL

## 2023-10-16 VITALS
SYSTOLIC BLOOD PRESSURE: 143 MMHG | HEART RATE: 91 BPM | DIASTOLIC BLOOD PRESSURE: 82 MMHG | OXYGEN SATURATION: 100 % | TEMPERATURE: 98 F | RESPIRATION RATE: 20 BRPM

## 2023-10-16 DIAGNOSIS — R26.81 GAIT INSTABILITY: ICD-10-CM

## 2023-10-16 DIAGNOSIS — G89.29 ACUTE EXACERBATION OF CHRONIC LOW BACK PAIN: Primary | ICD-10-CM

## 2023-10-16 DIAGNOSIS — G89.29 CHRONIC PAIN OF LEFT KNEE: ICD-10-CM

## 2023-10-16 DIAGNOSIS — M25.562 CHRONIC PAIN OF LEFT KNEE: ICD-10-CM

## 2023-10-16 DIAGNOSIS — M17.12 LOCALIZED OSTEOARTHRITIS OF LEFT KNEE: ICD-10-CM

## 2023-10-16 DIAGNOSIS — M51.36 DEGENERATIVE DISC DISEASE, LUMBAR: ICD-10-CM

## 2023-10-16 DIAGNOSIS — M54.50 ACUTE EXACERBATION OF CHRONIC LOW BACK PAIN: Primary | ICD-10-CM
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
## 2023-10-16 PROCEDURE — 72100 X-RAY EXAM L-S SPINE 2/3 VWS: CPT | Performed by: PHYSICIAN ASSISTANT

## 2023-10-16 PROCEDURE — 73562 X-RAY EXAM OF KNEE 3: CPT | Performed by: PHYSICIAN ASSISTANT

## 2023-10-16 PROCEDURE — 99213 OFFICE O/P EST LOW 20 MIN: CPT | Performed by: PHYSICIAN ASSISTANT

## 2023-10-16 RX ORDER — HYDROCODONE BITARTRATE AND ACETAMINOPHEN 10; 325 MG/1; MG/1
1 TABLET ORAL EVERY 6 HOURS PRN
Qty: 12 TABLET | Refills: 0 | Status: SHIPPED | OUTPATIENT
Start: 2023-10-16

## 2023-10-16 RX ORDER — HYDROCODONE BITARTRATE AND ACETAMINOPHEN 5; 325 MG/1; MG/1
1 TABLET ORAL ONCE
Status: COMPLETED | OUTPATIENT
Start: 2023-10-16 | End: 2023-10-16

## 2023-10-16 NOTE — ED INITIAL ASSESSMENT (HPI)
Pt here with complaints of bilateral hip pain and left knee pain, pt states she slipped down the stairs today at 8am, pt denies any head injury

## 2023-11-17 ENCOUNTER — TELEPHONE (OUTPATIENT)
Facility: CLINIC | Age: 53
End: 2023-11-17

## 2023-11-17 NOTE — TELEPHONE ENCOUNTER
Patient states she went to the ED at [facility] for chronic lower edema. They told her they saw no blood clots but patient believes it is due to the Gabapentin she is taking. She is requesting a follow up appointment.

## 2023-11-20 ENCOUNTER — OFFICE VISIT (OUTPATIENT)
Facility: CLINIC | Age: 53
End: 2023-11-20
Payer: COMMERCIAL

## 2023-11-20 ENCOUNTER — LAB ENCOUNTER (OUTPATIENT)
Dept: LAB | Facility: REFERENCE LAB | Age: 53
End: 2023-11-20
Attending: FAMILY MEDICINE
Payer: COMMERCIAL

## 2023-11-20 VITALS
HEIGHT: 62 IN | WEIGHT: 223 LBS | BODY MASS INDEX: 41.04 KG/M2 | OXYGEN SATURATION: 98 % | SYSTOLIC BLOOD PRESSURE: 160 MMHG | DIASTOLIC BLOOD PRESSURE: 96 MMHG | HEART RATE: 87 BPM

## 2023-11-20 DIAGNOSIS — I10 UNCONTROLLED HYPERTENSION: Primary | ICD-10-CM

## 2023-11-20 DIAGNOSIS — G89.29 OTHER CHRONIC PAIN: ICD-10-CM

## 2023-11-20 DIAGNOSIS — I50.33 ACUTE ON CHRONIC DIASTOLIC HEART FAILURE (HCC): ICD-10-CM

## 2023-11-20 DIAGNOSIS — R60.9 PERIPHERAL EDEMA: ICD-10-CM

## 2023-11-20 DIAGNOSIS — R53.82 CHRONIC FATIGUE: ICD-10-CM

## 2023-11-20 DIAGNOSIS — I50.32 CHRONIC DIASTOLIC HEART FAILURE (HCC): ICD-10-CM

## 2023-11-20 LAB
ANION GAP SERPL CALC-SCNC: 4 MMOL/L (ref 0–18)
BNP SERPL-MCNC: 8 PG/ML
BUN BLD-MCNC: 9 MG/DL (ref 9–23)
BUN/CREAT SERPL: 11.1 (ref 10–20)
CALCIUM BLD-MCNC: 10.1 MG/DL (ref 8.7–10.4)
CHLORIDE SERPL-SCNC: 109 MMOL/L (ref 98–112)
CO2 SERPL-SCNC: 30 MMOL/L (ref 21–32)
CREAT BLD-MCNC: 0.81 MG/DL
EGFRCR SERPLBLD CKD-EPI 2021: 87 ML/MIN/1.73M2 (ref 60–?)
FASTING STATUS PATIENT QL REPORTED: NO
GLUCOSE BLD-MCNC: 143 MG/DL (ref 70–99)
OSMOLALITY SERPL CALC.SUM OF ELEC: 297 MOSM/KG (ref 275–295)
POTASSIUM SERPL-SCNC: 3.9 MMOL/L (ref 3.5–5.1)
SODIUM SERPL-SCNC: 143 MMOL/L (ref 136–145)
TSI SER-ACNC: 1.18 MIU/ML (ref 0.55–4.78)

## 2023-11-20 PROCEDURE — 3080F DIAST BP >= 90 MM HG: CPT | Performed by: FAMILY MEDICINE

## 2023-11-20 PROCEDURE — 99214 OFFICE O/P EST MOD 30 MIN: CPT | Performed by: FAMILY MEDICINE

## 2023-11-20 PROCEDURE — 80048 BASIC METABOLIC PNL TOTAL CA: CPT | Performed by: FAMILY MEDICINE

## 2023-11-20 PROCEDURE — 83880 ASSAY OF NATRIURETIC PEPTIDE: CPT | Performed by: FAMILY MEDICINE

## 2023-11-20 PROCEDURE — 3077F SYST BP >= 140 MM HG: CPT | Performed by: FAMILY MEDICINE

## 2023-11-20 PROCEDURE — 84443 ASSAY THYROID STIM HORMONE: CPT | Performed by: FAMILY MEDICINE

## 2023-11-20 PROCEDURE — 3008F BODY MASS INDEX DOCD: CPT | Performed by: FAMILY MEDICINE

## 2023-11-20 RX ORDER — NIFEDIPINE 30 MG/1
30 TABLET, FILM COATED, EXTENDED RELEASE ORAL DAILY
Qty: 30 TABLET | Refills: 0 | Status: SHIPPED | OUTPATIENT
Start: 2023-11-20

## 2023-11-24 PROCEDURE — 36415 COLL VENOUS BLD VENIPUNCTURE: CPT

## 2023-11-24 PROCEDURE — 99284 EMERGENCY DEPT VISIT MOD MDM: CPT

## 2023-11-24 PROCEDURE — 99283 EMERGENCY DEPT VISIT LOW MDM: CPT

## 2023-11-25 ENCOUNTER — HOSPITAL ENCOUNTER (EMERGENCY)
Facility: HOSPITAL | Age: 53
Discharge: HOME | End: 2023-11-25
Attending: EMERGENCY MEDICINE
Payer: COMMERCIAL

## 2023-11-25 VITALS
DIASTOLIC BLOOD PRESSURE: 88 MMHG | HEART RATE: 86 BPM | OXYGEN SATURATION: 99 % | TEMPERATURE: 98 F | RESPIRATION RATE: 18 BRPM | SYSTOLIC BLOOD PRESSURE: 166 MMHG

## 2023-11-25 DIAGNOSIS — I89.0 LYMPHEDEMA: Primary | ICD-10-CM

## 2023-11-25 LAB
ALBUMIN SERPL-MCNC: 4.3 G/DL (ref 3.2–4.8)
ALP LIVER SERPL-CCNC: 82 U/L
ALT SERPL-CCNC: 13 U/L
ANION GAP SERPL CALC-SCNC: 5 MMOL/L (ref 0–18)
AST SERPL-CCNC: 13 U/L (ref ?–34)
BASOPHILS # BLD AUTO: 0.03 X10(3) UL (ref 0–0.2)
BASOPHILS NFR BLD AUTO: 0.5 %
BILIRUB DIRECT SERPL-MCNC: 0.2 MG/DL (ref ?–0.3)
BILIRUB SERPL-MCNC: 1 MG/DL (ref 0.3–1.2)
BNP SERPL-MCNC: 5 PG/ML
BUN BLD-MCNC: 14 MG/DL (ref 9–23)
BUN/CREAT SERPL: 17.5 (ref 10–20)
CALCIUM BLD-MCNC: 9.6 MG/DL (ref 8.7–10.4)
CHLORIDE SERPL-SCNC: 105 MMOL/L (ref 98–112)
CO2 SERPL-SCNC: 29 MMOL/L (ref 21–32)
CREAT BLD-MCNC: 0.8 MG/DL
DEPRECATED RDW RBC AUTO: 45 FL (ref 35.1–46.3)
EGFRCR SERPLBLD CKD-EPI 2021: 89 ML/MIN/1.73M2 (ref 60–?)
EOSINOPHIL # BLD AUTO: 0.23 X10(3) UL (ref 0–0.7)
EOSINOPHIL NFR BLD AUTO: 3.5 %
ERYTHROCYTE [DISTWIDTH] IN BLOOD BY AUTOMATED COUNT: 13.9 % (ref 11–15)
GLUCOSE BLD-MCNC: 197 MG/DL (ref 70–99)
HCT VFR BLD AUTO: 41 %
HGB BLD-MCNC: 13.5 G/DL
IMM GRANULOCYTES # BLD AUTO: 0.02 X10(3) UL (ref 0–1)
IMM GRANULOCYTES NFR BLD: 0.3 %
LYMPHOCYTES # BLD AUTO: 2.57 X10(3) UL (ref 1–4)
LYMPHOCYTES NFR BLD AUTO: 39.4 %
MCH RBC QN AUTO: 29.2 PG (ref 26–34)
MCHC RBC AUTO-ENTMCNC: 32.9 G/DL (ref 31–37)
MCV RBC AUTO: 88.6 FL
MONOCYTES # BLD AUTO: 0.45 X10(3) UL (ref 0.1–1)
MONOCYTES NFR BLD AUTO: 6.9 %
NEUTROPHILS # BLD AUTO: 3.23 X10 (3) UL (ref 1.5–7.7)
NEUTROPHILS # BLD AUTO: 3.23 X10(3) UL (ref 1.5–7.7)
NEUTROPHILS NFR BLD AUTO: 49.4 %
OSMOLALITY SERPL CALC.SUM OF ELEC: 294 MOSM/KG (ref 275–295)
PLATELET # BLD AUTO: 250 10(3)UL (ref 150–450)
POTASSIUM SERPL-SCNC: 3.6 MMOL/L (ref 3.5–5.1)
PROT SERPL-MCNC: 7.2 G/DL (ref 5.7–8.2)
RBC # BLD AUTO: 4.63 X10(6)UL
SODIUM SERPL-SCNC: 139 MMOL/L (ref 136–145)
WBC # BLD AUTO: 6.5 X10(3) UL (ref 4–11)

## 2023-11-25 PROCEDURE — 80076 HEPATIC FUNCTION PANEL: CPT | Performed by: EMERGENCY MEDICINE

## 2023-11-25 PROCEDURE — 80048 BASIC METABOLIC PNL TOTAL CA: CPT | Performed by: EMERGENCY MEDICINE

## 2023-11-25 PROCEDURE — 85025 COMPLETE CBC W/AUTO DIFF WBC: CPT | Performed by: EMERGENCY MEDICINE

## 2023-11-25 PROCEDURE — 83880 ASSAY OF NATRIURETIC PEPTIDE: CPT | Performed by: EMERGENCY MEDICINE

## 2023-11-25 RX ORDER — HYDROCODONE BITARTRATE AND ACETAMINOPHEN 5; 325 MG/1; MG/1
1 TABLET ORAL EVERY 6 HOURS PRN
Qty: 16 TABLET | Refills: 0 | Status: SHIPPED | OUTPATIENT
Start: 2023-11-25 | End: 2023-12-02

## 2023-11-25 RX ORDER — HYDROCODONE BITARTRATE AND ACETAMINOPHEN 5; 325 MG/1; MG/1
1 TABLET ORAL ONCE
Status: COMPLETED | OUTPATIENT
Start: 2023-11-25 | End: 2023-11-25

## 2023-11-25 NOTE — ED QUICK NOTES
Discharge instructions including follow-up care and medications were reviewed and discussed with patient. Pt verbalized understanding to all information and all questions asked were answered at this time. Pt is AAOx4, calm, respirations noted as even and unlabored, skin warm and dry, and there are no signs or symptoms of distress noted at this time. Pt ambulatory with a steady gait in room, pt wheeled out via wheelchair to waiting room to wait for ride.

## 2023-11-25 NOTE — ED QUICK NOTES
Pillow placed under patients leg to keep bilateral foot blisters from touching linen. Pt also given blanket as requested.

## 2023-11-25 NOTE — ED INITIAL ASSESSMENT (HPI)
S: pt presents to ed with ble swelling for few weeks, saw pcp in office on Monday who increased her lasix dosage and added nifedipine. Starting yesterday in addition to the swelling pt developed bilateral calcaneal blisters and pain preventing her from being able to walk without significant pain.

## 2023-11-28 ENCOUNTER — HOSPITAL ENCOUNTER (EMERGENCY)
Facility: HOSPITAL | Age: 53
Discharge: HOME | End: 2023-11-28
Attending: EMERGENCY MEDICINE
Payer: COMMERCIAL

## 2023-11-28 ENCOUNTER — NURSE TRIAGE (OUTPATIENT)
Facility: CLINIC | Age: 53
End: 2023-11-28

## 2023-11-28 VITALS
BODY MASS INDEX: 37.36 KG/M2 | TEMPERATURE: 99 F | SYSTOLIC BLOOD PRESSURE: 148 MMHG | HEIGHT: 62 IN | WEIGHT: 203 LBS | OXYGEN SATURATION: 100 % | DIASTOLIC BLOOD PRESSURE: 84 MMHG | HEART RATE: 82 BPM | RESPIRATION RATE: 18 BRPM

## 2023-11-28 DIAGNOSIS — T14.8XXA BLISTER: ICD-10-CM

## 2023-11-28 DIAGNOSIS — I89.0 LYMPHEDEMA: Primary | ICD-10-CM

## 2023-11-28 PROCEDURE — 99284 EMERGENCY DEPT VISIT MOD MDM: CPT

## 2023-11-28 PROCEDURE — 99283 EMERGENCY DEPT VISIT LOW MDM: CPT

## 2023-11-28 RX ORDER — TRAMADOL HYDROCHLORIDE 50 MG/1
50 TABLET ORAL ONCE
Status: COMPLETED | OUTPATIENT
Start: 2023-11-28 | End: 2023-11-28

## 2023-11-28 RX ORDER — ACETAMINOPHEN AND CODEINE PHOSPHATE 300; 30 MG/1; MG/1
1 TABLET ORAL EVERY 6 HOURS PRN
Qty: 15 TABLET | Refills: 0 | Status: SHIPPED | OUTPATIENT
Start: 2023-11-28 | End: 2023-12-03

## 2023-11-28 RX ORDER — CEPHALEXIN 500 MG/1
500 CAPSULE ORAL 2 TIMES DAILY
Qty: 14 CAPSULE | Refills: 0 | Status: SHIPPED | OUTPATIENT
Start: 2023-11-28 | End: 2023-12-11 | Stop reason: ALTCHOICE

## 2023-11-28 RX ORDER — ACETAMINOPHEN 500 MG
1000 TABLET ORAL ONCE
Status: COMPLETED | OUTPATIENT
Start: 2023-11-28 | End: 2023-11-28

## 2023-11-28 NOTE — ED INITIAL ASSESSMENT (HPI)
Pt to ED A&O x 4 w/ c/o: B/L heel wounds. Pt w/ hx lymphedema, was seen here on Saturday for wounds and DCd home and told to f/u w/ PCP. Pts blister on wound on R heel opened up yesterday, was told to come to ED by PCP d/t hx of DM and yellow drainage from wound. L heel wound remains intact. Pt unable to bear weight d/t pain. Pt denies any fevers/chills.

## 2023-11-28 NOTE — ED QUICK NOTES
Pt states has blisters on bilateral heels. States the rt heel is draining yellow fluids. Denies fevers. States has lymphedema.

## 2023-11-29 RX ORDER — ZOLPIDEM TARTRATE 10 MG/1
10 TABLET ORAL DAILY PRN
Qty: 30 TABLET | Refills: 1 | Status: SHIPPED | OUTPATIENT
Start: 2023-11-29

## 2023-11-29 NOTE — TELEPHONE ENCOUNTER
Please review refill protocol failed/ no protocol  Requested Prescriptions   Pending Prescriptions Disp Refills    ZOLPIDEM 10 MG Oral Tab [Pharmacy Med Name: ZOLPIDEM TARTRATE 10 MG TABLET] 30 tablet 1     Sig: TAKE 1 TABLET BY MOUTH EVERY DAY AT BEDTIME AS NEEDED FOR SLEEP       There is no refill protocol information for this order

## 2023-12-01 ENCOUNTER — TELEPHONE (OUTPATIENT)
Facility: CLINIC | Age: 53
End: 2023-12-01

## 2023-12-01 RX ORDER — SPIRONOLACTONE 50 MG/1
50 TABLET, FILM COATED ORAL DAILY
Qty: 90 TABLET | Refills: 1 | Status: SHIPPED | OUTPATIENT
Start: 2023-12-01

## 2023-12-01 RX ORDER — AMLODIPINE BESYLATE 5 MG/1
5 TABLET ORAL DAILY
Qty: 90 TABLET | Refills: 1 | OUTPATIENT
Start: 2023-12-01

## 2023-12-01 NOTE — TELEPHONE ENCOUNTER
Spoke to patient. She was seen in ER on 11/28/23 for Lymphedema and a blister. She was told to follow up with wound care but did not see a number to call. Rn relayed the information on the AVS.     Follow-up:  [address]  [address] [facility] [facility]  Follow up    She will call and see if she can get in next week. She will call back to provide an update. She is concerned because the blister is getting bigger and she said she can barely walk. She isn't sure if this is something Dr. Harpreet Dias would be able to drain if she can't get in with wound clinic. She will call to schedule with wound clinic and call us back.

## 2023-12-01 NOTE — TELEPHONE ENCOUNTER
Jessica Smith pt stated that she needs a refill on spironolactone but you increase it to 50 mg. If this is correct I have pended the  medication for 50 mg once a day. I was not sure if you want it 25 mg BID? spironolactone 25 MG Oral Tab 60 tablet 0 8/9/2023     Sig - Route:  Take 1 tablet (25 mg total) by mouth daily. - Oral    Sent to pharmacy as: Spironolactone 25 MG Oral Tablet (Aldactone)    E-Prescribing Status: Receipt confirmed by pharmacy (8/9/2023  7:47 AM CDT)

## 2023-12-04 ENCOUNTER — OFFICE VISIT (OUTPATIENT)
Dept: WOUND CARE | Facility: HOSPITAL | Age: 53
End: 2023-12-04
Attending: NURSE PRACTITIONER
Payer: COMMERCIAL

## 2023-12-04 VITALS
HEART RATE: 100 BPM | OXYGEN SATURATION: 97 % | BODY MASS INDEX: 33.82 KG/M2 | RESPIRATION RATE: 18 BRPM | DIASTOLIC BLOOD PRESSURE: 91 MMHG | WEIGHT: 203 LBS | TEMPERATURE: 98 F | HEIGHT: 65 IN | SYSTOLIC BLOOD PRESSURE: 158 MMHG

## 2023-12-04 DIAGNOSIS — L97.921 NON-PRESSURE CHRONIC ULCER OF LEFT LOWER LEG, LIMITED TO BREAKDOWN OF SKIN (HCC): ICD-10-CM

## 2023-12-04 DIAGNOSIS — L97.911 NON-PRESSURE CHRONIC ULCER OF RIGHT LOWER LEG, LIMITED TO BREAKDOWN OF SKIN (HCC): ICD-10-CM

## 2023-12-04 DIAGNOSIS — I89.0 LYMPHEDEMA OF BOTH LOWER EXTREMITIES: Primary | ICD-10-CM

## 2023-12-04 PROCEDURE — 99215 OFFICE O/P EST HI 40 MIN: CPT | Performed by: NURSE PRACTITIONER

## 2023-12-08 RX ORDER — FUROSEMIDE 40 MG/1
40 TABLET ORAL DAILY
Qty: 90 TABLET | Refills: 3 | OUTPATIENT
Start: 2023-12-08

## 2023-12-09 RX ORDER — FUROSEMIDE 40 MG/1
40 TABLET ORAL DAILY
Qty: 90 TABLET | Refills: 1 | Status: SHIPPED | OUTPATIENT
Start: 2023-12-09

## 2023-12-09 NOTE — TELEPHONE ENCOUNTER
Medication was listed under Historical by KODAK Carpenter (endo) . Please review; protocol failed/no protocol. Requested Prescriptions   Pending Prescriptions Disp Refills    furosemide (LASIX) 40 MG Oral Tab 30 tablet 0     Sig: Take 1 tablet (40 mg total) by mouth daily.        Hypertensive Medications Protocol Failed - 12/8/2023  6:57 PM        Failed - Last BP reading less than 140/90     BP Readings from Last 1 Encounters:   12/04/23 (!) 158/91               Passed - In person appointment in the past 12 or next 3 months     Recent Outpatient Visits              4 days ago Lymphedema of both lower extremities    [facility]    Office Visit    2 weeks ago Uncontrolled hypertension    [facility], [place] [address], [place], [place]    Office Visit    2 months ago Herpes zoster without complication    [facility], [place] [address], [facility]    Telemedicine    2 months ago left > right L5 radiculopathies    [address], [place] [address], [facility] Naomi Solano MD    Telemedicine    3 months ago INESSA (generalized anxiety disorder)    Arina Starkey, [facility]    Office Visit          Future Appointments         Provider Department Appt Notes    In 3 days FELICITAS Strickland [facility], [place] [address], [address]    In 3 days [address] or BMP in past 6 months     Recent Results (from the past 4392 hour(s))   Basic Metabolic Panel (8)    Collection Time: 11/25/23  1:00 AM   Result Value Ref Range    Glucose 197 (H) 70 - 99 mg/dL    Sodium 139 136 - 145 mmol/L    Potassium 3.6 3.5 - 5.1 mmol/L    Chloride 105 98 - 112 mmol/L    CO2 29.0 21.0 - 32.0 mmol/L    Anion Gap 5 0 - 18 mmol/L    BUN 14 9 - 23 mg/dL    Creatinine 0.80 0.55 - 1.02 mg/dL BUN/CREA Ratio 17.5 10.0 - 20.0    Calcium, Total 9.6 8.7 - 10.4 mg/dL    Calculated Osmolality 294 275 - 295 mOsm/kg    eGFR-Cr 89 >=60 mL/min/1.73m2     *Note: Due to a large number of results and/or encounters for the requested time period, some results have not been displayed. A complete set of results can be found in Results Review. Passed - In person appointment or virtual visit in the past 6 months     Recent Outpatient Visits              4 days ago Lymphedema of both lower extremities    [facility], [address], APRMANUEL    Office Visit    2 weeks ago Uncontrolled hypertension    [facility], [place] [address], [facility] MelyssaJu, [place]    Office Visit    2 months ago Herpes zoster without complication    [facility], [place] [address], [facility] Cat Mayfield DO    Telemedicine    2 months ago left > right L5 radiculopathies    [facility], [place] [address], [facility] Ken Mendoza MD    Telemedicine    3 months ago INESSA (generalized anxiety disorder)    Quan Austen, [facility] Cat Mayfield DO    Office Visit          Future Appointments         Provider Department Appt Notes    In 3 days Lars Peña, [address], [place] [address], [address]    In 3 days Nicholas Caba, [address] or GFRAA > 50     GFR Evaluation  EGFRCR: 89 , resulted on 2023           Refused Prescriptions Disp Refills    furosemide (LASIX) 40 MG Oral Tab 90 tablet 3     Sig: Take 1 tablet (40 mg total) by mouth daily.        Hypertensive Medications Protocol Failed - 2023  6:57 PM        Failed - Last BP reading less than 140/90     BP Readings from Last 1 Encounters:   23 (!) 158/91               Passed - In person appointment in the past 12 or next 3 months     Recent Outpatient Visits              4 days ago Lymphedema of both lower extremities    [facility]    Office Visit    2 weeks ago Uncontrolled hypertension    [facility], [facility], [address], [facility]    Office Visit    2 months ago Herpes zoster without complication    [facility], [facility] [address], [address], DO    Telemedicine    2 months ago left > right L5 radiculopathies    [facility], [facility], [address] Joey Mendez MD    Telemedicine    3 months ago INESSA (generalized anxiety disorder)    [facility], [facility], [address], DO    Office Visit          Future Appointments         Provider Department Appt Notes    In 3 days FELICITAS Lay [facility], [facility], [address]    In 3 days [address] or BMP in past 6 months     Recent Results (from the past 4392 hour(s))   Basic Metabolic Panel (8)    Collection Time: 11/25/23  1:00 AM   Result Value Ref Range    Glucose 197 (H) 70 - 99 mg/dL    Sodium 139 136 - 145 mmol/L    Potassium 3.6 3.5 - 5.1 mmol/L    Chloride 105 98 - 112 mmol/L    CO2 29.0 21.0 - 32.0 mmol/L    Anion Gap 5 0 - 18 mmol/L    BUN 14 9 - 23 mg/dL    Creatinine 0.80 0.55 - 1.02 mg/dL    BUN/CREA Ratio 17.5 10.0 - 20.0    Calcium, Total 9.6 8.7 - 10.4 mg/dL    Calculated Osmolality 294 275 - 295 mOsm/kg    eGFR-Cr 89 >=60 mL/min/1.73m2     *Note: Due to a large number of results and/or encounters for the requested time period, some results have not been displayed. A complete set of results can be found in Results Review.                Passed - In person appointment or virtual visit in the past 6 months     Recent Outpatient Visits              4 days ago Lymphedema of both lower extremities    [address] [facility], [facility]    Office Visit    2 weeks ago Uncontrolled hypertension    [facility], [address], [place], [place]    Office Visit    2 months ago Herpes zoster without complication    [facility], [address], [place], [place]    Telemedicine    2 months ago left > right L5 radiculopathies    [facility], [address], Lashell Brown MD    Telemedicine    3 months ago INESSA (generalized anxiety disorder)    [facility], [place], [place]    Office Visit          Future Appointments         Provider Department Appt Notes    In 3 days Ceasar Wiggins, APRN Khadijah Miller, [address]    In 3 days Janelle Aaron, [address] [place] [address] or GFRAA > 50     GFR Evaluation  EGFRCR: 89 , resulted on 11/25/2023                Recent Outpatient Visits              4 days ago Lymphedema of both lower extremities    [address] [facility] FELICITAS Martinez    Office Visit    2 weeks ago Uncontrolled hypertension    [facility], [address], [place], [place]    Office Visit    2 months ago Herpes zoster without complication    [facility], [address], [facility],     Telemedicine    2 months ago left > right L5 radiculopathies    [facility], [address], Susan Garcia MD    Telemedicine    3 months ago INESSA (generalized anxiety disorder)    [facility], [address], [facility],     Office Visit             Future Appointments         Provider Department Appt Notes    In 3 days FELICITAS Chávez, [address], [address]    In 3 days Saw Martinez [address]

## 2023-12-11 ENCOUNTER — OFFICE VISIT (OUTPATIENT)
Dept: WOUND CARE | Facility: HOSPITAL | Age: 53
End: 2023-12-11
Attending: NURSE PRACTITIONER
Payer: COMMERCIAL

## 2023-12-11 VITALS
SYSTOLIC BLOOD PRESSURE: 169 MMHG | OXYGEN SATURATION: 98 % | DIASTOLIC BLOOD PRESSURE: 102 MMHG | RESPIRATION RATE: 18 BRPM | TEMPERATURE: 98 F | HEART RATE: 90 BPM

## 2023-12-11 DIAGNOSIS — L97.921 NON-PRESSURE CHRONIC ULCER OF LEFT LOWER LEG, LIMITED TO BREAKDOWN OF SKIN (HCC): ICD-10-CM

## 2023-12-11 DIAGNOSIS — I89.0 LYMPHEDEMA OF BOTH LOWER EXTREMITIES: ICD-10-CM

## 2023-12-11 DIAGNOSIS — L97.911 NON-PRESSURE CHRONIC ULCER OF RIGHT LOWER LEG, LIMITED TO BREAKDOWN OF SKIN (HCC): Primary | ICD-10-CM

## 2023-12-11 PROCEDURE — 99213 OFFICE O/P EST LOW 20 MIN: CPT | Performed by: NURSE PRACTITIONER

## 2023-12-11 RX ORDER — EMPAGLIFLOZIN 25 MG/1
1 TABLET, FILM COATED ORAL DAILY
Qty: 90 TABLET | Refills: 0 | Status: SHIPPED | OUTPATIENT
Start: 2023-12-11

## 2023-12-11 NOTE — TELEPHONE ENCOUNTER
LOV: 8/7/23    RTC: 5 Months    FU:  Today - 12/11/23    Last Refill: 6/22/23    3 Month Supply Pending

## 2023-12-18 ENCOUNTER — NURSE TRIAGE (OUTPATIENT)
Facility: CLINIC | Age: 53
End: 2023-12-18

## 2023-12-18 RX ORDER — FLUCONAZOLE 150 MG/1
TABLET ORAL
Qty: 2 TABLET | Refills: 0 | Status: SHIPPED | OUTPATIENT
Start: 2023-12-18

## 2023-12-18 RX ORDER — FLUCONAZOLE 150 MG/1
150 TABLET ORAL ONCE
Qty: 1 TABLET | Refills: 0 | Status: CANCELLED | OUTPATIENT
Start: 2023-12-18 | End: 2023-12-18

## 2023-12-18 NOTE — TELEPHONE ENCOUNTER
Action Requested: Summary for Provider     []  Critical Lab, Recommendations Needed  [x] Need Additional Advice  []   FYI    []   Need Orders  [x] Need Medications Sent to Pharmacy  []  Other     SUMMARY: Please advise if patient can have Diflucan for vaginal yeast infection and oxycodone-acetaminophen 10mg for Fibromyalgia pain. Her next appointment with Agustin Child scheduled for 12/20/23    Patient states she was prescribed cephalexin by ER doctor 11/28/23. Patient has been having vaginal itiching, irritation and thick white discharge for over 1 week. She tried over the counter medication without relief. Patient states her fibromyalgia is flaring up and asking if  could refill the Percocet. Patient has pain \"all over\" and on her heels, can barely walk. She has tried tylenol and ibuprofen with no relief. She will also contact Dr. Rima Roger office, who prescribed this for her as well. Patient was seen in wound clinic 12/11/23 for non pressure ulcer of lower leg. Reason for call: Vaginal Problem and Fibromyalgia  Onset: worsening in past 1 week.   Reason for Disposition   Symptoms of a yeast infection (i.e., itchy, white discharge, not bad smelling) and not improved > 3 days following CARE ADVICE   Muscle aches or body pains are a chronic symptom (recurrent or ongoing AND present > 4 weeks)    Protocols used: Vaginal Symptoms-A-OH, Muscle Aches and Body Pain-A-OH

## 2023-12-18 NOTE — TELEPHONE ENCOUNTER
Rx sent for diflucan. I do not recommend opioids for her fibromyalgia pain thus refill was not provided. We will discuss at f/u visit. Please let pt know. Thank you.

## 2023-12-20 ENCOUNTER — OFFICE VISIT (OUTPATIENT)
Facility: CLINIC | Age: 53
End: 2023-12-20
Payer: COMMERCIAL

## 2023-12-20 VITALS
HEIGHT: 65 IN | BODY MASS INDEX: 33.82 KG/M2 | DIASTOLIC BLOOD PRESSURE: 86 MMHG | SYSTOLIC BLOOD PRESSURE: 132 MMHG | WEIGHT: 203 LBS | HEART RATE: 96 BPM | OXYGEN SATURATION: 100 %

## 2023-12-20 DIAGNOSIS — I89.0 LYMPHEDEMA OF BOTH LOWER EXTREMITIES: Primary | ICD-10-CM

## 2023-12-20 DIAGNOSIS — I10 ESSENTIAL HYPERTENSION: ICD-10-CM

## 2023-12-20 DIAGNOSIS — M79.7 FIBROMYALGIA: ICD-10-CM

## 2023-12-20 DIAGNOSIS — L97.521 ULCER OF BOTH FEET, LIMITED TO BREAKDOWN OF SKIN (HCC): ICD-10-CM

## 2023-12-20 DIAGNOSIS — L97.511 ULCER OF BOTH FEET, LIMITED TO BREAKDOWN OF SKIN (HCC): ICD-10-CM

## 2023-12-20 DIAGNOSIS — G89.4 CHRONIC PAIN SYNDROME: ICD-10-CM

## 2023-12-20 PROCEDURE — 3008F BODY MASS INDEX DOCD: CPT | Performed by: FAMILY MEDICINE

## 2023-12-20 PROCEDURE — 3079F DIAST BP 80-89 MM HG: CPT | Performed by: FAMILY MEDICINE

## 2023-12-20 PROCEDURE — 99214 OFFICE O/P EST MOD 30 MIN: CPT | Performed by: FAMILY MEDICINE

## 2023-12-20 PROCEDURE — 3075F SYST BP GE 130 - 139MM HG: CPT | Performed by: FAMILY MEDICINE

## 2023-12-20 RX ORDER — AMITRIPTYLINE HYDROCHLORIDE 25 MG/1
25 TABLET, FILM COATED ORAL NIGHTLY
Qty: 90 TABLET | Refills: 0 | Status: SHIPPED | OUTPATIENT
Start: 2023-12-20

## 2024-01-02 ENCOUNTER — NURSE TRIAGE (OUTPATIENT)
Facility: CLINIC | Age: 54
End: 2024-01-02

## 2024-01-02 NOTE — TELEPHONE ENCOUNTER
Action Requested: Summary for Provider     []  Critical Lab, Recommendations Needed  [x] Need Additional Advice  []   FYI    []   Need Orders  [] Need Medications Sent to Pharmacy  []  Other     SUMMARY: Patient reports possible shingles that are affecting her mouth.   States she does not have any feeling on her tongue or taste.   Denies: swelling or difficulty swallowing, mouth sores  Patient would like to be seen by provider but no available appts.   Advised message would be sent to provider for review.     Reason for call: Mouth/Lip Problem  Onset: 1 week      Reason for Disposition   Patient wants to be seen    Protocols used: Mouth Symptoms-A-OH

## 2024-01-08 NOTE — TELEPHONE ENCOUNTER
LOV: 8/7/23    RTC:  5 Months    FU: No FU Appt Scheduled    Called patient to help schedule a follow up appointment, no answer, patients mailbox was full and was unable to leave a voicemail. MCM sent.

## 2024-01-12 RX ORDER — RIVAROXABAN 20 MG/1
20 TABLET, FILM COATED ORAL
OUTPATIENT
Start: 2024-01-12

## 2024-01-22 DIAGNOSIS — I26.99 OTHER PULMONARY EMBOLISM WITHOUT ACUTE COR PULMONALE (HCC): ICD-10-CM

## 2024-01-23 NOTE — TELEPHONE ENCOUNTER
Patient called and requesting the status of her refill.  Patient stated she needs this refill ASAP.  Please advise .

## 2024-02-13 ENCOUNTER — TELEPHONE (OUTPATIENT)
Dept: ENDOCRINOLOGY CLINIC | Facility: CLINIC | Age: 54
End: 2024-02-13

## 2024-02-13 DIAGNOSIS — E11.65 UNCONTROLLED TYPE 2 DIABETES MELLITUS WITH HYPERGLYCEMIA (HCC): Primary | ICD-10-CM

## 2024-02-13 NOTE — TELEPHONE ENCOUNTER
Patient calling back, states when called twice when she would answer call would be disconnected. Please call pt is requesting asap.

## 2024-02-13 NOTE — TELEPHONE ENCOUNTER
KODAK Vicente,  Patient rescheduled as VV for 2/26 at 10:30am - please advise if patient should complete labs prior to f/u -thanks

## 2024-02-13 NOTE — TELEPHONE ENCOUNTER
Per KODAK Vicente: ok to offer res24: 2/26 at 10:30 its a res 24 and it can be VV or in person per her preference.    LM on both #s to call clinic asap regarding apt today and rescheduling

## 2024-02-13 NOTE — TELEPHONE ENCOUNTER
Patient is scheduled at 10am today for office visit and wondering if that can be converted to telemedicine and she is having transportation issues.  Please call.  Thank you.

## 2024-02-26 NOTE — TELEPHONE ENCOUNTER
Patient was called and notified to have labs done prior to VV appt, pt verbalized understanding with no further questions.

## 2024-02-28 DIAGNOSIS — G47.00 INSOMNIA, UNSPECIFIED TYPE: Primary | ICD-10-CM

## 2024-02-29 RX ORDER — ZOLPIDEM TARTRATE 10 MG/1
10 TABLET ORAL DAILY PRN
Qty: 30 TABLET | Refills: 1 | Status: SHIPPED | OUTPATIENT
Start: 2024-02-29

## 2024-02-29 NOTE — TELEPHONE ENCOUNTER
Please review; Protocol Failed/ no protocol.  Rx Pended, authorize if appropriate    Requested Prescriptions   Pending Prescriptions Disp Refills    ZOLPIDEM 10 MG Oral Tab [Pharmacy Med Name: ZOLPIDEM TARTRATE 10 MG TABLET] 30 tablet 1     Sig: Take 1 tablet (10 mg total) by mouth daily as needed for Sleep.       Controlled Substance Medication Failed - 2/28/2024  7:46 PM        Failed - This medication is a controlled substance - forward to provider to refill

## 2024-03-01 ENCOUNTER — TELEMEDICINE (OUTPATIENT)
Dept: ENDOCRINOLOGY CLINIC | Facility: CLINIC | Age: 54
End: 2024-03-01
Payer: COMMERCIAL

## 2024-03-01 DIAGNOSIS — E66.09 OBESITY DUE TO EXCESS CALORIES WITH SERIOUS COMORBIDITY, UNSPECIFIED CLASSIFICATION: Primary | ICD-10-CM

## 2024-03-01 RX ORDER — TIRZEPATIDE 15 MG/.5ML
INJECTION, SOLUTION SUBCUTANEOUS
COMMUNITY
Start: 2024-02-26

## 2024-03-01 NOTE — PROGRESS NOTES
Suki Rosenberg verbally consents to a video visit on 3/1/24     Visit was completed with two-way video and audio.  Patient understands and accepts financial responsibility for any deductible, co-insurance and/or co-pays associated with this service.  Patient has been referred to the [facility] website at [email or web address] to review the yearly Consent to Treat document.          Name: Suki Rosenberg  Date: 3/1/24    Referring Physician: No ref. provider found    HISTORY OF PRESENT ILLNESS   Suki Rosenberg is a 53 year old female who presents for follow up for diabetes mellitus     She admits since last visit that she has been doing more stress eating. Has gained 20lbs per patient since visit in the summer. She has been stress eating and having more juice, and higher CHO snacks.     Diabetes History:  Diagnosed- age 18   Patient has had hospitalizations for blood sugar issues- last time 2021    Prior glycohemoglobin were: 13.4% 3/2023; 7.3% 6/2023    Dietary compliance: Poor- admits to more cheating with diet recently and more stress eating.      Recall:  Breakfast- coffee with slice of toast or 1/2 bagel.   Lunch- yogurt or smaller snack   Dinner- protein like chicken with beans, or rice, gravy   Snack- popcorn, chips occ.   Beverages- water sometimes with 0 sugar flavoring, sometimes soda with dinner. And coffee     Exercise: No- did get bike recently but has just started using  for a few minutes.   Polyuria/polydipsia: Yes   Blurred vision: Yes     Episodes of hypoglycemia:Yes- occ. Overnight a few times weekly     Blood Glucose:  Using Freestyle Meagan   Reviewed data for (2/6/24-2/19/24)    --> She has been adherent with CGM but unfortunately was not able to see her glucose data for the past 2 weeks- she says is wearing sensor and using phone but most recent data not connected to clinic account.     34% of glucose readings in target range ()  45% of glucose readings above target range (>180)  20% of  glucose readings very high   0% of glucose readings below target range     Trends: Meagan for dates above shoes pattern of overnight and fasting hyperglycemia but some hypoglycemia between meals. Post prandial hyperglyemia seen with all three meals but worse in the evenings.     Current DM Regimen:  Metformin 1000mg PO BID  Novolog - 12 units TID with meals   Mounjaro- 15 mg subcutaneous weekly   Jardiance -25mg PO daily   Basagalar- 16 units subcutaneous daily     Modifying factors:  Medication adherence: Yes   Recent steroids, illness or infections: No       REVIEW OF SYSTEMS  Eyes: Diabetic retinopathy present: No             Most recent visit to eye doctor in last 12 months: Yes- within last year     CV: Cardiovascular disease present: No          Hypertension present: Yes          Hyperlipidemia present: Yes - on statin therapy          Peripheral Vascular Disease present: No     : Nephropathy present: No     Neuro: Neuropathy present: Yes- numbness in feet bilaterally     Skin: Infection or ulceration: No     Osteoporosis: No     Thyroid disease: No     Medications:     Current Outpatient Medications:     zolpidem 10 MG Oral Tab, Take 1 tablet (10 mg total) by mouth daily as needed for Sleep., Disp: 30 tablet, Rfl: 1    Continuous Blood Gluc Sensor (FREESTYLE MEAGAN 2 SENSOR) Does not apply Misc, 1 each every 14 (fourteen) days., Disp: 2 each, Rfl: 0    rivaroxaban (XARELTO) 20 MG Oral Tab, Take 1 tablet (20 mg total) by mouth daily with food., Disp: 90 tablet, Rfl: 1    amitriptyline 25 MG Oral Tab, Take 1 tablet (25 mg total) by mouth nightly., Disp: 90 tablet, Rfl: 0    fluconazole 150 MG Oral Tab, Take 1 tab PO once. May repeat dose in 72 hours as needed., Disp: 2 tablet, Rfl: 0    Empagliflozin (JARDIANCE) 25 MG Oral Tab, Take 1 tablet by mouth daily., Disp: 90 tablet, Rfl: 0    furosemide (LASIX) 40 MG Oral Tab, Take 1 tablet (40 mg total) by mouth daily., Disp: 90 tablet, Rfl: 1    spironolactone 50 MG  Oral Tab, Take 1 tablet (50 mg total) by mouth daily., Disp: 90 tablet, Rfl: 1    silver sulfADIAZINE 1 % External Cream, Aaa bid, Disp: 50 g, Rfl: 0    NIFEdipine ER 30 MG Oral Tablet 24 Hr, Take 1 tablet (30 mg total) by mouth daily., Disp: 30 tablet, Rfl: 0    HYDROcodone-acetaminophen (NORCO)  MG Oral Tab, Take 1 tablet by mouth every 6 (six) hours as needed for Pain. (Patient not taking: Reported on 12/4/2023), Disp: 12 tablet, Rfl: 0    ARIPiprazole 5 MG Oral Tab, , Disp: , Rfl:     oxyCODONE-acetaminophen  MG Oral Tab, Take 1 tablet by mouth every 4 (four) hours as needed for Pain. (Patient not taking: Reported on 12/4/2023), Disp: 30 tablet, Rfl: 0    lidocaine 5 % External Patch, Place 1 patch onto the skin daily., Disp: 30 patch, Rfl: 2    carvedilol 25 MG Oral Tab, Take 1 tablet (25 mg total) by mouth 2 (two) times daily with meals., Disp: 60 tablet, Rfl: 0    METFORMIN HCL 1000 MG Oral Tab, TAKE 1 TABLET BY MOUTH TWICE A DAY WITH MEALS, Disp: 180 tablet, Rfl: 1    LOSARTAN 100 MG Oral Tab, TAKE 1 TABLET BY MOUTH EVERY DAY, Disp: 90 tablet, Rfl: 1    ROSUVASTATIN 10 MG Oral Tab, TAKE 1 TABLET BY MOUTH EVERY DAY AT NIGHT, Disp: 90 tablet, Rfl: 1    insulin glargine (BASAGLAR KWIKPEN) 100 UNIT/ML Subcutaneous Solution Pen-injector, Inject 22 Units into the skin nightly., Disp: 15 mL, Rfl: 0    insulin aspart (NOVOLOG FLEXPEN) 100 Units/mL Subcutaneous Solution Pen-injector, Inject 15 Units into the skin in the morning, at noon, and at bedtime., Disp: 30 mL, Rfl: 3    Insulin Pen Needle (PEN NEEDLES) 32G X 4 MM Does not apply Misc, 4 each daily., Disp: 200 each, Rfl: 1     Allergies:   Allergies   Allergen Reactions    Latex HIVES and SWELLING    Influenza Vaccines OTHER (SEE COMMENTS)     Pt passed out     Radiology Contrast Iodinated Dyes ITCHING       Social History:   Social History     Socioeconomic History    Marital status: Single   Occupational History    Occupation: works for social  [employer] as    Tobacco Use    Smoking status: Former     Types: Cigarettes     Quit date: 2010     Years since quittin.5    Smokeless tobacco: Never   Vaping Use    Vaping Use: Never used   Substance and Sexual Activity    Alcohol use: Not Currently     Comment: Occ    Drug use: Yes     Types: Cannabis     Comment: Tea    Sexual activity: Not Currently     Partners: Male   Other Topics Concern    Blood Transfusions No    Caffeine Concern No    Exercise No       Medical History:   Past Medical History:   Diagnosis Date    Anemia     due to blood loss    Anxiety     Asthma (HCC)     Atrial septal defect (HCC)     Back problem     Bipolar 2 disorder (HCC)     Breast cancer (HCC)     L sided, treated with chemo, radiation    Chronic back pain     Chronic constipation     COVID-19 long hauler     Depression     Diabetes (HCC)     Essential hypertension     Fibromyalgia     History of blood transfusion     4-5 per year    Hx of motion sickness     Hypercoagulable state (HCC)     Incontinence     Lupus (HCC)     Migraines     Muscle weakness     Neuropathy     PE (pulmonary thromboembolism) (HCC) , 2014 after child birth and again  (bilateral)    Pneumonia due to organism     PONV (postoperative nausea and vomiting)     Pulmonary embolism (HCC)     Schizophrenia (HCC)     Visual impairment     Glasses       Surgical history:   Past Surgical History:   Procedure Laterality Date    BREAST BIOPSY Left     for breast cancer          x 3    CHOLECYSTECTOMY      INGUINAL HERNIA REPAIR  2011    Had inguinal and ventral hernia repair together @ [facility]    NEEDLE BIOPSY LEFT  2022    us guided bx axilla    REMOVAL ANAL FISTULA,SUBMUSCULAR      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      General Appearance:  alert, well developed, in no acute distress  Back: no kyphosis  Lymph Nodes:  No abnormal nodes noted  Musculoskeletal:  normal muscle strength and tone  Skin:  normal moisture and skin texture  Hair & Nails:  normal scalp hair     Neuro:  sensory grossly intact and motor grossly intact.  Psychiatric:  oriented to time, self, and place  Nutritional:  no abnormal weight gain or loss      ASSESSMENT/PLAN:    Diabetes mellitus type 2 uncontrolled  -HgA1c- 7.3% 6/2023- needs updated HgA1c- lab ordered but not yet completed.   -Sugars were significantly improved at last visit but unfortunately now with more cheating with diet sugars on CGM do look worse in the last month.   -Reviewed ABC's of diabetes   - Reviewed pathogenesis of diabetes.   - Reviewed importance of good glycemic control to prevent microvascular and macrovascular complications including nephropathy, neuropathy, retinopathy, and cardiovascular disease.  - Reviewed importance of SBGM- check glucose 1-2 times daily   - Reviewed target glucose goals for patient  fasting and <180 post prandially   - Reviewed importance of following diabetic diet- recommended 135 grams of CHO per day or 45 grams per meal.   - Provided patient education materials    -Decrease Basaglar to 12 units subcutaneous daily   -Change current dose of Novolog to 13-13-15 TID with meals. Reviewed insulin timing and administration and importance of taking dose before each meal.     -Continue Mounjaro to 15mg subcutaneous weekly.  Reviewed side effects and risks vs benefits of medication.     -Continue Jardiance 25mg PO daily. Reviewed side effects and risks vs benefits of medication. Reviewed importance of staying very well hydrated on medication. Instructed to call office if any symptoms of dysuria.     -Continue Metformin 1000mg PO BID    -Discussed diet at length today- avoid juice and work on decreasing snacking. Decrease carbs at meal times.    -Referral to bariatric clinic.     -Lipids 3/2023- LDL - 149- Rosuvastatin was increased from 5mg to 10mg daily- will recheck lipids before next appointment. Reviewed low fat diet.- repeat labs in the next 2 weeks   - UTD with optho   -Foot exam 3/2023, unable to repeat today due to telehealth visit but will defer to next in person follow up.   -Continue Freestyle Meagan 2    Hypertension  - On carvedilol 25mg PO BID and Losartan 100mg PO daily  -Almodipine 5mg daily was added by PCP with improved BP      RTC in 3 months  3/1/24  FELICITAS Lemons    A total of  30 minutes was spent on obtaining history, reviewing pertinent imaging/labs and specialists notes, evaluating patient, providing multiple treatment options, reinforcing diet/exercise and compliance, and completing documentation.

## 2024-03-01 NOTE — PATIENT INSTRUCTIONS
Decrease Basaglar 12 units subcutaneous daily       Increase Novolog 12-13 with breakfast and lunch, but increase to 15 units with dinner

## 2024-03-06 ENCOUNTER — TELEPHONE (OUTPATIENT)
Facility: CLINIC | Age: 54
End: 2024-03-06

## 2024-03-06 NOTE — TELEPHONE ENCOUNTER
Pt requesting for an appointment with Dr. Recio.  Pt states she has chronic pain due to lupus and fibromyalgia.  Pt had injury at work and dealing with workman's comp. Was seeing Dr. Stiles  Pt states he discussed referral for pain management at last office visit, but no referral on file  Offered appointment tomorrow with Dr. Recio, Pt states she will not be able to get a ride on a short notice.  Pt states she can come in on Monday, anytime.   Please advise if ok to use SDA or time for add on 3/11/24?    Pt states pharmacy did not receive zolpidem medication.  Informed Pt will call pharmacy as this was approved on 2/29/24.  Called [facility], per pharmacist they have the prescription, plan only restricts 1 fill a year due to nature of medication.  They can fill for Pt with coupon and Pt can pay cash. They will contact Pt.    zolpidem 10 MG Oral Tab 30 tablet 1 2/29/2024 --    Sig - Route: Take 1 tablet (10 mg total) by mouth daily as needed for Sleep. - Oral    Sent to pharmacy as: Zolpidem Tartrate 10 MG Oral Tablet (Ambien)    Notes to Pharmacy: Not to exceed 4 additional fills before 05/27/2024PATIENT REQUESTED A REFILL.    E-Prescribing Status: Receipt confirmed by pharmacy (2/29/2024  3:48 PM CST)      Associated Diagnoses    Insomnia, unspecified type  - Primary        Pharmacy    [id number] - [place], [place] - [address] AT [facility], [phone], [phone]

## 2024-03-06 NOTE — TELEPHONE ENCOUNTER
Pt contacted. Verified name and .  Pt scheduled for Monday SDA 12pm.  Pt also informed of zolpidem request. Verbalized understanding.    Future Appointments   Date Time Provider Department Center   3/11/2024 12:00 PM Graham Recio DO [facility] FP [facility]

## 2024-03-11 ENCOUNTER — OFFICE VISIT (OUTPATIENT)
Facility: CLINIC | Age: 54
End: 2024-03-11
Payer: COMMERCIAL

## 2024-03-11 ENCOUNTER — LAB ENCOUNTER (OUTPATIENT)
Dept: LAB | Age: 54
End: 2024-03-11
Payer: COMMERCIAL

## 2024-03-11 VITALS
WEIGHT: 223 LBS | HEIGHT: 65 IN | BODY MASS INDEX: 37.15 KG/M2 | OXYGEN SATURATION: 98 % | HEART RATE: 91 BPM | DIASTOLIC BLOOD PRESSURE: 82 MMHG | SYSTOLIC BLOOD PRESSURE: 132 MMHG

## 2024-03-11 DIAGNOSIS — M32.9 SYSTEMIC LUPUS ERYTHEMATOSUS, UNSPECIFIED SLE TYPE, UNSPECIFIED ORGAN INVOLVEMENT STATUS (HCC): ICD-10-CM

## 2024-03-11 DIAGNOSIS — M79.7 FIBROMYALGIA: ICD-10-CM

## 2024-03-11 DIAGNOSIS — G89.4 CHRONIC PAIN SYNDROME: Primary | ICD-10-CM

## 2024-03-11 DIAGNOSIS — E11.65 UNCONTROLLED TYPE 2 DIABETES MELLITUS WITH HYPERGLYCEMIA (HCC): ICD-10-CM

## 2024-03-11 LAB
ALBUMIN SERPL-MCNC: 4.2 G/DL (ref 3.2–4.8)
ALBUMIN/GLOB SERPL: 1.4 {RATIO} (ref 1–2)
ALP LIVER SERPL-CCNC: 82 U/L
ALT SERPL-CCNC: 11 U/L
ANION GAP SERPL CALC-SCNC: 6 MMOL/L (ref 0–18)
AST SERPL-CCNC: 18 U/L (ref ?–34)
BILIRUB SERPL-MCNC: 0.5 MG/DL (ref 0.3–1.2)
BUN BLD-MCNC: 8 MG/DL (ref 9–23)
BUN/CREAT SERPL: 9.2 (ref 10–20)
CALCIUM BLD-MCNC: 9.8 MG/DL (ref 8.7–10.4)
CHLORIDE SERPL-SCNC: 108 MMOL/L (ref 98–112)
CHOLEST SERPL-MCNC: 202 MG/DL (ref ?–200)
CO2 SERPL-SCNC: 30 MMOL/L (ref 21–32)
CREAT BLD-MCNC: 0.87 MG/DL
CREAT UR-SCNC: 188 MG/DL
EGFRCR SERPLBLD CKD-EPI 2021: 80 ML/MIN/1.73M2 (ref 60–?)
EST. AVERAGE GLUCOSE BLD GHB EST-MCNC: 171 MG/DL (ref 68–126)
FASTING PATIENT LIPID ANSWER: YES
FASTING STATUS PATIENT QL REPORTED: YES
GLOBULIN PLAS-MCNC: 3.1 G/DL (ref 2.8–4.4)
GLUCOSE BLD-MCNC: 136 MG/DL (ref 70–99)
HBA1C MFR BLD: 7.6 % (ref ?–5.7)
HDLC SERPL-MCNC: 42 MG/DL (ref 40–59)
LDLC SERPL CALC-MCNC: 143 MG/DL (ref ?–100)
MICROALBUMIN UR-MCNC: 2.9 MG/DL
MICROALBUMIN/CREAT 24H UR-RTO: 15.4 UG/MG (ref ?–30)
NONHDLC SERPL-MCNC: 160 MG/DL (ref ?–130)
OSMOLALITY SERPL CALC.SUM OF ELEC: 298 MOSM/KG (ref 275–295)
POTASSIUM SERPL-SCNC: 4.3 MMOL/L (ref 3.5–5.1)
PROT SERPL-MCNC: 7.3 G/DL (ref 5.7–8.2)
SODIUM SERPL-SCNC: 144 MMOL/L (ref 136–145)
TRIGL SERPL-MCNC: 94 MG/DL (ref 30–149)
VLDLC SERPL CALC-MCNC: 17 MG/DL (ref 0–30)

## 2024-03-11 PROCEDURE — 82043 UR ALBUMIN QUANTITATIVE: CPT

## 2024-03-11 PROCEDURE — 80061 LIPID PANEL: CPT

## 2024-03-11 PROCEDURE — 82570 ASSAY OF URINE CREATININE: CPT

## 2024-03-11 PROCEDURE — 3075F SYST BP GE 130 - 139MM HG: CPT | Performed by: FAMILY MEDICINE

## 2024-03-11 PROCEDURE — 83036 HEMOGLOBIN GLYCOSYLATED A1C: CPT

## 2024-03-11 PROCEDURE — 3079F DIAST BP 80-89 MM HG: CPT | Performed by: FAMILY MEDICINE

## 2024-03-11 PROCEDURE — 3008F BODY MASS INDEX DOCD: CPT | Performed by: FAMILY MEDICINE

## 2024-03-11 PROCEDURE — 36415 COLL VENOUS BLD VENIPUNCTURE: CPT

## 2024-03-11 PROCEDURE — 80053 COMPREHEN METABOLIC PANEL: CPT

## 2024-03-11 PROCEDURE — 99214 OFFICE O/P EST MOD 30 MIN: CPT | Performed by: FAMILY MEDICINE

## 2024-03-11 RX ORDER — FUROSEMIDE 40 MG/1
40 TABLET ORAL DAILY
Qty: 90 TABLET | Refills: 1 | Status: SHIPPED | OUTPATIENT
Start: 2024-03-11

## 2024-03-11 RX ORDER — ROSUVASTATIN CALCIUM 10 MG/1
10 TABLET, COATED ORAL NIGHTLY
Qty: 90 TABLET | Refills: 1 | Status: SHIPPED | OUTPATIENT
Start: 2024-03-11

## 2024-03-11 RX ORDER — AMITRIPTYLINE HYDROCHLORIDE 75 MG/1
TABLET ORAL
Qty: 90 TABLET | Refills: 0 | Status: SHIPPED | OUTPATIENT
Start: 2024-03-11

## 2024-03-11 NOTE — PROGRESS NOTES
HPI:    Patient ID: Suki Rosenberg is a 53 year old female who presents for pain f/u.    HPI  Started amitriptyline and took up to 3 tabs, which did help with the pain.   Ran out of amitriptyline about 3 weeks ago.   Complains of joint swelling in b/l hands & fingers, elbows, toes. No redness of skin.   Of note, she was seeing rheumatologist while living in [place] for Lupus.     Past Medical History:   Diagnosis Date    Anemia     due to blood loss    Anxiety     Asthma (HCC)     Atrial septal defect (HCC)     Back problem     Bipolar 2 disorder (HCC)     Breast cancer ([facility]) 2021    L sided, treated with chemo, radiation    Chronic back pain     Chronic constipation     COVID-19 long hauler     Depression     Diabetes ([facility])     Essential hypertension     Fibromyalgia     History of blood transfusion 2014    4-5 per year    Hx of motion sickness     Hypercoagulable state ([facility])     Incontinence     Lupus ([facility])     Migraines     Muscle weakness     Neuropathy     PE (pulmonary thromboembolism) ([facility]) 1999, 2014 1999 after child birth and again 2014 (bilateral)    Pneumonia due to organism 2020    PONV (postoperative nausea and vomiting)     Pulmonary embolism ([facility])     Schizophrenia ([facility])     Visual impairment     Glasses        Current Outpatient Medications   Medication Sig Dispense Refill    amitriptyline 75 MG Oral Tab Take 1/2 tablet PO nightly x7 days, then increase to 1 tablet nightly. 90 tablet 0    rosuvastatin 10 MG Oral Tab Take 1 tablet (10 mg total) by mouth nightly. 90 tablet 1    furosemide (LASIX) 40 MG Oral Tab Take 1 tablet (40 mg total) by mouth daily. 90 tablet 1    zolpidem 10 MG Oral Tab Take 1 tablet (10 mg total) by mouth daily as needed for Sleep. 30 tablet 1    Continuous Blood Gluc Sensor (FREESTYLE NAA 2 SENSOR) Does not apply Misc 1 each every 14 (fourteen) days. 2 each 0    rivaroxaban (XARELTO) 20 MG Oral Tab Take 1 tablet (20 mg total) by mouth daily with food. 90 tablet 1     Empagliflozin (JARDIANCE) 25 MG Oral Tab Take 1 tablet by mouth daily. 90 tablet 0    spironolactone 50 MG Oral Tab Take 1 tablet (50 mg total) by mouth daily. 90 tablet 1    silver sulfADIAZINE 1 % External Cream Aaa bid 50 g 0    NIFEdipine ER 30 MG Oral Tablet 24 Hr Take 1 tablet (30 mg total) by mouth daily. 30 tablet 0    ARIPiprazole 5 MG Oral Tab       lidocaine 5 % External Patch Place 1 patch onto the skin daily. 30 patch 2    carvedilol 25 MG Oral Tab Take 1 tablet (25 mg total) by mouth 2 (two) times daily with meals. 60 tablet 0    METFORMIN HCL 1000 MG Oral Tab TAKE 1 TABLET BY MOUTH TWICE A DAY WITH MEALS 180 tablet 1    LOSARTAN 100 MG Oral Tab TAKE 1 TABLET BY MOUTH EVERY DAY 90 tablet 1    insulin glargine (BASAGLAR KWIKPEN) 100 UNIT/ML Subcutaneous Solution Pen-injector Inject 22 Units into the skin nightly. 15 mL 0    insulin aspart (NOVOLOG FLEXPEN) 100 Units/mL Subcutaneous Solution Pen-injector Inject 15 Units into the skin in the morning, at noon, and at bedtime. 30 mL 3    Insulin Pen Needle (PEN NEEDLES) 32G X 4 MM Does not apply Misc 4 each daily. 200 each 1        Allergies   Allergen Reactions    Latex HIVES and SWELLING    Influenza Vaccines OTHER (SEE COMMENTS)     Pt passed out     Radiology Contrast Iodinated Dyes ITCHING       Review of Systems   Constitutional: Negative.    Respiratory: Negative.     Cardiovascular: Negative.    Gastrointestinal: Negative.    Musculoskeletal:  Positive for arthralgias, back pain, joint swelling and myalgias.   Neurological: Negative.    Psychiatric/Behavioral:          See HPI   All other systems reviewed and are negative.           /82   Pulse 91   Ht 5' 5\" (1.651 m)   Wt 223 lb (101.2 kg)   LMP 10/29/2018   SpO2 98%   BMI 37.11 kg/m²     PHYSICAL EXAM:   Physical Exam  Constitutional:       General: She is not in acute distress.     Appearance: Normal appearance. She is well-developed. She is not ill-appearing, toxic-appearing or  diaphoretic.   HENT:      Head: Normocephalic and atraumatic.      Right Ear: External ear normal.      Left Ear: External ear normal.      Nose: Nose normal.      Mouth/Throat:      Mouth: Mucous membranes are moist.      Pharynx: Oropharynx is clear.   Eyes:      Extraocular Movements: Extraocular movements intact.      Conjunctiva/sclera: Conjunctivae normal.   Cardiovascular:      Rate and Rhythm: Normal rate and regular rhythm.      Heart sounds: Normal heart sounds. No murmur heard.     No friction rub. No gallop.   Pulmonary:      Effort: Pulmonary effort is normal. No respiratory distress.      Breath sounds: Normal breath sounds. No wheezing or rales.   Musculoskeletal:         General: No swelling or deformity.      Cervical back: Neck supple.      Right lower leg: Edema (nonpitting edema in lower leg) present.      Left lower leg: Edema (nonpitting edema in lower leg) present.   Lymphadenopathy:      Cervical: No cervical adenopathy.   Skin:     General: Skin is warm and dry.      Capillary Refill: Capillary refill takes less than 2 seconds.   Neurological:      General: No focal deficit present.      Mental Status: She is alert.   Psychiatric:         Mood and Affect: Mood normal.         Behavior: Behavior normal.         Thought Content: Thought content normal.         Judgment: Judgment normal.             ASSESSMENT/PLAN:     Encounter Diagnoses   Name Primary?    Chronic pain syndrome Yes    Fibromyalgia     Systemic lupus erythematosus, unspecified SLE type, unspecified organ involvement status (HCC)        1. Chronic pain syndrome  - Rheumatology Referral - In Network    2. Fibromyalgia  - Rheumatology Referral - In Network    3. Systemic lupus erythematosus, unspecified SLE type, unspecified organ involvement status (HCC)  - Rheumatology Referral - In Network     -Chronic and uncontrolled pain possibly 2/2 fibromyalgia.   -Recommend re-establishing with rheumatology given above, but also given hx  of SLE.   -In the meantime, she prefers to resume amitriptyline. Rx sent for 75mg tablet. To start 1/2 tablet x7 days, then increase to 1 tablet daily.   -Previously self-discontinued gabapentin. Of note, cymbalta has helped in the past.   -If unable to see rheumatology within the next 4-6 weeks, pt to send Fio message with update.     Meds This Visit:  Requested Prescriptions     Signed Prescriptions Disp Refills    amitriptyline 75 MG Oral Tab 90 tablet 0     Sig: Take 1/2 tablet PO nightly x7 days, then increase to 1 tablet nightly.    rosuvastatin 10 MG Oral Tab 90 tablet 1     Sig: Take 1 tablet (10 mg total) by mouth nightly.    furosemide (LASIX) 40 MG Oral Tab 90 tablet 1     Sig: Take 1 tablet (40 mg total) by mouth daily.       Imaging & Referrals:  RHEUMATOLOGY - INTERNAL       Graham Recio, DO  ID#[id number]

## 2024-03-21 RX ORDER — SEMAGLUTIDE 1.34 MG/ML
1 INJECTION, SOLUTION SUBCUTANEOUS
Refills: 0 | OUTPATIENT
Start: 2024-03-21

## 2024-04-26 NOTE — TELEPHONE ENCOUNTER
Endocrine Refill protocol for CGM supplies       Protocol Criteria:  Appointment with Endocrinology completed in the last 12 months or scheduled in the next 6 months     Verify appointment has been completed or scheduled in the appropriate timeline. If so can send a 90 day supply with 1 refill.        Last completed office visit:03/01/24  Next scheduled Follow up: No F/U

## 2024-04-30 RX ORDER — SEMAGLUTIDE 1.34 MG/ML
1 INJECTION, SOLUTION SUBCUTANEOUS
Refills: 0 | OUTPATIENT
Start: 2024-04-30

## 2024-05-11 NOTE — TELEPHONE ENCOUNTER
Endocrine Refill protocol for oral and injectable diabetic medications    Protocol Criteria:    -Appointment with Endocrinology completed in the last 6 months or scheduled in the next 3 months    -A1c result <8.5% in the past 6 months      Verify the above has been completed or scheduled in the appropriate timeline. If so can send a 90 day supply with 1 refill.      Last completed office visit: Tele 3/1/24  Next scheduled Follow up: N/A    Last A1c result:  Component      Latest Ref Rng 3/11/2024   HEMOGLOBIN A1c      <5.7 % 7.6 (H)         Called patient to help schedule a follow up appointment for June 2024, no answer, left detailed message to call back. [employer] message sent.

## 2024-05-12 NOTE — TELEPHONE ENCOUNTER
Please review; protocol failed. Or has no protocol    Requested Prescriptions   Pending Prescriptions Disp Refills    AMITRIPTYLINE 25 MG Oral Tab [Pharmacy Med Name: AMITRIPTYLINE 25MG TABLETS] 90 tablet 0     Sig: TAKE 1 TABLET(25 MG) BY MOUTH EVERY NIGHT       There is no refill protocol information for this order           Recent Outpatient Visits              [facility]    2 months ago Obesity due to excess calories with serious comorbidity, unspecified classification    [facility] Jennyfer Mayorga, APRN    Telemedicine    4 months ago [facility]     [facility]    5 months ago Non-pressure chronic ulcer of right lower leg, limited to breakdown of skin (HCC)    [facility] Jose M Johnson, FELICITAS    Office Visit    5 months ago Lymphedema of both lower extremities    [facility] Jose M Johnson, FELICITAS    Office Visit           Future Appointments         Provider Department Appt Notes    In 4 months Miles Mandujano MD [facility]

## 2024-05-13 RX ORDER — TIRZEPATIDE 15 MG/.5ML
INJECTION, SOLUTION SUBCUTANEOUS
Qty: 6 ML | Refills: 1 | Status: SHIPPED | OUTPATIENT
Start: 2024-05-13

## 2024-05-13 RX ORDER — AMITRIPTYLINE HYDROCHLORIDE 75 MG/1
75 TABLET ORAL DAILY
Qty: 90 TABLET | Refills: 1 | Status: SHIPPED | OUTPATIENT
Start: 2024-05-13

## 2024-05-13 RX ORDER — AMITRIPTYLINE HYDROCHLORIDE 25 MG/1
25 TABLET, FILM COATED ORAL NIGHTLY
Qty: 90 TABLET | Refills: 0 | OUTPATIENT
Start: 2024-05-13

## 2024-05-13 NOTE — TELEPHONE ENCOUNTER
Can you please confirm her dose? At her most recent visit, she was going to resume the 75mg tablet although take 1/2 tablet to start. Thank you.

## 2024-05-13 NOTE — TELEPHONE ENCOUNTER
Patient stated that she is on amitriptyline 75mg nightly, not 25mg. Will be running out soon and requesting a refill. Prescription pended.

## 2024-05-15 ENCOUNTER — OFFICE VISIT (OUTPATIENT)
Facility: CLINIC | Age: 54
End: 2024-05-15

## 2024-05-15 VITALS
OXYGEN SATURATION: 98 % | WEIGHT: 236 LBS | HEART RATE: 86 BPM | BODY MASS INDEX: 39.32 KG/M2 | DIASTOLIC BLOOD PRESSURE: 82 MMHG | HEIGHT: 65 IN | SYSTOLIC BLOOD PRESSURE: 128 MMHG

## 2024-05-15 DIAGNOSIS — Z83.49 FAMILY HISTORY OF AMYLOIDOSIS: ICD-10-CM

## 2024-05-15 DIAGNOSIS — R21 RASH OF FACE: ICD-10-CM

## 2024-05-15 DIAGNOSIS — E78.5 DYSLIPIDEMIA: ICD-10-CM

## 2024-05-15 DIAGNOSIS — M32.9 SYSTEMIC LUPUS ERYTHEMATOSUS, UNSPECIFIED SLE TYPE, UNSPECIFIED ORGAN INVOLVEMENT STATUS (HCC): ICD-10-CM

## 2024-05-15 DIAGNOSIS — I89.0 LYMPHEDEMA OF BOTH LOWER EXTREMITIES: Primary | ICD-10-CM

## 2024-05-15 DIAGNOSIS — I51.7 MODERATE LEFT VENTRICULAR HYPERTROPHY: ICD-10-CM

## 2024-05-15 DIAGNOSIS — I50.32 CHRONIC DIASTOLIC HEART FAILURE (HCC): ICD-10-CM

## 2024-05-15 DIAGNOSIS — I10 ESSENTIAL HYPERTENSION: ICD-10-CM

## 2024-05-15 PROCEDURE — 3079F DIAST BP 80-89 MM HG: CPT | Performed by: FAMILY MEDICINE

## 2024-05-15 PROCEDURE — 99215 OFFICE O/P EST HI 40 MIN: CPT | Performed by: FAMILY MEDICINE

## 2024-05-15 PROCEDURE — 3008F BODY MASS INDEX DOCD: CPT | Performed by: FAMILY MEDICINE

## 2024-05-15 PROCEDURE — 3051F HG A1C>EQUAL 7.0%<8.0%: CPT | Performed by: FAMILY MEDICINE

## 2024-05-15 PROCEDURE — 3061F NEG MICROALBUMINURIA REV: CPT | Performed by: FAMILY MEDICINE

## 2024-05-15 PROCEDURE — 3074F SYST BP LT 130 MM HG: CPT | Performed by: FAMILY MEDICINE

## 2024-05-15 NOTE — PROGRESS NOTES
HPI:    Patient ID: Suki Rosenberg is a 53 year old female who presents for few concerns.    HPI  Rash:   Has rashes on face and back.   This is a chronic and recurrent rash. Was told it was related to Lupus.   Burns if water hits it.   No itching.   Has used vaseline but doesn't help.     Swelling in legs:  Chronic.   Uses lymphedema vac daily. On waiting list for lymphedema clinic.   Sits most of the day.     HTN:   Taking meds as prescribed.   Sees Dr. Galvan.     Dad and brother have amyloidosis. She has never been tested.     Past Medical History:    Anemia    due to blood loss    Anxiety    Asthma (HCC)    Atrial septal defect (HCC)    Back problem    Bipolar 2 disorder (HCC)    Breast cancer (HCC)    L sided, treated with chemo, radiation    Chronic back pain    Chronic constipation    COVID-19 long hauler    Depression    Diabetes (HCC)    Essential hypertension    Fibromyalgia    History of blood transfusion    4-5 per year    Hx of motion sickness    Hypercoagulable state (HCC)    Incontinence    Lupus (HCC)    Migraines    Muscle weakness    Neuropathy    PE (pulmonary thromboembolism) ([facility])    1999 after child birth and again 2014 (bilateral)    Pneumonia due to organism    PONV (postoperative nausea and vomiting)    Pulmonary embolism (HCC)    Schizophrenia (HCC)    Visual impairment    Glasses        Current Outpatient Medications   Medication Sig Dispense Refill    MOUNJARO 15 MG/0.5ML Subcutaneous Solution Pen-injector INJECT 15 MG INTO THE SKIN EVERY WEEK 6 mL 1    amitriptyline 75 MG Oral Tab Take 1 tablet (75 mg total) by mouth daily. 90 tablet 1    Continuous Blood Gluc Sensor (FREESTYLE NAA 2 SENSOR) Does not apply Misc 1 each every 14 (fourteen) days. 2 each 3    amitriptyline 75 MG Oral Tab Take 1/2 tablet PO nightly x7 days, then increase to 1 tablet nightly. 90 tablet 0    rosuvastatin 10 MG Oral Tab Take 1 tablet (10 mg total) by mouth nightly. 90 tablet 1    furosemide (LASIX) 40 MG Oral  Tab Take 1 tablet (40 mg total) by mouth daily. 90 tablet 1    zolpidem 10 MG Oral Tab Take 1 tablet (10 mg total) by mouth daily as needed for Sleep. 30 tablet 1    rivaroxaban (XARELTO) 20 MG Oral Tab Take 1 tablet (20 mg total) by mouth daily with food. 90 tablet 1    Empagliflozin (JARDIANCE) 25 MG Oral Tab Take 1 tablet by mouth daily. 90 tablet 0    spironolactone 50 MG Oral Tab Take 1 tablet (50 mg total) by mouth daily. 90 tablet 1    silver sulfADIAZINE 1 % External Cream Aaa bid 50 g 0    NIFEdipine ER 30 MG Oral Tablet 24 Hr Take 1 tablet (30 mg total) by mouth daily. 30 tablet 0    ARIPiprazole 5 MG Oral Tab       lidocaine 5 % External Patch Place 1 patch onto the skin daily. 30 patch 2    carvedilol 25 MG Oral Tab Take 1 tablet (25 mg total) by mouth 2 (two) times daily with meals. 60 tablet 0    METFORMIN HCL 1000 MG Oral Tab TAKE 1 TABLET BY MOUTH TWICE A DAY WITH MEALS 180 tablet 1    LOSARTAN 100 MG Oral Tab TAKE 1 TABLET BY MOUTH EVERY DAY 90 tablet 1    insulin glargine (BASAGLAR KWIKPEN) 100 UNIT/ML Subcutaneous Solution Pen-injector Inject 22 Units into the skin nightly. 15 mL 0    insulin aspart (NOVOLOG FLEXPEN) 100 Units/mL Subcutaneous Solution Pen-injector Inject 15 Units into the skin in the morning, at noon, and at bedtime. 30 mL 3    Insulin Pen Needle (PEN NEEDLES) 32G X 4 MM Does not apply Misc 4 each daily. 200 each 1        Allergies   Allergen Reactions    Latex HIVES and SWELLING    Influenza Vaccines OTHER (SEE COMMENTS)     Pt passed out     Radiology Contrast Iodinated Dyes ITCHING       Review of Systems   Constitutional: Negative.    Respiratory: Negative.     Cardiovascular:  Positive for leg swelling. Negative for chest pain and palpitations.   Gastrointestinal: Negative.    Skin:  Positive for rash.   Neurological: Negative.    All other systems reviewed and are negative.           /82   Pulse 86   Ht 5' 5\" (1.651 m)   Wt 236 lb (107 kg)   LMP 10/29/2018   SpO2  98%   BMI 39.27 kg/m²     PHYSICAL EXAM:   Physical Exam  Constitutional:       General: She is not in acute distress.     Appearance: Normal appearance. She is well-developed. She is not ill-appearing, toxic-appearing or diaphoretic.   HENT:      Head: Normocephalic and atraumatic.      Right Ear: External ear normal.      Left Ear: External ear normal.      Nose: Nose normal.      Mouth/Throat:      Mouth: Mucous membranes are moist.      Pharynx: Oropharynx is clear.   Eyes:      Extraocular Movements: Extraocular movements intact.      Conjunctiva/sclera: Conjunctivae normal.   Cardiovascular:      Rate and Rhythm: Normal rate and regular rhythm.      Pulses: Normal pulses.      Heart sounds: Normal heart sounds. No murmur heard.     No friction rub. No gallop.   Pulmonary:      Effort: Pulmonary effort is normal. No respiratory distress.      Breath sounds: Normal breath sounds. No wheezing or rales.   Musculoskeletal:      Cervical back: Neck supple.      Right lower leg: Edema (mild-moderate nonpitting edema in lower leg) present.      Left lower leg: Edema (mild-moderate nonpitting edema in lower leg) present.   Lymphadenopathy:      Cervical: No cervical adenopathy.   Skin:     General: Skin is warm and dry.      Capillary Refill: Capillary refill takes less than 2 seconds.      Findings: Rash (dry irregular brown round macular rash on forehead near hairline) present.   Neurological:      General: No focal deficit present.      Mental Status: She is alert.   Psychiatric:         Mood and Affect: Mood normal.         Behavior: Behavior normal.         Thought Content: Thought content normal.         Judgment: Judgment normal.             ASSESSMENT/PLAN:     Encounter Diagnoses   Name Primary?    Lymphedema of both lower extremities Yes    Essential hypertension     Chronic diastolic heart failure (HCC)     Moderate left ventricular hypertrophy     Systemic lupus erythematosus, unspecified SLE type,  unspecified organ involvement status (HCC)     Family history of amyloidosis     Dyslipidemia     Rash of face        1. Lymphedema of both lower extremities  -Chronic and uncontrolled. Venous insufficiency contributing. Does have moderate LVH as well.   -Awaiting treatment in lymphedema clinic as she is on waitlist.  -On lasix and aldactone.   -Recommend conservative management: avoid prolonged sitting/standing, elevate legs when seated, limit salt intake  -Will obtain genetic counselor evaluation given FH of amyloidosis.     2. Essential hypertension  -Well-controlled. CPM. Sees cardiology Dr. Galvan.     3. Chronic diastolic heart failure (HCC)  -Plan as above.     4. Moderate left ventricular hypertrophy  -Plan as above.     5. Systemic lupus erythematosus, unspecified SLE type, unspecified organ involvement status (HCC)  -Again recommend re-establishing with rheumatology. Referral previously provided.     6. Family history of amyloidosis  - Genetic Counselor Referral - [facility] ([facility])  -See above.     7. Dyslipidemia  -Reviewed labs per endo including elevated lipids.   -Increase rosuvastatin from 10mg to 20mg daily.     8. Rash of face  -Trial OTC hydrocortisone 1-2x/day for 7 days.     Meds This Visit:  Requested Prescriptions      No prescriptions requested or ordered in this encounter       Imaging & Referrals:  OP REFERRAL TO GENETIC COUNSELOR    A total of 45 minutes were spent with patient and reviewing medical records pertaining to this visit.        Graham Recio, DO  ID#[id number]

## 2024-06-12 ENCOUNTER — OFFICE VISIT (OUTPATIENT)
Dept: ENDOCRINOLOGY CLINIC | Facility: CLINIC | Age: 54
End: 2024-06-12

## 2024-06-12 VITALS
HEIGHT: 65 IN | SYSTOLIC BLOOD PRESSURE: 132 MMHG | WEIGHT: 243 LBS | BODY MASS INDEX: 40.48 KG/M2 | HEART RATE: 101 BPM | DIASTOLIC BLOOD PRESSURE: 98 MMHG

## 2024-06-12 DIAGNOSIS — G47.00 INSOMNIA, UNSPECIFIED TYPE: ICD-10-CM

## 2024-06-12 DIAGNOSIS — E11.65 TYPE 2 DIABETES MELLITUS WITH HYPERGLYCEMIA, UNSPECIFIED WHETHER LONG TERM INSULIN USE (HCC): Primary | ICD-10-CM

## 2024-06-12 DIAGNOSIS — E78.5 DYSLIPIDEMIA: ICD-10-CM

## 2024-06-12 LAB
GLUCOSE BLOOD: 245
HEMOGLOBIN A1C: 8.2 % (ref 4.3–5.6)
TEST STRIP LOT #: NORMAL NUMERIC

## 2024-06-12 PROCEDURE — 3075F SYST BP GE 130 - 139MM HG: CPT | Performed by: INTERNAL MEDICINE

## 2024-06-12 PROCEDURE — 3008F BODY MASS INDEX DOCD: CPT | Performed by: INTERNAL MEDICINE

## 2024-06-12 PROCEDURE — 99214 OFFICE O/P EST MOD 30 MIN: CPT | Performed by: INTERNAL MEDICINE

## 2024-06-12 PROCEDURE — 82947 ASSAY GLUCOSE BLOOD QUANT: CPT | Performed by: INTERNAL MEDICINE

## 2024-06-12 PROCEDURE — 3080F DIAST BP >= 90 MM HG: CPT | Performed by: INTERNAL MEDICINE

## 2024-06-12 PROCEDURE — 83036 HEMOGLOBIN GLYCOSYLATED A1C: CPT | Performed by: INTERNAL MEDICINE

## 2024-06-12 RX ORDER — ROSUVASTATIN CALCIUM 10 MG/1
10 TABLET, COATED ORAL NIGHTLY
Qty: 90 TABLET | Refills: 1 | Status: SHIPPED | OUTPATIENT
Start: 2024-06-12

## 2024-06-12 RX ORDER — INSULIN GLARGINE 100 [IU]/ML
10 INJECTION, SOLUTION SUBCUTANEOUS NIGHTLY
Qty: 15 ML | Refills: 0 | Status: SHIPPED | OUTPATIENT
Start: 2024-06-12

## 2024-06-12 NOTE — PROGRESS NOTES
FU VISIT:       HISTORY OF PRESENT ILLNESS   Suki Rosenberg is a 53 year old female who presents for follow up for diabetes mellitus     Diabetes History:  Diagnosed- age 18   Patient has had hospitalizations for blood sugar issues- last time 2021    Dietary compliance: moderate compliance  Exercise: No- hard due to lymphedema  Polyuria/polydipsia: Yes   Blurred vision: Yes     Episodes of hypoglycemia: no     Blood Glucose:    Meagan : not a lot of data available      Current DM Regimen:  Metformin 1000mg PO BID  Novolog - 10-12 units TID with meals   Mounjaro- 15 mg subcutaneous weekly   Jardiance -25mg PO daily   Basagalar- 12 units subcutaneous daily --> no taking     Modifying factors:  Medication adherence: Yes --> not taking long acting insulin   Recent steroids, illness or infections: No       REVIEW OF SYSTEMS  Eyes: Diabetic retinopathy present: No             Most recent visit to eye doctor in last 12 months: Yes- within last year     CV: Cardiovascular disease present: No          Hypertension present: Yes          Hyperlipidemia present: Yes - on statin therapy          Peripheral Vascular Disease present: No     : Nephropathy present: No     Neuro: Neuropathy present: Yes- numbness in feet bilaterally     Skin: Infection or ulceration: No     Osteoporosis: No     Thyroid disease: No     Medications:     Current Outpatient Medications:     MOUNJARO 15 MG/0.5ML Subcutaneous Solution Pen-injector, INJECT 15 MG INTO THE SKIN EVERY WEEK, Disp: 6 mL, Rfl: 1    Empagliflozin (JARDIANCE) 25 MG Oral Tab, Take 1 tablet by mouth daily., Disp: 90 tablet, Rfl: 0    METFORMIN HCL 1000 MG Oral Tab, TAKE 1 TABLET BY MOUTH TWICE A DAY WITH MEALS, Disp: 180 tablet, Rfl: 1    insulin aspart (NOVOLOG FLEXPEN) 100 Units/mL Subcutaneous Solution Pen-injector, Inject 15 Units into the skin in the morning, at noon, and at bedtime., Disp: 30 mL, Rfl: 3    amitriptyline 75 MG Oral Tab, Take 1 tablet (75 mg total) by mouth  daily., Disp: 90 tablet, Rfl: 1    Continuous Blood Gluc Sensor (FREESTYLE NAA 2 SENSOR) Does not apply Misc, 1 each every 14 (fourteen) days., Disp: 2 each, Rfl: 3    amitriptyline 75 MG Oral Tab, Take 1/2 tablet PO nightly x7 days, then increase to 1 tablet nightly., Disp: 90 tablet, Rfl: 0    rosuvastatin 10 MG Oral Tab, Take 1 tablet (10 mg total) by mouth nightly., Disp: 90 tablet, Rfl: 1    furosemide (LASIX) 40 MG Oral Tab, Take 1 tablet (40 mg total) by mouth daily., Disp: 90 tablet, Rfl: 1    zolpidem 10 MG Oral Tab, Take 1 tablet (10 mg total) by mouth daily as needed for Sleep., Disp: 30 tablet, Rfl: 1    rivaroxaban (XARELTO) 20 MG Oral Tab, Take 1 tablet (20 mg total) by mouth daily with food., Disp: 90 tablet, Rfl: 1    spironolactone 50 MG Oral Tab, Take 1 tablet (50 mg total) by mouth daily., Disp: 90 tablet, Rfl: 1    silver sulfADIAZINE 1 % External Cream, Aaa bid, Disp: 50 g, Rfl: 0    NIFEdipine ER 30 MG Oral Tablet 24 Hr, Take 1 tablet (30 mg total) by mouth daily., Disp: 30 tablet, Rfl: 0    ARIPiprazole 5 MG Oral Tab, , Disp: , Rfl:     lidocaine 5 % External Patch, Place 1 patch onto the skin daily., Disp: 30 patch, Rfl: 2    carvedilol 25 MG Oral Tab, Take 1 tablet (25 mg total) by mouth 2 (two) times daily with meals., Disp: 60 tablet, Rfl: 0    LOSARTAN 100 MG Oral Tab, TAKE 1 TABLET BY MOUTH EVERY DAY, Disp: 90 tablet, Rfl: 1    insulin glargine (BASAGLAR KWIKPEN) 100 UNIT/ML Subcutaneous Solution Pen-injector, Inject 22 Units into the skin nightly. (Patient not taking: Reported on 6/12/2024), Disp: 15 mL, Rfl: 0    Insulin Pen Needle (PEN NEEDLES) 32G X 4 MM Does not apply Misc, 4 each daily., Disp: 200 each, Rfl: 1     Allergies:   Allergies   Allergen Reactions    Latex HIVES and SWELLING    Influenza Vaccines OTHER (SEE COMMENTS)     Pt passed out     Radiology Contrast Iodinated Dyes ITCHING       Social History:   Social History     Socioeconomic History    Marital status: Single    Occupational History    Occupation: works for social [employer] as [employer]   Tobacco Use    Smoking status: Former     Current packs/day: 0.00     Types: Cigarettes     Quit date: 2010     Years since quittin.8    Smokeless tobacco: Never   Vaping Use    Vaping status: Never Used   Substance and Sexual Activity    Alcohol use: Not Currently     Comment: Occ    Drug use: Yes     Types: Cannabis     Comment: Tea    Sexual activity: Not Currently     Partners: Male   Other Topics Concern    Blood Transfusions No    Caffeine Concern No    Exercise No       Medical History:   Past Medical History:    Anemia    due to blood loss    Anxiety    Asthma (HCC)    Atrial septal defect (HCC)    Back problem    Bipolar 2 disorder (HCC)    Breast cancer (HCC)    L sided, treated with chemo, radiation    Chronic back pain    Chronic constipation    COVID-19 long hauler    Depression    Diabetes (HCC)    Essential hypertension    Fibromyalgia    History of blood transfusion    4-5 per year    Hx of motion sickness    Hypercoagulable state (HCC)    Incontinence    Lupus (HCC)    Migraines    Muscle weakness    Neuropathy    PE (pulmonary thromboembolism) (HCC)     after child birth and again  (bilateral)    Pneumonia due to organism    PONV (postoperative nausea and vomiting)    Pulmonary embolism (HCC)    Schizophrenia (HCC)    Visual impairment    Glasses       Surgical history:   Past Surgical History:   Procedure Laterality Date    Breast biopsy Left     for breast cancer          x 3    Cholecystectomy      Inguinal hernia repair  2011    Had inguinal and ventral hernia repair together @ [place]    Needle biopsy left  2022    us guided bx axilla    Removal anal fistula,submuscular      repair of anal vaginal fistula after child birth    Repair rotator cuff,acute Left     after trauma    Ventral hernia repair  2011    after last c/s         PHYSICAL EXAM  Vitals:     06/12/24 1002   BP: (!) 165/97   Pulse: 101   Weight: 243 lb (110.2 kg)   Height: 5' 5\" (1.651 m)             General Appearance:  alert, well developed, in no acute distress  Nutritional:  no extreme weight gain or loss  Head: Atraumatic  Eyes:  normal conjunctivae, sclera., normal sclera and normal pupils  Throat/Neck: normal sound to voice. Normal hearing, normal speech  Respiratory:  Speaking in full sentences, non-labored. no increased work of breathing, no audible wheezing    Neuro: motor grossly intact, moving all extremities without difficulty  Psychiatric:  oriented to time, self, and place  Extremities: June 2024  Bilateral barefoot skin diabetic exam is normal, visualized feet and the appearance is normal.  Bilateral monofilament/sensation of both feet is decreased   Pulsation pedal pulse exam of both lower legs/feet is normal as well.              ASSESSMENT/PLAN:    Diabetes mellitus type 2 : hyperglycemia  -Reviewed ABC's of diabetes   - Reviewed pathogenesis of diabetes.   - Reviewed importance of good glycemic control to prevent microvascular and macrovascular complications including nephropathy, neuropathy, retinopathy, and cardiovascular disease.  - Reviewed importance of SBGM- check glucose 1-2 times daily   - Reviewed target glucose goals for patient  fasting and <180 post prandially   - Reviewed importance of following diabetic diet- recommended 135 grams of CHO per day or 45 grams per meal.       - Basaglar : resume at 10  units subcutaneous daily   -Change current dose of Novolog to 10-12 units TID with meals. Reviewed insulin timing and administration and importance of taking dose before each meal.     - STOP mounjaro. Start ozempic She has been on this before and will like to start with the 1 mg weekly doseShe will update me with brayan on my chart in one month and we can go up on the dose accordingly     No personal or family history of MEN syndrome  Patient counselled regarding side  effects including injection site reactions, nausea, vomiting, diarrhea, pancreatitis, gastroparesis and rare side effect phong Shakeel syndrome.    -Continue Jardiance 25mg PO daily. Reviewed side effects and risks vs benefits of medication. Reviewed importance of staying very well hydrated on medication. Instructed to call office if any symptoms of dysuria.     -Continue Metformin 1000mg PO BID    - Dyslipidemia:  Rosuvastatin 10mg daily, LDL is good. CPM   - UTD with optho   -Discussed importance of annual eye exams   -Continue Freestyle Meagan 2--> use more often     Hypertension  BP is okay on repeat testing   She is compliant with medications      RTC in 3 months  Nati Kline

## 2024-06-15 NOTE — TELEPHONE ENCOUNTER
Please review; protocol failed/No Protocol    Recent Fills: 01/13/2024, 03/06/2024, 04/24/2024    Last Rx Written: 02/29/2024    Last Office Visit: 05/15/2024    Requested Prescriptions   Pending Prescriptions Disp Refills    ZOLPIDEM 10 MG Oral Tab [Pharmacy Med Name: ZOLPIDEM TARTRATE 10 MG TABLET] 30 tablet 1     Sig: Take 1 tablet (10 mg total) by mouth daily as needed for Sleep.       Controlled Substance Medication Failed - 6/12/2024  8:24 PM        Failed - This medication is a controlled substance - forward to provider to refill           Future Appointments         Provider Department Appt Notes    In 3 months Miles Mandujano MD [facility]           Recent Outpatient Visits              3 days ago Type 2 diabetes mellitus with hyperglycemia, unspecified whether long term insulin use (HCC)    [facility] Nati Kline MD    Office Visit    1 month ago Lymphedema of both lower extremities    [facility] Graham Recio DO    Office Visit    3 months ago Chronic pain syndrome    [facility] Graham Recio DO    Office Visit    3 months ago Obesity due to excess calories with serious comorbidity, unspecified classification    [facility] [place] Jennyfer Mayorga APRN    Telemedicine    5 months ago Lymphedema of both lower extremities    [facility] Graham Recio DO    Office Visit

## 2024-06-17 ENCOUNTER — TELEPHONE (OUTPATIENT)
Dept: ENDOCRINOLOGY CLINIC | Facility: CLINIC | Age: 54
End: 2024-06-17

## 2024-06-17 RX ORDER — ZOLPIDEM TARTRATE 10 MG/1
10 TABLET ORAL DAILY PRN
Qty: 30 TABLET | Refills: 1 | Status: SHIPPED | OUTPATIENT
Start: 2024-06-17

## 2024-06-17 NOTE — TELEPHONE ENCOUNTER
Patient called to inform Dr. Kline that  William sent a fax over to the office and are waiting to hear back regarding patient's Ozempic.

## 2024-06-18 NOTE — TELEPHONE ENCOUNTER
Medication PA Requested:  Ozempic 4mg/3mL                                                        CoverMyMeds Used:  Key:  Quantity: 9mL  Day Supply: 90  Sig: Inject 1 mg into the skin once a week.   DX Code:   E11.65

## 2024-06-20 ENCOUNTER — TELEPHONE (OUTPATIENT)
Facility: CLINIC | Age: 54
End: 2024-06-20

## 2024-06-20 DIAGNOSIS — Z79.4 UNCONTROLLED TYPE 2 DIABETES MELLITUS WITH HYPERGLYCEMIA, WITH LONG-TERM CURRENT USE OF INSULIN (HCC): Primary | ICD-10-CM

## 2024-06-20 DIAGNOSIS — E11.65 UNCONTROLLED TYPE 2 DIABETES MELLITUS WITH HYPERGLYCEMIA, WITH LONG-TERM CURRENT USE OF INSULIN (HCC): Primary | ICD-10-CM

## 2024-06-26 ENCOUNTER — TELEPHONE (OUTPATIENT)
Dept: ENDOCRINOLOGY CLINIC | Facility: CLINIC | Age: 54
End: 2024-06-26

## 2024-06-27 NOTE — TELEPHONE ENCOUNTER
Called [facility] [employer] at [phone], spoke with Mariela CHAVES, states they did not receive prior authorization submitted via Cover My Meds.   Prior authorization completed via phone  Authorization approved from 6/27/24-6/27/25  Authorization number-[id number]    [facility] message sent to patient

## 2024-06-27 NOTE — TELEPHONE ENCOUNTER
Called [facility] pharmacy and was notified by the pharmacist that they needed clarification since pt was never on Ozempic but was starting out with 1 mg as opposed to 0.25 mg. Per LOV 6/12, pt was on Mounjaro 15 mg previously. Provided verbal clarification to dispense medication.

## 2024-07-01 ENCOUNTER — TELEPHONE (OUTPATIENT)
Facility: CLINIC | Age: 54
End: 2024-07-01

## 2024-07-01 NOTE — TELEPHONE ENCOUNTER
Called patient regarding overdue eye exam. States she has not completed it yet but will be scheduling soon.

## 2024-07-19 ENCOUNTER — OFFICE VISIT (OUTPATIENT)
Dept: WOUND CARE | Facility: HOSPITAL | Age: 54
End: 2024-07-19
Attending: NURSE PRACTITIONER
Payer: COMMERCIAL

## 2024-07-19 VITALS
HEIGHT: 62 IN | RESPIRATION RATE: 18 BRPM | TEMPERATURE: 98 F | DIASTOLIC BLOOD PRESSURE: 83 MMHG | WEIGHT: 213 LBS | OXYGEN SATURATION: 99 % | SYSTOLIC BLOOD PRESSURE: 135 MMHG | BODY MASS INDEX: 39.2 KG/M2 | HEART RATE: 98 BPM

## 2024-07-19 DIAGNOSIS — E08.621 DIABETIC ULCER OF RIGHT HEEL ASSOCIATED WITH DIABETES MELLITUS DUE TO UNDERLYING CONDITION, LIMITED TO BREAKDOWN OF SKIN (HCC): Primary | ICD-10-CM

## 2024-07-19 DIAGNOSIS — L97.411 DIABETIC ULCER OF RIGHT HEEL ASSOCIATED WITH DIABETES MELLITUS DUE TO UNDERLYING CONDITION, LIMITED TO BREAKDOWN OF SKIN (HCC): Primary | ICD-10-CM

## 2024-07-19 DIAGNOSIS — T14.8XXA PAIN ASSOCIATED WITH WOUND: ICD-10-CM

## 2024-07-19 DIAGNOSIS — R52 PAIN ASSOCIATED WITH WOUND: ICD-10-CM

## 2024-07-19 DIAGNOSIS — S91.301A NON-HEALING OPEN WOUND OF RIGHT HEEL: ICD-10-CM

## 2024-07-19 PROCEDURE — 99214 OFFICE O/P EST MOD 30 MIN: CPT

## 2024-07-19 PROCEDURE — 97597 DBRDMT OPN WND 1ST 20 CM/<: CPT | Performed by: NURSE PRACTITIONER

## 2024-07-19 RX ORDER — TRAMADOL HYDROCHLORIDE 50 MG/1
50 TABLET ORAL 2 TIMES DAILY PRN
Qty: 60 TABLET | Refills: 0 | Status: SHIPPED | OUTPATIENT
Start: 2024-07-19 | End: 2024-08-18

## 2024-07-19 NOTE — PATIENT INSTRUCTIONS
Orders and Instructions:     Remove the old dressing and discard  Clean the wound with  Normal saline or wound cleanser  Or 1/4 strength acetic acid  3. Apply the medi-honey and hydroferra ready (soft foam on wound with writing on dressing on top of wound)- this should be cut larger then the wound  4. Secure with rolled gauze or gentle tape  5.  Change the dressing twice a week and as needed.    Hydroferra blue contains methylene blue and gentian violet which has antibacterial properties to help heal the wounds.  It also is a foam dressing with helps to absorb and trap drainage that contains bacteria.  The surrounds skin sometimes is stained with a blue coloring which is temporary.     It is essential for wound healing to take pressure off the wound by elevating the heel and or using heel elevator foams, all the time.

## 2024-07-19 NOTE — PROGRESS NOTES
Patient ID: Suki Rosenberg is a 53 year old female.    Debridement Right Heel   Wound 07/19/24 Heel Right    Performed by: Jose M Johnson APRN  Authorized by: Jose M Johnson APRN      Consent   Consent obtained? verbal  Consent given by: patient  Risks discussed? procedural risks discussed  Time out called at 7/19/2024 9:34 AM  Immediately prior to the procedure a time out was called and the performing provider verified the correct patient, procedure, equipment, support staff, and site/side marked as required.    Debridement Details  Performed by: NP  Debridement type: conservative sharp  Pain control: benzocaine 20%  Pain control administration type: topical    Pre-debridement measurements  Length (cm): 5.5  Width (cm): 5.4  Depth (cm): 0  Surface Area (cm^2): 29.7    Post-debridement measurements  Length (cm): 1.5  Width (cm): 1.2  Depth (cm): 0.1  Percent debrided: 100%  Surface Area (cm^2): 1.8  Area Debrided (cm^2): 1.8  Volume (cm^3): 0.18    Devitalized tissue debrided: biofilm and necrotic debris  Instrument(s) utilized: blade, forceps and scissors  Bleeding: small  Hemostasis obtained with: pressure  Procedural pain (0-10): 8  Post-procedural pain: 8   Response to treatment: procedure was tolerated well

## 2024-07-19 NOTE — PROGRESS NOTES
Subjective   Suki Rosenberg is a 53 year old female.    Chief Complaint   Patient presents with    Wound Care     Initial consult in wound clinic for wound on right heel. Patient states blood glucose this morning was 176. Patient states the puffiness has gone down in the past few weeks.     HPI  7/19/2024: Patient is returning to clinic with new blood blister on right heel, she stated happened few days ago, she opened the blister to drain which reduced the pain. She has her lymphedema pump and using them daily, states wearing her compression stockings as well. She feels her A1c is better managed now came down from 13 to 8.2 on 6/12/2024.    12/11/2023: Patient is here for follow up, tolerated the compression, reports continues to have some pain while walking on left heel, no other concern.   12/4/2023: Patient is 53 year old woman with chronic leg edema and recent bilateral on posterior heel. Patient states she sits for 10-14 hours on chair for her job with very little time to break for walking. She has purchased compression stockings , she has had hard time putting them on or keeping them on.   Patient medical history significant for type two diabetes on Jardiance 25 mg, hx of PE on xarelto 20 mg, neuropathy, fibromyalgia, hx of breast cancer, depression, bipolar, obesity, chronic low back pain with bilateral sciatica, herniated lumbar disc, chronic left knee pain, and osteoarthritis bilateral knees.     Review of Systems   Constitutional:  Positive for activity change. Negative for chills, fatigue and fever.   HENT:  Negative for congestion.    Respiratory:  Negative for shortness of breath.    Cardiovascular:  Positive for leg swelling. Negative for chest pain.   Gastrointestinal:  Positive for constipation.        Hx of constipation    Musculoskeletal:  Positive for arthralgias, back pain and gait problem.   Skin:  Positive for wound.        Right heel blisters   Neurological:  Positive for numbness. Negative  for weakness.        Feeling numbness on her toes   Psychiatric/Behavioral:          Hx f anxiety, depression and bipolar      Objective  Physical Exam  Vitals reviewed.   Constitutional:       Appearance: Normal appearance. She is obese.   HENT:      Head: Normocephalic.   Cardiovascular:      Rate and Rhythm: Normal rate.      Pulses: Normal pulses.           Dorsalis pedis pulses are 2+ on the left side.        Posterior tibial pulses are 2+ on the left side.   Pulmonary:      Effort: Pulmonary effort is normal.   Abdominal:      General: Bowel sounds are normal.   Musculoskeletal:      Cervical back: Normal range of motion.      Right lower leg: Edema present.      Left lower leg: Edema present.        Feet: right heel dry blood blister with wound  Feet:      Right foot:      Toenail Condition: Right toenails are normal.      Left foot:      Skin integrity: Ulcer present.      Toenail Condition: Left toenails are normal.   Skin:     General: Skin is warm and dry.      Capillary Refill: Capillary refill takes 2 to 3 seconds.      Findings: No erythema.   Neurological:      Mental Status: She is alert and oriented to person, place, and kalee  Wound Assessment  Wound 07/19/24 Heel Right (Active)   Date First Assessed/Time First Assessed: 07/19/24 0910   Location: Heel  Wound Location Orientation: Right      Assessments 7/19/2024  9:12 AM 7/19/2024  9:13 AM   Wound Image             Site Assessment Dry;Black;Pink --   Drainage Amount None --   Treatments Cleansed;Wound ;Honey Gel --   Dressing Kerlix roll;Tape --   Dressing Changed New --   Dressing Status Clean;Dry;Intact --   Wound Length (cm) 5.5 cm 1.5 cm   Wound Width (cm) 5.4 cm 1.2 cm   Wound Surface Area (cm^2) 29.7 cm^2 1.8 cm^2   Wound Depth (cm) 0 cm 0.1 cm   Wound Volume (cm^3) 0 cm^3 0.18 cm^3   Margins Poorly defined --   Non-staged Wound Description Not applicable --   Madelaine-wound Assessment Clean;Dry;Intact --   Wound Bed Granulation (%) 0 % --    Wound Bed Epithelium (%) 20 % --   Wound Bed Slough (%) 0 % --   Wound Bed Eschar (%) 80 % --   Wound Bed Fibrin (%) 0 % --   State of Healing Non-healing;Eschar --   Wound Odor None --       Inactive Orders   Date Order Priority Status Authorizing Provider   07/19/24 1003 Debridement Right Heel Routine Completed Jose M Johnson, FELICITAS   Lower Extremity Measurements   Calf Ankle Foot Heel to Knee Knee to Thigh   Right Right Calf from:: Heel  Right Calf cm:: 42.5 Right Ankle from:: Heel  Right Ankle cm:: 26.9    Right Foot from:: Great toe  Right Foot cm:: 23.7   Right Heel to Knee: 39          Left Left Calf from:: Heel  Calf Left cm:: 42.6 Left Ankle from:: Heel  Left Ankle cm:: 27    Left Foot from:: Great toe  Left Foot cm:: 24.6 Left Heel to Knee: 39          Vital Signs    07/19/24 0908   BP: 135/83   Pulse: 98   Resp: 18   Temp: 98 °F (36.7 °C)   PainSc: 8 - (Severe)   PainLoc: Foot   Allergies  Allergies   Allergen Reactions    Latex HIVES and SWELLING    Influenza Vaccines OTHER (SEE COMMENTS)     Pt passed out     Radiology Contrast Iodinated Dyes ITCHING   Assessment   Right heel , diabetic foot ulcer, possible pressure injury etiology, patient has habit of sitting long hours with crossing her legs and putting extra pressure on heels.    -dry blood blister, with center of the wound moist and soft  -no erythema or other S&S of soft tissue infection  -painful to touch, sensitive    Reviewed note, imagining and labs in Epic and Care Every Where.  Recent labs: 3/11/2024: BUN 8, Creatinine 0.87, albumin 4.2  Office Visit on 06/12/2024   Component Date Value Ref Range Status    HEMOGLOBIN A1C 06/12/2024 8.2 (A)  4.3 - 5.6 % Final      Encounter Diagnosis  1. 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 due to underlying condition, limited to breakdown of skin (HCC)    2. Pain associated with wound    3. Non-healing open wound of right heel      Problem List  Patient Active Problem List    Diagnosis    Iron deficiency anemia due to chronic blood loss    Recurrent pulmonary embolism (HCC)    Menorrhagia with regular cycle    Chronic low back pain with bilateral sciatica    left > right L5 radiculopathies    L5-S1 right mild foraminal & left mild far lateral, L4-5 mild diffuse, L2-3 right mild far lateral bulging discs    Urinary incontinence    Anemia    Anemia, unspecified type    Congenital anomaly of heart (HCC)    Anemia due to blood loss, acute    Atrial septal defect (HCC)    Hypercoagulable state (HCC)    Lymphedema of both lower extremities    Symptomatic anemia    Microcytic anemia    Other pulmonary embolism without acute cor pulmonale (HCC)    Severe episode of recurrent major depressive disorder, without psychotic features (HCC)    Uterine bleeding    Obesity (BMI 30-39.9)    Acquired trigger finger of right middle finger    Chronic anticoagulation    Chronic pain of left knee    Non-pressure chronic ulcer of right lower leg, limited to breakdown of skin (HCC)    Non-pressure chronic ulcer of left lower leg, limited to breakdown of skin (HCC)    Moderate left ventricular hypertrophy    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 due to underlying condition, limited to breakdown of skin (HCC)    Non-healing open wound of right heel    Pain associated with wound     Plan  Orders  Orders Placed This Encounter   Procedures    Debridement Right Heel   After obtaining consent Initiated Betadine on both heel to reduce bioburden daily.  Continue compression therapy, either compression stockings or compression therapy with two layers, patient daughter has seen application of compression wraps in our clinic.  Patient stated she has compression stockings, she has forgot to bring them in.  Monitor for signs and symptoms of infection, encourage patient to assess skin daily.   Initiated medi-honey gel with Hydrofera foam dressing change 2-3 times daily. Discussed ways to offload and  reduce pressure from the wound bed by elevating heel when sitting on small table or ottoman, heel elevator foams or wedges.  Patient to follow up with vascular for leg edema and increase in setting of her lymphedema pump.      Recommended nutrition for wound healing and encourage reduce carbohydrate intake, increase protein intake, and glycemic control diet.    [place] of [place] tramadol 50 mg twice daily as needed, discussed indication and possible side effects and percussions.   Discussed the treatment plan with patient, patient verbalized understanding and agreed to these plan.  Total face to face time was 30 min, more than 50% of the time was spent in counseling and/or coordination of care related to BLE wounds.  Follow-Up  Return in about 2 weeks (around 8/2/2024) for APN x 30 minutes.

## 2024-07-30 ENCOUNTER — APPOINTMENT (OUTPATIENT)
Dept: GENETICS | Facility: HOSPITAL | Age: 54
End: 2024-07-30
Payer: COMMERCIAL

## 2024-08-02 ENCOUNTER — OFFICE VISIT (OUTPATIENT)
Dept: WOUND CARE | Facility: HOSPITAL | Age: 54
End: 2024-08-02
Attending: NURSE PRACTITIONER
Payer: COMMERCIAL

## 2024-08-02 VITALS
HEART RATE: 101 BPM | DIASTOLIC BLOOD PRESSURE: 96 MMHG | RESPIRATION RATE: 18 BRPM | OXYGEN SATURATION: 97 % | TEMPERATURE: 97 F | SYSTOLIC BLOOD PRESSURE: 168 MMHG

## 2024-08-02 DIAGNOSIS — S91.301A NON-HEALING OPEN WOUND OF RIGHT HEEL: ICD-10-CM

## 2024-08-02 DIAGNOSIS — L97.411 DIABETIC ULCER OF RIGHT HEEL ASSOCIATED WITH DIABETES MELLITUS DUE TO UNDERLYING CONDITION, LIMITED TO BREAKDOWN OF SKIN (HCC): Primary | ICD-10-CM

## 2024-08-02 DIAGNOSIS — E08.621 DIABETIC ULCER OF RIGHT HEEL ASSOCIATED WITH DIABETES MELLITUS DUE TO UNDERLYING CONDITION, LIMITED TO BREAKDOWN OF SKIN (HCC): Primary | ICD-10-CM

## 2024-08-02 PROCEDURE — 99213 OFFICE O/P EST LOW 20 MIN: CPT | Performed by: NURSE PRACTITIONER

## 2024-08-02 NOTE — PROGRESS NOTES
Subjective   Suki Rosenberg is a 54 year old female.    Chief Complaint   Patient presents with    Wound Recheck     Rt heel. Pts Bs are at 115     HPI  8/2/2024: Patient is here for follow up, her daughter has been doing dressing, she reports no drainage from the wound.  7/19/2024: Patient is returning to clinic with new blood blister on right heel, she stated happened few days ago, she opened the blister to drain which reduced the pain. She has her lymphedema pump and using them daily, states wearing her compression stockings as well. She feels her A1c is better managed now came down from 13 to 8.2 on 6/12/2024.     12/11/2023: Patient is here for follow up, tolerated the compression, reports continues to have some pain while walking on left heel, no other concern.   12/4/2023: Patient is 53 year old woman with chronic leg edema and recent bilateral on posterior heel. Patient states she sits for 10-14 hours on chair for her job with very little time to break for walking. She has purchased compression stockings , she has had hard time putting them on or keeping them on.   Patient medical history significant for type two diabetes on Jardiance 25 mg, hx of PE on xarelto 20 mg, neuropathy, fibromyalgia, hx of breast cancer, depression, bipolar, obesity, chronic low back pain with bilateral sciatica, herniated lumbar disc, chronic left knee pain, and osteoarthritis bilateral knees.     Review of Systems   Constitutional:  Positive for activity change. Negative for chills, fatigue and fever.   HENT:  Negative for congestion.    Respiratory:  Negative for shortness of breath.    Cardiovascular:  Positive for leg swelling. Negative for chest pain.   Gastrointestinal:  Positive for constipation.        Hx of constipation    Musculoskeletal:  Positive for arthralgias, back pain and gait problem.   Skin:  Positive for wound.        Right heel blisters   Neurological:  Positive for numbness. Negative for weakness.         Feeling numbness on her toes   Psychiatric/Behavioral:          Hx f anxiety, depression and bipolar      Objective  Physical Exam  Vitals reviewed.   Constitutional:       Appearance: Normal appearance. She is obese.   HENT:      Head: Normocephalic.   Cardiovascular:      Rate and Rhythm: Normal rate.      Pulses: Normal pulses.           Dorsalis pedis pulses are 2+ on the left side.        Posterior tibial pulses are 2+ on the left side.   Pulmonary:      Effort: Pulmonary effort is normal.   Abdominal:      General: Bowel sounds are normal.   Musculoskeletal:      Cervical back: Normal range of motion.      Right lower leg: Edema present.      Left lower leg: Edema present.        Feet: right heel dry blood blister with wound  Feet:      Right foot:      Toenail Condition: Right toenails are normal.      Left foot:      Skin integrity: Ulcer present.      Toenail Condition: Left toenails are normal.   Skin:     General: Skin is warm and dry.      Capillary Refill: Capillary refill takes 2 to 3 seconds.      Findings: No erythema.   Neurological:      Mental Status: She is alert and oriented to person, place, and kalee  Wound Assessment  Wound 07/19/24 Heel Right (Active)   Date First Assessed/Time First Assessed: 07/19/24 0910   Location: Heel  Wound Location Orientation: Right      Assessments 8/2/2024  8:41 AM   Wound Image     Site Assessment Dry;Brown;Yellow;Pink   Closure Not approximated   Drainage Amount None   Drainage Description Serosanguineous;Scabbed   Treatments Cleansed;Wound    Dressing 2x2s;Gauze;Nayla;Tape   Dressing Changed Changed   Dressing Status Clean;Dry;Intact   Wound Length (cm) 1 cm   Wound Width (cm) 0.7 cm   Wound Surface Area (cm^2) 0.7 cm^2   Wound Depth (cm) 0.1 cm   Wound Volume (cm^3) 0.07 cm^3   Margins Poorly defined   Non-staged Wound Description Not applicable   Madelaine-wound Assessment Dry;Callous;Hyperpigmented   Wound Bed Granulation (%) 0 %   Wound Bed Epithelium (%) 0  %   Wound Bed Slough (%) 100 %   Wound Bed Eschar (%) 0 %   Wound Bed Fibrin (%) 0 %   State of Healing Non-healing   Wound Odor None   Pressure Injury Stage Stage 2       Inactive Orders   Date Order Priority Status Authorizing Provider   07/19/24 1003 Debridement Right Heel Routine Completed Jose M Johnson APRN     Vital Signs    08/02/24 0837   BP: (!) 168/96   Pulse: 101   Resp: 18   Temp: 97.3 °F (36.3 °C)   PainSc: 7 - (Severe)   PainLoc: Foot   BP elevated, patient is anxious for her wound and pain associated with her wound, will follow up with her PCP.  Allergies  Allergies   Allergen Reactions    Latex HIVES and SWELLING    Influenza Vaccines OTHER (SEE COMMENTS)     Pt passed out     Radiology Contrast Iodinated Dyes ITCHING     Assessment   Right heel , diabetic foot ulcer, possible pressure injury etiology, patient has habit of sitting long hours with crossing her legs and putting extra pressure on heels.    -dry wound bed  -no erythema or other S&S of soft tissue infection  -painful to touch, sensitive     Reviewed note, imagining and labs in [facility].  Recent labs: 3/11/2024: BUN 8, Creatinine 0.87, albumin 4.2    Encounter Diagnosis  1. 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 due to underlying condition, limited to breakdown of skin (HCC)    2. Non-healing open wound of right heel      Problem List  Patient Active Problem List   Diagnosis    Iron deficiency anemia due to chronic blood loss    Recurrent pulmonary embolism (HCC)    Menorrhagia with regular cycle    Chronic low back pain with bilateral sciatica    left > right L5 radiculopathies    L5-S1 right mild foraminal & left mild far lateral, L4-5 mild diffuse, L2-3 right mild far lateral bulging discs    Urinary incontinence    Anemia    Anemia, unspecified type    Congenital anomaly of heart (HCC)    Anemia due to blood loss, acute    Atrial septal defect (HCC)    Hypercoagulable state (HCC)    Lymphedema  of both lower extremities    Symptomatic anemia    Microcytic anemia    Other pulmonary embolism without acute cor pulmonale (HCC)    Severe episode of recurrent major depressive disorder, without psychotic features (HCC)    Uterine bleeding    Obesity (BMI 30-39.9)    Acquired trigger finger of right middle finger    Chronic anticoagulation    Chronic pain of left knee    Non-pressure chronic ulcer of right lower leg, limited to breakdown of skin (HCC)    Non-pressure chronic ulcer of left lower leg, limited to breakdown of skin (HCC)    Moderate left ventricular hypertrophy    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 due to underlying condition, limited to breakdown of skin (HCC)    Non-healing open wound of right heel    Pain associated with wound     Plan  Orders  Orders Placed This Encounter   Procedures    OP Wound Dressing   Continue using Hydrofera Transfer foam dressing (may use Medi-honey gel as needed) to enhance wound healing, if there is no drainage patient may consider wound is closed. No pressure on the heel as it is very sensitive to touch or pressure.  Monitor for signs and symptoms of infection, encourage patient to assess skin daily.   Initiated medi-honey gel with Hydrofera foam dressing change 2-3 times daily. Discussed ways to offload and reduce pressure from the wound bed by elevating heel when sitting on small table or ottoman, heel elevator foams or wedges.  Patient to follow up with vascular for leg edema and increase in setting of her lymphedema pump.       Recommended nutrition for wound healing and encourage reduce carbohydrate intake, increase protein intake, and glycemic control diet.    [place] of [place] was [place] tramadol 50 mg twice daily as needed, discussed indication and possible side effects and percussions.   Discussed the treatment plan with patient, patient verbalized understanding and agreed to these  plan.  Total face to face time was 30 min, more than 50% of the time was spent in counseling and/or coordination of care related to BLE wounds.  Follow-Up  Return in about 2 weeks (around 8/16/2024) for APN visit x 30 mins.

## 2024-08-08 ENCOUNTER — NURSE ONLY (OUTPATIENT)
Dept: HEMATOLOGY/ONCOLOGY | Facility: HOSPITAL | Age: 54
End: 2024-08-08
Attending: GENETIC COUNSELOR, MS
Payer: COMMERCIAL

## 2024-08-08 ENCOUNTER — GENETICS ENCOUNTER (OUTPATIENT)
Dept: GENETICS | Facility: HOSPITAL | Age: 54
End: 2024-08-08
Attending: GENETIC COUNSELOR, MS
Payer: COMMERCIAL

## 2024-08-08 DIAGNOSIS — Z80.3 FAMILY HISTORY OF BREAST CANCER: ICD-10-CM

## 2024-08-08 DIAGNOSIS — Z80.9 FAMILY HISTORY OF CANCER: ICD-10-CM

## 2024-08-08 DIAGNOSIS — Z84.89 FAMILY HISTORY OF GENETIC DISORDER: Primary | ICD-10-CM

## 2024-08-08 DIAGNOSIS — Z80.41 FAMILY HISTORY OF OVARIAN CANCER: ICD-10-CM

## 2024-08-08 DIAGNOSIS — Z84.81 FAMILY HISTORY OF GENETIC DISEASE CARRIER: ICD-10-CM

## 2024-08-08 DIAGNOSIS — Z85.3 HISTORY OF BREAST CANCER: ICD-10-CM

## 2024-08-08 PROCEDURE — 96040 HC GENETIC COUNSELING EA 30 MIN: CPT | Performed by: GENETIC COUNSELOR, MS

## 2024-08-08 PROCEDURE — 36415 COLL VENOUS BLD VENIPUNCTURE: CPT

## 2024-08-08 NOTE — PROGRESS NOTES
Patient Name: Suki Rosenberg  YOB: 1970  Date of Visit: 8/8/2024    Reason for visit: Ms. Rosenberg was seen for the purposes of genetic counseling due to a a family history of hereditary transthyretin amyloidosis (hATTR) in her father and two brothers. She also has a prior diagnosis of breast cancer at age 51y and a family history of breast and ovarian cancer. The purpose of this visit was to review information regarding genetic testing options for the known familial TTR pathogenic variant and for mutations in high penetrance cancer susceptibility genes.    Referring Provider: Graham Recio DO    Medical History: Ms. Rosenberg is a pleasant 54 year old female.     Per cardiologist's (Dr. Alonso Galvan) note, she has a h/o chronic leg edema and hypertension. Cardiac tests, 1/2024 echo, show significant LVH with no renal artery stenosis and normal EF with normal perfusion and no old infarct. She reported a h/o chest pain on the center of the chest that squeezes radiates to the left shoulder and is worse with activity and better with rest. She also reported SOB with the pain but no PND orthopnea.     She also has a h/o PE and schizophrenia.     She has a h/o a left-sided breast cancer dx in 2021 at age 51y, s/p surgery (lumpectomy), chemo, and RT. No hereditary cancer genetic testing was reported to have been done at that time. She has been lost to f/u with her oncologist and has not re-established care yet. Last mammo was in 5/2023 and was read as benign.  She denies any other cancer hx. She retains her uterus, ovaries, and fallopian tubes. She has never had a colonoscopy.     Ms. Rosenberg achieved menarche at approximately 11 years of age, is te-menopausal, and has been pregnant 6 times, delivering her first of 4 children at 23y. Ms. Rosenberg has a <5-year history of oral contraceptive use and denies any fertility or hormone replacement use.      Relevant Family History: Ms. Rosenberg has 4 daughters (29y, 28y, 25y,  13y), her youngest daughter was born prematurely with CP and DD.     There are 3 full brothers (2 tested positive for hATTR, one has a h/o renal transplant with complications of infection post-transplant; 1 tested negative) and 1 full sister (negative genetic testing for hATTR), all are living >50y. There are also 3 paternal half-sisters and 1 paternal half-brother, all >50y, one sister has a h/o blood clots.    Ms. Montelongos father (77y) has a h/o hATTR (p.Nri934Ado variant; aka: c.424G>A (p.[id number])). There are 9 paternal aunts (2 living; 6  >50y, 1  in her late-40s dt a blood clot) and 1 paternal uncle ( >50y). One paternal aunt reportedly had cancer v.s. bone cancer. At least one paternal aunt had heart issues. At least 4 maternal cousins have heart issues/disease/MI. 2 paternal female 1st cousins  due to breast cancer (dx 50s and 40s) (siblings to each other) and 1 paternal female 1st cousin was dx with and  from ovarian cancer in her 60s. The paternal grandfather  in his 60-70s with a h/o cardiovascular disease, he had a \"bad heart\". The PGFA's 2 sisters  dt MIs. The paternal grandmother  in her late-70s dt dementia-related complications.     Ms. Montelongos mother (75y) has a h/o HTN and depression. There are 3 maternal aunts and 1 maternal uncle, he reported had a \"bone cancer\" s/p chemo and surgery in his late-40s. The maternal grandfather  ~73y dt a MI. The maternal grandmother is living at 103y. The [facility] had a sister who  dt ovarian cancer >50y and another sister who  >50y dt an unspecified cancer.     The rest of the family history is negative for other significant genetic conditions, cancers, or birth defects of any kind. See scanned pedigree for full family history reported during the session.    Ms. Montelongos maternal and paternal ethnicity is /Black/.    Summary: Because Ms. Rosenberg's father was found to have a pathogenic TTR  variant (p.V122I; aka: c.424G>A (p.Ial931Oac)) causative of hereditary transthyretin amyloidosis, Ms. Rosenberg has an estimated 50% chance to have inherited the same TTR variant as her father, genetic testing is indicated.    Hereditary Transthyretin Amyloidosis (hATTR)  Hereditary transthyretin amyloidosis (hATTR) is a protein misfolding disorder leading to deposition of amyloid aggregates resulting in organ dysfunction. Pathogenic variants in the TTR gene account for ~90% of hereditary amyloidosis cases and are inherited in an autosomal dominant manner with incomplete penetrance. The TTR gene codes for the transthyretin protein, which is made primarily in the liver and is responsible for transporting thyroxine and retinol. The transthyretin protein is made of four monomers (units of the transthyretin protein) that associate to form a tetrameric complex.  Pathogenic variants in the TTR gene result in an abnormal TTR protein, which typically results in the disassociation of the tetramer. The monomers can then misfold and aggregate; their aggregation results in amyloid deposits, which can accumulate and damage a variety of tissues.       The symptoms of TTR amyloidosis (ATTR) are variable. ATTR is a slowly progressive disorder which presents in three main forms: neuropathic, leptomeningeal and cardiac. Neuropathic ATTR affects the peripheral and autonomic nervous system resulting in peripheral neuropathy and difficulty controlling bodily functions. Leptomeningeal ATTR affects the central nervous system causing hydrocephalus, ataxia, seizures and loss of intellectual function. Cardiac ATTR affects the heart resulting in arrhythmia, cardiomegaly or congestive heart failure. Symptom onset occurs in the third to fifth decade of life.     ATTR occurs at an estimated incidence of 1/100,000 in Americans of  descent. The cardiac form of ATTR is more common in  Americans affecting about 4% of the population.       Treatment for TTR amyloidosis is rapidly developing.  Liver transplants have been used for a number of years to treat the neuropathy associated with the neuropathic form. However, liver transplant does not seem to affect the development of heart disease and the presence of cardiomyopathy can be a contraindication for liver transplant.  Heart transplant can be used in cases of severe CHF. The small interfering RNA patisiran was recently approved by the FDA for treatment of the polyneuropathy.  A number of other clinical trials are underway investigating additional treatment pathways.      Hereditary cancer  Because Ms. Rosenberg was diagnosed with breast cancer at 51y with a family history of breast cancer in 2 paternal female 1st cousins (dx 50s and 40s) and ovarian cancer in another paternal female 1st cousin (dx 60s), genetic testing for pathogenic variants in high-penetrance cancer susceptibility genes is indicated based on the NCCN guidelines (BOP V.3.2024).      The majority of cancers are sporadic whereas approximately 10-30% of cases of cancer cases are attributed to familial factors such as unidentified low penetrance genes in the family or shared environmental factors.  Approximately 5-10% of cancers are related to a hereditary cancer syndrome.  Signs of a hereditary cancer syndrome include some rare cancers, common cancers occurring at unusually young ages, multiple primary cancers in the same individual, multiple colorectal polyps, or the same type of cancer or related cancers (e.g., breast and ovarian, colorectal and endometrial) in three or more individuals in the same lineage.     Cancer is usually caused by gene mutations that occur randomly in one or a few cells of the body. Such gene changes, called somatic mutations, may arise as a natural consequence of aging or when a cell’s DNA has been damaged. Acquired mutations are only present in some of the body’s cells, and they are not passed on from  parents to their children.    However, in the small percentage of people with a hereditary cancer syndrome, the disease is due to a different type of mutation called a hereditary mutation, or germline mutation. These mutations are usually inherited from one or both of the person’s parents, and are present in nearly every cell of the body. Because hereditary mutations are present in the DNA of sperm and egg cells, they can be passed down in families. People who carry such hereditary mutations do not necessarily get cancer, but their risk of developing the disease at some point during their lifetime is higher than average. When a personal and/or family history doesn't clearly fit a single hereditary cancer syndrome, panel genetic testing examining multiple genes in which pathogenic mutations cause hereditary cancer syndromes is appropriate.    If testing is performed, three results are possible: positive, negative, and variant of uncertain significance.  A positive result indicates a pathogenic variant (harmful gene mutation) has been identified, and there is an increased risk for the conditions/cancers associated with the specific gene.  Since pathogenic variants in TTR and most cancer susceptibility genes are inherited in an autosomal dominant fashion, siblings and children of individuals with a pathogenic variant have a 50% risk of carrying the same familial pathogenic variant as well.      A negative test result would indicate that no pathogenic variant was identified in a disease susceptibility gene.  While testing detects gene mutations, it is possible for a genetic variant to be present and go undetected.  It is also possible for a genetic variant to be located in a gene other than those being tested.     A variant of uncertain significance means that a change has been identified a disease susceptibility gene; however, it is uncertain if the variant is pathogenic or a non-deleterious (benign) change.  With  time, the variant may be reclassified as either pathogenic or benign.     Ms. Rosenberg appeared to understand the information presented. On the day of the visit Ms. Rosenberg elected to proceed with genetic testing for hATTR and hereditary cancer syndromes My office will call Ms. Rosenberg as soon as results are received; post-test counseling can be scheduled at that time. Thank you for allowing me to participate in the care of your patient; please do not hesitate to contact my office if you have any questions or concerns, [phone].    Plan:   Blood was drawn and sent out for:  1) [facility] TTR gene analysis to rule in/out the known familial TTR c.424G>A (p.[id number]) pathogenic variant (aka: p.V122I) (1 gene, TTR; TAT: ~2 weeks)  2) [facility] hereditary breast and gyn cancers panel (28 genes; TAT: ~2 weeks).   The Genetics office will call Ms. Rosenberg when results are available.  Recommendations for Ms. Rosenberg and family members will depend the above genetic testing results.      Send to: Tim  Time spent with patient: 40 minutes      Allegra Holt MS, [facility]

## 2024-08-16 ENCOUNTER — APPOINTMENT (OUTPATIENT)
Dept: WOUND CARE | Facility: HOSPITAL | Age: 54
End: 2024-08-16
Attending: NURSE PRACTITIONER
Payer: COMMERCIAL

## 2024-08-16 VITALS
DIASTOLIC BLOOD PRESSURE: 95 MMHG | OXYGEN SATURATION: 94 % | SYSTOLIC BLOOD PRESSURE: 146 MMHG | TEMPERATURE: 98 F | RESPIRATION RATE: 18 BRPM | HEART RATE: 103 BPM

## 2024-08-16 DIAGNOSIS — S91.301A NON-HEALING OPEN WOUND OF RIGHT HEEL: Primary | ICD-10-CM

## 2024-08-16 DIAGNOSIS — E08.621 DIABETIC ULCER OF RIGHT HEEL ASSOCIATED WITH DIABETES MELLITUS DUE TO UNDERLYING CONDITION, LIMITED TO BREAKDOWN OF SKIN (HCC): ICD-10-CM

## 2024-08-16 DIAGNOSIS — L97.411 DIABETIC ULCER OF RIGHT HEEL ASSOCIATED WITH DIABETES MELLITUS DUE TO UNDERLYING CONDITION, LIMITED TO BREAKDOWN OF SKIN (HCC): ICD-10-CM

## 2024-08-16 PROCEDURE — 99213 OFFICE O/P EST LOW 20 MIN: CPT | Performed by: NURSE PRACTITIONER

## 2024-08-16 NOTE — PROGRESS NOTES
Subjective   Suki Rosenberg is a 54 year old female.    Chief Complaint   Patient presents with    Wound Care     Right heel     HPI  8/16/2024: Patient is here for follow up, no drainage in the past few days, still pain in the wound area.   8/2/2024: Patient is here for follow up, her daughter has been doing dressing, she reports no drainage from the wound.  7/19/2024: Patient is returning to clinic with new blood blister on right heel, she stated happened few days ago, she opened the blister to drain which reduced the pain. She has her lymphedema pump and using them daily, states wearing her compression stockings as well. She feels her A1c is better managed now came down from 13 to 8.2 on 6/12/2024.     12/11/2023: Patient is here for follow up, tolerated the compression, reports continues to have some pain while walking on left heel, no other concern.   12/4/2023: Patient is 53 year old woman with chronic leg edema and recent bilateral on posterior heel. Patient states she sits for 10-14 hours on chair for her job with very little time to break for walking. She has purchased compression stockings , she has had hard time putting them on or keeping them on.   Patient medical history significant for type two diabetes on Jardiance 25 mg, hx of PE on xarelto 20 mg, neuropathy, fibromyalgia, hx of breast cancer, depression, bipolar, obesity, chronic low back pain with bilateral sciatica, herniated lumbar disc, chronic left knee pain, and osteoarthritis bilateral knees.     Review of Systems   Constitutional:  Positive for activity change. Negative for chills, fatigue and fever.   HENT:  Negative for congestion.    Respiratory:  Negative for shortness of breath.    Cardiovascular:  Positive for leg swelling. Negative for chest pain.   Gastrointestinal:  Positive for constipation.        Hx of constipation    Musculoskeletal:  Positive for arthralgias, back pain and gait problem.   Skin:  Positive for wound.         Right heel blisters   Neurological:  Positive for numbness. Negative for weakness.        Feeling numbness on her toes   Psychiatric/Behavioral:          Hx f anxiety, depression and bipolar      Objective  Physical Exam  Vitals reviewed.   Constitutional:       Appearance: Normal appearance. She is obese.   HENT:      Head: Normocephalic.   Cardiovascular:      Rate and Rhythm: Normal rate.      Pulses: Normal pulses.           Dorsalis pedis pulses are 2+ on the left side.        Posterior tibial pulses are 2+ on the left side.   Pulmonary:      Effort: Pulmonary effort is normal.   Abdominal:      General: Bowel sounds are normal.   Musculoskeletal:      Cervical back: Normal range of motion.      Right lower leg: Edema present.      Left lower leg: Edema present.        Feet: right heel dry blood blister with wound  Feet:      Right foot:      Toenail Condition: Right toenails are normal.      Left foot:      Skin integrity: Ulcer present.      Toenail Condition: Left toenails are normal.   Skin:     General: Skin is warm and dry.      Capillary Refill: Capillary refill takes 2 to 3 seconds.      Findings: No erythema.   Neurological:      Mental Status: She is alert and oriented to person, place, and time  Wound Assessment  Wound 07/19/24 Heel Right (Active)   Date First Assessed/Time First Assessed: 07/19/24 0910   Location: Heel  Wound Location Orientation: Right      Assessments 8/16/2024  1:14 PM   Wound Image     Site Assessment Dry;Brown   Closure Not approximated   Drainage Amount None   Treatments Cleansed;Wound    Dressing Open to air   Dressing Status Removed   Wound Length (cm) 1 cm   Wound Width (cm) 0.6 cm   Wound Surface Area (cm^2) 0.6 cm^2   Wound Depth (cm) 0.0 cm   Wound Volume (cm^3) 0.06 cm^3   Margins Well defined   Non-staged Wound Description Not applicable   Madelaine-wound Assessment Dry;Callous;Hyperpigmented   Wound Bed Granulation (%) 0 %   Wound Bed Epithelium (%) 0 %   Wound Bed  Slough (%) 0 %   Wound Bed Eschar (%) 100 %   Wound Bed Fibrin (%) 0 %   State of Healing Non-healing   Wound Odor None       Inactive Orders   Date Order Priority Status Authorizing Provider   08/02/24 0902 OP Wound Dressing Routine Completed Jose M Johnson, FELICITAS     - Cleansing:    Cleanse with normal saline or wound cleanser     - Dressing:    Hydrofera transfer     - Dressing:    Dry gauze     - Additional Wound Dressing Information:    tape     - Frequency:    Change dressing every other day and PRN   07/19/24 1003 Debridement Right Heel Routine Completed Jose M Johnson APRN     Vital Signs    08/16/24 1308   BP: (!) 146/95   Pulse: 103   Resp: 18   Temp: 98 °F (36.7 °C)   PainSc: 7 - (Severe)   PainLoc: Foot       Allergies  Allergies   Allergen Reactions    Latex HIVES and SWELLING    Influenza Vaccines OTHER (SEE COMMENTS)     Pt passed out     Radiology Contrast Iodinated Dyes ITCHING   Assessment   Right heel , diabetic foot ulcer, possible pressure injury etiology, patient has habit of sitting long hours with crossing her legs and putting extra pressure on heels.    -dry scab, feels superficial   -no erythema or other S&S of soft tissue infection  -painful to touch, more sensitivity and neuropathy      Reviewed note, imagining and labs in Epic and Care Every Where.  Recent labs: 3/11/2024: BUN 8, Creatinine 0.87, albumin 4.2    Encounter Diagnosis  1. Non-healing open wound of right heel    2. 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 due to underlying condition, limited to breakdown of skin (HCC)    Problem List  Patient Active Problem List   Diagnosis    Iron deficiency anemia due to chronic blood loss    Recurrent pulmonary embolism ([facility])    Menorrhagia with regular cycle    Chronic low back pain with bilateral sciatica    left > right L5 radiculopathies    L5-S1 right mild foraminal & left mild far lateral, L4-5 mild diffuse, L2-3 right mild far lateral bulging discs    Urinary  incontinence    Anemia    Anemia, unspecified type    Congenital anomaly of heart (HCC)    Anemia due to blood loss, acute    Atrial septal defect (HCC)    Hypercoagulable state (HCC)    Lymphedema of both lower extremities    Symptomatic anemia    Microcytic anemia    Other pulmonary embolism without acute cor pulmonale (HCC)    Severe episode of recurrent major depressive disorder, without psychotic features (HCC)    Uterine bleeding    Obesity (BMI 30-39.9)    Acquired trigger finger of right middle finger    Chronic anticoagulation    Chronic pain of left knee    Non-pressure chronic ulcer of right lower leg, limited to breakdown of skin (HCC)    Non-pressure chronic ulcer of left lower leg, limited to breakdown of skin (HCC)    Moderate left ventricular hypertrophy    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 due to underlying condition, limited to breakdown of skin (HCC)    Non-healing open wound of right heel    Pain associated with wound   Plan  Orders  Keep the skin dry and intact. Assess daily, watch for drainage if open wound then dressing continues.  Resume shower and cleaning daily, may wear shoes and silicon socks for sensitive skin  Discussed the care of newly healed wound with scar tissue, UV protection and pressure injury prevention.  At this time patient has no open wounds, therefore patient will be discharged from [facility] wound care clinic. Instructed patient to follow up with PCP for medical management.    Discussed discharge recommendation  with patient, she verbalized understanding and agreed to these plan.  Thank you for referring this patient to our clinic and allowing me to be part of his care team.   Total face to face time was 30min, more than 50% of the time was spent in counseling and/or coordination of care related to his diagnosis and plan of care.   Follow-Up  Return Only if needed.

## 2024-08-19 ENCOUNTER — GENETICS ENCOUNTER (OUTPATIENT)
Dept: HEMATOLOGY/ONCOLOGY | Facility: HOSPITAL | Age: 54
End: 2024-08-19

## 2024-08-19 PROBLEM — Z13.71 BRCA GENE MUTATION NEGATIVE IN FEMALE: Status: ACTIVE | Noted: 2024-08-01

## 2024-08-19 NOTE — PROGRESS NOTES
Patient Name: Suki Rosenberg  YOB: 1970    Referring Provider:  Graham Recio DO      Reason for Referral:  Ms. Rosenberg had genetic testing performed on 8/8/2024 because of a personal history of breast cancer and family history of breast and ovarian cancer.      Genetic Testing Result:  Negative. No pathogenic variant was found in the following 28 genes on the [facility] hereditary breast and gyn cancers panel: ERIC*, BARD1, BRCA1, BRCA2, BRIP1, CDC73, CDH1, CHEK2, DICER1*, EPCAM*, FANCC, FANCM, MLH1*, MSH2*, MSH6*, NBN, NF1*, NTHL1, PALB2, PMS2*, POLD1*, PTEN*, RAD51C, RAD51D, RECQL*, SMARCA4, STK11, TP53. Please refer to the report from [facility] for additional testing information. These results were discussed in a voicemail message left at Ms. Rosenberg's phone number: [phone] on 8/19/2024, and a copy of the test results was mailed to Ms. Rosenberg's permanent address.    Summary and Plan:   These results indicate that Ms. Rosenberg was not found to have a pathogenic variant (harmful genetic mutation) in any of the genes listed above. No pathogenic variants associated with hereditary breast and/or gynecological cancer syndromes were identified. The etiology Ms. Rosenberg's personal and family history of cancer remains genetically unexplained. The limitations of the testing were discussed with Ms. Rosenberg including the chance that a pathogenic variant in a gene other than those included in this analysis might be the cause of cancer in Ms. Rosenberg or in relatives.     Medical management and surveillance for Ms. Rosenberg and other family members should be based on their personal and family history. All medical management decisions should be made with a physician.     I encouraged Ms. Rosenberg to share the genetic test results with her children and other relatives so that they may discuss the implications of this information with their health providers. Ms. Rosenberg is also encouraged to contact me on an annual basis to learn if  there have been any updates in genetic information that would apply or if there are changes in the personal and/or family history. Please do not hesitate to contact my office if you have any questions or concerns, [phone].     Allegra Holt MS, CGC

## 2024-08-19 NOTE — PROGRESS NOTES
Patient Name: Suki Rosenberg  YOB: 1970    Referring Provider:  Graham Recio DO     Reason for Referral:  Ms. Rosenberg had genetic testing performed on 8/8/2024 because of a family history of hereditary transthyretin amyloidosis (hATTR) in her father and two brothers.      Genetic Testing Result:  Negative. Ms. Rosenberg did not inherit the familial TTR c.424G>A ([id number].[id number]) pathogenic variant (aka: p.V122I variant in prior nomenclature). No other pathogenic variants were found in the following 1 gene: TTR. Please refer to the report from [facility] for additional testing information. These results were discussed in a voicemail message left at Ms. Rosenberg's mobile/home phone number: [phone] on 8/19/2024, and copy of the test results was mailed to Ms. Rosenberg's permanent address.    Summary and Plan:  These results indicate that Ms. Rosenberg did not inherit the familial TTR c.424G>A (p.[id number]) pathogenic variant (harmful genetic mutation), and is not at any increased risk, genetically, for hereditary transthyretin amyloidosis (hATTR). Ms. Montelongos children are not expected to be at risk to inherit the familial TTR c.424G>A (p.Owy668Qst) pathogenic variant, as Ms. Rosenberg does not carry the mutation and therefore, cannot pass it on.     I encouraged Ms. Rosenberg to share the genetic test results with other relatives so that they may discuss the implications of this information with their health providers. Ms. Rosenberg should also contact me if there are changes in her personal and/or family history. Please do not hesitate to contact my office if you have any questions or concerns, [phone].      Allegra Holt MS, CGC

## 2024-08-29 ENCOUNTER — TELEPHONE (OUTPATIENT)
Facility: CLINIC | Age: 54
End: 2024-08-29

## 2024-08-29 DIAGNOSIS — E11.65 UNCONTROLLED TYPE 2 DIABETES MELLITUS WITH HYPERGLYCEMIA, WITH LONG-TERM CURRENT USE OF INSULIN (HCC): Primary | ICD-10-CM

## 2024-08-29 DIAGNOSIS — Z79.4 UNCONTROLLED TYPE 2 DIABETES MELLITUS WITH HYPERGLYCEMIA, WITH LONG-TERM CURRENT USE OF INSULIN (HCC): Primary | ICD-10-CM

## 2024-09-04 DIAGNOSIS — G47.00 INSOMNIA, UNSPECIFIED TYPE: ICD-10-CM

## 2024-09-06 RX ORDER — PEN NEEDLE, DIABETIC 30 GX3/16"
1 NEEDLE, DISPOSABLE MISCELLANEOUS 4 TIMES DAILY
Qty: 400 EACH | Refills: 1 | Status: SHIPPED | OUTPATIENT
Start: 2024-09-06

## 2024-09-06 RX ORDER — INSULIN ASPART 100 [IU]/ML
12 INJECTION, SOLUTION INTRAVENOUS; SUBCUTANEOUS
Qty: 33 ML | Refills: 1 | Status: SHIPPED | OUTPATIENT
Start: 2024-09-06

## 2024-09-06 NOTE — TELEPHONE ENCOUNTER
Endocrine refill protocol for rapid acting, regular, intermediate, and mixed insulin:    Protocol Criteria:  PASSED Reason: N/A MyChart sent to schedule appt.   Appointment with Endocrinology completed in the last 6 months or scheduled in the next 3 months     Verify appointment has been completed or scheduled in the appropriate timeline. If so can send a 90 day supply with 1 refill.   Verify A1C has been completed in the last 6 months and is below 8.5%    -May substitute prescriptions for Novolog and Humalog unless documented allergy (pens and vials) at the same dose and concentration per insurance preference and provider protocol.   -May substitute prescriptions for Novolin R and Humulin R unless documented allergy (pens and vials) at the same dose and concentration per insurance preference and provider protocol.   -May substitute prescriptions for Novolin N and Humulin N unless documented allergy (pens and vials) at the same dose and concentration per insurance preference and provider protocol.   -May substitute prescriptions for Humulin and Novolin 70/30 insulin unless documented allergy at the same dose and concentration per insurance preference and provider protocol.    Last completed office visit: 6/12/2024 Nati Kline MD, RTC 3 months  Next scheduled Follow up: none   Last A1C result: 8.2% done 6/12/2024.

## 2024-09-09 RX ORDER — GABAPENTIN 300 MG/1
300 CAPSULE ORAL 3 TIMES DAILY
Qty: 90 CAPSULE | Refills: 0 | OUTPATIENT
Start: 2024-09-09

## 2024-09-09 RX ORDER — ZOLPIDEM TARTRATE 10 MG/1
10 TABLET ORAL DAILY PRN
Qty: 30 TABLET | Refills: 0 | Status: SHIPPED | OUTPATIENT
Start: 2024-09-09

## 2024-09-09 NOTE — TELEPHONE ENCOUNTER
Please review; protocol failed/No Protocol    Recent Fills: 04/24/2024, 06/17/2024, 07/19/2024    Last Rx Written: 06/17/2024    Last Office Visit: 05/15/2024    Requested Prescriptions   Pending Prescriptions Disp Refills    ZOLPIDEM 10 MG Oral Tab [Pharmacy Med Name: ZOLPIDEM TARTRATE 10 MG TABLET] 30 tablet 1     Sig: TAKE 1 TABLET (10 MG TOTAL) BY MOUTH DAILY AS NEEDED FOR SLEEP.       Controlled Substance Medication Failed - 9/4/2024  6:54 PM        Failed - This medication is a controlled substance - forward to provider to refill         Refused Prescriptions Disp Refills    GABAPENTIN 300 MG Oral Cap [Pharmacy Med Name: GABAPENTIN 300 MG CAPSULE] 90 capsule 0     Sig: TAKE 1 CAPSULE BY MOUTH THREE TIMES A DAY       Neurology Medications Passed - 9/4/2024  6:54 PM        Passed - In person appointment or virtual visit in the past 6 mos or appointment in next 3 mos     Recent Outpatient Visits              3 weeks ago Non-healing open wound of right heel    [facility] Jose M Johnson APRN    Office Visit    1 month ago     [facility]    Nurse Only    1 month ago 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 due to underlying condition, limited to breakdown of skin ([facility])    [facility] ArchieJose M APRN    Office Visit    1 month ago 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 due to underlying condition, limited to breakdown of skin ([facility])    [facility]    Office Visit    2 months ago Type 2 diabetes mellitus with hyperglycemia, unspecified whether long term insulin use ([facility])    [facility] Nati Kline MD    Office Visit          Future Appointments         Provider Department Appt Notes    In 4 weeks Miles Mandujano MD [facility]  Appointments         Provider Department Appt Notes    In 4 weeks Miles Mandujano MD [facility]           Recent Outpatient Visits              3 weeks ago Non-healing open wound of right heel    [facility] Jose M Johnson, APRMANUEL    Office Visit    1 month ago     [facility]    Nurse Only    1 month ago 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 due to underlying condition, limited to breakdown of skin ([facility])    [facility] Jose M Johnson, APRMANUEL    Office Visit    1 month ago 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

## 2024-09-12 ENCOUNTER — TELEPHONE (OUTPATIENT)
Dept: ENDOCRINOLOGY CLINIC | Facility: CLINIC | Age: 54
End: 2024-09-12

## 2024-09-12 DIAGNOSIS — E11.65 TYPE 2 DIABETES MELLITUS WITH HYPERGLYCEMIA, UNSPECIFIED WHETHER LONG TERM INSULIN USE (HCC): Primary | ICD-10-CM

## 2024-09-12 NOTE — TELEPHONE ENCOUNTER
Spoke to pt. Pt is currently on Ozempic 1 mg but feels it is not working. Feel she is gaining weight and has increased hunger. Pt denies any GI symptoms. Last dose was on 9/1 and been out since.    Spoke to Dr. Kline via telephone. Advised to send 1 month supply and make follow up within next month and will discuss further at this visit. MD advised cannot titrate to higher dose as patient will feel more of the side effects. Per MD, ok to offer video visit. Per MD, send 1 month supply of 1 mg until next visit to discuss further with pt.     Spoke to pt. VV scheduled on 10/1 with MD. Pt was amenable to current recommendations by MD. Pt requests RX be sent to [facility] on file. RX SENT.

## 2024-09-12 NOTE — TELEPHONE ENCOUNTER
Patient calling states current Ozempic dosage is not working, if can prescribe a higher dosage. Please call. States is out. ([facility] on file)

## 2024-09-12 NOTE — TELEPHONE ENCOUNTER
Action Requested: Summary for Provider     []  Critical Lab, Recommendations Needed  [] Need Additional Advice  [x]   FYI    []   Need Orders  [] Need Medications Sent to Pharmacy  []  Other     SUMMARY: Per protocol advised : Go to ER for evluation    Will go to [facility] ER      Reason for call: Drainage  Onset: Data Unavailable                   Patient calling ( identified name and  ) states went to ER on Saturday for \" sores on her feet\"   Was told to f/u with PCP     Patient states that  now the right foot is draining yellow drainage, both feet are swollen and has \" horrible pain\"  not able to weight bear, cannot apply pressure to her feet   Foot was not draining on Saturday when she went to the ER     Rates pain at 50/10     Advised to go to the ER, will go to [facility] ER       Reason for Disposition   Severe pain in the wound    Protocols used:  Wound Infection-A-OH No pallor, no cervical/supraclavicular/inguinal adenopathy.  No splenomegaly

## 2024-09-27 ENCOUNTER — TELEPHONE (OUTPATIENT)
Facility: CLINIC | Age: 54
End: 2024-09-27

## 2024-10-01 ENCOUNTER — TELEMEDICINE (OUTPATIENT)
Dept: ENDOCRINOLOGY CLINIC | Facility: CLINIC | Age: 54
End: 2024-10-01
Payer: COMMERCIAL

## 2024-10-01 DIAGNOSIS — E78.5 DYSLIPIDEMIA: ICD-10-CM

## 2024-10-01 DIAGNOSIS — Z79.4 TYPE 2 DIABETES MELLITUS WITH OTHER SPECIFIED COMPLICATION, WITH LONG-TERM CURRENT USE OF INSULIN (HCC): Primary | ICD-10-CM

## 2024-10-01 DIAGNOSIS — E11.69 TYPE 2 DIABETES MELLITUS WITH OTHER SPECIFIED COMPLICATION, WITH LONG-TERM CURRENT USE OF INSULIN (HCC): Primary | ICD-10-CM

## 2024-10-01 PROCEDURE — 99214 OFFICE O/P EST MOD 30 MIN: CPT | Performed by: INTERNAL MEDICINE

## 2024-10-01 RX ORDER — ROSUVASTATIN CALCIUM 20 MG/1
20 TABLET, COATED ORAL NIGHTLY
Qty: 90 TABLET | Refills: 0 | Status: SHIPPED | OUTPATIENT
Start: 2024-10-01

## 2024-10-01 RX ORDER — SEMAGLUTIDE 2.68 MG/ML
2 INJECTION, SOLUTION SUBCUTANEOUS WEEKLY
Qty: 9 ML | Refills: 0 | Status: SHIPPED | OUTPATIENT
Start: 2024-10-01

## 2024-10-01 RX ORDER — INSULIN ASPART 100 [IU]/ML
15 INJECTION, SOLUTION INTRAVENOUS; SUBCUTANEOUS
Qty: 40.5 ML | Refills: 0 | Status: SHIPPED | OUTPATIENT
Start: 2024-10-01 | End: 2024-12-30

## 2024-10-01 NOTE — PROGRESS NOTES
Chet Suki REDMONDFritz  verbally consents to a video visit on 10/1/2024     Patient understands and accepts financial responsibility for any deductible, co-insurance and/or co-pays associated with this service.  Patient has been referred to the [facility] website at [email or web address] to review the yearly Consent to Treat document.        This visit is conducted using Telemedicine with live, interactive video and audio    FU VISIT:       HISTORY OF PRESENT ILLNESS   Suki Rosenberg is a 54 year old female who presents for follow up for diabetes mellitus     Diabetes History:  Diagnosed- age 18   Patient has had hospitalizations for blood sugar issues- last time 2021    Dietary compliance: moderate compliance  Exercise: No- hard due to lymphedema  Polyuria/polydipsia: Yes   Blurred vision: Yes     Episodes of hypoglycemia: no     Blood Glucose:  Using chayito , but states that these have been coming off easily    Fasting: in the 120-140s  During the days in the late 100s    Current DM Regimen:  Metformin 1000mg PO BID  Novolog - 12 units TID with meals   Jardiance -25mg PO daily --> noted no chart notes --> new blister on heal   Ozempic 1 mg weekly     Modifying factors:  Medication adherence: Yes  Recent steroids, illness or infections: No       REVIEW OF SYSTEMS  Eyes: Diabetic retinopathy present: No             Most recent visit to eye doctor in last 12 months: Yes-  Sep 2024    CV: Cardiovascular disease present: No          Hypertension present: Yes          Hyperlipidemia present: Yes - on statin therapy          Peripheral Vascular Disease present: No     : Nephropathy present: No     Neuro: Neuropathy present: Yes- numbness in feet bilaterally     Skin: Infection or ulceration: No     Osteoporosis: No     Thyroid disease: No     Medications:     Current Outpatient Medications:     rosuvastatin 20 MG Oral Tab, Take 1 tablet (20 mg total) by mouth nightly., Disp: 90 tablet, Rfl: 0    semaglutide (OZEMPIC, 2  MG/DOSE,) 8 MG/3ML Subcutaneous Solution Pen-injector, Inject 2 mg into the skin once a week., Disp: 9 mL, Rfl: 0    insulin aspart (NOVOLOG FLEXPEN) 100 Units/mL Subcutaneous Solution Pen-injector, Inject 15 Units into the skin 3 (three) times daily before meals., Disp: 40.5 mL, Rfl: 0    zolpidem 10 MG Oral Tab, Take 1 tablet (10 mg total) by mouth daily as needed for Sleep., Disp: 30 tablet, Rfl: 0    Insulin Pen Needle (PEN NEEDLES) 32G X 4 MM Does not apply Misc, 1 each in the morning, at noon, in the evening, and at bedtime., Disp: 400 each, Rfl: 1    Continuous Glucose Sensor (FREESTYLE NAA 2 SENSOR) Does not apply Misc, 1 each every 14 (fourteen) days., Disp: 2 each, Rfl: 3    amitriptyline 75 MG Oral Tab, Take 1 tablet (75 mg total) by mouth daily., Disp: 90 tablet, Rfl: 1    amitriptyline 75 MG Oral Tab, Take 1/2 tablet PO nightly x7 days, then increase to 1 tablet nightly., Disp: 90 tablet, Rfl: 0    furosemide (LASIX) 40 MG Oral Tab, Take 1 tablet (40 mg total) by mouth daily., Disp: 90 tablet, Rfl: 1    rivaroxaban (XARELTO) 20 MG Oral Tab, Take 1 tablet (20 mg total) by mouth daily with food., Disp: 90 tablet, Rfl: 1    spironolactone 50 MG Oral Tab, Take 1 tablet (50 mg total) by mouth daily., Disp: 90 tablet, Rfl: 1    NIFEdipine ER 30 MG Oral Tablet 24 Hr, Take 1 tablet (30 mg total) by mouth daily., Disp: 30 tablet, Rfl: 0    ARIPiprazole 5 MG Oral Tab, , Disp: , Rfl:     lidocaine 5 % External Patch, Place 1 patch onto the skin daily., Disp: 30 patch, Rfl: 2    carvedilol 25 MG Oral Tab, Take 1 tablet (25 mg total) by mouth 2 (two) times daily with meals., Disp: 60 tablet, Rfl: 0    METFORMIN HCL 1000 MG Oral Tab, TAKE 1 TABLET BY MOUTH TWICE A DAY WITH MEALS, Disp: 180 tablet, Rfl: 1    LOSARTAN 100 MG Oral Tab, TAKE 1 TABLET BY MOUTH EVERY DAY, Disp: 90 tablet, Rfl: 1     Allergies:   Allergies   Allergen Reactions    Latex HIVES and SWELLING    Influenza Vaccines OTHER (SEE COMMENTS)     Pt  passed out     Radiology Contrast Iodinated Dyes ITCHING       Social History:   Social History     Socioeconomic History    Marital status: Single   Occupational History    Occupation: works for social [employer] as [employer]   Tobacco Use    Smoking status: Former     Current packs/day: 0.00     Types: Cigarettes     Quit date: 2010     Years since quittin.1    Smokeless tobacco: Never   Vaping Use    Vaping status: Never Used   Substance and Sexual Activity    Alcohol use: Not Currently     Comment: Occ    Drug use: Yes     Types: Cannabis     Comment: Tea    Sexual activity: Not Currently     Partners: Male   Other Topics Concern    Blood Transfusions No    Caffeine Concern No    Exercise No       Medical History:   Past Medical History:    Anemia    due to blood loss    Anxiety    Asthma (HCC)    Atrial septal defect (HCC)    Back problem    Bipolar 2 disorder (HCC)    BRCA gene mutation negative in female    Negative 28 gene hereditary breast/gyn cancers panel, report in media tab    Breast cancer (HCC)    L sided, treated with chemo, radiation    Chronic back pain    Chronic constipation    COVID-19 long hauler    Depression    Diabetes (HCC)    Essential hypertension    Fibromyalgia    History of blood transfusion    4-5 per year    Hx of motion sickness    Hypercoagulable state (HCC)    Incontinence    Lupus (HCC)    Migraines    Muscle weakness    Neuropathy    PE (pulmonary thromboembolism) (HCC)     after child birth and again  (bilateral)    Pneumonia due to organism    PONV (postoperative nausea and vomiting)    Pulmonary embolism (HCC)    Schizophrenia (HCC)    Visual impairment    Glasses       Surgical history:   Past Surgical History:   Procedure Laterality Date    Breast biopsy Left     for breast cancer          x 3    Cholecystectomy      Inguinal hernia repair  2011    Had inguinal and ventral hernia repair together @ [place]    Needle biopsy left  2022     us guided bx axilla    Removal anal fistula,submuscular  1995    repair of anal vaginal fistula after child birth    Repair rotator cuff,acute Left 2013    after trauma    Ventral hernia repair  03/2011    after last c/s         PHYSICAL EXAM  There were no vitals filed for this visit.            General Appearance:  alert, well developed, in no acute distress  Nutritional:  no extreme weight gain or loss  Head: Atraumatic  Eyes:  normal conjunctivae, sclera., normal sclera and normal pupils  Throat/Neck: normal sound to voice. Normal hearing, normal speech  Respiratory:  Speaking in full sentences, non-labored. no increased work of breathing, no audible wheezing    Neuro: motor grossly intact, moving all extremities without difficulty  Psychiatric:  oriented to time, self, and place  Extremities: June 2024  Bilateral barefoot skin diabetic exam is normal, visualized feet and the appearance is normal.  Bilateral monofilament/sensation of both feet is decreased   Pulsation pedal pulse exam of both lower legs/feet is normal as well.              ASSESSMENT/PLAN:    Diabetes mellitus type 2 : hyperglycemia  -Reviewed ABC's of diabetes   - Reviewed pathogenesis of diabetes.   - Reviewed importance of good glycemic control to prevent microvascular and macrovascular complications including nephropathy, neuropathy, retinopathy, and cardiovascular disease.  - Reviewed importance of SBGM- check glucose 1-2 times daily   - Reviewed target glucose goals for patient  fasting and <180 post prandially   - Reviewed importance of following diabetic diet- recommended 135 grams of CHO per day or 45 grams per meal.       - Novolog to 15  units TID with meals. Reviewed insulin timing and administration and importance of taking dose before each meal.     - Ozmepic 1 --> 2 mg weekly     No personal or family history of MEN syndrome  Patient counselled regarding side effects including injection site reactions, nausea, vomiting,  diarrhea, pancreatitis, gastroparesis and rare side effect pohng Shakeel syndrome.    - STOP jardiance given recent infection ( she has had repeated infections in her right foot)     -Continue Metformin 1000mg PO BID  Take with food    - Dyslipidemia:  Rosuvastatin 10--> 20 mg daily, LDL was elevated. Fasting lipid panel in 3 months  - UTD with optho   -Discussed importance of annual eye exams   -Continue Freestyle Meagan 2--> use more often     Hypertension  BP is okay on repeat testing   She is compliant with medications      RTC in 3 months  Nati Kline  Update with sugars in 1-2 weeks ( before breakfast and before dinner )       Please note that the following visit was completed using two-way, real-time interactive audio and video communication.  This has been done in good aren to provide continuity of care in the best interest of the provider-patient relationship.  There are limitations of this visit as no physical exam could be performed.  Every conscious effort was taken to allow for sufficient and adequate time.  This billing was spent on reviewing labs, medications, radiology tests and decision making.  Appropriate medical decision-making and tests are ordered as detailed in the plan of care above.”

## 2024-10-08 ENCOUNTER — OFFICE VISIT (OUTPATIENT)
Dept: SURGERY | Facility: CLINIC | Age: 54
End: 2024-10-08
Payer: COMMERCIAL

## 2024-10-08 VITALS
WEIGHT: 243.63 LBS | HEIGHT: 62.8 IN | HEART RATE: 98 BPM | DIASTOLIC BLOOD PRESSURE: 82 MMHG | SYSTOLIC BLOOD PRESSURE: 128 MMHG | OXYGEN SATURATION: 99 % | BODY MASS INDEX: 43.17 KG/M2

## 2024-10-08 DIAGNOSIS — E78.5 DYSLIPIDEMIA: ICD-10-CM

## 2024-10-08 DIAGNOSIS — I10 HTN (HYPERTENSION), BENIGN: ICD-10-CM

## 2024-10-08 DIAGNOSIS — E11.9 TYPE 2 DIABETES MELLITUS WITHOUT COMPLICATION, WITH LONG-TERM CURRENT USE OF INSULIN (HCC): Primary | ICD-10-CM

## 2024-10-08 DIAGNOSIS — Z79.4 TYPE 2 DIABETES MELLITUS WITHOUT COMPLICATION, WITH LONG-TERM CURRENT USE OF INSULIN (HCC): Primary | ICD-10-CM

## 2024-10-08 DIAGNOSIS — E55.9 VITAMIN D DEFICIENCY: ICD-10-CM

## 2024-10-08 DIAGNOSIS — F43.9 STRESS: ICD-10-CM

## 2024-10-08 DIAGNOSIS — E66.01 MORBID OBESITY WITH BMI OF 40.0-44.9, ADULT (HCC): ICD-10-CM

## 2024-10-08 PROCEDURE — 3074F SYST BP LT 130 MM HG: CPT | Performed by: INTERNAL MEDICINE

## 2024-10-08 PROCEDURE — 3079F DIAST BP 80-89 MM HG: CPT | Performed by: INTERNAL MEDICINE

## 2024-10-08 PROCEDURE — 3008F BODY MASS INDEX DOCD: CPT | Performed by: INTERNAL MEDICINE

## 2024-10-08 PROCEDURE — 99205 OFFICE O/P NEW HI 60 MIN: CPT | Performed by: INTERNAL MEDICINE

## 2024-10-08 NOTE — PROGRESS NOTES
[facility] Consultation Note       Patient:  Suki Rosenberg  :      1970  MRN:      [id number]    Referring Provider: Dr. Kline       Chief Complaint:    Chief Complaint   Patient presents with    Consult    Weight Management       SUBJECTIVE     History of Present Illness:  Suki Rosenberg has been referred to me for evaluation and treatment.       53 yo who lives with daughters  She does the cooking  Desk job  Currently at heaviest weight    Patient has tried several diets in the past including exercises and is frustrated with increase of weight. Weight has been a struggle for the past several years and is now starting to develop into co-morbidities that are worrisome to the patient. Patient is interested in losing weight, so it can stay off long term.    Patient also understands that this is a life style change and wants to get on track.      Interested in non surgical weight loss    Past Medical History:   Past Medical History:    Anemia    due to blood loss    Anxiety    Asthma (HCC)    Atrial septal defect (HCC)    Back problem    Bipolar 2 disorder (HCC)    BRCA gene mutation negative in female    Negative 28 gene hereditary breast/gyn cancers panel, report in media tab    Breast cancer (HCC)    L sided, treated with chemo, radiation    Chronic back pain    Chronic constipation    COVID-19 long hauler    Depression    Diabetes (HCC)    Essential hypertension    Fibromyalgia    History of blood transfusion    4-5 per year    Hx of motion sickness    Hypercoagulable state (HCC)    Incontinence    Lupus    Migraines    Morbid obesity with BMI of 40.0-44.9, adult (HCC)    Muscle weakness    Neuropathy    PE (pulmonary thromboembolism) (HCC)     after child birth and again  (bilateral)    Pneumonia due to organism    PONV (postoperative nausea and vomiting)    Pulmonary embolism (HCC)    Schizophrenia (HCC)    Visual impairment    Glasses       OBJECTIVE     Vitals: BP  128/82   Pulse 98   Ht 5' 2.8\" (1.595 m)   Wt 243 lb 9.6 oz (110.5 kg)   LMP 10/29/2018   SpO2 99%   BMI 43.43 kg/m²      Patient Medications:    Current Outpatient Medications   Medication Sig Dispense Refill    sertraline 50 MG Oral Tab Take 1 tablet (50 mg total) by mouth every evening. Take half tablet for the first week 30 tablet 5    rosuvastatin 20 MG Oral Tab Take 1 tablet (20 mg total) by mouth nightly. 90 tablet 0    semaglutide (OZEMPIC, 2 MG/DOSE,) 8 MG/3ML Subcutaneous Solution Pen-injector Inject 2 mg into the skin once a week. 9 mL 0    insulin aspart (NOVOLOG FLEXPEN) 100 Units/mL Subcutaneous Solution Pen-injector Inject 15 Units into the skin 3 (three) times daily before meals. 40.5 mL 0    zolpidem 10 MG Oral Tab Take 1 tablet (10 mg total) by mouth daily as needed for Sleep. 30 tablet 0    Continuous Glucose Sensor (FREESTYLE NAA 2 SENSOR) Does not apply Misc 1 each every 14 (fourteen) days. 2 each 3    amitriptyline 75 MG Oral Tab Take 1 tablet (75 mg total) by mouth daily. 90 tablet 1    furosemide (LASIX) 40 MG Oral Tab Take 1 tablet (40 mg total) by mouth daily. 90 tablet 1    rivaroxaban (XARELTO) 20 MG Oral Tab Take 1 tablet (20 mg total) by mouth daily with food. 90 tablet 1    spironolactone 50 MG Oral Tab Take 1 tablet (50 mg total) by mouth daily. 90 tablet 1    NIFEdipine ER 30 MG Oral Tablet 24 Hr Take 1 tablet (30 mg total) by mouth daily. 30 tablet 0    ARIPiprazole 5 MG Oral Tab       lidocaine 5 % External Patch Place 1 patch onto the skin daily. 30 patch 2    carvedilol 25 MG Oral Tab Take 1 tablet (25 mg total) by mouth 2 (two) times daily with meals. 60 tablet 0    METFORMIN HCL 1000 MG Oral Tab TAKE 1 TABLET BY MOUTH TWICE A DAY WITH MEALS 180 tablet 1    LOSARTAN 100 MG Oral Tab TAKE 1 TABLET BY MOUTH EVERY DAY 90 tablet 1       Allergies:  Latex, Influenza vaccines, and Radiology contrast iodinated dyes     Comorbidities:  hypertension, insulin dependent diabetes,  and osteoarthritis    Social History:    Social History     Socioeconomic History    Marital status: Single     Spouse name: Not on file    Number of children: Not on file    Years of education: Not on file    Highest education level: Not on file   Occupational History    Occupation: works for social [employer] as [employer]   Tobacco Use    Smoking status: Former     Current packs/day: 0.00     Types: Cigarettes     Quit date: 2010     Years since quittin.1    Smokeless tobacco: Never   Vaping Use    Vaping status: Never Used   Substance and Sexual Activity    Alcohol use: Not Currently     Comment: Occ    Drug use: Yes     Types: Cannabis     Comment: Tea    Sexual activity: Not Currently     Partners: Male   Other Topics Concern     Service Not Asked    Blood Transfusions No    Caffeine Concern No    Occupational Exposure Not Asked    Hobby Hazards Not Asked    Sleep Concern Not Asked    Stress Concern Not Asked    Weight Concern Not Asked    Special Diet Not Asked    Back Care Not Asked    Exercise No    Bike Helmet Not Asked    Seat Belt Not Asked    Self-Exams Not Asked   Social History Narrative        Her daughter in [place] is due at the end of 2018, but just recently had cerclage out.She is currently premed. She has daughters 23, 22, 19 and 8. Her 7 y/o is disabled b/c she was a 28 W premie with PPROM at [place]. Patient is currently homeless b/c she lost her home after prolonged disability.        She is currently living with friends and family members. Plans to transfer her job to [place]     Social [facility]     Financial Resource Strain: Not on file   Food Insecurity: Not on file   Transportation Needs: Not on file   Physical Activity: Not on file   Stress: Not on file   Social Connections: Not on file   Housing Stability: Not on file     Surgical History:    Past Surgical History:   Procedure Laterality Date    Breast biopsy Left     for breast cancer           x 3    Cholecystectomy      Inguinal hernia repair  2011    Had inguinal and ventral hernia repair together @ [place]    Needle biopsy left  2022    us guided bx axilla    Removal anal fistula,submuscular      repair of anal vaginal fistula after child birth    Repair rotator cuff,acute Left     after trauma    Ventral hernia repair  2011    after last c/s       Family History:    Family History   Problem Relation Age of Onset    Breast Cancer Self 49            Depression Mother     Hypertension Mother     Psychiatric Mother     Heart Disorder Father     Hypertension Father     Heart Disease Father     Other (a-Fib) Father     Heart Disease Brother     No Known Problems Maternal Grandmother     Heart Attack Maternal Grandfather     Dementia Paternal Grandmother     Heart Attack Paternal Grandfather     DVT/VTE Paternal Aunt 42         from PE    DVT/VTE Paternal Aunt 58         from PE    Heart Disease Other     Blood Disorder Other     Ovarian Cancer Other     Colon Cancer Neg            Typical Dietary Intake:  Breakfast AM Snack Lunch PM Snack Dinner   5 cups coffee creamer Popcorn, fruit Pop tart, juice juice FF   +sweet tooth  Eats quickly  May wake up at night to snack    Soda Drinker?: Yes  If yes, how much?:  regular    Number of restaurant or fast food meals/week:  5 meals/week    Nutritional Goals Reviewed and Discussed:     Limit carbohydrates to 100 gms per day, Eat 100-200 calories within 1 hour of waking up, and Eat 3-4 cups of fresh fruit or vegetables daily    Behavior Modifications Reviewed and Discussed:    Eat breakfast, Eat 3 meals per day, Plan meals in advance, Read nutrition labels, Drink 64oz of water per day, Maintain a daily food journal, No drinking 30 minutes before or after meals, Utilize portion control strategies to reduce calorie intake, Identify triggers for eating and manage cues, and Eat slowly and take 20 to 30 minutes to  complete each meal      ROS:  Constitutional: positive for fatigue  Respiratory: positive for dyspnea on exertion  Cardiovascular: negative  Gastrointestinal: positive for reflux symptoms  Musculoskeletal:positive for arthralgias and back pain  Neurological: positive for headaches  Behavioral/Psych: positive for stress  Endocrine: negative  All other systems were reviewed and are negative.    Physical Exam:  General appearance: alert, appears stated age, cooperative, and morbidly obese  Head: Normocephalic, without obvious abnormality, atraumatic  Back: symmetric, no curvature. ROM normal. No CVA tenderness.  Lungs: clear to auscultation bilaterally  Heart: S1, S2 normal, no murmur, click, rub or gallop, regular rate and rhythm  Abdomen:  soft, obese, non tender  Extremities: edema trace  Pulses: 2+ and symmetric  Skin: Skin color, texture, turgor normal. No rashes or lesions  Neurologic: Grossly normal    ASSESSMENT     DIABETES:  The patient's blood sugars were reviewed with the patient with averages ranging from 140-180.  The patient denies any episodes of hypoglycemia since her last clinic visit.  she denies any lower extremity skin breakdown or foot ulcers.    HYPERTENSION:  The patient's blood pressure has been well controlled.  she has been checking it as instructed and has remained in relatively good control.      HYPERCHOLESTEROLEMIA:  The patient states that her cholesterol has been well controlled on her current medication.    Lab Results   Component Value Date/Time    CHOLEST 202 (H) 03/11/2024 01:02 PM     (H) 03/11/2024 01:02 PM    HDL 42 03/11/2024 01:02 PM    TRIG 94 03/11/2024 01:02 PM    VLDL 17 03/11/2024 01:02 PM             Encounter Diagnosis(ses):   1. Type 2 diabetes mellitus without complication, with long-term current use of insulin ([facility])    2. HTN (hypertension), benign    3. Dyslipidemia    4. Stress    5. Morbid obesity with BMI of 40.0-44.9, adult ([facility])    6. Vitamin D deficiency         PLAN     Given the patient's age, degree of obesity and multiple medical problems outlined above, I do believe that bariatric surgery may prove beneficial if the patient is unable to lose at least 20% of her weight after 6 months time. Further consideration for bariatric surgery will be discussed at upcoming clinic visits.      HYPERTENSION: Blood pressure stable on the above medications. No interval change in antihypertensive medication.     DIABETES: Continue current medications.    DYSLIPIDEMIA: Stable on the above prescribed meal plan and medication. Liver function stable.    Lab Results   Component Value Date/Time    CHOLEST 202 (H) 03/11/2024 01:02 PM     (H) 03/11/2024 01:02 PM    HDL 42 03/11/2024 01:02 PM    TRIG 94 03/11/2024 01:02 PM    VLDL 17 03/11/2024 01:02 PM     OBSTRUCTIVE SLEEP APNEA: Given the patient's history suggestive of obstructive sleep apnea as outlined above, consideration for obtaining a sleep study may be warranted.  Further consideration for obtaining the sleep study will be discussed with the patient's PCP.    Goals for next month:  1. Keep a food log.  2. Drink 48-64 ounces of non-caloric beverages per day. No fruit juices or regular soda.  3. Increase activity-upper body exercises, walk 10 minutes per day.  4. Increase fruit and vegetable servings to 5-6 per day.      Should attend seminar    Refer RD    Needs to cut back on sugary drinks    Night eating syndrome noted:  1. Morning anorexia  2. Evening hyperphagia  3. Insomnia  Will start Sertarline        Diagnoses and all orders for this visit:    Type 2 diabetes mellitus without complication, with long-term current use of insulin (HCC)  -     Vitamin B12  -     Vitamin D, 25-Hydroxy; Future  -     DIETITIAN EDUCATION INITIAL, DIET (INTERNAL)    HTN (hypertension), benign  -     Vitamin B12  -     Vitamin D, 25-Hydroxy; Future  -     DIETITIAN EDUCATION INITIAL, DIET (INTERNAL)    Dyslipidemia  -     Vitamin B12  -      Vitamin D, 25-Hydroxy; Future    Stress  -     Vitamin B12  -     Vitamin D, 25-Hydroxy; Future  -     sertraline 50 MG Oral Tab; Take 1 tablet (50 mg total) by mouth every evening. Take half tablet for the first week    Morbid obesity with BMI of 40.0-44.9, adult (HCC)  -     Vitamin B12  -     Vitamin D, 25-Hydroxy; Future  -     Bariatric Surgial Seminar; Future  -     DIETITIAN EDUCATION INITIAL, DIET (INTERNAL)    Vitamin D deficiency  -     Vitamin B12  -     Vitamin D, 25-Hydroxy; Future        Miles Mandujano MD

## 2024-10-30 ENCOUNTER — TELEPHONE (OUTPATIENT)
Facility: CLINIC | Age: 54
End: 2024-10-30

## 2024-10-31 ENCOUNTER — TELEPHONE (OUTPATIENT)
Dept: ENDOCRINOLOGY CLINIC | Facility: CLINIC | Age: 54
End: 2024-10-31

## 2024-10-31 NOTE — TELEPHONE ENCOUNTER
Prior Authorization    KEY:  [id number]  Patient Last name:  BITA  Date of Birth:  [birth year]    Current Outpatient Medications   Medication Sig Dispense Refill                  semaglutide (OZEMPIC, 2 MG/DOSE,) 8 MG/3ML Subcutaneous Solution Pen-injector Inject 2 mg into the skin once a week. 9 mL 0

## 2024-10-31 NOTE — TELEPHONE ENCOUNTER
Medication PA Requested:  (OZEMPIC, 2 MG/DOSE,) 8 MG/3ML Subcutaneous Solution Pen                                                         CoverMyMeds Used:  Key:  Quantity: 9 mL   Day Supply: 90  Sig: Inject 2 mg into the skin once a week.   DX Code:  E11.69

## 2024-11-01 NOTE — TELEPHONE ENCOUNTER
Medication PA Requested:  (OZEMPIC, 2 MG/DOSE,) 8 MG/3ML Subcutaneous Solution Pen                                                         CoverMyMeds Used: Yes  Key: [id number]  Quantity: 9 mL   Day Supply: 90  Sig: Inject 2 mg into the skin once a week.   DX Code:  E11.69          Demographics submitted  Awaiting questions

## 2024-11-08 NOTE — TELEPHONE ENCOUNTER
Per Cover My Meds, no Prior Authorization is needed at this time.    Approved from 05/08/2024 until 06/27/2025    [employer] message sent to patient

## 2024-11-26 RX ORDER — AMITRIPTYLINE HYDROCHLORIDE 75 MG/1
75 TABLET ORAL DAILY
Qty: 90 TABLET | Refills: 1 | OUTPATIENT
Start: 2024-11-26

## 2024-11-26 RX ORDER — AMITRIPTYLINE HYDROCHLORIDE 75 MG/1
75 TABLET ORAL DAILY
Qty: 90 TABLET | Refills: 1 | Status: SHIPPED | OUTPATIENT
Start: 2024-11-26

## 2024-11-26 NOTE — TELEPHONE ENCOUNTER
Please review. Protocol Failed; No Protocol    Requested Prescriptions   Pending Prescriptions Disp Refills    AMITRIPTYLINE 75 MG Oral Tab [Pharmacy Med Name: AMITRIPTYLINE 75MG TABLETS] 90 tablet 1     Sig: TAKE 1 TABLET(75 MG) BY MOUTH DAILY       There is no refill protocol information for this order            Future Appointments         Provider Department Appt Notes    In 1 month Miles Mandujano MD [facility]           Recent Outpatient Visits              1 month ago Type 2 diabetes mellitus without complication, with long-term current use of insulin ([facility])    [facility] Miles Mandujano MD    Office Visit    1 month ago Type 2 diabetes mellitus with other specified complication, with long-term current use of insulin ([facility])    [facility] Nati Kline MD    Telemedicine    3 months ago Non-healing open wound of right heel    [facility] Jose M Johnson, APRMANUEL    Office Visit    3 months ago     [facility]    Nurse Only    3 months ago 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 due to underlying condition, limited to breakdown of skin ([facility])    [facility] Jose M Johnson, APRN    Office Visit

## 2024-12-18 ENCOUNTER — HOSPITAL ENCOUNTER (OUTPATIENT)
Age: 54
Discharge: HOME | End: 2024-12-18
Payer: COMMERCIAL

## 2024-12-18 ENCOUNTER — NURSE TRIAGE (OUTPATIENT)
Facility: CLINIC | Age: 54
End: 2024-12-18

## 2024-12-18 VITALS
RESPIRATION RATE: 16 BRPM | HEART RATE: 91 BPM | OXYGEN SATURATION: 100 % | TEMPERATURE: 98 F | SYSTOLIC BLOOD PRESSURE: 169 MMHG | DIASTOLIC BLOOD PRESSURE: 101 MMHG

## 2024-12-18 DIAGNOSIS — Z87.39 HISTORY OF FIBROMYALGIA: ICD-10-CM

## 2024-12-18 DIAGNOSIS — E66.01 MORBID OBESITY (HCC): ICD-10-CM

## 2024-12-18 DIAGNOSIS — I89.0 LYMPHEDEMA OF BOTH LOWER EXTREMITIES: ICD-10-CM

## 2024-12-18 DIAGNOSIS — S90.522A BLISTER (NONTHERMAL), LEFT ANKLE, INITIAL ENCOUNTER: Primary | ICD-10-CM

## 2024-12-18 DIAGNOSIS — Z86.39 HISTORY OF DIABETIC NEUROPATHY: ICD-10-CM

## 2024-12-18 DIAGNOSIS — G47.00 INSOMNIA, UNSPECIFIED TYPE: ICD-10-CM

## 2024-12-18 LAB — GLUCOSE BLDC GLUCOMTR-MCNC: 153 MG/DL (ref 70–99)

## 2024-12-18 RX ORDER — SILVER SULFADIAZINE 10 MG/G
CREAM TOPICAL ONCE
Status: COMPLETED | OUTPATIENT
Start: 2024-12-18 | End: 2024-12-18

## 2024-12-18 RX ORDER — TRAMADOL HYDROCHLORIDE 50 MG/1
50 TABLET ORAL EVERY 6 HOURS PRN
Qty: 20 TABLET | Refills: 0 | Status: SHIPPED | OUTPATIENT
Start: 2024-12-18

## 2024-12-18 RX ORDER — HYDROCODONE BITARTRATE AND ACETAMINOPHEN 5; 325 MG/1; MG/1
1 TABLET ORAL ONCE
Status: COMPLETED | OUTPATIENT
Start: 2024-12-18 | End: 2024-12-18

## 2024-12-18 NOTE — TELEPHONE ENCOUNTER
Action Requested: Summary for Provider     []  Critical Lab, Recommendations Needed  [] Need Additional Advice  [x]   NAPOLEONI    []   Need Orders  [] Need Medications Sent to Pharmacy  []  Other     SUMMARY: Disposition per protocol  is go to office now.  No appointments open today at [facility], Patient will go to [facility] now.  Dr Recio-KENISHA.    Reason for call: Cellulitis  Onset: yesterday.      Patient with history of lymphedema. Left Heel  blister/wound   split open yesterday.  Today It is very painful, with a dark bruised looking area and has light pink drainage. Afebrile  She called wound care clinic today, scheduled first available appointment 1/3/25 and was advised to follow up with PCP or [facility]mediate [facility]. Reviewed care advice to go to  Immediate care now, keep wound care appointment as scheduled, call back for fever, increased redness, swelling, drainage or pain, follow up with Dr Recio as needed. Patient verbalizes understanding and agrees to plan of care.    Reason for Disposition   Looks infected (spreading redness, red streak, pus) and fever    Protocols used: Wound Infection [id number]-HT

## 2024-12-18 NOTE — TELEPHONE ENCOUNTER
Patient calling,verified name and date of birth.  Patient requests Zolpidem refill.  Medication  pended to run through protocol.  Pharmacy verified.

## 2024-12-18 NOTE — ED PROVIDER NOTES
Patient Seen in: [facility]      History     Chief Complaint   Patient presents with    Derm Problem     Stated Complaint: Ulcer on heel    Subjective:   HPI      Patient is a 54-year-old -American female with hypertension, dyslipidemia, type 2 diabetes, diastolic congestive heart failure, lower extremity lymphedema, chronic nonhealing pressure ulcer of right heel-followed by wound clinic, neuropathy, breast cancer, recurrent pulmonary embolism on anticoagulation bipolar disorder, chronic low back pain with radiculopathy, presenting to immediate care evaluation of blister on left posterior heel that occurred yesterday.  States today blister burst and began to drain.  She is concerned for possible infection.  Associated pain.  Took Advil for symptoms with minimal improvement.  Has been previously prescribed tramadol for pain symptoms.  States previously her has been drained and excised by wound clinic with addition of application of honey appearance ointment.  Has gone to ER in the past which she has been prescribed Silvadene for wound care.  Coming to immediate care for further evaluation.  No fever or systemic symptoms.  No redness or warmth.  No purulent drainage.  Has scheduled appointment with wound clinic on 1/3/2024.  Sent by primary care for initial evaluation for    Objective:     Past Medical History:    Anemia    due to blood loss    Anxiety    Asthma (HCC)    Atrial septal defect (HCC)    Back problem    Bipolar 2 disorder (HCC)    BRCA gene mutation negative in female    Negative 28 gene hereditary breast/gyn cancers panel, report in media tab    Breast cancer (HCC)    L sided, treated with chemo, radiation    Chronic back pain    Chronic constipation    COVID-19 long hauler    Depression    Diabetes (HCC)    Essential hypertension    Fibromyalgia    History of blood transfusion    4-5 per year    Hx of motion sickness    Hypercoagulable state (HCC)    Incontinence    Lupus     Migraines    Morbid obesity with BMI of 40.0-44.9, adult (HCC)    Muscle weakness    Neuropathy    PE (pulmonary thromboembolism) (HCC)     after child birth and again  (bilateral)    Pneumonia due to organism    PONV (postoperative nausea and vomiting)    Pulmonary embolism (HCC)    Schizophrenia (HCC)    Visual impairment    Glasses              Past Surgical History:   Procedure Laterality Date    Breast biopsy Left     for breast cancer          x 3    Cholecystectomy      Inguinal hernia repair  2011    Had inguinal and ventral hernia repair together @ [facility]    Needle biopsy left  2022    us guided bx axilla    Removal anal fistula,submuscular      repair of anal vaginal fistula after child birth    Repair rotator cuff,acute Left     after trauma    Ventral hernia repair  2011    after last c/s                No pertinent social history.            Review of Systems   Constitutional:  Positive for activity change. Negative for chills and fever.   Respiratory:  Negative for shortness of breath.    Cardiovascular:  Negative for chest pain.   Gastrointestinal:  Negative for nausea and vomiting.   Skin:  Positive for wound.        Open blister left heel.  Blister/heel pain   Neurological:  Negative for weakness.   Psychiatric/Behavioral:  Negative for confusion.        Positive for stated complaint: Ulcer on heel  Other systems are as noted in HPI.  Constitutional and vital signs reviewed.      All other systems reviewed and negative except as noted above.    Physical Exam     ED Triage Vitals [24 1700]   BP (!) 169/101   Pulse 91   Resp 16   Temp 98 °F (36.7 °C)   Temp src Oral   SpO2 100 %   O2 Device None (Room air)       Current Vitals:   Vital Signs  BP: (!) 169/101  Pulse: 91  Resp: 16  Temp: 98 °F (36.7 °C)  Temp src: Oral    Oxygen Therapy  SpO2: 100 %  O2 Device: None (Room air)        Physical Exam  Vitals and nursing note reviewed.   Constitutional:        General: She is not in acute distress.     Appearance: Normal appearance. She is well-developed. She is obese. She is not ill-appearing, toxic-appearing or diaphoretic.   HENT:      Head: Normocephalic and atraumatic.      Mouth/Throat:      Mouth: Mucous membranes are moist.   Eyes:      General: No scleral icterus.  Cardiovascular:      Rate and Rhythm: Normal rate.      Pulses: Normal pulses.   Pulmonary:      Effort: No respiratory distress.   Musculoskeletal:         General: No tenderness or deformity. Normal range of motion.      Right lower leg: Edema present.      Left lower leg: Edema present.      Comments: Bilateral lower extremity lymphedema.  There is a non-bleeding, non necrotic blister left posterior heel that is tender with scant serous drainage manually expressed.  There is no surrounding erythema or warmth purulent drainage.  No crepitus.  Compartments soft   Skin:     Findings: No bruising, erythema or rash.   Neurological:      General: No focal deficit present.      Mental Status: She is alert.      Motor: No weakness.   Psychiatric:         Mood and Affect: Mood normal.         Behavior: Behavior normal.           ED Course     Labs Reviewed   POCT GLUCOSE - Abnormal; Notable for the following components:       Result Value    POC Glucose  153 (*)     All other components within normal limits     Glucose point-of-care elevated 153.  Patient is type II diabetic on insulin.  No clinical signs of DKA.       MDM     Patient is a 54-year-old female, presenting to immediate care for evaluation of blister on left posterior heel with associated pain.  History of bilateral lower extremity lymphedema, diabetic ulcer of bilateral heel, nonhealing wound, fibromyalgia, diabetic neuropathy.  Exam notable for large blister left posterior heel without signs of infection or bleeding or necrosis.  Discussed wound management.  Optimal follow-up with wound care management for blister excision and management.   RN application of Silvadene and barrier cream, nonadherent dressing, Kerlix dressing.  Will prescribe tramadol as needed for pain.  PCP and outpatient wound clinic follow-up.  ED return precautions      Medical Decision Making      Disposition and Plan     Clinical Impression:  1. Blister (nonthermal), left ankle, initial encounter    2. Morbid obesity (HCC)    3. History of fibromyalgia    4. History of diabetic neuropathy    5. Lymphedema of both lower extremities         Disposition:  Discharge  12/18/2024  5:36 pm    Follow-up:  Graham Recio DO  [address] [place] [id number]  [phone]                Medications Prescribed:  Current Discharge Medication List        START taking these medications    Details   traMADol 50 MG Oral Tab Take 1 tablet (50 mg total) by mouth every 6 (six) hours as needed for Pain.  Qty: 20 tablet, Refills: 0    Comments: If not covered by insurance may substitute for Norco 5-325 mg every 6-8 hours as needed for pain, 12 tablets  Associated Diagnoses: History of diabetic neuropathy; Lymphedema of both lower extremities                 Supplementary Documentation:

## 2024-12-18 NOTE — ED INITIAL ASSESSMENT (HPI)
Pt came in due to wound eval. Pt stated a week ago she began to get a blister on her left heel and yesterday the blister burst and began to drain. Pt concerned for possible infection. Pt c/o severe pain. Pt stated she has wound appointment coming up but she cannot take the pain. Pt has easy non labored respirations.

## 2024-12-24 RX ORDER — ZOLPIDEM TARTRATE 10 MG/1
10 TABLET ORAL DAILY PRN
Qty: 30 TABLET | Refills: 0 | Status: ON HOLD | OUTPATIENT
Start: 2024-12-24

## 2024-12-24 NOTE — TELEPHONE ENCOUNTER
Please review. Protocol Failed; No Protocol      Recent fills: 7/19/2024, 9/12/2024  Last Rx written: 9/9/2024  Last office visit: 5/15/2024        Requested Prescriptions   Pending Prescriptions Disp Refills    zolpidem 10 MG Oral Tab 30 tablet 0     Sig: Take 1 tablet (10 mg total) by mouth daily as needed for Sleep.       Controlled Substance Medication Failed - 12/24/2024  2:18 PM        Failed - This medication is a controlled substance - forward to provider to refill               Future Appointments         Provider Department Appt Notes    In 1 week Jose M Johnson APRN [facility] left foot    In 3 weeks Miles Mandujano MD [facility]           Recent Outpatient Visits              2 months ago Type 2 diabetes mellitus without complication, with long-term current use of insulin ([facility])    [facility] Miles Mandujano MD    Office Visit    2 months ago Type 2 diabetes mellitus with other specified complication, with long-term current use of insulin ([facility])    [facility] Nati Kline MD    Telemedicine    4 months ago Non-healing open wound of right heel    [facility] Jose M Johnson APRN    Office Visit    4 months ago     [facility]    Nurse Only    4 months ago 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 due to underlying condition, limited to breakdown of skin ([facility])    [facility] Jose M Johnson APRN    Office Visit

## 2024-12-27 ENCOUNTER — HOSPITAL ENCOUNTER (INPATIENT)
Facility: HOSPITAL | Age: 54
LOS: 6 days | Discharge: HOME | End: 2025-01-03
Attending: STUDENT IN AN ORGANIZED HEALTH CARE EDUCATION/TRAINING PROGRAM | Admitting: STUDENT IN AN ORGANIZED HEALTH CARE EDUCATION/TRAINING PROGRAM
Payer: COMMERCIAL

## 2024-12-27 ENCOUNTER — HOSPITAL ENCOUNTER (OUTPATIENT)
Age: 54
Discharge: ANOTHER HOSPITAL | End: 2024-12-27
Payer: COMMERCIAL

## 2024-12-27 VITALS
DIASTOLIC BLOOD PRESSURE: 90 MMHG | OXYGEN SATURATION: 98 % | TEMPERATURE: 99 F | SYSTOLIC BLOOD PRESSURE: 155 MMHG | HEART RATE: 92 BPM | RESPIRATION RATE: 20 BRPM

## 2024-12-27 DIAGNOSIS — Z86.39 HISTORY OF DIABETIC NEUROPATHY: ICD-10-CM

## 2024-12-27 DIAGNOSIS — E11.621 DIABETIC ULCER OF LEFT HEEL ASSOCIATED WITH TYPE 2 DIABETES MELLITUS, UNSPECIFIED ULCER STAGE (HCC): Primary | ICD-10-CM

## 2024-12-27 DIAGNOSIS — E11.621 DIABETIC ULCER OF HEEL ASSOCIATED WITH TYPE 2 DIABETES MELLITUS, UNSPECIFIED LATERALITY, UNSPECIFIED ULCER STAGE (HCC): ICD-10-CM

## 2024-12-27 DIAGNOSIS — N17.9 AKI (ACUTE KIDNEY INJURY): ICD-10-CM

## 2024-12-27 DIAGNOSIS — E11.628 DIABETIC FOOT INFECTION (HCC): Primary | ICD-10-CM

## 2024-12-27 DIAGNOSIS — L08.9 DIABETIC FOOT INFECTION (HCC): Primary | ICD-10-CM

## 2024-12-27 DIAGNOSIS — L97.409 DIABETIC ULCER OF HEEL ASSOCIATED WITH TYPE 2 DIABETES MELLITUS, UNSPECIFIED LATERALITY, UNSPECIFIED ULCER STAGE (HCC): ICD-10-CM

## 2024-12-27 DIAGNOSIS — L97.429 DIABETIC ULCER OF LEFT HEEL ASSOCIATED WITH TYPE 2 DIABETES MELLITUS, UNSPECIFIED ULCER STAGE (HCC): Primary | ICD-10-CM

## 2024-12-27 DIAGNOSIS — I89.0 LYMPHEDEMA OF BOTH LOWER EXTREMITIES: ICD-10-CM

## 2024-12-27 DIAGNOSIS — M25.511 ACUTE PAIN OF RIGHT SHOULDER: ICD-10-CM

## 2024-12-27 PROCEDURE — 99215 OFFICE O/P EST HI 40 MIN: CPT | Performed by: NURSE PRACTITIONER

## 2024-12-28 ENCOUNTER — APPOINTMENT (OUTPATIENT)
Dept: GENERAL RADIOLOGY | Facility: HOSPITAL | Age: 54
End: 2024-12-28
Attending: NURSE PRACTITIONER
Payer: COMMERCIAL

## 2024-12-28 PROBLEM — L97.409 DIABETIC ULCER OF HEEL (HCC): Status: ACTIVE | Noted: 2024-12-28

## 2024-12-28 PROBLEM — E11.621 DIABETIC ULCER OF LEFT HEEL ASSOCIATED WITH TYPE 2 DIABETES MELLITUS, UNSPECIFIED ULCER STAGE (HCC): Status: ACTIVE | Noted: 2024-12-28

## 2024-12-28 PROBLEM — E11.621 DIABETIC ULCER OF HEEL (HCC): Status: ACTIVE | Noted: 2024-12-28

## 2024-12-28 PROBLEM — L97.429 DIABETIC ULCER OF LEFT HEEL ASSOCIATED WITH TYPE 2 DIABETES MELLITUS, UNSPECIFIED ULCER STAGE (HCC): Status: ACTIVE | Noted: 2024-12-28

## 2024-12-28 LAB
ANION GAP SERPL CALC-SCNC: 6 MMOL/L (ref 0–18)
BASOPHILS # BLD AUTO: 0.02 X10(3) UL (ref 0–0.2)
BASOPHILS NFR BLD AUTO: 0.3 %
BUN BLD-MCNC: 10 MG/DL (ref 9–23)
BUN/CREAT SERPL: 12 (ref 10–20)
CALCIUM BLD-MCNC: 9.7 MG/DL (ref 8.7–10.4)
CHLORIDE SERPL-SCNC: 103 MMOL/L (ref 98–112)
CO2 SERPL-SCNC: 31 MMOL/L (ref 21–32)
CREAT BLD-MCNC: 0.83 MG/DL
CRP SERPL-MCNC: 4.4 MG/DL (ref ?–1)
DEPRECATED RDW RBC AUTO: 43.8 FL (ref 35.1–46.3)
EGFRCR SERPLBLD CKD-EPI 2021: 84 ML/MIN/1.73M2 (ref 60–?)
EOSINOPHIL # BLD AUTO: 0.15 X10(3) UL (ref 0–0.7)
EOSINOPHIL NFR BLD AUTO: 2.2 %
ERYTHROCYTE [DISTWIDTH] IN BLOOD BY AUTOMATED COUNT: 13.7 % (ref 11–15)
ERYTHROCYTE [SEDIMENTATION RATE] IN BLOOD: 29 MM/HR
EST. AVERAGE GLUCOSE BLD GHB EST-MCNC: 183 MG/DL (ref 68–126)
GLUCOSE BLD-MCNC: 212 MG/DL (ref 70–99)
GLUCOSE BLDC GLUCOMTR-MCNC: 109 MG/DL (ref 70–99)
GLUCOSE BLDC GLUCOMTR-MCNC: 132 MG/DL (ref 70–99)
GLUCOSE BLDC GLUCOMTR-MCNC: 156 MG/DL (ref 70–99)
GLUCOSE BLDC GLUCOMTR-MCNC: 177 MG/DL (ref 70–99)
GLUCOSE BLDC GLUCOMTR-MCNC: 199 MG/DL (ref 70–99)
GLUCOSE BLDC GLUCOMTR-MCNC: 79 MG/DL (ref 70–99)
HBA1C MFR BLD: 8 % (ref ?–5.7)
HCT VFR BLD AUTO: 40.5 %
HGB BLD-MCNC: 13.5 G/DL
IMM GRANULOCYTES # BLD AUTO: 0.02 X10(3) UL (ref 0–1)
IMM GRANULOCYTES NFR BLD: 0.3 %
LYMPHOCYTES # BLD AUTO: 2.14 X10(3) UL (ref 1–4)
LYMPHOCYTES NFR BLD AUTO: 31.4 %
MCH RBC QN AUTO: 29.3 PG (ref 26–34)
MCHC RBC AUTO-ENTMCNC: 33.3 G/DL (ref 31–37)
MCV RBC AUTO: 87.9 FL
MONOCYTES # BLD AUTO: 0.55 X10(3) UL (ref 0.1–1)
MONOCYTES NFR BLD AUTO: 8.1 %
MRSA DNA SPEC QL NAA+PROBE: NEGATIVE
NEUTROPHILS # BLD AUTO: 3.94 X10 (3) UL (ref 1.5–7.7)
NEUTROPHILS # BLD AUTO: 3.94 X10(3) UL (ref 1.5–7.7)
NEUTROPHILS NFR BLD AUTO: 57.7 %
OSMOLALITY SERPL CALC.SUM OF ELEC: 295 MOSM/KG (ref 275–295)
PLATELET # BLD AUTO: 247 10(3)UL (ref 150–450)
POTASSIUM SERPL-SCNC: 3.3 MMOL/L (ref 3.5–5.1)
RBC # BLD AUTO: 4.61 X10(6)UL
SODIUM SERPL-SCNC: 140 MMOL/L (ref 136–145)
WBC # BLD AUTO: 6.8 X10(3) UL (ref 4–11)

## 2024-12-28 PROCEDURE — 99223 1ST HOSP IP/OBS HIGH 75: CPT | Performed by: STUDENT IN AN ORGANIZED HEALTH CARE EDUCATION/TRAINING PROGRAM

## 2024-12-28 PROCEDURE — 0JBR0ZZ EXCISION OF LEFT FOOT SUBCUTANEOUS TISSUE AND FASCIA, OPEN APPROACH: ICD-10-PCS | Performed by: STUDENT IN AN ORGANIZED HEALTH CARE EDUCATION/TRAINING PROGRAM

## 2024-12-28 PROCEDURE — 73030 X-RAY EXAM OF SHOULDER: CPT | Performed by: NURSE PRACTITIONER

## 2024-12-28 PROCEDURE — 73630 X-RAY EXAM OF FOOT: CPT | Performed by: NURSE PRACTITIONER

## 2024-12-28 RX ORDER — MORPHINE SULFATE 2 MG/ML
1 INJECTION, SOLUTION INTRAMUSCULAR; INTRAVENOUS EVERY 2 HOUR PRN
Status: DISCONTINUED | OUTPATIENT
Start: 2024-12-28 | End: 2024-12-28 | Stop reason: ALTCHOICE

## 2024-12-28 RX ORDER — POTASSIUM CHLORIDE 1500 MG/1
40 TABLET, EXTENDED RELEASE ORAL ONCE
Status: COMPLETED | OUTPATIENT
Start: 2024-12-28 | End: 2024-12-28

## 2024-12-28 RX ORDER — SODIUM PHOSPHATE, DIBASIC AND SODIUM PHOSPHATE, MONOBASIC 7; 19 G/230ML; G/230ML
1 ENEMA RECTAL ONCE AS NEEDED
Status: DISCONTINUED | OUTPATIENT
Start: 2024-12-28 | End: 2025-01-03

## 2024-12-28 RX ORDER — POLYETHYLENE GLYCOL 3350 17 G/17G
17 POWDER, FOR SOLUTION ORAL DAILY PRN
Status: DISCONTINUED | OUTPATIENT
Start: 2024-12-28 | End: 2025-01-03

## 2024-12-28 RX ORDER — SPIRONOLACTONE 50 MG/1
50 TABLET, FILM COATED ORAL DAILY
Status: DISCONTINUED | OUTPATIENT
Start: 2024-12-28 | End: 2025-01-03

## 2024-12-28 RX ORDER — ZOLPIDEM TARTRATE 5 MG/1
10 TABLET ORAL DAILY PRN
Status: DISCONTINUED | OUTPATIENT
Start: 2024-12-28 | End: 2025-01-03

## 2024-12-28 RX ORDER — MORPHINE SULFATE 4 MG/ML
4 INJECTION, SOLUTION INTRAMUSCULAR; INTRAVENOUS ONCE
Status: COMPLETED | OUTPATIENT
Start: 2024-12-28 | End: 2024-12-28

## 2024-12-28 RX ORDER — DEXTROSE MONOHYDRATE 25 G/50ML
50 INJECTION, SOLUTION INTRAVENOUS
Status: DISCONTINUED | OUTPATIENT
Start: 2024-12-28 | End: 2025-01-03

## 2024-12-28 RX ORDER — CLINDAMYCIN PHOSPHATE 900 MG/50ML
900 INJECTION, SOLUTION INTRAVENOUS EVERY 8 HOURS
Status: DISCONTINUED | OUTPATIENT
Start: 2024-12-28 | End: 2024-12-31

## 2024-12-28 RX ORDER — FUROSEMIDE 40 MG/1
40 TABLET ORAL DAILY
Status: DISCONTINUED | OUTPATIENT
Start: 2024-12-28 | End: 2025-01-03

## 2024-12-28 RX ORDER — CARVEDILOL 25 MG/1
25 TABLET ORAL 2 TIMES DAILY WITH MEALS
Status: DISCONTINUED | OUTPATIENT
Start: 2024-12-28 | End: 2025-01-03

## 2024-12-28 RX ORDER — ROSUVASTATIN CALCIUM 20 MG/1
20 TABLET, COATED ORAL NIGHTLY
Status: DISCONTINUED | OUTPATIENT
Start: 2024-12-28 | End: 2025-01-03

## 2024-12-28 RX ORDER — NICOTINE POLACRILEX 4 MG
30 LOZENGE BUCCAL
Status: DISCONTINUED | OUTPATIENT
Start: 2024-12-28 | End: 2025-01-03

## 2024-12-28 RX ORDER — MORPHINE SULFATE 2 MG/ML
2 INJECTION, SOLUTION INTRAMUSCULAR; INTRAVENOUS EVERY 2 HOUR PRN
Status: DISCONTINUED | OUTPATIENT
Start: 2024-12-28 | End: 2024-12-28 | Stop reason: ALTCHOICE

## 2024-12-28 RX ORDER — NICOTINE POLACRILEX 4 MG
15 LOZENGE BUCCAL
Status: DISCONTINUED | OUTPATIENT
Start: 2024-12-28 | End: 2025-01-03

## 2024-12-28 RX ORDER — LIDOCAINE HYDROCHLORIDE 10 MG/ML
20 INJECTION, SOLUTION EPIDURAL; INFILTRATION; INTRACAUDAL; PERINEURAL ONCE
Status: COMPLETED | OUTPATIENT
Start: 2024-12-28 | End: 2024-12-28

## 2024-12-28 RX ORDER — HYDROMORPHONE HYDROCHLORIDE 1 MG/ML
0.4 INJECTION, SOLUTION INTRAMUSCULAR; INTRAVENOUS; SUBCUTANEOUS EVERY 2 HOUR PRN
Status: DISCONTINUED | OUTPATIENT
Start: 2024-12-28 | End: 2025-01-03

## 2024-12-28 RX ORDER — HYDROCODONE BITARTRATE AND ACETAMINOPHEN 10; 325 MG/1; MG/1
2 TABLET ORAL EVERY 4 HOURS PRN
Status: DISCONTINUED | OUTPATIENT
Start: 2024-12-28 | End: 2024-12-28 | Stop reason: ALTCHOICE

## 2024-12-28 RX ORDER — SODIUM CHLORIDE 9 MG/ML
INJECTION, SOLUTION INTRAVENOUS CONTINUOUS
Status: ACTIVE | OUTPATIENT
Start: 2024-12-28 | End: 2024-12-28

## 2024-12-28 RX ORDER — PROCHLORPERAZINE EDISYLATE 5 MG/ML
5 INJECTION INTRAMUSCULAR; INTRAVENOUS EVERY 8 HOURS PRN
Status: DISCONTINUED | OUTPATIENT
Start: 2024-12-28 | End: 2025-01-03

## 2024-12-28 RX ORDER — NIFEDIPINE 30 MG/1
30 TABLET, EXTENDED RELEASE ORAL DAILY
Status: DISCONTINUED | OUTPATIENT
Start: 2024-12-28 | End: 2025-01-03

## 2024-12-28 RX ORDER — HYDROMORPHONE HYDROCHLORIDE 4 MG/1
2 TABLET ORAL EVERY 4 HOURS PRN
Status: DISCONTINUED | OUTPATIENT
Start: 2024-12-28 | End: 2025-01-03

## 2024-12-28 RX ORDER — MORPHINE SULFATE 4 MG/ML
4 INJECTION, SOLUTION INTRAMUSCULAR; INTRAVENOUS EVERY 2 HOUR PRN
Status: DISCONTINUED | OUTPATIENT
Start: 2024-12-28 | End: 2024-12-28 | Stop reason: ALTCHOICE

## 2024-12-28 RX ORDER — ACETAMINOPHEN 500 MG
500 TABLET ORAL EVERY 4 HOURS PRN
Status: DISCONTINUED | OUTPATIENT
Start: 2024-12-28 | End: 2024-12-28

## 2024-12-28 RX ORDER — ONDANSETRON 2 MG/ML
4 INJECTION INTRAMUSCULAR; INTRAVENOUS EVERY 6 HOURS PRN
Status: DISCONTINUED | OUTPATIENT
Start: 2024-12-28 | End: 2025-01-03

## 2024-12-28 RX ORDER — ARIPIPRAZOLE 5 MG/1
5 TABLET ORAL DAILY
Status: DISCONTINUED | OUTPATIENT
Start: 2024-12-28 | End: 2025-01-03

## 2024-12-28 RX ORDER — HYDROMORPHONE HYDROCHLORIDE 1 MG/ML
0.8 INJECTION, SOLUTION INTRAMUSCULAR; INTRAVENOUS; SUBCUTANEOUS EVERY 2 HOUR PRN
Status: DISCONTINUED | OUTPATIENT
Start: 2024-12-28 | End: 2025-01-03

## 2024-12-28 RX ORDER — LORAZEPAM 2 MG/ML
0.5 INJECTION INTRAMUSCULAR ONCE
Status: DISCONTINUED | OUTPATIENT
Start: 2024-12-28 | End: 2025-01-03

## 2024-12-28 RX ORDER — BISACODYL 10 MG
10 SUPPOSITORY, RECTAL RECTAL
Status: DISCONTINUED | OUTPATIENT
Start: 2024-12-28 | End: 2025-01-03

## 2024-12-28 RX ORDER — VANCOMYCIN HYDROCHLORIDE
20 ONCE
Status: COMPLETED | OUTPATIENT
Start: 2024-12-28 | End: 2024-12-28

## 2024-12-28 RX ORDER — ACETAMINOPHEN 500 MG
500 TABLET ORAL EVERY 4 HOURS PRN
Status: DISCONTINUED | OUTPATIENT
Start: 2024-12-28 | End: 2025-01-03

## 2024-12-28 RX ORDER — HYDROMORPHONE HYDROCHLORIDE 1 MG/ML
0.2 INJECTION, SOLUTION INTRAMUSCULAR; INTRAVENOUS; SUBCUTANEOUS EVERY 2 HOUR PRN
Status: DISCONTINUED | OUTPATIENT
Start: 2024-12-28 | End: 2025-01-03

## 2024-12-28 RX ORDER — INSULIN DEGLUDEC 100 U/ML
10 INJECTION, SOLUTION SUBCUTANEOUS NIGHTLY
Status: DISCONTINUED | OUTPATIENT
Start: 2024-12-28 | End: 2025-01-03

## 2024-12-28 RX ORDER — SENNOSIDES 8.6 MG
17.2 TABLET ORAL NIGHTLY PRN
Status: DISCONTINUED | OUTPATIENT
Start: 2024-12-28 | End: 2025-01-03

## 2024-12-28 NOTE — ED PROVIDER NOTES
Patient Seen in: [facility] Emergency Department      History     Chief Complaint   Patient presents with    Rash Skin Problem     Stated Complaint: infected left foot    Subjective:   53yo/f w hx of DM, anemia, asthma, depression. SLE, migraines, PE reports with Left posterior foot pain, drainage. 10 days. Was advised abx but was ?not on. Worsening, now spreading across posterior ankle with foul drainage. Sent from  today after eval. Has previously seen wound clinic but denies infections or amputations. No recent abx use. No systemic symptoms               Objective:     Past Medical History:    Anemia    due to blood loss    Anxiety    Asthma (HCC)    Atrial septal defect (HCC)    Back problem    Bipolar 2 disorder (HCC)    BRCA gene mutation negative in female    Negative 28 gene hereditary breast/gyn cancers panel, report in media tab    Breast cancer (HCC)    L sided, treated with chemo, radiation    Chronic back pain    Chronic constipation    COVID-19 long hauler    Depression    Diabetes (HCC)    Essential hypertension    Fibromyalgia    History of blood transfusion    4-5 per year    Hx of motion sickness    Hypercoagulable state (HCC)    Incontinence    Lupus    Migraines    Morbid obesity with BMI of 40.0-44.9, adult (HCC)    Muscle weakness    Neuropathy    PE (pulmonary thromboembolism) (HCC)     after child birth and again  (bilateral)    Pneumonia due to organism    PONV (postoperative nausea and vomiting)    Pulmonary embolism (HCC)    Schizophrenia (HCC)    Visual impairment    Glasses              Past Surgical History:   Procedure Laterality Date    Breast biopsy Left     for breast cancer          x 3    Cholecystectomy      Inguinal hernia repair  2011    Had inguinal and ventral hernia repair together @ [facility]    Needle biopsy left  2022    us guided bx axilla    Removal anal fistula,submuscular      repair of anal vaginal fistula after child  birth    Repair rotator cuff,acute Left     after trauma    Ventral hernia repair  2011    after last c/s                Social History     Socioeconomic History    Marital status: Single   Occupational History    Occupation: works for [employer] as    Tobacco Use    Smoking status: Former     Current packs/day: 0.00     Types: Cigarettes     Quit date: 2010     Years since quittin.3    Smokeless tobacco: Never   Vaping Use    Vaping status: Never Used   Substance and Sexual Activity    Alcohol use: Not Currently     Comment: Occ    Drug use: Yes     Types: Cannabis     Comment: Tea    Sexual activity: Not Currently     Partners: Male   Other Topics Concern    Blood Transfusions No    Caffeine Concern No    Exercise No   Social History Narrative        Her daughter in [place] is due at the end of 2018, but just recently had cerclage out.She is currently premed. She has daughters 23, 22, 19 and 8. Her 7 y/o is disabled b/c she was a 28 W premie with PPROM at [employer]. Patient is currently homeless b/c she lost her home after prolonged disability.        She is currently living with friends and family members. Plans to transfer her job to [place]                  Physical Exam     ED Triage Vitals [24]   BP (!) 168/92   Pulse 96   Resp 18   Temp 98.7 °F (37.1 °C)   Temp src Oral   SpO2 98 %   O2 Device None (Room air)       Current Vitals:   Vital Signs  BP: (!) 188/116  Pulse: 87  Resp: 16  Temp: 98.7 °F (37.1 °C)  Temp src: Oral  MAP (mmHg): (!) 138    Oxygen Therapy  SpO2: 99 %  O2 Device: None (Room air)        Physical Exam  Vitals and nursing note reviewed.   Constitutional:       General: She is not in acute distress.     Appearance: She is well-developed.   HENT:      Head: Normocephalic and atraumatic.      Nose: Nose normal.      Mouth/Throat:      Mouth: Mucous membranes are moist.   Eyes:      Conjunctiva/sclera: Conjunctivae normal.      Pupils: Pupils are  equal, round, and reactive to light.   Cardiovascular:      Rate and Rhythm: Normal rate and regular rhythm.      Heart sounds: Normal heart sounds.   Pulmonary:      Effort: Pulmonary effort is normal.      Breath sounds: Normal breath sounds.   Abdominal:      General: Bowel sounds are normal.      Palpations: Abdomen is soft.   Musculoskeletal:         General: Tenderness present. No deformity or signs of injury. Normal range of motion.      Cervical back: Normal range of motion and neck supple.   Skin:     General: Skin is warm and dry.      Capillary Refill: Capillary refill takes less than 2 seconds.      Findings: Erythema present. No rash.      Comments: Large blister/ulcer to posterior heel left foot, foul drainage, erythema, distal cms intact, no streaking    Neurological:      General: No focal deficit present.      Mental Status: She is alert and oriented to person, place, and time.      GCS: GCS eye subscore is 4. GCS verbal subscore is 5. GCS motor subscore is 6.      Cranial Nerves: No cranial nerve deficit.      Gait: Gait normal.             ED Course     Labs Reviewed   CBC WITH DIFFERENTIAL WITH PLATELET   BASIC METABOLIC PANEL (8)   AEROBIC BACTERIAL CULTURE                   MDM          Admission disposition: 12/28/2024 12:48 AM           Medical Decision Making  53yo/f w hx and exam as stated; foot erythema    No trauma  No fever  No systemic symptoms  Not rapidly changing  Stable    Discussed w podiatry who will consult  Discussed w [facility] hospitalist who will admit      Amount and/or Complexity of Data Reviewed  Labs:  Decision-making details documented in ED Course.  Radiology:  Decision-making details documented in ED Course.    Risk  OTC drugs.  Prescription drug management.        Disposition and Plan     Clinical Impression:  1. Diabetic ulcer of left heel associated with type 2 diabetes mellitus, unspecified ulcer stage (HCC)         Disposition:  Admit  12/28/2024 12:48  am    Follow-up:  No follow-up provider specified.        Medications Prescribed:  Current Discharge Medication List              Supplementary Documentation:         Hospital Problems       Present on Admission  Date Reviewed: 12/27/2024            ICD-10-CM Noted POA    * (Principal) Diabetic ulcer of left heel associated with type 2 diabetes mellitus, unspecified ulcer stage (HCC) E11.621, L97.429 12/28/2024 Unknown

## 2024-12-28 NOTE — ED QUICK NOTES
Rounding Completed    Plan of Care reviewed. Waiting for transport upstairs.  Elimination needs assessed.  Provided IV abx.    Bed is locked and in lowest position. Call light within reach.

## 2024-12-28 NOTE — PHYSICAL THERAPY NOTE
Per EMR and pt's RN, MRI has been ordered to r/o osteomyelitis. Per podiatry pt is WBAT however per RN pt is to ambulate to/from bathroom only right now and to avoid OOB activity other than necessity. RN stated they were able to ambulate pt to/from bathroom and is okay with PT holding at this time pending MRI results and POC. Will check back for updated POC pending MRI results tomorrow. Thank you,    Jenny Miller, PT  12/28/2024

## 2024-12-28 NOTE — CM/SW NOTE
MDO received for PHQ4.  CM added PHQ4 resources on AVS for patient reference. Per chart review, patient is compliant with antidepressant prescriptions and follow ups.    / to remain available for support and/or discharge planning.     Sheila Melgoza RN, BSN  Nurse   [phone]

## 2024-12-28 NOTE — PHYSICAL THERAPY NOTE
PT Orders and pt chart reviewed. Per EMR pt pending x-ray of L foot to r/o osteomyelitis. Per RN pt also having another x-ray of R shoulder as pt fell yesterday. Will hold PT pending results and updated pt POC.     Jenny Miller, PT  12/28/2024

## 2024-12-28 NOTE — ED QUICK NOTES
Orders for admission, patient is aware of plan and ready to go upstairs. Any questions, please call ED RN [phone] at extension [facility].     Patient Covid vaccination status: Unvaccinated     COVID Test Ordered in ED: None    COVID Suspicion at Admission: N/A    Running Infusions:      Mental Status/LOC at time of transport: A/Ox4    Other pertinent information:   CIWA score: N/A   NIH score:  N/A

## 2024-12-28 NOTE — ED INITIAL ASSESSMENT (HPI)
[facility] at [facility] for infection on left foot. Patient states she has lymphedema. Per patient infection is worse/different than in past. Was told she would be given antibiotics, prescription never sent. [facility]en at [facility] today told to come to ED for IV antibiotics for worsening infection.

## 2024-12-28 NOTE — PLAN OF CARE
I&D at bedside this am, MRI ordered of left foot, ID, clindamycin, zosyn,vanco  Problem: Diabetes/Glucose Control  Goal: Glucose maintained within prescribed range  Description: INTERVENTIONS:  - Monitor Blood Glucose as ordered  - Assess for signs and symptoms of hyperglycemia and hypoglycemia  - Administer ordered medications to maintain glucose within target range  - Assess barriers to adequate nutritional intake and initiate nutrition consult as needed  - Instruct patient on self management of diabetes  Outcome: Progressing     Problem: Patient Centered Care  Goal: Patient preferences are identified and integrated in the patient's plan of care  Description: Interventions:  - What would you like us to know as we care for you? From home with family  - Provide timely, complete, and accurate information to patient/family  - Incorporate patient and family knowledge, values, beliefs, and cultural backgrounds into the planning and delivery of care  - Encourage patient/family to participate in care and decision-making at the level they choose  - Honor patient and family perspectives and choices  Outcome: Progressing     Problem: Patient/Family Goals  Goal: Patient/Family Long Term Goal  Description: Patient's Long Term Goal: cure infection    Interventions:  - antiobiotics  - See additional Care Plan goals for specific interventions  Outcome: Progressing     Problem: RESPIRATORY - ADULT  Goal: Achieves optimal ventilation and oxygenation  Description: INTERVENTIONS:  - Assess for changes in respiratory status  - Assess for changes in mentation and behavior  - Position to facilitate oxygenation and minimize respiratory effort  - Oxygen supplementation based on oxygen saturation or ABGs  - Provide Smoking Cessation handout, if applicable  - Encourage broncho-pulmonary hygiene including cough, deep breathe, Incentive Spirometry  - Assess the need for suctioning and perform as needed  - Assess and instruct to report SOB or  any respiratory difficulty  - Respiratory Therapy support as indicated  - Manage/alleviate anxiety  - Monitor for signs/symptoms of CO2 retention  Outcome: Progressing     Problem: METABOLIC/FLUID AND ELECTROLYTES - ADULT  Goal: Electrolytes maintained within normal limits  Description: INTERVENTIONS:  - Monitor labs and rhythm and assess patient for signs and symptoms of electrolyte imbalances  - Administer electrolyte replacement as ordered  - Monitor response to electrolyte replacements, including rhythm and repeat lab results as appropriate  - Fluid restriction as ordered  - Instruct patient on fluid and nutrition restrictions as appropriate  Outcome: Progressing     Problem: RISK FOR INFECTION - ADULT  Goal: Absence of fever/infection during anticipated neutropenic period  Description: INTERVENTIONS  - Monitor WBC  - Administer growth factors as ordered  - Implement neutropenic guidelines  Outcome: Progressing     Problem: SAFETY ADULT - FALL  Goal: Free from fall injury  Description: INTERVENTIONS:  - Assess pt frequently for physical needs  - Identify cognitive and physical deficits and behaviors that affect risk of falls.  - [place] fall precautions as indicated by assessment.  - Educate pt/family on patient safety including physical limitations  - Instruct pt to call for assistance with activity based on assessment  - Modify environment to reduce risk of injury  - Provide assistive devices as appropriate  - Consider OT/PT consult to assist with strengthening/mobility  - Encourage toileting schedule  Outcome: Progressing     Problem: Patient/Family Goals  Goal: Patient/Family Short Term Goal  Description: Patient's Short Term Goal: decrease pain    Interventions:   - change pain meds  - See additional Care Plan goals for specific interventions  Outcome: Not Progressing     Problem: PAIN - ADULT  Goal: Verbalizes/displays adequate comfort level or patient's stated pain goal  Description: INTERVENTIONS:  -  Encourage pt to monitor pain and request assistance  - Assess pain using appropriate pain scale  - Administer analgesics based on type and severity of pain and evaluate response  - Implement non-pharmacological measures as appropriate and evaluate response  - Consider cultural and social influences on pain and pain management  - Manage/alleviate anxiety  - Utilize distraction and/or relaxation techniques  - Monitor for opioid side effects  - Notify MD/LIP if interventions unsuccessful or patient reports new pain  - Anticipate increased pain with activity and pre-medicate as appropriate  Outcome: Not Progressing

## 2024-12-28 NOTE — DISCHARGE INSTRUCTIONS
Depression and Anxiety   There is often a link between how someone is feeling physically and their emotional wellbeing. It isn’t unusual to experience depression or anxiety after a physical illness, major surgery or traumatic situation. It also isn’t unusual for someone dealing with anxiety or depression to develop other physical symptoms. The following tables will give you some comparisons between the physical and emotional symptoms of depression and anxiety.  Depression  Mood disorders like depression are quite common. It is not unusual for someone who is depressed to experience both physical and emotional symptoms. A primary care physician may help you understand if your physical symptoms are related to a recent physical illness, stress, emotional turmoil, or an unexpected trauma.  Physical Symptoms:   * Changes in appetite Changes in sleep patterns,Fatigue, Persistent unexplained aches and pains, Headaches, Chest pain, Digestive problems  Behavioral Symptoms:   * Sadness, Increased irritability, Easily frustrated, Low self-esteem, Loss of interest in, pleasurable activities, Poor concentration, Suicidal thoughts  Anxiety  Stress and anxiety go hand in hand. From generalized anxiety to phobic disorders and panic attacks, these behavioral issues can cause both physical and emotional symptoms. Your feelings of anxiety could be caused by a recent physical illness, stress, emotional distress or feelings associated with an unexpected trauma. As with other mental illnesses, especially anxiety disorders, allow your physician to determine the cause of your physical health symptoms.  Physical Symptoms:   * Trouble falling or staying asleep, Heart palpitations, Trembling, Irritability, Sweating/flushing, Frequent urination or diarrhea, Being easily startled  Behavioral Symptoms:   * Persistent state of apprehension or fear, Feelings of dread without valid cause, Irritability or edginess, Intense/sudden feelings of panic or  doom,    Feelings of detachment and unreality, Catastrophic thinking, Hyper-vigilance towards signs of danger  We’re here to help  Once the cause of your physical symptoms has been determined, your physician may recommend you see a psychiatrist, psychologist or a counselor for additional support. [facility] offers a free and confidential behavioral health assessment and specialized services at our locations in [facility] and [place].  For more information, call the [facility] Help Line, available 24/7, at [phone] or visit   [email or web address].      Diabetes:  Your A1C level is greater than 8%.  It is recommended that you attend an outpatient diabetes education program.  Please discuss with your Primary Care Provider at your next visit to obtain a referral.  If you wish to make an appointment at [facility], call [phone].      Home health provider  [facility]  [address] [place] [id number]  Phone: [phone]  Fax: [phone]

## 2024-12-28 NOTE — CONSULTS
[facility]  part of [facility] ID CONSULT NOTE    Suki Rosenberg Patient Status:  Inpatient    1970 MRN [id number]   Location [facility] 5SW/SE Attending Winsome Gaona MD   Hosp Day # 0 PCP Graham Recio DO       Reason for Consultation:  L heel ulcer    ASSESSMENT:    Antibiotics: Vancomycin, zosyn -    # L heel cellulitis with significant blistering and TTP with concerns for deeper infection, R/o OM   -FU drainage cx   -Podiatry to see  # R shoulder pain s/p fall   # Hypotension, R/o bacteremia  # Diabetes mellitus  # Obesity  # BLE lymphedema    PLAN:  -  Continue on vancomycin and zosyn. Add clindamycin.  -  Podiatry evaluation.  -  FU XR R shoulder.  -  Follow fever curve, wbc.  -  Reviewed labs, micro, imaging reports, available old records.  -  Case d/w patient, RN.    History of Present Illness:  Suki Rosenberg is a 54 year old female with a history of morbid obesity, BLE lymphedema, diabetes mellitus, who presented to [facility] ED on  with worsening drainage from her left heel. States that the left heel wound has bee present for about two weeks. Previously was followed at the wound clinic for wound on other heel. Patient was seen in  on  for L heel wound where she was sent home with silvadene and told to follow up with the wound clinic but unable to get an appointment until 1/3/25. Noticed more odor coming from foot on [place] with worsening pain. Patient did hit her right shoulder on a concrete wall yesterday and states unable to move her shoulder. No recent antibiotics. No fevers or chills at home. On arrival, afebrile, wbc 6.8, XR L foot pending, XR R shoulder pending, started on vancomycin and zosyn. Podiatry to see. Hypotensive this AM and receiving fluid bolus. Taking morphine for pain. ID consulted.    History:  Past Medical History:    Anemia    due to blood loss    Anxiety    Asthma (HCC)    Atrial septal defect (HCC)     Back problem    Bipolar 2 disorder (HCC)    BRCA gene mutation negative in female    Negative 28 gene hereditary breast/gyn cancers panel, report in media tab    Breast cancer (HCC)    L sided, treated with chemo, radiation    Chronic back pain    Chronic constipation    COVID-19 long hauler    Depression    Diabetes (HCC)    Essential hypertension    Fibromyalgia    History of blood transfusion    4-5 per year    Hx of motion sickness    Hypercoagulable state (HCC)    Incontinence    Lupus    Migraines    Morbid obesity with BMI of 40.0-44.9, adult (HCC)    Muscle weakness    Neuropathy    PE (pulmonary thromboembolism) (HCC)     after child birth and again  (bilateral)    Pneumonia due to organism    PONV (postoperative nausea and vomiting)    Pulmonary embolism (HCC)    Schizophrenia (HCC)    Visual impairment    Glasses     Past Surgical History:   Procedure Laterality Date    Breast biopsy Left     for breast cancer          x 3    Cholecystectomy      Inguinal hernia repair  2011    Had inguinal and ventral hernia repair together @ [facility]    Needle biopsy left  2022    us guided bx axilla    Removal anal fistula,submuscular      repair of anal vaginal fistula after child birth    Repair rotator cuff,acute Left     after trauma    Ventral hernia repair  2011    after last c/s     Family History   Problem Relation Age of Onset    Breast Cancer Self 49            Depression Mother     Hypertension Mother     Psychiatric Mother     Heart Disorder Father     Hypertension Father     Heart Disease Father     Other (a-Fib) Father     Heart Disease Brother     No Known Problems Maternal Grandmother     Heart Attack Maternal Grandfather     Dementia Paternal Grandmother     Heart Attack Paternal Grandfather     DVT/VTE Paternal Aunt 42         from PE    DVT/VTE Paternal Aunt 58         from PE    Heart Disease Other     Blood Disorder Other     Ovarian  Cancer Other     Colon Cancer Neg       reports that she quit smoking about 14 years ago. Her smoking use included cigarettes. She has never used smokeless tobacco. She reports that she does not currently use alcohol. She reports current drug use. Drug: Cannabis.    Allergies:  Allergies[1]    Medications:    Current Facility-Administered Medications:     amitriptyline (Elavil) tab 75 mg, 75 mg, Oral, Daily    ARIPiprazole (Abilify) tab 5 mg, 5 mg, Oral, Daily    carvedilol (Coreg) tab 25 mg, 25 mg, Oral, BID with meals    furosemide (Lasix) tab 40 mg, 40 mg, Oral, Daily    insulin aspart (NovoLOG) 100 Units/mL FlexPen 15 Units, 15 Units, Subcutaneous, TID AC    NIFEdipine ER (Procardia-XL) 24 hr tab 30 mg, 30 mg, Oral, Daily    rivaroxaban (Xarelto) tab 20 mg, 20 mg, Oral, Daily with food    rosuvastatin (Crestor) tab 20 mg, 20 mg, Oral, Nightly    sertraline (Zoloft) tab 50 mg, 50 mg, Oral, QPM    spironolactone (Aldactone) tab 50 mg, 50 mg, Oral, Daily    zolpidem (Ambien) tab 10 mg, 10 mg, Oral, Daily PRN    piperacillin-tazobactam (Zosyn) 3.375 g in dextrose 5% 100 mL IVPB-ADDV, 3.375 g, Intravenous, Q8H    acetaminophen (Tylenol Extra Strength) tab 500 mg, 500 mg, Oral, Q4H PRN    morphINE PF 2 MG/ML injection 1 mg, 1 mg, Intravenous, Q2H PRN **OR** morphINE PF 2 MG/ML injection 2 mg, 2 mg, Intravenous, Q2H PRN **OR** morphINE PF 4 MG/ML injection 4 mg, 4 mg, Intravenous, Q2H PRN    polyethylene glycol (PEG 3350) (Miralax) 17 g oral packet 17 g, 17 g, Oral, Daily PRN    sennosides (Senokot) tab 17.2 mg, 17.2 mg, Oral, Nightly PRN    bisacodyl (Dulcolax) 10 MG rectal suppository 10 mg, 10 mg, Rectal, Daily PRN    fleet enema (Fleet) rectal enema 133 mL, 1 enema, Rectal, Once PRN    ondansetron (Zofran) 4 MG/2ML injection 4 mg, 4 mg, Intravenous, Q6H PRN    prochlorperazine (Compazine) 10 MG/2ML injection 5 mg, 5 mg, Intravenous, Q8H PRN    glucose (Dex4) 15 GM/59ML oral liquid 15 g, 15 g, Oral, Q15 Min PRN  **OR** glucose (Glutose) 40% oral gel 15 g, 15 g, Oral, Q15 Min PRN **OR** glucose-vitamin C (Dex-4) chewable tab 4 tablet, 4 tablet, Oral, Q15 Min PRN **OR** dextrose 50% injection 50 mL, 50 mL, Intravenous, Q15 Min PRN **OR** glucose (Dex4) 15 GM/59ML oral liquid 30 g, 30 g, Oral, Q15 Min PRN **OR** glucose (Glutose) 40% oral gel 30 g, 30 g, Oral, Q15 Min PRN **OR** glucose-vitamin C (Dex-4) chewable tab 8 tablet, 8 tablet, Oral, Q15 Min PRN    insulin aspart (NovoLOG) 100 Units/mL FlexPen 1-7 Units, 1-7 Units, Subcutaneous, TID CC and HS    HYDROcodone-acetaminophen (Norco)  MG per tab 2 tablet, 2 tablet, Oral, Q4H PRN    vancomycin (Vancocin) 1,250 mg in sodium chloride 0.9% 500 mL IVPB, 1,250 mg, Intravenous, Q12H    Review of Systems:  CONSTITUTIONAL:  No weight loss, weakness or fatigue.  HEENT:  Eyes:  No visual loss, blurred vision, double vision or yellow sclerae. Ears, Nose, Throat:  No hearing loss, sneezing, congestion, runny nose or sore throat.  SKIN:  No rash or itching.  CARDIOVASCULAR:  No chest pain, chest pressure or chest discomfort  RESPIRATORY:  No shortness of breath, cough or sputum.  GASTROINTESTINAL:  No anorexia, nausea, vomiting or diarrhea. No abdominal pain or blood.  GENITOURINARY:  No Burning on urination.   NEUROLOGICAL:  No headache, dizziness, syncope, paralysis, ataxia, numbness or tingling in the extremities.  MUSCULOSKELETAL: +L heel pain, R shoulder pain  HEMATOLOGIC:  No anemia, bleeding or bruising.  ALLERGIES:  No history of asthma, hives, eczema or rhinitis.    Physical Exam:  Vital signs: Blood pressure 99/66, pulse 82, temperature 97.5 °F (36.4 °C), temperature source Oral, resp. rate 20, height 5' 2\" (1.575 m), weight 236 lb 8.9 oz (107.3 kg), last menstrual period 10/29/2018, SpO2 99%, not currently breastfeeding.    General: Awake, in bed  HEENT: Moist mucous membranes.   Neck: No lymphadenopathy.  Supple.  Cardiovascular: RRR  Respiratory: Clear to auscultation  bilaterally.  No wheezes. No rhonchi.  Abdomen: Soft, nontender, nondistended.   Musculoskeletal: BLE lymphedema, R shoulder with restricted ROM  Integument:L heel with area of blistering, foul odor, TTP throughout with surrounding erythema  Lines: PIV+    Laboratory Data:  Recent Labs   Lab 12/28/24  0021   RBC 4.61   HGB 13.5   HCT 40.5   MCV 87.9   MCH 29.3   MCHC 33.3   RDW 13.7   NEPRELIM 3.94   WBC 6.8   .0     Recent Labs   Lab 12/28/24  0021   *   BUN 10   CREATSERUM 0.83   CA 9.7      K 3.3*      CO2 31.0       Microbiology: Reviewed in EMR    Radiology: Reviewed    Thank you for allowing us to participate in the care of this patient. Please do not hesitate to call if you have any questions.   We will continue to follow with you and will make further recommendations based on his progress.    [facility]  [phone]  12/28/2024           [1]   Allergies  Allergen Reactions    Latex HIVES and SWELLING    Influenza Vaccines OTHER (SEE COMMENTS)     Pt passed out     Radiology Contrast Iodinated Dyes ITCHING

## 2024-12-28 NOTE — PLAN OF CARE
Problem: Diabetes/Glucose Control  Goal: Glucose maintained within prescribed range  Description: INTERVENTIONS:  - Monitor Blood Glucose as ordered  - Assess for signs and symptoms of hyperglycemia and hypoglycemia  - Administer ordered medications to maintain glucose within target range  - Assess barriers to adequate nutritional intake and initiate nutrition consult as needed  - Instruct patient on self management of diabetes  Outcome: Progressing     Problem: Patient Centered Care  Goal: Patient preferences are identified and integrated in the patient's plan of care  Description: Interventions:  - What would you like us to know as we care for you? From Home  - Provide timely, complete, and accurate information to patient/family  - Incorporate patient and family knowledge, values, beliefs, and cultural backgrounds into the planning and delivery of care  - Encourage patient/family to participate in care and decision-making at the level they choose  - Honor patient and family perspectives and choices  Outcome: Progressing     Problem: Patient/Family Goals  Goal: Patient/Family Long Term Goal  Description: Patient's Long Term Goal: discharge    Interventions:  - Monitor vitals, labs, imaging  - Wound care   - PT/OT  - follow MD orders   - Take all prescribed medications   - See additional Care Plan goals for specific interventions  Outcome: Progressing  Goal: Patient/Family Short Term Goal  Description: Patient's Short Term Goal: pain control    Interventions:   - Assess pain using 0-10 rating scale   - PRN pain medications, non-pharmological interventions   - See additional Care Plan goals for specific interventions  Outcome: Progressing     Problem: RESPIRATORY - ADULT  Goal: Achieves optimal ventilation and oxygenation  Description: INTERVENTIONS:  - Assess for changes in respiratory status  - Assess for changes in mentation and behavior  - Position to facilitate oxygenation and minimize respiratory effort  -  Oxygen supplementation based on oxygen saturation or ABGs  - Provide Smoking Cessation handout, if applicable  - Encourage broncho-pulmonary hygiene including cough, deep breathe, Incentive Spirometry  - Assess the need for suctioning and perform as needed  - Assess and instruct to report SOB or any respiratory difficulty  - Respiratory Therapy support as indicated  - Manage/alleviate anxiety  - Monitor for signs/symptoms of CO2 retention  Outcome: Progressing     Problem: METABOLIC/FLUID AND ELECTROLYTES - ADULT  Goal: Electrolytes maintained within normal limits  Description: INTERVENTIONS:  - Monitor labs and rhythm and assess patient for signs and symptoms of electrolyte imbalances  - Administer electrolyte replacement as ordered  - Monitor response to electrolyte replacements, including rhythm and repeat lab results as appropriate  - Fluid restriction as ordered  - Instruct patient on fluid and nutrition restrictions as appropriate  Outcome: Progressing     Problem: PAIN - ADULT  Goal: Verbalizes/displays adequate comfort level or patient's stated pain goal  Description: INTERVENTIONS:  - Encourage pt to monitor pain and request assistance  - Assess pain using appropriate pain scale  - Administer analgesics based on type and severity of pain and evaluate response  - Implement non-pharmacological measures as appropriate and evaluate response  - Consider cultural and social influences on pain and pain management  - Manage/alleviate anxiety  - Utilize distraction and/or relaxation techniques  - Monitor for opioid side effects  - Notify MD/LIP if interventions unsuccessful or patient reports new pain  - Anticipate increased pain with activity and pre-medicate as appropriate  Outcome: Progressing     Problem: RISK FOR INFECTION - ADULT  Goal: Absence of fever/infection during anticipated neutropenic period  Description: INTERVENTIONS  - Monitor WBC  - Administer growth factors as ordered  - Implement neutropenic  guidelines  Outcome: Progressing     Problem: SAFETY ADULT - FALL  Goal: Free from fall injury  Description: INTERVENTIONS:  - Assess pt frequently for physical needs  - Identify cognitive and physical deficits and behaviors that affect risk of falls.  - [place] fall precautions as indicated by assessment.  - Educate pt/family on patient safety including physical limitations  - Instruct pt to call for assistance with activity based on assessment  - Modify environment to reduce risk of injury  - Provide assistive devices as appropriate  - Consider OT/PT consult to assist with strengthening/mobility  - Encourage toileting schedule  Outcome: Progressing

## 2024-12-28 NOTE — ED INITIAL ASSESSMENT (HPI)
Pt states here for a wound on left foot that she has been dealing with, pt states has appointment with wound care but not until the 3rd.     Pt states yesterday feel yesterday and injured her right shoulder pt states has been having pain in area since and having issues with mobility.

## 2024-12-28 NOTE — PROGRESS NOTES
[facility] Dosing Service      Initial Pharmacokinetic Consult for Vancomycin Dosing     Suki Rosenberg is a 54 year old female who is being initiated on vancomycin therapy for  L Heel DFI .  Pharmacy has been asked to dose vancomycin by Dr Gaona.  The initial treatment and monitoring approach will be steady state AUC strategy.        Weight and Temperature:    Wt Readings from Last 1 Encounters:   24 107.3 kg (236 lb 8.9 oz)        Temp Readings from Last 1 Encounters:   24 97.7 °F (36.5 °C) (Oral)      Labs:   Recent Labs   Lab 24  0021   CREATSERUM 0.83      Estimated Creatinine Clearance: 62 mL/min (based on SCr of 0.83 mg/dL).     Recent Labs   Lab 24  0021   WBC 6.8          The Pharmacokinetic Target is:     to 600 mg-h/L and trough <=15 mg/L    Renal Dosing Considerations:    None     Assessment/Plan:   Initial/Loading dose: Has received 1500 mg IV (20 mg/kg, capped at 2250 mg) x 1 initial dose.      Maintenance dose: Pharmacy will dose vancomycin at 1250 mg IV every 12 hours    Monitorin) Plan for vancomycin peak and trough to be obtained at steady state    2) Pharmacy will order SCr as clinically indicated to assess renal function.    3) Pharmacy will monitor for toxicity and efficacy, adjust vancomycin dose and/or frequency, and order vancomycin levels as appropriate per the Pharmacy and Therapeutics Committee approved protocol until discontinuation of the medication.       We appreciate the opportunity to assist in the care of this patient.     Tyron Booth, PharmD  2024  2:10 AM  [facility]  Pharmacy Extension: [phone]

## 2024-12-28 NOTE — ED QUICK NOTES
Rounding Completed    Plan of Care reviewed. Waiting for lab results and bed assignment.  Elimination needs assessed.  Provided blanket.    Bed is locked and in lowest position. Call light within reach.   none

## 2024-12-28 NOTE — CONSULTS
[facility]  part of [facility]    Report of Consultation    Suki Rosenberg Patient Status:  Inpatient    1970 MRN [id number]   Location [facility] 5SW/SE Attending Jennyfer Dobbs,    Hosp Day # 0 PCP Graham Recio DO     Date of Admission:  2024  Date of Consult: 2024    Reason for Consultation:   Consults  Podiatry- Left Heel Wound   History provided by:patient  HPI:     Chief Complaint   Patient presents with    Rash Skin Problem     HPI  Patient presents to [facility] after suffering with a wound to the left heel for approximately 2 weeks now. Patient states that she has suffered with lymphedema for approximately 2 years now and routinely goes to the wound care center here at the hospital. She states she does not have an outpatient Podiatrist that she follows up with routinely. Patient states that approximately two weeks ago, she started experiencing pain to her left heel but did not notice any wound until 10 days ago where she noticed blistering and malodor. She went to urgent care which transported her to [place] [facility] for continued care. PMH: Diabetes, High Cholesterol, Lupus.   History     Past Medical History:    Anemia    due to blood loss    Anxiety    Asthma (HCC)    Atrial septal defect (HCC)    Back problem    Bipolar 2 disorder (HCC)    BRCA gene mutation negative in female    Negative 28 gene hereditary breast/gyn cancers panel, report in media tab    Breast cancer (HCC)    L sided, treated with chemo, radiation    Chronic back pain    Chronic constipation    COVID-19 long hauler    Depression    Diabetes (HCC)    Essential hypertension    Fibromyalgia    History of blood transfusion    4-5 per year    Hx of motion sickness    Hypercoagulable state (HCC)    Incontinence    Lupus    Migraines    Morbid obesity with BMI of 40.0-44.9, adult (HCC)    Muscle weakness    Neuropathy    PE (pulmonary thromboembolism) (HCC)     after child birth  and again  (bilateral)    Pneumonia due to organism    PONV (postoperative nausea and vomiting)    Pulmonary embolism (HCC)    Schizophrenia (HCC)    Visual impairment    Glasses     Past Surgical History:   Procedure Laterality Date    Breast biopsy Left     for breast cancer          x 3    Cholecystectomy      Inguinal hernia repair  2011    Had inguinal and ventral hernia repair together @ [place]    Needle biopsy left  2022    us guided bx axilla    Removal anal fistula,submuscular      repair of anal vaginal fistula after child birth    Repair rotator cuff,acute Left     after trauma    Ventral hernia repair  2011    after last c/s     Family History   Problem Relation Age of Onset    Breast Cancer Self 49            Depression Mother     Hypertension Mother     Psychiatric Mother     Heart Disorder Father     Hypertension Father     Heart Disease Father     Other (a-Fib) Father     Heart Disease Brother     No Known Problems Maternal Grandmother     Heart Attack Maternal Grandfather     Dementia Paternal Grandmother     Heart Attack Paternal Grandfather     DVT/VTE Paternal Aunt 42         from PE    DVT/VTE Paternal Aunt 58         from PE    Heart Disease Other     Blood Disorder Other     Ovarian Cancer Other     Colon Cancer Neg      Social History:  Social History     Socioeconomic History    Marital status: Single   Occupational History    Occupation: works for social security as [employer]   Tobacco Use    Smoking status: Former     Current packs/day: 0.00     Types: Cigarettes     Quit date: 2010     Years since quittin.3    Smokeless tobacco: Never   Vaping Use    Vaping status: Never Used   Substance and Sexual Activity    Alcohol use: Not Currently     Comment: Occ    Drug use: Yes     Types: Cannabis     Comment: Tea    Sexual activity: Not Currently     Partners: Male   Other Topics Concern    Blood Transfusions No    Caffeine Concern  No    Exercise No   Social History Narrative        Her daughter in [place] is due at the end of December 2018, but just recently had cerclage out.She is currently premed. She has daughters 23, 22, 19 and 8. Her 9 y/o is disabled b/c she was a 28 W premie with PPROM at [place]. Patient is currently homeless b/c she lost her home after prolonged disability.        She is currently living with friends and family members. Plans to transfer her job to [place]     Social Drivers of Health     Food Insecurity: No Food Insecurity (12/28/2024)    Food Insecurity     Food Insecurity: Never true   Transportation Needs: No Transportation Needs (12/28/2024)    Transportation Needs     Lack of Transportation: No   Housing Stability: Low Risk  (12/28/2024)    Housing Stability     Housing Instability: No     Allergies/Medications:   Allergies: Allergies[1]  Medications Prior to Admission   Medication Sig    zolpidem 10 MG Oral Tab Take 1 tablet (10 mg total) by mouth daily as needed for Sleep.    amitriptyline 75 MG Oral Tab Take 1 tablet (75 mg total) by mouth daily.    sertraline 50 MG Oral Tab Take 1 tablet (50 mg total) by mouth every evening. Take half tablet for the first week    semaglutide (OZEMPIC, 2 MG/DOSE,) 8 MG/3ML Subcutaneous Solution Pen-injector Inject 2 mg into the skin once a week.    insulin aspart (NOVOLOG FLEXPEN) 100 Units/mL Subcutaneous Solution Pen-injector Inject 15 Units into the skin 3 (three) times daily before meals.    furosemide (LASIX) 40 MG Oral Tab Take 1 tablet (40 mg total) by mouth daily.    rivaroxaban (XARELTO) 20 MG Oral Tab Take 1 tablet (20 mg total) by mouth daily with food.    spironolactone 50 MG Oral Tab Take 1 tablet (50 mg total) by mouth daily.    NIFEdipine ER 30 MG Oral Tablet 24 Hr Take 1 tablet (30 mg total) by mouth daily.    carvedilol 25 MG Oral Tab Take 1 tablet (25 mg total) by mouth 2 (two) times daily with meals.    METFORMIN HCL 1000 MG Oral Tab TAKE 1 TABLET BY  MOUTH TWICE A DAY WITH MEALS    LOSARTAN 100 MG Oral Tab TAKE 1 TABLET BY MOUTH EVERY DAY    traMADol 50 MG Oral Tab Take 1 tablet (50 mg total) by mouth every 6 (six) hours as needed for Pain.    rosuvastatin 20 MG Oral Tab Take 1 tablet (20 mg total) by mouth nightly.    Continuous Glucose Sensor (FREESTYLE NAA 2 SENSOR) Does not apply Misc 1 each every 14 (fourteen) days.    ARIPiprazole 5 MG Oral Tab     lidocaine 5 % External Patch Place 1 patch onto the skin daily.       Review of Systems:   Review of Systems    Physical Exam:   Vital Signs:   height is 5' 2\" (1.575 m) and weight is 236 lb 8.9 oz (107.3 kg). Her oral temperature is 97.5 °F (36.4 °C). Her blood pressure is 152/95 (abnormal) and her pulse is 85. Her respiration is 20 and oxygen saturation is 97%.   Physical Exam  Vascular: DP and PT pulses to left foot faintly palpable at this time.   MSK: ROM to bilateral extremities is present and normal at this time. Ankle able to dorsiflex, plantarflex, invert and danika normally. Pain upon palpation of left achilles tendon and left heel.   Skin: Blistering is noted to the left heel and posterior aspect of left upon deroofing of blisters, there is a heel decubitus ulcer to the left foot with malodor. Maceration is noted to the wound edges at this time.   Results:     Lab Results   Component Value Date    WBC 6.8 12/28/2024    HGB 13.5 12/28/2024    HCT 40.5 12/28/2024    .0 12/28/2024    CREATSERUM 0.83 12/28/2024    BUN 10 12/28/2024     12/28/2024    K 3.3 (L) 12/28/2024     12/28/2024    CO2 31.0 12/28/2024     (H) 12/28/2024    CA 9.7 12/28/2024    ALB 4.2 03/11/2024    ALKPHO 82 03/11/2024    BILT 0.5 03/11/2024    TP 7.3 03/11/2024    AST 18 03/11/2024    ALT 11 03/11/2024    PTT 26.4 01/07/2018    INR 1.1 01/07/2018    TSH 1.178 11/20/2023     01/29/2023    DDIMER <0.27 04/26/2018    ESRML 29 12/28/2024    CRP 4.40 (H) 12/28/2024    MG 1.8 11/23/2018    TROP 0.01  04/26/2018    B12 813 08/30/2016     XR FOOT, COMPLETE (MIN 3 VIEWS), LEFT (CPT=73630)    Result Date: 12/28/2024  CONCLUSION: Soft tissue inflammation along the dorsal aspect of the midfoot which may represent changes of soft tissue injury, cellulitis, vascular insufficiency, or other inflammatory process.  Calcaneal spur.  No acute osseous abnormality of the left foot.   [facility] provided a preliminary report for this examination. This final report agrees with their preliminary findings.   Dictated by (CST): Oskar Brown MD on 12/28/2024 at 10:51 AM     Finalized by (CST): Oskar Brown MD on 12/28/2024 at 10:52 AM          XR SHOULDER, COMPLETE (MIN 2 VIEWS), RIGHT (CPT=73030)    Result Date: 12/28/2024  CONCLUSION: Linear calcification lateral to the greater tuberosity which could represent calcific tendinosis of the rotator cuff or loose body.  No acute osseous abnormality of the left shoulder.   [facility] provided a preliminary report for this examination. This final report agrees with their preliminary findings.   Dictated by (CST): Oskar Brown MD on 12/28/2024 at 10:38 AM     Finalized by (CST): Oskar Brown MD on 12/28/2024 at 10:40 AM               Impression:     Diabetic ulcer of left heel associated with type 2 diabetes mellitus, unspecified ulcer stage (HCC)       Recommendations:  - MRI to rule out any deep infection/OM to the left foot at this time  - Arterial studies were ordered at this time  - Bedside I&D was performed. No purulence or significant drainage at this time.   - Xrays were reviewed in depth   - Patient to continue abx at this time   - Pain medication was prescribed   - Patient able to WBAT  - Wound Cx: Pending   - Further plan once MRI and Arterial studies are completed  - Betadine DSD applied to left heel   - Plan discussed with patient and nurse in depth   - Please contact if any questions at [phone]    Dr. Jatin Campbell, DPBRENT  12/28/2024         [1]    Allergies  Allergen Reactions    Latex HIVES and SWELLING    Influenza Vaccines OTHER (SEE COMMENTS)     Pt passed out     Radiology Contrast Iodinated Dyes ITCHING

## 2024-12-28 NOTE — H&P
[facility]  part of [facility]    History & Physical    Suki Rosenberg Patient Status:  Emergency    1970 MRN [id number]   Location [facility] EMERGENCY DEPARTMENT Attending No att. providers found   Hosp Day # 0 PCP Graham Recio DO     Date:  2024  Date of Admission:  2024    Chief Complaint:  Chief Complaint   Patient presents with    Rash Skin Problem     Assessment and Plan:    Recurrent diabetic left heel ulcer  -Swelling/erythema/foul-smelling noted on physical exam.  Patient has had issues with left heel ulcer in the last year and previously has had it drained by wound care.    -X-ray of left foot currently pending to look for possible osteomyelitis  -Patient was given a dose of Zosyn and vancomycin in the ED.  Continue empiric antibiotic coverage with vancomycin/Zosyn.  -Podiatry consult, appreciate recs possible debridement  -Infectious disease on consult, appreciate recS for management of antibiotics  -Wound care consulted    IDDM type II  -Resume home dose of insulin.  Diabetic diet.  Insulin sliding scale.    History of pulmonary embolism  -Resume Xarelto    Essential hypertension  -Resume home dose of nifedipine/Coreg/Aldactone  -Closely monitor blood pressure    Other medical conditions  Anxiety/bipolar disorder/depression  Breast cancer  Fibromyalgia    Prophylaxis  Xarelto    CODE STATUS  Full    History of Present Illness:  Suki Rosenberg is a(n) 54 year old female, who presents for evaluation of left heel swelling erythema foul-smelling drainage.  Patient was seen earlier at immediate care and advised to come to the ER for antibiotics.  Patient has had recurrent left heel ulcer for the last year.  Previously she followed with wound care and had the ulcer drained but for the last couple of weeks she has had an increase in pain and swelling to the left heel.  Today the smell was unbearable along with the pain and patient decided to come for  evaluation.  She is not able to see wound care until January 3, 2025.  Patient reports chills but no fever.  She denies any other symptoms.  Denies any chest pain or shortness of breath.  Denies any fever.  Denies any lightheadedness or dizziness.  She noticed drainage from the left heel today.  On presentation to the ED, initial vital signs reveal temp 98.7, heart rate 96, respiratory 18, blood pressure 168/92, SpO2 98% on room air.  Lab work reveals WBC 6.8.  Left foot x-ray pending at this time.  Patient was given a dose of Zosyn and vancomycin as well as morphine in the ED.  Patient admitted under hospitalist service with consultation to podiatry and ID.    History:  Past Medical History:    Anemia    due to blood loss    Anxiety    Asthma (HCC)    Atrial septal defect (HCC)    Back problem    Bipolar 2 disorder (HCC)    BRCA gene mutation negative in female    Negative 28 gene hereditary breast/gyn cancers panel, report in media tab    Breast cancer (HCC)    L sided, treated with chemo, radiation    Chronic back pain    Chronic constipation    COVID-19 long hauler    Depression    Diabetes (HCC)    Essential hypertension    Fibromyalgia    History of blood transfusion    4-5 per year    Hx of motion sickness    Hypercoagulable state (HCC)    Incontinence    Lupus    Migraines    Morbid obesity with BMI of 40.0-44.9, adult (HCC)    Muscle weakness    Neuropathy    PE (pulmonary thromboembolism) (HCC)     after child birth and again  (bilateral)    Pneumonia due to organism    PONV (postoperative nausea and vomiting)    Pulmonary embolism (HCC)    Schizophrenia (HCC)    Visual impairment    Glasses     Past Surgical History:   Procedure Laterality Date    Breast biopsy Left     for breast cancer          x 3    Cholecystectomy      Inguinal hernia repair  2011    Had inguinal and ventral hernia repair together @ [place]    Needle biopsy left  2022    us guided bx axilla     Removal anal fistula,submuscular      repair of anal vaginal fistula after child birth    Repair rotator cuff,acute Left     after trauma    Ventral hernia repair  2011    after last c/s     Family History   Problem Relation Age of Onset    Breast Cancer Self 49            Depression Mother     Hypertension Mother     Psychiatric Mother     Heart Disorder Father     Hypertension Father     Heart Disease Father     Other (a-Fib) Father     Heart Disease Brother     No Known Problems Maternal Grandmother     Heart Attack Maternal Grandfather     Dementia Paternal Grandmother     Heart Attack Paternal Grandfather     DVT/VTE Paternal Aunt 42         from PE    DVT/VTE Paternal Aunt 58         from PE    Heart Disease Other     Blood Disorder Other     Ovarian Cancer Other     Colon Cancer Neg       reports that she quit smoking about 14 years ago. Her smoking use included cigarettes. She has never used smokeless tobacco. She reports that she does not currently use alcohol. She reports current drug use. Drug: Cannabis.    Allergies:  Allergies[1]    Home Medications:  Prior to Admission Medications   Prescriptions Last Dose Informant Patient Reported? Taking?   ARIPiprazole 5 MG Oral Tab   Yes No   Continuous Glucose Sensor (FREESTYLE NAA 2 SENSOR) Does not apply Misc   No No   Si each every 14 (fourteen) days.   LOSARTAN 100 MG Oral Tab   No No   Sig: TAKE 1 TABLET BY MOUTH EVERY DAY   METFORMIN HCL 1000 MG Oral Tab   No No   Sig: TAKE 1 TABLET BY MOUTH TWICE A DAY WITH MEALS   NIFEdipine ER 30 MG Oral Tablet 24 Hr   No No   Sig: Take 1 tablet (30 mg total) by mouth daily.   amitriptyline 75 MG Oral Tab   No No   Sig: Take 1 tablet (75 mg total) by mouth daily.   carvedilol 25 MG Oral Tab   No No   Sig: Take 1 tablet (25 mg total) by mouth 2 (two) times daily with meals.   furosemide (LASIX) 40 MG Oral Tab   No No   Sig: Take 1 tablet (40 mg total) by mouth daily.   insulin aspart (NOVOLOG  FLEXPEN) 100 Units/mL Subcutaneous Solution Pen-injector   No No   Sig: Inject 15 Units into the skin 3 (three) times daily before meals.   lidocaine 5 % External Patch   No No   Sig: Place 1 patch onto the skin daily.   rivaroxaban (XARELTO) 20 MG Oral Tab   No No   Sig: Take 1 tablet (20 mg total) by mouth daily with food.   rosuvastatin 20 MG Oral Tab   No No   Sig: Take 1 tablet (20 mg total) by mouth nightly.   semaglutide (OZEMPIC, 2 MG/DOSE,) 8 MG/3ML Subcutaneous Solution Pen-injector   No No   Sig: Inject 2 mg into the skin once a week.   sertraline 50 MG Oral Tab   No No   Sig: Take 1 tablet (50 mg total) by mouth every evening. Take half tablet for the first week   spironolactone 50 MG Oral Tab   No No   Sig: Take 1 tablet (50 mg total) by mouth daily.   traMADol 50 MG Oral Tab   No No   Sig: Take 1 tablet (50 mg total) by mouth every 6 (six) hours as needed for Pain.   zolpidem 10 MG Oral Tab   No No   Sig: Take 1 tablet (10 mg total) by mouth daily as needed for Sleep.      Facility-Administered Medications: None       Review of Systems:  Constitutional:  + Chills  Eye:  Negative.  Ear/Nose/Mouth/Throat:  Negative.  Respiratory:  Negative  Cardiovascular: Negative  Gastrointestinal:  Negative.  Genitourinary:  Negative  Endocrine:  Negative.  Immunologic:  Negative.  Musculoskeletal:  + Left heel pain  Integumentary:  Negative.  Neurologic:  Negative.  Psychiatric:  Negative.  ROS reviewed as documented in chart    Physical Exam:  Temp:  [98.5 °F (36.9 °C)-98.7 °F (37.1 °C)] 98.7 °F (37.1 °C)  Pulse:  [87-96] 87  Resp:  [16-20] 16  BP: (155-188)/() 188/116  SpO2:  [98 %-99 %] 99 %    General:  Alert and oriented.  Diffuse skin problem:  None.  Eye:  Pupils are equal, round and reactive to light, extraocular movements are intact, Normal conjunctiva.  HENT:  Normocephalic, oral mucosa is moist.  Head:  Normocephalic, atraumatic.  Neck:  Supple, non-tender, no carotid bruit, no jugular venous  distention, no lymphadenopathy, no thyromegaly.  Respiratory:  Lungs are clear to auscultation, respirations are non-labored, breath sounds are equal, symmetrical chest wall expansion.  Cardiovascular:  Normal rate, regular rhythm, no murmur, no edema.  Gastrointestinal:  Soft, non-tender, non-distended, normal bowel sounds, no organomegaly.  Lymphatics:  No lymphadenopathy neck, axilla, groin.  Musculoskeletal: Normal range of motion.  normal strength.  Left heel ulcer noted with swelling, erythema and foul smell, drainage noted  Feet:  Normal pulses.  Neurologic:  Alert, oriented, no focal deficits, cranial nerves II-XII are grossly intact.  Cognition and Speech:  Oriented, speech clear and coherent.  Psychiatric:  Cooperative, appropriate mood & affect.      Laboratory Data:   Lab Results   Component Value Date    WBC 6.8 12/28/2024    HGB 13.5 12/28/2024    HCT 40.5 12/28/2024    .0 12/28/2024       Imaging:  No results found.     Primary care physician  Graham Recio DO    60 minutes spent on this admission - examining patient, obtaining history, reviewing previous medical records, going over test results/imaging and discussing plan of care. All questions answered.     Disposition  Clinical course will dictate outcome      Winsome Gaona MD  12/28/2024  12:37 AM            [1]   Allergies  Allergen Reactions    Latex HIVES and SWELLING    Influenza Vaccines OTHER (SEE COMMENTS)     Pt passed out     Radiology Contrast Iodinated Dyes ITCHING

## 2024-12-28 NOTE — ED PROVIDER NOTES
Patient Seen in: [facility]      History   No chief complaint on file.    Stated Complaint: Left foot ulcer with odor, Rt arm pain    Subjective:   HPI  Patient is a 54-year-old female diabetic that presents to the immediate care center with a concern for pain to the left heel.  This has been persistent and has been progressing for a couple of weeks.  She was evaluated 10 days ago.  At that time, the area was simply blistered with no erythema, no skin color change.  She has an appointment scheduled with wound care clinic 1 week from today.  She is here concerned because of increasing pain, foul from the area, drainage, and redness and swelling.  She is having difficulty ambulating because of the pain.    Patient has second, unrelated concern and that she has pain to her right shoulder.  She was walking yesterday, lost balance, fell from a standing position onto the right shoulder.  She has been having difficulty with range of motion and pain to the shoulder since that time.            Objective:     Past Medical History:    Anemia    due to blood loss    Anxiety    Asthma (HCC)    Atrial septal defect (HCC)    Back problem    Bipolar 2 disorder (HCC)    BRCA gene mutation negative in female    Negative 28 gene hereditary breast/gyn cancers panel, report in media tab    Breast cancer (HCC)    L sided, treated with chemo, radiation    Chronic back pain    Chronic constipation    COVID-19 long hauler    Depression    Diabetes (HCC)    Essential hypertension    Fibromyalgia    History of blood transfusion    4-5 per year    Hx of motion sickness    Hypercoagulable state (HCC)    Incontinence    Lupus    Migraines    Morbid obesity with BMI of 40.0-44.9, adult (HCC)    Muscle weakness    Neuropathy    PE (pulmonary thromboembolism) (HCC)    1999 after child birth and again 2014 (bilateral)    Pneumonia due to organism    PONV (postoperative nausea and vomiting)    Pulmonary embolism (HCC)    Schizophrenia  (HCC)    Visual impairment    Glasses              Past Surgical History:   Procedure Laterality Date    Breast biopsy Left     for breast cancer          x 3    Cholecystectomy      Inguinal hernia repair  2011    Had inguinal and ventral hernia repair together @ [facility]    Needle biopsy left  2022    us guided bx axilla    Removal anal fistula,submuscular      repair of anal vaginal fistula after child birth    Repair rotator cuff,acute Left     after trauma    Ventral hernia repair  2011    after last c/s                Social History     Socioeconomic History    Marital status: Single   Occupational History    Occupation: works for [employer] as [employer]   Tobacco Use    Smoking status: Former     Current packs/day: 0.00     Types: Cigarettes     Quit date: 2010     Years since quittin.3    Smokeless tobacco: Never   Vaping Use    Vaping status: Never Used   Substance and Sexual Activity    Alcohol use: Not Currently     Comment: Occ    Drug use: Yes     Types: Cannabis     Comment: Tea    Sexual activity: Not Currently     Partners: Male   Other Topics Concern    Blood Transfusions No    Caffeine Concern No    Exercise No   Social History Narrative        Her daughter in [place] is due at the end of 2018, but just recently had cerclage out.She is currently premed. She has daughters 23, 22, 19 and 8. Her 9 y/o is disabled b/c she was a 28 W premie with PPROM at [facility]. Patient is currently homeless b/c she lost her home after prolonged disability.        She is currently living with friends and family members. Plans to transfer her job to [place]              Review of Systems   Constitutional:  Positive for fatigue. Negative for appetite change, chills and fever.   Musculoskeletal:  Positive for arthralgias.   Skin:  Positive for wound.   Neurological:  Negative for weakness.       Positive for stated complaint: Left foot ulcer with odor, Rt arm  pain  Other systems are as noted in HPI.  Constitutional and vital signs reviewed.      All other systems reviewed and negative except as noted above.    Physical Exam     ED Triage Vitals [12/27/24 1754]   /90   Pulse 92   Resp 20   Temp 98.5 °F (36.9 °C)   Temp src Oral   SpO2 98 %   O2 Device None (Room air)       Current Vitals:   Vital Signs  BP: 155/90  Pulse: 92  Resp: 20  Temp: 98.5 °F (36.9 °C)  Temp src: Oral    Oxygen Therapy  SpO2: 98 %  O2 Device: None (Room air)        Physical Exam  Vitals and nursing note reviewed.   Constitutional:       General: She is in acute distress (Peers uncomfortable with facial grimace with position change.).   Cardiovascular:      Pulses: Normal pulses.   Pulmonary:      Effort: Pulmonary effort is normal. No respiratory distress.   Musculoskeletal:      Right shoulder: Tenderness present. No swelling or deformity. Decreased range of motion.      Right elbow: Normal range of motion. No tenderness.   Feet:      Left foot:      Skin integrity: Ulcer and blister present.      Comments: See photograph imported to the medical record for pictorial depiction of the wound to patient's left heel: There is a large blister that encompasses the heel and extends to the distal ankle; there is an area centrally on the heel that is darkened, concern for necrosis; there is serosanguineous drainage from the area.  Skin:     General: Skin is warm and dry.   Neurological:      Mental Status: She is oriented to person, place, and time.   Psychiatric:         Behavior: Behavior normal.                 ED Course   Labs Reviewed - No data to display                MDM      Patient was informed that the wound on her left heel was significant.  There is concern for necrosis.  She was informed that this is going to require resources for both evaluation and treatment beyond what is available in the immediate care center.  She was informed of potential risk for osteomyelitis and sepsis.  For  these reasons, she was informed that it is imperative that she go directly from here to the emergency department for further evaluation and treatment.    Patient was advised that her right shoulder injury was also potentially significant.  However, we will not delay the care of her foot wound for x-rays here.  She was informed that all evaluation will be available in the emergency department.  Both she and family members at bedside state understanding agree with plan.              Medical Decision Making  Differential diagnoses considered today include, but are not exclusive of: Diabetic foot infection, osteomyelitis, necrosis of the heel, necrotizing fasciitis, sepsis; contusion of the right shoulder, fracture of the right humerus or scapula, dislocation of the right shoulder, rotator cuff injury.    Problems Addressed:  Acute pain of right shoulder: undiagnosed new problem with uncertain prognosis  Diabetic foot infection (HCC): undiagnosed new problem with uncertain prognosis        Disposition and Plan     Clinical Impression:  1. Diabetic foot infection (HCC)    2. Acute pain of right shoulder         Disposition:  Ic to ed  12/27/2024  7:02 pm    Follow-up:  No follow-up provider specified.        Medications Prescribed:  Discharge Medication List as of 12/27/2024  7:08 PM              Supplementary Documentation:

## 2024-12-29 LAB
ANION GAP SERPL CALC-SCNC: 6 MMOL/L (ref 0–18)
BASOPHILS # BLD AUTO: 0.04 X10(3) UL (ref 0–0.2)
BASOPHILS NFR BLD AUTO: 0.6 %
BUN BLD-MCNC: 11 MG/DL (ref 9–23)
BUN/CREAT SERPL: 9.6 (ref 10–20)
CALCIUM BLD-MCNC: 8.8 MG/DL (ref 8.7–10.4)
CHLORIDE SERPL-SCNC: 108 MMOL/L (ref 98–112)
CO2 SERPL-SCNC: 26 MMOL/L (ref 21–32)
CREAT BLD-MCNC: 1.14 MG/DL
DEPRECATED RDW RBC AUTO: 46.6 FL (ref 35.1–46.3)
EGFRCR SERPLBLD CKD-EPI 2021: 58 ML/MIN/1.73M2 (ref 60–?)
EOSINOPHIL # BLD AUTO: 0.23 X10(3) UL (ref 0–0.7)
EOSINOPHIL NFR BLD AUTO: 3.7 %
ERYTHROCYTE [DISTWIDTH] IN BLOOD BY AUTOMATED COUNT: 13.8 % (ref 11–15)
GLUCOSE BLD-MCNC: 187 MG/DL (ref 70–99)
GLUCOSE BLDC GLUCOMTR-MCNC: 133 MG/DL (ref 70–99)
GLUCOSE BLDC GLUCOMTR-MCNC: 138 MG/DL (ref 70–99)
GLUCOSE BLDC GLUCOMTR-MCNC: 140 MG/DL (ref 70–99)
GLUCOSE BLDC GLUCOMTR-MCNC: 144 MG/DL (ref 70–99)
HCT VFR BLD AUTO: 37.4 %
HGB BLD-MCNC: 12.1 G/DL
IMM GRANULOCYTES # BLD AUTO: 0.01 X10(3) UL (ref 0–1)
IMM GRANULOCYTES NFR BLD: 0.2 %
LYMPHOCYTES # BLD AUTO: 2.02 X10(3) UL (ref 1–4)
LYMPHOCYTES NFR BLD AUTO: 32.5 %
MCH RBC QN AUTO: 29.6 PG (ref 26–34)
MCHC RBC AUTO-ENTMCNC: 32.4 G/DL (ref 31–37)
MCV RBC AUTO: 91.4 FL
MONOCYTES # BLD AUTO: 0.62 X10(3) UL (ref 0.1–1)
MONOCYTES NFR BLD AUTO: 10 %
NEUTROPHILS # BLD AUTO: 3.3 X10 (3) UL (ref 1.5–7.7)
NEUTROPHILS # BLD AUTO: 3.3 X10(3) UL (ref 1.5–7.7)
NEUTROPHILS NFR BLD AUTO: 53 %
OSMOLALITY SERPL CALC.SUM OF ELEC: 294 MOSM/KG (ref 275–295)
PLATELET # BLD AUTO: 241 10(3)UL (ref 150–450)
POTASSIUM SERPL-SCNC: 3.8 MMOL/L (ref 3.5–5.1)
POTASSIUM SERPL-SCNC: 3.8 MMOL/L (ref 3.5–5.1)
RBC # BLD AUTO: 4.09 X10(6)UL
SODIUM SERPL-SCNC: 140 MMOL/L (ref 136–145)
VANCOMYCIN SERPL-MCNC: 18.9 UG/ML (ref ?–40)
WBC # BLD AUTO: 6.2 X10(3) UL (ref 4–11)

## 2024-12-29 PROCEDURE — 99233 SBSQ HOSP IP/OBS HIGH 50: CPT | Performed by: HOSPITALIST

## 2024-12-29 RX ORDER — VANCOMYCIN HYDROCHLORIDE
1250 EVERY 24 HOURS
Status: DISCONTINUED | OUTPATIENT
Start: 2024-12-30 | End: 2024-12-31

## 2024-12-29 NOTE — OCCUPATIONAL THERAPY NOTE
Pt pending MRI and POC. Pt has been up with staff to bathroom only. Will follow.      Yadira Styles OT  [facility]  Inpatient Rehabilitation  Occupational Therapy  [phone]

## 2024-12-29 NOTE — PROGRESS NOTES
[facility]  part of [facility]  Hospitalist Progress Note     Suki Rosenberg Patient Status:  Inpatient    1970  54 year old CSN [id number]   Location 552/552-A Attending Rowdy Jj MD   Hosp Day # 1 PCP Graham Recio DO     Assessment & Plan:   ----------------------------------  Diabetic foot infection.  Cannot rule out osteomyelitis, abscess.  -MRI pending  -Arterial studies pending  -Podiatry infectious disease consult appreciated  -Pain control  -Continue current antibiotics    Other problems  History of PE  Asthma  Lupus?  Per chart  Schizophrenia?  Per chart    DVT Mechanical Prophylaxis:   SCDs,    DVT Pharmacologic Prophylaxis   Medication    rivaroxaban (Xarelto) tab 20 mg              code status: Full  dispo: home upon medical clearance  If applicable, malnutrition status at bottom of note    I personally reviewed the available laboratories, imaging including. I discussed/will discuss the case with consultants. I ordered laboratories and/or radiographic studies. I adjusted medications as detailed above.  Medical decision making high, risk is high.    Subjective:   ----------------------------------  Continues to have pain in the left heel.  Not worse.  Able to ambulate with walker and assistance.  No chest pain or shortness of breath.      Objective:   Chief Complaint:   Chief Complaint   Patient presents with    Rash Skin Problem     ----------------------------------  Temp:  [97.2 °F (36.2 °C)-98.3 °F (36.8 °C)] 97.5 °F (36.4 °C)  Pulse:  [83-87] 83  Resp:  [18-20] 20  BP: (107-155)/() 112/75  SpO2:  [92 %-95 %] 92 %  Gen: A+Ox3.  No distress.   HEENT: NCAT, neck supple, no carotid bruit.  CV: RRR, S1S2, and intact distal pulses. No gallop, rub, murmur.  Pulm: Effort and breath sounds normal. No distress, wheezes, rales, rhonchi.  Abd: Soft, NTND, BS normal, no mass, no HSM, no rebound/guarding.   Neuro: Normal reflexes, CN. Sensory/motor exams grossly normal  deficit.   MS: N left heel with blistering, foul odor, tenderness and erythema.  Skin: Skin is warm and dry. No rashes, erythema, diaphoresis.   Psych: Normal mood and affect. Calm, cooperative    Labs:  Lab Results   Component Value Date    HGB 12.1 12/29/2024    WBC 6.2 12/29/2024    .0 12/29/2024     12/29/2024    K 3.8 12/29/2024    K 3.8 12/29/2024    CREATSERUM 1.14 (H) 12/29/2024    INR 1.1 01/07/2018    AST 18 03/11/2024    ALT 11 03/11/2024    TROP 0.01 04/26/2018            amitriptyline  75 mg Oral Daily    ARIPiprazole  5 mg Oral Daily    carvedilol  25 mg Oral BID with meals    furosemide  40 mg Oral Daily    NIFEdipine ER  30 mg Oral Daily    rivaroxaban  20 mg Oral Daily with food    rosuvastatin  20 mg Oral Nightly    sertraline  50 mg Oral QPM    spironolactone  50 mg Oral Daily    piperacillin-tazobactam  3.375 g Intravenous Q8H    insulin aspart  1-7 Units Subcutaneous TID CC and HS    [Held by provider] vancomycin  1,250 mg Intravenous Q12H    clindamycin  900 mg Intravenous Q8H    insulin aspart  5 Units Subcutaneous TID AC    insulin degludec  10 Units Subcutaneous Nightly    LORazepam  0.5 mg Intravenous Once       zolpidem    polyethylene glycol (PEG 3350)    sennosides    bisacodyl    fleet enema    ondansetron    prochlorperazine    glucose **OR** glucose **OR** glucose-vitamin C **OR** dextrose **OR** glucose **OR** glucose **OR** glucose-vitamin C    HYDROmorphone **OR** HYDROmorphone **OR** HYDROmorphone    HYDROmorphone    acetaminophen

## 2024-12-29 NOTE — PLAN OF CARE
Problem: Diabetes/Glucose Control  Goal: Glucose maintained within prescribed range  Description: INTERVENTIONS:  - Monitor Blood Glucose as ordered  - Assess for signs and symptoms of hyperglycemia and hypoglycemia  - Administer ordered medications to maintain glucose within target range  - Assess barriers to adequate nutritional intake and initiate nutrition consult as needed  - Instruct patient on self management of diabetes  Outcome: Progressing     Problem: Patient Centered Care  Goal: Patient preferences are identified and integrated in the patient's plan of care  Description: Interventions:  - What would you like us to know as we care for you? From home with family  - Provide timely, complete, and accurate information to patient/family  - Incorporate patient and family knowledge, values, beliefs, and cultural backgrounds into the planning and delivery of care  - Encourage patient/family to participate in care and decision-making at the level they choose  - Honor patient and family perspectives and choices  Outcome: Progressing     Problem: Patient/Family Goals  Goal: Patient/Family Long Term Goal  Description: Patient's Long Term Goal: cure infection    Interventions:  - antiobiotics  - See additional Care Plan goals for specific interventions  Outcome: Progressing  Goal: Patient/Family Short Term Goal  Description: Patient's Short Term Goal: decrease pain    Interventions:   - change pain meds  - See additional Care Plan goals for specific interventions  Outcome: Progressing     Problem: RESPIRATORY - ADULT  Goal: Achieves optimal ventilation and oxygenation  Description: INTERVENTIONS:  - Assess for changes in respiratory status  - Assess for changes in mentation and behavior  - Position to facilitate oxygenation and minimize respiratory effort  - Oxygen supplementation based on oxygen saturation or ABGs  - Provide Smoking Cessation handout, if applicable  - Encourage broncho-pulmonary hygiene including  cough, deep breathe, Incentive Spirometry  - Assess the need for suctioning and perform as needed  - Assess and instruct to report SOB or any respiratory difficulty  - Respiratory Therapy support as indicated  - Manage/alleviate anxiety  - Monitor for signs/symptoms of CO2 retention  Outcome: Progressing     Problem: METABOLIC/FLUID AND ELECTROLYTES - ADULT  Goal: Electrolytes maintained within normal limits  Description: INTERVENTIONS:  - Monitor labs and rhythm and assess patient for signs and symptoms of electrolyte imbalances  - Administer electrolyte replacement as ordered  - Monitor response to electrolyte replacements, including rhythm and repeat lab results as appropriate  - Fluid restriction as ordered  - Instruct patient on fluid and nutrition restrictions as appropriate  Outcome: Progressing     Problem: PAIN - ADULT  Goal: Verbalizes/displays adequate comfort level or patient's stated pain goal  Description: INTERVENTIONS:  - Encourage pt to monitor pain and request assistance  - Assess pain using appropriate pain scale  - Administer analgesics based on type and severity of pain and evaluate response  - Implement non-pharmacological measures as appropriate and evaluate response  - Consider cultural and social influences on pain and pain management  - Manage/alleviate anxiety  - Utilize distraction and/or relaxation techniques  - Monitor for opioid side effects  - Notify MD/LIP if interventions unsuccessful or patient reports new pain  - Anticipate increased pain with activity and pre-medicate as appropriate  Outcome: Progressing     Problem: RISK FOR INFECTION - ADULT  Goal: Absence of fever/infection during anticipated neutropenic period  Description: INTERVENTIONS  - Monitor WBC  - Administer growth factors as ordered  - Implement neutropenic guidelines  Outcome: Progressing     Problem: SAFETY ADULT - FALL  Goal: Free from fall injury  Description: INTERVENTIONS:  - Assess pt frequently for physical  needs  - Identify cognitive and physical deficits and behaviors that affect risk of falls.  - [place] fall precautions as indicated by assessment.  - Educate pt/family on patient safety including physical limitations  - Instruct pt to call for assistance with activity based on assessment  - Modify environment to reduce risk of injury  - Provide assistive devices as appropriate  - Consider OT/PT consult to assist with strengthening/mobility  - Encourage toileting schedule  Outcome: Progressing

## 2024-12-29 NOTE — PLAN OF CARE
Problem: Diabetes/Glucose Control  Goal: Glucose maintained within prescribed range  Description: INTERVENTIONS:  - Monitor Blood Glucose as ordered  - Assess for signs and symptoms of hyperglycemia and hypoglycemia  - Administer ordered medications to maintain glucose within target range  - Assess barriers to adequate nutritional intake and initiate nutrition consult as needed  - Instruct patient on self management of diabetes  Outcome: Progressing     Problem: Patient Centered Care  Goal: Patient preferences are identified and integrated in the patient's plan of care  Description: Interventions:  - What would you like us to know as we care for you? From home with family  - Provide timely, complete, and accurate information to patient/family  - Incorporate patient and family knowledge, values, beliefs, and cultural backgrounds into the planning and delivery of care  - Encourage patient/family to participate in care and decision-making at the level they choose  - Honor patient and family perspectives and choices  Outcome: Progressing     Problem: Patient/Family Goals  Goal: Patient/Family Long Term Goal  Description: Patient's Long Term Goal: cure infection    Interventions:  - antiobiotics  - See additional Care Plan goals for specific interventions  Outcome: Progressing  Goal: Patient/Family Short Term Goal  Description: Patient's Short Term Goal: decrease pain    Interventions:   - change pain meds  - See additional Care Plan goals for specific interventions  Outcome: Progressing     Problem: RESPIRATORY - ADULT  Goal: Achieves optimal ventilation and oxygenation  Description: INTERVENTIONS:  - Assess for changes in respiratory status  - Assess for changes in mentation and behavior  - Position to facilitate oxygenation and minimize respiratory effort  - Oxygen supplementation based on oxygen saturation or ABGs  - Provide Smoking Cessation handout, if applicable  - Encourage broncho-pulmonary hygiene including  cough, deep breathe, Incentive Spirometry  - Assess the need for suctioning and perform as needed  - Assess and instruct to report SOB or any respiratory difficulty  - Respiratory Therapy support as indicated  - Manage/alleviate anxiety  - Monitor for signs/symptoms of CO2 retention  Outcome: Progressing     Problem: METABOLIC/FLUID AND ELECTROLYTES - ADULT  Goal: Electrolytes maintained within normal limits  Description: INTERVENTIONS:  - Monitor labs and rhythm and assess patient for signs and symptoms of electrolyte imbalances  - Administer electrolyte replacement as ordered  - Monitor response to electrolyte replacements, including rhythm and repeat lab results as appropriate  - Fluid restriction as ordered  - Instruct patient on fluid and nutrition restrictions as appropriate  Outcome: Progressing     Problem: PAIN - ADULT  Goal: Verbalizes/displays adequate comfort level or patient's stated pain goal  Description: INTERVENTIONS:  - Encourage pt to monitor pain and request assistance  - Assess pain using appropriate pain scale  - Administer analgesics based on type and severity of pain and evaluate response  - Implement non-pharmacological measures as appropriate and evaluate response  - Consider cultural and social influences on pain and pain management  - Manage/alleviate anxiety  - Utilize distraction and/or relaxation techniques  - Monitor for opioid side effects  - Notify MD/LIP if interventions unsuccessful or patient reports new pain  - Anticipate increased pain with activity and pre-medicate as appropriate  Outcome: Progressing     Problem: RISK FOR INFECTION - ADULT  Goal: Absence of fever/infection during anticipated neutropenic period  Description: INTERVENTIONS  - Monitor WBC  - Administer growth factors as ordered  - Implement neutropenic guidelines  Outcome: Progressing     Problem: SAFETY ADULT - FALL  Goal: Free from fall injury  Description: INTERVENTIONS:  - Assess pt frequently for physical  needs  - Identify cognitive and physical deficits and behaviors that affect risk of falls.  - [place] fall precautions as indicated by assessment.  - Educate pt/family on patient safety including physical limitations  - Instruct pt to call for assistance with activity based on assessment  - Modify environment to reduce risk of injury  - Provide assistive devices as appropriate  - Consider OT/PT consult to assist with strengthening/mobility  - Encourage toileting schedule  Outcome: Progressing

## 2024-12-30 ENCOUNTER — APPOINTMENT (OUTPATIENT)
Dept: ULTRASOUND IMAGING | Facility: HOSPITAL | Age: 54
End: 2024-12-30
Attending: STUDENT IN AN ORGANIZED HEALTH CARE EDUCATION/TRAINING PROGRAM
Payer: COMMERCIAL

## 2024-12-30 ENCOUNTER — APPOINTMENT (OUTPATIENT)
Dept: MRI IMAGING | Facility: HOSPITAL | Age: 54
End: 2024-12-30
Attending: STUDENT IN AN ORGANIZED HEALTH CARE EDUCATION/TRAINING PROGRAM
Payer: COMMERCIAL

## 2024-12-30 LAB
ANION GAP SERPL CALC-SCNC: 9 MMOL/L (ref 0–18)
BASOPHILS # BLD AUTO: 0.03 X10(3) UL (ref 0–0.2)
BASOPHILS NFR BLD AUTO: 0.4 %
BUN BLD-MCNC: 17 MG/DL (ref 9–23)
BUN/CREAT SERPL: 7.3 (ref 10–20)
CALCIUM BLD-MCNC: 9 MG/DL (ref 8.7–10.4)
CHLORIDE SERPL-SCNC: 107 MMOL/L (ref 98–112)
CO2 SERPL-SCNC: 26 MMOL/L (ref 21–32)
CREAT BLD-MCNC: 2.33 MG/DL
DEPRECATED RDW RBC AUTO: 44.1 FL (ref 35.1–46.3)
EGFRCR SERPLBLD CKD-EPI 2021: 24 ML/MIN/1.73M2 (ref 60–?)
EOSINOPHIL # BLD AUTO: 0.15 X10(3) UL (ref 0–0.7)
EOSINOPHIL NFR BLD AUTO: 2.2 %
ERYTHROCYTE [DISTWIDTH] IN BLOOD BY AUTOMATED COUNT: 13.6 % (ref 11–15)
GLUCOSE BLD-MCNC: 170 MG/DL (ref 70–99)
GLUCOSE BLDC GLUCOMTR-MCNC: 129 MG/DL (ref 70–99)
GLUCOSE BLDC GLUCOMTR-MCNC: 135 MG/DL (ref 70–99)
GLUCOSE BLDC GLUCOMTR-MCNC: 159 MG/DL (ref 70–99)
HCT VFR BLD AUTO: 37.9 %
HGB BLD-MCNC: 12.9 G/DL
IMM GRANULOCYTES # BLD AUTO: 0.02 X10(3) UL (ref 0–1)
IMM GRANULOCYTES NFR BLD: 0.3 %
LYMPHOCYTES # BLD AUTO: 1.6 X10(3) UL (ref 1–4)
LYMPHOCYTES NFR BLD AUTO: 23.1 %
MCH RBC QN AUTO: 30.1 PG (ref 26–34)
MCHC RBC AUTO-ENTMCNC: 34 G/DL (ref 31–37)
MCV RBC AUTO: 88.3 FL
MONOCYTES # BLD AUTO: 0.53 X10(3) UL (ref 0.1–1)
MONOCYTES NFR BLD AUTO: 7.6 %
NEUTROPHILS # BLD AUTO: 4.6 X10 (3) UL (ref 1.5–7.7)
NEUTROPHILS # BLD AUTO: 4.6 X10(3) UL (ref 1.5–7.7)
NEUTROPHILS NFR BLD AUTO: 66.4 %
OSMOLALITY SERPL CALC.SUM OF ELEC: 300 MOSM/KG (ref 275–295)
PLATELET # BLD AUTO: 221 10(3)UL (ref 150–450)
POTASSIUM SERPL-SCNC: 3.8 MMOL/L (ref 3.5–5.1)
RBC # BLD AUTO: 4.29 X10(6)UL
SODIUM SERPL-SCNC: 142 MMOL/L (ref 136–145)
WBC # BLD AUTO: 6.9 X10(3) UL (ref 4–11)

## 2024-12-30 PROCEDURE — 93925 LOWER EXTREMITY STUDY: CPT | Performed by: STUDENT IN AN ORGANIZED HEALTH CARE EDUCATION/TRAINING PROGRAM

## 2024-12-30 PROCEDURE — 99233 SBSQ HOSP IP/OBS HIGH 50: CPT | Performed by: HOSPITALIST

## 2024-12-30 PROCEDURE — 73718 MRI LOWER EXTREMITY W/O DYE: CPT | Performed by: STUDENT IN AN ORGANIZED HEALTH CARE EDUCATION/TRAINING PROGRAM

## 2024-12-30 RX ORDER — SODIUM CHLORIDE 9 MG/ML
INJECTION, SOLUTION INTRAVENOUS CONTINUOUS
Status: DISCONTINUED | OUTPATIENT
Start: 2024-12-30 | End: 2025-01-03

## 2024-12-30 NOTE — PROGRESS NOTES
INFECTIOUS DISEASE PROGRESS NOTE  [facility]  part of [facility] ID PROGRESS NOTE    Suki Rosenberg Patient Status:  Inpatient    1970 MRN [id number]   Location [facility] 5SW/SE Attending Tevin Guy MD   Hosp Day # 2 PCP Graham Recio,      Subjective:  ROS reviewed. Still having pain and unable to bear weight on her left foot.    ASSESSMENT:    Antibiotics: Vancomycin, zosyn, clindamycin -     # L heel cellulitis with significant blistering and TTP with concerns for deeper infection, R/o OM               -s/p bedside debridement   # R shoulder pain s/p fall   # Hypotension, R/o bacteremia  # Diabetes mellitus  # Obesity  # BLE lymphedema     PLAN:  -  Continue on vancomycin, zosyn, and clindamycin.  -  FU MRI L foot.  -  Follow fever curve, wbc.  -  Reviewed labs, micro, imaging reports, available old records.  -  Case d/w patient, RN.     History of Present Illness:  Suki Rosenberg is a 54 year old female with a history of morbid obesity, BLE lymphedema, diabetes mellitus, who presented to [facility] ED on  with worsening drainage from her left heel. States that the left heel wound has bee present for about two weeks. Previously was followed at the wound clinic for wound on other heel. Patient was seen in  on  for L heel wound where she was sent home with silvadene and told to follow up with the wound clinic but unable to get an appointment until 1/3/25. Noticed more odor coming from foot on [place] with worsening pain. Patient did hit her right shoulder on a concrete wall yesterday and states unable to move her shoulder. No recent antibiotics. No fevers or chills at home. On arrival, afebrile, wbc 6.8, XR L foot pending, XR R shoulder pending, started on vancomycin and zosyn. Podiatry to see. Hypotensive this AM and receiving fluid bolus. Taking morphine for pain. ID consulted.    Physical Exam:  /74 (BP Location: Left arm)    Pulse 80   Temp 98.2 °F (36.8 °C) (Oral)   Resp 16   Ht 5' 2\" (1.575 m)   Wt 236 lb 8.9 oz (107.3 kg)   LMP 10/29/2018   SpO2 93%   BMI 43.27 kg/m²     Gen:   Awake, in bed  HEENT:  EOMI, neck supple  CV/lungs:  RRR, CTAB  Abdom:  Soft, NT/ND, +BS  Skin/extrem:  No rashes, L heel wrapped  Lines:  PIV+    Laboratory Data: Reviewed    Microbiology: Reviewed    Radiology: Reviewed      [facility]  [phone]  12/30/2024

## 2024-12-30 NOTE — PLAN OF CARE
No acute changes. MRI left foot and arterial doppler left foot completed today. PRN dilaudid for moderate pain. Safety precautions in place.     Problem: Patient Centered Care  Goal: Patient preferences are identified and integrated in the patient's plan of care  Description: Interventions:  - What would you like us to know as we care for you? From home with family  - Provide timely, complete, and accurate information to patient/family  - Incorporate patient and family knowledge, values, beliefs, and cultural backgrounds into the planning and delivery of care  - Encourage patient/family to participate in care and decision-making at the level they choose  - Honor patient and family perspectives and choices  Outcome: Progressing     Problem: Patient/Family Goals  Goal: Patient/Family Short Term Goal  Description: Patient's Short Term Goal: decrease pain    Interventions:   - change pain meds  - See additional Care Plan goals for specific interventions  Outcome: Progressing     Problem: Diabetes/Glucose Control  Goal: Glucose maintained within prescribed range  Description: INTERVENTIONS:  - Monitor Blood Glucose as ordered  - Assess for signs and symptoms of hyperglycemia and hypoglycemia  - Administer ordered medications to maintain glucose within target range  - Assess barriers to adequate nutritional intake and initiate nutrition consult as needed  - Instruct patient on self management of diabetes  Outcome: Progressing     Problem: RESPIRATORY - ADULT  Goal: Achieves optimal ventilation and oxygenation  Description: INTERVENTIONS:  - Assess for changes in respiratory status  - Assess for changes in mentation and behavior  - Position to facilitate oxygenation and minimize respiratory effort  - Oxygen supplementation based on oxygen saturation or ABGs  - Provide Smoking Cessation handout, if applicable  - Encourage broncho-pulmonary hygiene including cough, deep breathe, Incentive Spirometry  - Assess the need for  suctioning and perform as needed  - Assess and instruct to report SOB or any respiratory difficulty  - Respiratory Therapy support as indicated  - Manage/alleviate anxiety  - Monitor for signs/symptoms of CO2 retention  Outcome: Progressing     Problem: METABOLIC/FLUID AND ELECTROLYTES - ADULT  Goal: Electrolytes maintained within normal limits  Description: INTERVENTIONS:  - Monitor labs and rhythm and assess patient for signs and symptoms of electrolyte imbalances  - Administer electrolyte replacement as ordered  - Monitor response to electrolyte replacements, including rhythm and repeat lab results as appropriate  - Fluid restriction as ordered  - Instruct patient on fluid and nutrition restrictions as appropriate  Outcome: Progressing     Problem: PAIN - ADULT  Goal: Verbalizes/displays adequate comfort level or patient's stated pain goal  Description: INTERVENTIONS:  - Encourage pt to monitor pain and request assistance  - Assess pain using appropriate pain scale  - Administer analgesics based on type and severity of pain and evaluate response  - Implement non-pharmacological measures as appropriate and evaluate response  - Consider cultural and social influences on pain and pain management  - Manage/alleviate anxiety  - Utilize distraction and/or relaxation techniques  - Monitor for opioid side effects  - Notify MD/LIP if interventions unsuccessful or patient reports new pain  - Anticipate increased pain with activity and pre-medicate as appropriate  Outcome: Progressing     Problem: RISK FOR INFECTION - ADULT  Goal: Absence of fever/infection during anticipated neutropenic period  Description: INTERVENTIONS  - Monitor WBC  - Administer growth factors as ordered  - Implement neutropenic guidelines  Outcome: Progressing     Problem: SAFETY ADULT - FALL  Goal: Free from fall injury  Description: INTERVENTIONS:  - Assess pt frequently for physical needs  - Identify cognitive and physical deficits and behaviors  that affect risk of falls.  - [place] fall precautions as indicated by assessment.  - Educate pt/family on patient safety including physical limitations  - Instruct pt to call for assistance with activity based on assessment  - Modify environment to reduce risk of injury  - Provide assistive devices as appropriate  - Consider OT/PT consult to assist with strengthening/mobility  - Encourage toileting schedule  Outcome: Progressing

## 2024-12-30 NOTE — PLAN OF CARE
No acute changes overnight, PRN pain meds given, IV abx given per md order. Plan for MRI and US arterial doppler.  Safety precautions in place, call light within reach.    Problem: Diabetes/Glucose Control  Goal: Glucose maintained within prescribed range  Description: INTERVENTIONS:  - Monitor Blood Glucose as ordered  - Assess for signs and symptoms of hyperglycemia and hypoglycemia  - Administer ordered medications to maintain glucose within target range  - Assess barriers to adequate nutritional intake and initiate nutrition consult as needed  - Instruct patient on self management of diabetes  Outcome: Progressing     Problem: Patient Centered Care  Goal: Patient preferences are identified and integrated in the patient's plan of care  Description: Interventions:  - What would you like us to know as we care for you? From home with family  - Provide timely, complete, and accurate information to patient/family  - Incorporate patient and family knowledge, values, beliefs, and cultural backgrounds into the planning and delivery of care  - Encourage patient/family to participate in care and decision-making at the level they choose  - Honor patient and family perspectives and choices  Outcome: Progressing     Problem: Patient/Family Goals  Goal: Patient/Family Long Term Goal  Description: Patient's Long Term Goal: cure infection    Interventions:  - antiobiotics  - See additional Care Plan goals for specific interventions  Outcome: Progressing  Goal: Patient/Family Short Term Goal  Description: Patient's Short Term Goal: decrease pain    Interventions:   - change pain meds  - See additional Care Plan goals for specific interventions  Outcome: Progressing     Problem: RESPIRATORY - ADULT  Goal: Achieves optimal ventilation and oxygenation  Description: INTERVENTIONS:  - Assess for changes in respiratory status  - Assess for changes in mentation and behavior  - Position to facilitate oxygenation and minimize respiratory  effort  - Oxygen supplementation based on oxygen saturation or ABGs  - Provide Smoking Cessation handout, if applicable  - Encourage broncho-pulmonary hygiene including cough, deep breathe, Incentive Spirometry  - Assess the need for suctioning and perform as needed  - Assess and instruct to report SOB or any respiratory difficulty  - Respiratory Therapy support as indicated  - Manage/alleviate anxiety  - Monitor for signs/symptoms of CO2 retention  Outcome: Progressing     Problem: METABOLIC/FLUID AND ELECTROLYTES - ADULT  Goal: Electrolytes maintained within normal limits  Description: INTERVENTIONS:  - Monitor labs and rhythm and assess patient for signs and symptoms of electrolyte imbalances  - Administer electrolyte replacement as ordered  - Monitor response to electrolyte replacements, including rhythm and repeat lab results as appropriate  - Fluid restriction as ordered  - Instruct patient on fluid and nutrition restrictions as appropriate  Outcome: Progressing     Problem: PAIN - ADULT  Goal: Verbalizes/displays adequate comfort level or patient's stated pain goal  Description: INTERVENTIONS:  - Encourage pt to monitor pain and request assistance  - Assess pain using appropriate pain scale  - Administer analgesics based on type and severity of pain and evaluate response  - Implement non-pharmacological measures as appropriate and evaluate response  - Consider cultural and social influences on pain and pain management  - Manage/alleviate anxiety  - Utilize distraction and/or relaxation techniques  - Monitor for opioid side effects  - Notify MD/LIP if interventions unsuccessful or patient reports new pain  - Anticipate increased pain with activity and pre-medicate as appropriate  Outcome: Progressing     Problem: RISK FOR INFECTION - ADULT  Goal: Absence of fever/infection during anticipated neutropenic period  Description: INTERVENTIONS  - Monitor WBC  - Administer growth factors as ordered  - Implement  neutropenic guidelines  Outcome: Progressing     Problem: SAFETY ADULT - FALL  Goal: Free from fall injury  Description: INTERVENTIONS:  - Assess pt frequently for physical needs  - Identify cognitive and physical deficits and behaviors that affect risk of falls.  - [place] fall precautions as indicated by assessment.  - Educate pt/family on patient safety including physical limitations  - Instruct pt to call for assistance with activity based on assessment  - Modify environment to reduce risk of injury  - Provide assistive devices as appropriate  - Consider OT/PT consult to assist with strengthening/mobility  - Encourage toileting schedule  Outcome: Progressing

## 2024-12-30 NOTE — PROGRESS NOTES
[facility]  Progress Note     Suki Rosenberg  : 1970    Status: Inpatient  Day #: 2    Attending: Tevin Guy MD  PCP: Graham Recio DO     Assessment and Plan:    Diabetic foot infection.  Cannot rule out osteomyelitis, abscess.  -MRI pending  -Arterial studies pending  -Podiatry infectious disease consult appreciated  -Pain control  -Continue current antibiotics    NAYELI  -hold lasix and aldactone  -gentle IVF  -BMP in AM     Other problems  History of PE  Asthma  Lupus?  Per chart  Schizophrenia?  Per chart  HTN        DVT Mechanical Prophylaxis:   SCDs,    DVT Pharmacologic Prophylaxis   Medication    rivaroxaban (Xarelto) tab 20 mg               Subjective:      Initial Chief Complaint:  foot wound    Stable pain in left heel.     10 point ROS completed and was negative, except for pertinent positive and negatives stated in subjective.      Objective:      Temp:  [98.2 °F (36.8 °C)-98.4 °F (36.9 °C)] 98.2 °F (36.8 °C)  Pulse:  [80-87] 87  Resp:  [16-20] 18  BP: (118-157)/(73-90) 145/84  SpO2:  [89 %-95 %] 92 %  General:  Alert, no distress  HEENT:  Normocephalic, atraumatic  Cardiac:  Regular rate, regular rhythm  Pulmonary:  Clear to auscultation bilaterally, respirations unlabored  Gastrointestinal:  Soft, non-tender, normal bowel sounds  Musculoskeletal:  No joint swelling  Extremities:  No edema, no cyanosis, no clubbing. L foot wrapped.  Neurologic:  nonfocal  Psychiatric:  Normal affect, calm and appropriate    Intake/Output Summary (Last 24 hours) at 2024 1131  Last data filed at 2024 1037  Gross per 24 hour   Intake 390 ml   Output 1700 ml   Net -1310 ml         Recent Labs   Lab 24  0021 24  0818 24  1043   WBC 6.8 6.2 6.9   HGB 13.5 12.1 12.9   HCT 40.5 37.4 37.9   .0 241.0 221.0   RBC 4.61 4.09 4.29   MCV 87.9 91.4 88.3   MCH 29.3 29.6 30.1   MCHC 33.3 32.4 34.0   RDW 13.7 13.8 13.6   NEPRELIM 3.94 3.30 4.60     Recent Labs   Lab  12/28/24  0021 12/29/24  0818   BUN 10 11   CREATSERUM 0.83 1.14*   CA 9.7 8.8    140   K 3.3* 3.8  3.8    108   CO2 31.0 26.0   * 187*   CRP 4.40*  --        No results found.      Medications:   vancomycin  1,250 mg Intravenous Q24H    amitriptyline  75 mg Oral Daily    ARIPiprazole  5 mg Oral Daily    carvedilol  25 mg Oral BID with meals    furosemide  40 mg Oral Daily    NIFEdipine ER  30 mg Oral Daily    rivaroxaban  20 mg Oral Daily with food    rosuvastatin  20 mg Oral Nightly    sertraline  50 mg Oral QPM    spironolactone  50 mg Oral Daily    piperacillin-tazobactam  3.375 g Intravenous Q8H    insulin aspart  1-7 Units Subcutaneous TID CC and HS    clindamycin  900 mg Intravenous Q8H    insulin aspart  5 Units Subcutaneous TID AC    insulin degludec  10 Units Subcutaneous Nightly    LORazepam  0.5 mg Intravenous Once      PRN Meds:   zolpidem    polyethylene glycol (PEG 3350)    sennosides    bisacodyl    fleet enema    ondansetron    prochlorperazine    glucose **OR** glucose **OR** glucose-vitamin C **OR** dextrose **OR** glucose **OR** glucose **OR** glucose-vitamin C    HYDROmorphone **OR** HYDROmorphone **OR** HYDROmorphone    HYDROmorphone    acetaminophen    Supplementary Documentation:   DVT Mechanical Prophylaxis:   SCDs,    DVT Pharmacologic Prophylaxis   Medication    rivaroxaban (Xarelto) tab 20 mg                Code Status: Full Code  [place]: External urinary catheter in place  [place] Duration (in days):   Central line:    ISRAEL: 1/2/2025                    [facility]. Time spent on chart/note review, review of labs/imaging, discussion with patient, physical exam, discussion with staff, consultants, coordinating care, writing progress note, discussion of plan of care.      Tevin Guy MD

## 2024-12-30 NOTE — DIABETES ED
Attempted to visit patient for nutrition knowledge screen - elevated hemoglobin A1c and foot wound; sleeping at time of visit and daugthers at bedside. Tried to wake patient to gentle name calling and she did not wake up. Daughters reported she has been sleeping deeply for a little while - they tried to converse with her as well.   Follows with Dr. Kline and Dr. Mandujano. Will attempt to revisit patient.

## 2024-12-30 NOTE — PROGRESS NOTES
[facility]  part of [facility]    Progress Note    Suki Rosenberg Patient Status:  Inpatient    1970 MRN [id number]   Location [facility] 5SW/SE Attending Tevin Guy MD   Hosp Day # 2 PCP Graham Recio DO     Chief Complaint: Left Heel Ulcer     Subjective:   Subjective: Patient in and out of sleep at bedside. Physical Exam was performed. Patient states she is able to ambulate to and from the bathroom with very little diffculty.     Objective:   Physical Exam: Decubitus heel ulcer is noted to the left heel at this time. No purelence, erythema or malodor noted. Pulses palpable at this time. Pain upon palpation and manual expression is noted at this time.   Ortho Exam: ROM WNL    Results:   Lab Results   Component Value Date    WBC 6.9 2024    HGB 12.9 2024    HCT 37.9 2024    .0 2024    CREATSERUM 2.33 (H) 2024    BUN 17 2024     2024    K 3.8 2024     2024    CO2 26.0 2024     (H) 2024    CA 9.0 2024    ALB 4.2 2024    ALKPHO 82 2024    BILT 0.5 2024    TP 7.3 2024    AST 18 2024    ALT 11 2024    PTT 26.4 2018    INR 1.1 2018    TSH 1.178 2023     2023    DDIMER <0.27 2018    ESRML 29 2024    CRP 4.40 (H) 2024    MG 1.8 2018    TROP 0.01 2018    B12 813 2016       MRI FOOT, LEFT (CPT=73718)    Result Date: 2024  CONCLUSION:   Soft tissue and skin ulceration within the posterior heel without abscess.  No osteomyelitis.  6 mm osteochondral lesion of the medial talar dome without collapse of the articular surface.  Split tear of the peroneus brevis with peroneal tenosynovitis.  Mild Achilles tendinosis.  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  Multiple other incidental findings as described in the body of the  report.    Dictated by (CST): Marlon Horne MD on 12/30/2024 at 2:44 PM     Finalized by (CST): Marlon Horne MD on 12/30/2024 at 2:53 PM          US ARTERIAL DUPLEX LOWER EXTREMITY BILATERAL (CPT=93925)    Result Date: 12/30/2024  CONCLUSION: Duplex arterial ultrasound demonstrates patent vessels with triphasic arterial Doppler flow.  Somewhat limited evaluation of the below knee tibial vessels on the left due to overlying calf edema and bandaging in the foot, though the vessels are patent where visualized.    Dictated by (CST): Yves Goff MD on 12/30/2024 at 11:41 AM     Finalized by (CST): Yves Goff MD on 12/30/2024 at 11:56 AM               Assessment & Plan:     Diabetic ulcer of left heel associated with type 2 diabetes mellitus, unspecified ulcer stage (HCC)  Plan:   - Patient seen and evaluated at bedside  - MRI and Arterial Duplex reviewed and discussed with patient in depth   - Dressing change performed. Patient to change dressings daily (Betadine DSD). Wound care orders placed while patient is in hospital.   - Patient does NOT require any surgical intervention at this time. Patient will follow outpatient with Podiatry (Dr. Jatin Campbell) once discharge. Phone number was given to the patient at bedside to make outpatient appointment   - Patient able to WBAT. Instructed patient the importance of off loading the left heel once in bed to aid with healing and prevent more pressure to the wound   - CAM Walker to be dispensed- Orders placed   - Podiatry signing off at this time, please reconsult if needed at a later time.       Jatin Campbell DPM  12/30/2024

## 2024-12-30 NOTE — WOUND PROGRESS NOTE
Pt off unit for MRI then arterial studies. Pt has been seen by Dr. Campbell DPM-applied betadine and dry dressing. Orders placed. Wound rn signing off. Updated bedside RN

## 2024-12-30 NOTE — OCCUPATIONAL THERAPY NOTE
OT orders received, chart reviewed. Pt pending MRI L foot r/o osteomyelitis. Will re-attempt eval as able and appropriate.

## 2024-12-30 NOTE — PAYOR COMM NOTE
--------------  ADMISSION REVIEW     Payor: [employer]  Subscriber #:  [id number]  Authorization Number: [id number]    Admit date: 12/28/24  Admit time:  1:31 AM       REVIEW DOCUMENTATION:     ED Provider Notes          Stated Complaint: infected left foot    Subjective:   53yo/f w hx of DM, anemia, asthma, depression. SLE, migraines, PE reports with Left posterior foot pain, drainage. 10 days. Was advised abx but was ?not on. Worsening, now spreading across posterior ankle with foul drainage. Sent from  today after eval. Has previously seen wound clinic but denies infections or amputations. No recent abx use. No systemic symptoms     ED Triage Vitals [12/27/24 2101]   BP (!) 168/92   Pulse 96   Resp 18   Temp 98.7 °F (37.1 °C)   Temp src Oral   SpO2 98 %   O2 Device None (Room air)       Current Vitals:   Vital Signs  BP: (!) 188/116  Pulse: 87  Resp: 16  Temp: 98.7 °F (37.1 °C)  Temp src: Oral  MAP (mmHg): (!) 138    Oxygen Therapy  SpO2: 99 %  O2 Device: None (Room air)        Physical Exam  Vitals and nursing note reviewed.   Constitutional:       General: She is not in acute distress.     Appearance: She is well-developed.   HENT:      Head: Normocephalic and atraumatic.      Nose: Nose normal.      Mouth/Throat:      Mouth: Mucous membranes are moist.   Eyes:      Conjunctiva/sclera: Conjunctivae normal.      Pupils: Pupils are equal, round, and reactive to light.   Cardiovascular:      Rate and Rhythm: Normal rate and regular rhythm.      Heart sounds: Normal heart sounds.   Pulmonary:      Effort: Pulmonary effort is normal.      Breath sounds: Normal breath sounds.   Abdominal:      General: Bowel sounds are normal.      Palpations: Abdomen is soft.   Musculoskeletal:         General: Tenderness present. No deformity or signs of injury. Normal range of motion.      Cervical back: Normal range of motion and neck supple.   Skin:     General: Skin is warm and dry.      Capillary Refill: Capillary  refill takes less than 2 seconds.      Findings: Erythema present. No rash.      Comments: Large blister/ulcer to posterior heel left foot, foul drainage, erythema, distal cms intact, no streaking    Neurological:      General: No focal deficit present.      Mental Status: She is alert and oriented to person, place, and time.      GCS: GCS eye subscore is 4. GCS verbal subscore is 5. GCS motor subscore is 6.      Cranial Nerves: No cranial nerve deficit.      Gait: Gait normal.       [facility]          Admission disposition: 2024 12:48 AM      Medical Decision Making  53yo/f w hx and exam as stated; foot erythema    No trauma  No fever  No systemic symptoms  Not rapidly changing  Stable    Discussed w podiatry who will consult  Discussed w [facility] hospitalist who will admit      Amount and/or Complexity of Data Reviewed  Labs:  Decision-making details documented in ED Course.  Radiology:  Decision-making details documented in ED Course.    Risk  OTC drugs.  Prescription drug management.        Disposition and Plan     Clinical Impression:  1. Diabetic ulcer of left heel associated with type 2 diabetes mellitus, unspecified ulcer stage (HCC)         Disposition:  Admit  2024 12:48 am             History & Physical           Suki Rosenberg Patient Status:  Emergency    1970 MRN [id number]   Location [facility] EMERGENCY DEPARTMENT Attending No att. providers found   Hosp Day # 0 PCP Graham Recio DO      Date:  2024  Date of Admission:  2024     Chief Complaint:      Chief Complaint   Patient presents with    Rash Skin Problem      Assessment and Plan:     Recurrent diabetic left heel ulcer  -Swelling/erythema/foul-smelling noted on physical exam.  Patient has had issues with left heel ulcer in the last year and previously has had it drained by wound care.    -X-ray of left foot currently pending to look for possible osteomyelitis  -Patient was given a dose of Zosyn and vancomycin in  the ED.  Continue empiric antibiotic coverage with vancomycin/Zosyn.  -Podiatry consult, appreciate recs possible debridement  -Infectious disease on consult, appreciate recS for management of antibiotics  -Wound care consulted     IDDM type II  -Resume home dose of insulin.  Diabetic diet.  Insulin sliding scale.     History of pulmonary embolism  -Resume Xarelto     Essential hypertension  -Resume home dose of nifedipine/Coreg/Aldactone  -Closely monitor blood pressure     Other medical conditions  Anxiety/bipolar disorder/depression  Breast cancer  Fibromyalgia     Prophylaxis  Xarelto     CODE STATUS  Full     History of Present Illness:  Suki Rosenberg is a(n) 54 year old female, who presents for evaluation of left heel swelling erythema foul-smelling drainage.  Patient was seen earlier at immediate care and advised to come to the ER for antibiotics.  Patient has had recurrent left heel ulcer for the last year.  Previously she followed with wound care and had the ulcer drained but for the last couple of weeks she has had an increase in pain and swelling to the left heel.  Today the smell was unbearable along with the pain and patient decided to come for evaluation.  She is not able to see wound care until January 3, 2025.  Patient reports chills but no fever.  She denies any other symptoms.  Denies any chest pain or shortness of breath.  Denies any fever.  Denies any lightheadedness or dizziness.  She noticed drainage from the left heel today.  On presentation to the ED, initial vital signs reveal temp 98.7, heart rate 96, respiratory 18, blood pressure 168/92, SpO2 98% on room air.  Lab work reveals WBC 6.8.  Left foot x-ray pending at this time.  Patient was given a dose of Zosyn and vancomycin as well as morphine in the ED.  Patient admitted under hospitalist service with consultation to podiatry and ID.       Laboratory Data:         Lab Results   Component Value Date     WBC 6.8 12/28/2024     HGB 13.5  2024     HCT 40.5 2024     .0 2024         Imaging:  No results found.      Primary care physician  Graham Recio DO     60 minutes spent on this admission - examining patient, obtaining history, reviewing previous medical records, going over test results/imaging and discussing plan of care. All questions answered.      Disposition  Clinical course will dictate outcome        Winsome Gaona MD  2024        [facility] ID CONSULT NOTE           Suki Rosenberg Patient Status:  Inpatient    1970 MRN [id number]   Location [facility] 5SW/SE Attending Winsome Gaona MD   Hosp Day # 0 PCP Graham Recio DO         Reason for Consultation:  L heel ulcer     ASSESSMENT:     Antibiotics: Vancomycin, zosyn -     # L heel cellulitis with significant blistering and TTP with concerns for deeper infection, R/o OM               -FU drainage cx               -Podiatry to see  # R shoulder pain s/p fall   # Hypotension, R/o bacteremia  # Diabetes mellitus  # Obesity  # BLE lymphedema     PLAN:  -  Continue on vancomycin and zosyn. Add clindamycin.  -  Podiatry evaluation.  -  FU XR R shoulder.  -  Follow fever curve, wbc.  -  Reviewed labs, micro, imaging reports, available old records.  -  Case d/w patient, RN.     History of Present Illness:  Suki Rosenberg is a 54 year old female with a history of morbid obesity, BLE lymphedema, diabetes mellitus, who presented to [facility] ED on  with worsening drainage from her left heel. States that the left heel wound has bee present for about two weeks. Previously was followed at the wound clinic for wound on other heel. Patient was seen in  on  for L heel wound where she was sent home with silvadene and told to follow up with the wound clinic but unable to get an appointment until 1/3/25. Noticed more odor coming from foot on [place] with worsening pain. Patient did hit her right shoulder on a concrete wall yesterday  and states unable to move her shoulder. No recent antibiotics. No fevers or chills at home. On arrival, afebrile, wbc 6.8, XR L foot pending, XR R shoulder pending, started on vancomycin and zosyn. Podiatry to see. Hypotensive this AM and receiving fluid bolus. Taking morphine for pain. ID consulted.     Attestation signed by Sundeep Lofton MD at 12/28/2024  2:14 PM        Patient seen and examined by me. Agree with exam and assessment by Lynn Leon PA-C     54 year old woman with poorly controlled diabetes who came to the Ed on 12/27/2024 with worsening drainge from her left heel. Wound present for ~2 wks. She has been started on Vancomycin and Piperacillin/tazobactam. Nurse reports that she was already seen by podiatry and had bedside debridement     On exam, obese woman, lying in bed nad  Foot covered with dressing  Very tender to palpation  Diffuse swelling  Can move ankle joint     Labs reviewed  No leukocytosis  Crea 0.83  Crp 4.4        # diabetic foot infection  X-ray with no evidence of om. Would need MRI to r/o.  Follow drainage culture for now  Concern for inflammation. Doubt joint involvement though given not much pain on movement of ankle.   Given extent of swelling concern that she may end up needing amputation     - continue Vancomycin and Piperacillin/tazobactam, clindaymycin  for now  - obtain foot MRI  - follow cultures  - continue to follow with podiatry  - obtain arterial duplex to assess vascular supply.        We will continue to follow.         At least 50% of clinical time and 100% of MDM was made by me.     12/29 HOSPITALIST:    Assessment & Plan:  ----------------------------------  Diabetic foot infection.  Cannot rule out osteomyelitis, abscess.  -MRI pending  -Arterial studies pending  -Podiatry infectious disease consult appreciated  -Pain control  -Continue current antibiotics     Other problems  History of PE  Asthma  Lupus?  Per chart  Schizophrenia?  Per chart     DVT  Mechanical Prophylaxis:   SCDs,        DVT Pharmacologic Prophylaxis   Medication    rivaroxaban (Xarelto) tab 20 mg               code status: Full  dispo: home upon medical clearance  If applicable, malnutrition status at bottom of note     I personally reviewed the available laboratories, imaging including. I discussed/will discuss the case with consultants. I ordered laboratories and/or radiographic studies. I adjusted medications as detailed above.  Medical decision making high, risk is high.        Subjective:  ----------------------------------  Continues to have pain in the left heel.  Not worse.  Able to ambulate with walker and assistance.  No chest pain or shortness of breath.           Objective:  Chief Complaint:       Chief Complaint   Patient presents with    Rash Skin Problem      ----------------------------------  Temp:  [97.2 °F (36.2 °C)-98.3 °F (36.8 °C)] 97.5 °F (36.4 °C)  Pulse:  [83-87] 83  Resp:  [18-20] 20  BP: (107-155)/() 112/75  SpO2:  [92 %-95 %] 92 %  Gen: A+Ox3.  No distress.   HEENT: NCAT, neck supple, no carotid bruit.  CV: RRR, S1S2, and intact distal pulses. No gallop, rub, murmur.  Pulm: Effort and breath sounds normal. No distress, wheezes, rales, rhonchi.  Abd: Soft, NTND, BS normal, no mass, no HSM, no rebound/guarding.   Neuro:  Normal reflexes, CN. Sensory/motor exams grossly normal deficit.   MS: N left heel with blistering, foul odor, tenderness and erythema.  Skin: Skin is warm and dry. No rashes, erythema, diaphoresis.   Psych:   Normal mood and affect. Calm, cooperative     Labs:        Lab Results   Component Value Date     HGB 12.1 12/29/2024     WBC 6.2 12/29/2024     .0 12/29/2024      12/29/2024     K 3.8 12/29/2024     K 3.8 12/29/2024     CREATSERUM 1.14 (H) 12/29/2024     INR 1.1 01/07/2018     AST 18 03/11/2024     ALT 11 03/11/2024     TROP 0.01 04/26/2018 12/30 HOSPITALIST:    Assessment and Plan:     Diabetic foot infection.   Cannot rule out osteomyelitis, abscess.  -MRI pending  -Arterial studies pending  -Podiatry infectious disease consult appreciated  -Pain control  -Continue current antibiotics     NAYELI  -hold lasix and aldactone  -gentle IVF  -BMP in AM     Other problems  History of PE  Asthma  Lupus?  Per chart  Schizophrenia?  Per chart  HTN     DVT Mechanical Prophylaxis:   SCDs,        DVT Pharmacologic Prophylaxis   Medication    rivaroxaban (Xarelto) tab 20 mg                  Subjective:  Initial Chief Complaint:  foot wound     Stable pain in left heel.      10 point ROS completed and was negative, except for pertinent positive and negatives stated in subjective.           Objective:  Temp:  [98.2 °F (36.8 °C)-98.4 °F (36.9 °C)] 98.2 °F (36.8 °C)  Pulse:  [80-87] 87  Resp:  [16-20] 18  BP: (118-157)/(73-90) 145/84  SpO2:  [89 %-95 %] 92 %  General:  Alert, no distress  HEENT:  Normocephalic, atraumatic  Cardiac:  Regular rate, regular rhythm  Pulmonary:  Clear to auscultation bilaterally, respirations unlabored  Gastrointestinal:  Soft, non-tender, normal bowel sounds  Musculoskeletal:  No joint swelling  Extremities:  No edema, no cyanosis, no clubbing. L foot wrapped.  Neurologic:  nonfocal  Psychiatric:  Normal affect, calm and appropriate     Intake/Output Summary (Last 24 hours) at 12/30/2024 1131  Last data filed at 12/30/2024 1037      Gross per 24 hour   Intake 390 ml   Output 1700 ml   Net -1310 ml                  Recent Labs   Lab 12/28/24  0021 12/29/24  0818 12/30/24  1043   WBC 6.8 6.2 6.9   HGB 13.5 12.1 12.9   HCT 40.5 37.4 37.9   .0 241.0 221.0   RBC 4.61 4.09 4.29   MCV 87.9 91.4 88.3   MCH 29.3 29.6 30.1   MCHC 33.3 32.4 34.0   RDW 13.7 13.8 13.6   NEPRELIM 3.94 3.30 4.60           Recent Labs   Lab 12/28/24  0021 12/29/24  0818   BUN 10 11   CREATSERUM 0.83 1.14*   CA 9.7 8.8    140   K 3.3* 3.8  3.8    108   CO2 31.0 26.0   * 187*   CRP 4.40*  --                  Supplementary  Documentation:  DVT Mechanical Prophylaxis:   SCDs,        DVT Pharmacologic Prophylaxis   Medication    rivaroxaban (Xarelto) tab 20 mg                 Code Status: Full Code  Ventura: External urinary catheter in place  Ventura Duration (in days):   Central line:    [place]RA[place]: 1/2/2025                    MEDICATIONS ADMINISTERED IN LAST 1 DAY:  amitriptyline (Elavil) tab 75 mg       Date Action Dose Route User    12/30/2024 0928 Given 75 mg Oral Mariah Sanford          ARIPiprazole (Abilify) tab 5 mg       Date Action Dose Route User    12/30/2024 0929 Given 5 mg Oral Mariah Sanford          carvedilol (Coreg) tab 25 mg       Date Action Dose Route User    12/30/2024 0929 Given 25 mg Oral Mariah Sanford    12/29/2024 1758 Given 25 mg Oral [place] Betancourt RN          clindamycin phosphate in NaCl 0.9% (Cleocin) 900 mg/50mL IVPB premix 900 mg       Date Action Dose Route User    12/30/2024 1136 Given 900 mg Intravenous Mariah Sanford    12/30/2024 0056 Given 900 mg Intravenous Rhianna Romo RN    12/29/2024 1607 Given 900 mg Intravenous [place] Betancourt RN          furosemide (Lasix) tab 40 mg       Date Action Dose Route User    12/30/2024 0929 Given 40 mg Oral Mariah Sanford          HYDROmorphone (Dilaudid) 1 MG/ML injection 0.4 mg       Date Action Dose Route User    12/30/2024 0947 Given 0.4 mg Intravenous Mariah Sanford    12/30/2024 0731 Given 0.4 mg Intravenous Rhianna Romo RN    12/30/2024 0056 Given 0.4 mg Intravenous Rhianan Romo RN          HYDROmorphone (Dilaudid) 1 MG/ML injection 0.8 mg       Date Action Dose Route User    12/30/2024 1155 Given 0.8 mg Intravenous Mariah Sanford          insulin aspart (NovoLOG) 100 Units/mL FlexPen 5 Units       Date Action Dose Route User    12/30/2024 1005 Given 5 Units Subcutaneous (Right Lower Abdomen) Mariah Sanford    12/29/2024 1612 Given 5 Units Subcutaneous (Left Upper Arm) [place] Betancourt RN          NIFEdipine ER (Procardia-XL) 24 hr tab 30 mg       Date Action  Dose Route User    12/30/2024 0929 Given 30 mg Oral Mariah Sanford          piperacillin-tazobactam (Zosyn) 3.375 g in dextrose 5% 100 mL IVPB-ADDV       Date Action Dose Route User    12/30/2024 0731 New Bag 3.375 g Intravenous Rhianna Romo RN    12/29/2024 2138 New Bag 3.375 g Intravenous Wanda David RN          rivaroxaban (Xarelto) tab 20 mg       Date Action Dose Route User    12/30/2024 0929 Given 20 mg Oral Mariah Sanford          rosuvastatin (Crestor) tab 20 mg       Date Action Dose Route User    12/29/2024 2141 Given 20 mg Oral Wanda David RN          sertraline (Zoloft) tab 50 mg       Date Action Dose Route User    12/29/2024 1757 Given 50 mg Oral [place] Betancourt RN          sodium chloride 0.9% infusion       Date Action Dose Route User    12/30/2024 1200 New Bag (none) Intravenous Mariah Sanford          spironolactone (Aldactone) tab 50 mg       Date Action Dose Route User    12/30/2024 0929 Given 50 mg Oral Mariah Sanford            Vitals (last day)       Date/Time Temp Pulse Resp BP SpO2 Weight O2 Device O2 Flow Rate (L/min) [facility]    12/30/24 1154 -- 80 16 124/74 93 % -- None (Room air) -- JL    12/30/24 0927 98.2 °F (36.8 °C) 87 18 145/84 92 % -- None (Room air) -- JL    12/30/24 0548 98.4 °F (36.9 °C) 82 16 157/90 94 % -- None (Room air) -- PK    12/29/24 2140 -- -- -- -- 95 % -- Nasal cannula 2 L/min MW    12/29/24 2137 98.3 °F (36.8 °C) 84 16 141/88 89 % -- None (Room air) -- MW    12/29/24 1755 -- 81 18 128/77 91 % -- None (Room air) -- TQ    12/29/24 1605 98.2 °F (36.8 °C) 80 20 118/73 92 % -- None (Room air) -- TQ    12/29/24 0932 97.5 °F (36.4 °C) 83 20 112/75 92 % -- Nasal cannula 2 L/min TQ    12/29/24 0501 97.2 °F (36.2 °C) 83 18 107/65 95 % -- None (Room air) -- EO

## 2024-12-31 LAB
CREAT BLD-MCNC: 2.44 MG/DL
EGFRCR SERPLBLD CKD-EPI 2021: 23 ML/MIN/1.73M2 (ref 60–?)
GLUCOSE BLDC GLUCOMTR-MCNC: 107 MG/DL (ref 70–99)
GLUCOSE BLDC GLUCOMTR-MCNC: 115 MG/DL (ref 70–99)
GLUCOSE BLDC GLUCOMTR-MCNC: 134 MG/DL (ref 70–99)
GLUCOSE BLDC GLUCOMTR-MCNC: 134 MG/DL (ref 70–99)
GLUCOSE BLDC GLUCOMTR-MCNC: 139 MG/DL (ref 70–99)
GLUCOSE BLDC GLUCOMTR-MCNC: 142 MG/DL (ref 70–99)

## 2024-12-31 PROCEDURE — 99233 SBSQ HOSP IP/OBS HIGH 50: CPT | Performed by: HOSPITALIST

## 2024-12-31 NOTE — PHYSICAL THERAPY NOTE
PHYSICAL THERAPY EVALUATION - INPATIENT     Room Number: 552/552-A  Evaluation Date: 12/31/2024  Type of Evaluation: Initial   Physician Order: PT Eval and Treat    Presenting Problem: diabetic ulcer of left heel - WBAT in CAM boot, recent fall, R shoulder pain  Co-Morbidities : BLE lymphedema, diabetes mellitus  Reason for Therapy: Mobility Dysfunction and Discharge Planning    PHYSICAL THERAPY ASSESSMENT   Patient is a 54 year old female admitted 12/27/2024 for diabetic ulcer of left heel - WBAT in CAM boot, recent fall, R shoulder pain. No acute fractures found on imaging. Prior to admission, patient's baseline is Independent with ADLs/iADLs/functional mobility. Patient is currently functioning below baseline with bed mobility, transfers, gait, stair negotiation, standing prolonged periods, and performing household tasks. Patient is requiring minimal assist and assist x 1-2  as a result of the following impairments: decreased functional strength, decreased endurance/aerobic capacity, pain, impaired standing balance, decreased muscular endurance, medical status, and limited RUE ROM.  Physical Therapy will continue to follow for duration of hospitalization.    Patient will benefit from continued skilled PT Services to promote return to prior level of function and safety with continuous assistance and gradual rehabilitative therapy .    PLAN DURING HOSPITALIZATION  Nursing Mobility Recommendation : 1 Assist (2nd person present for safety; short distances)  PT Device Recommendation: Rolling walker;Gait belt (short distances)  PT Treatment Plan: Bed mobility;Body mechanics;Endurance;Energy conservation;Patient education;Gait training;Strengthening;Transfer training;Balance training;Stair training  Rehab Potential : Good  Frequency (Obs): 3-5x/week     PHYSICAL THERAPY MEDICAL/SOCIAL HISTORY     Problem List  Principal Problem:    Diabetic ulcer of left heel associated with type 2 diabetes mellitus, unspecified ulcer  stage (HCC)  Active Problems:    Diabetic ulcer of heel (HCC)    HOME SITUATION  Type of Home: Apartment  Home Layout: One level  Stairs to Enter : 8   Railing: Yes    Lives With: Daughter    Patient Regularly Uses: None     SUBJECTIVE  Agreeable     PHYSICAL THERAPY EXAMINATION   OBJECTIVE  Precautions: Limb alert - left;Bed/chair alarm  Fall Risk:  (Moderate fall risk)    WEIGHT BEARING RESTRICTION  L Lower Extremity: Weight Bearing as Tolerated (in CAM boot)    PAIN ASSESSMENT  Rating: Unable to rate  Location: LLE and R shoulder  Management Techniques: Activity promotion;Body mechanics;Repositioning    COGNITION  Overall Cognitive Status:  WFL - within functional limits    RANGE OF MOTION AND STRENGTH ASSESSMENT  Lower extremity ROM is within functional limits   Lower extremity strength is within functional limits; LLE limited due to pain     BALANCE  Static Sitting: Good  Dynamic Sitting: Fair +  Static Standing: Poor +  Dynamic Standing: Poor    ACTIVITY TOLERANCE  BP: (!) 147/91  BP Location: Left arm  BP Method: Automatic  Patient Position: Sitting    AM-PAC '6-Clicks' INPATIENT SHORT FORM - BASIC MOBILITY  How much difficulty does the patient currently have...  Patient Difficulty: Turning over in bed (including adjusting bedclothes, sheets and blankets)?: A Little   Patient Difficulty: Sitting down on and standing up from a chair with arms (e.g., wheelchair, bedside commode, etc.): A Little   Patient Difficulty: Moving from lying on back to sitting on the side of the bed?: A Little   How much help from another person does the patient currently need...   Help from Another: Moving to and from a bed to a chair (including a wheelchair)?: A Lot   Help from Another: Need to walk in hospital room?: A Lot   Help from Another: Climbing 3-5 steps with a railing?: A Lot     AM-PAC Score:  Raw Score: 15   Approx Degree of Impairment: 57.7%   Standardized Score (AM-PAC Scale): 39.45   CMS Modifier (G-Code):  CK    FUNCTIONAL ABILITY STATUS  Functional Mobility/Gait Assessment  Gait Assistance: Minimum assistance (A x 2)  Distance (ft): 4 ft bed>chair  Assistive Device: Rolling walker  Pattern: L Decreased stance time;Shuffle (decreased anastacio speed, decreased step length, step to gait pattern. Patient with difficulty holding onto RW with RUE due to pain - assistance provided with RW management.)  Sit to Stand: minimal assist from EOB with height of bed elevated for ease of transfer    Exercise/Education Provided:  Body mechanics  Energy conservation  Functional activity tolerated  Gait training  Posture  Transfer training    Skilled Therapy Provided: Patient sitting EOB with OT present in room upon arrival. RN approved activity. Educated patient on POC and benefits of mobilization. Agreeable to participate. Patient reporting LLE and R shoulder pain, not quantified per the pain scale. Educated patient on LLE WBAT status and use of CAM boot. Patient benefits from increased time, cues, and assistance in order to complete functional mobility tasks. Patient with difficulty managing RW due to R shoulder pain - may benefit from trial with RUE platform RW or rhoda-walker next session.    The patient's Approx Degree of Impairment: 57.7% has been calculated based on documentation in the [facility] '6 clicks' Inpatient Basic Mobility Short Form.  Research supports that patients with this level of impairment may benefit from rehab.  Final disposition will be made by interdisciplinary medical team.    Patient End of Session: Up in chair;Needs met;Call light within reach;RN aware of session/findings;All patient questions and concerns addressed;Hospital anti-slip socks;Alarm set    CURRENT GOALS  Goals to be met by: 1/14/25  Patient Goal Patient's self-stated goal is: none stated   Goal #1 Patient is able to demonstrate supine - sit EOB @ level: SBA     Goal #1   Current Status    Goal #2 Patient is able to demonstrate transfers Sit  to/from Stand at assistance level: SBA with  LRAD     Goal #2  Current Status    Goal #3 Patient is able to ambulate 40 feet with assist device:  LRAD  at assistance level: SBA   Goal #3   Current Status    Goal #4 Patient will negotiate 8 stairs/one curb w/ assistive device and SBA   Goal #4   Current Status    Goal #5 Patient to demonstrate independence with home activity/exercise instructions provided to patient in preparation for discharge.   Goal #5   Current Status      Patient Evaluation Complexity Level:  History Moderate - 1 or 2 personal factors and/or co-morbidities   Examination of body systems Moderate - addressing a total of 3 or more elements   Clinical Presentation  Moderate - Evolving   Clinical Decision Making  Moderate Complexity     Therapeutic Activity:  13 minutes

## 2024-12-31 NOTE — PLAN OF CARE
Problem: Diabetes/Glucose Control  Goal: Glucose maintained within prescribed range  Description: INTERVENTIONS:  - Monitor Blood Glucose as ordered  - Assess for signs and symptoms of hyperglycemia and hypoglycemia  - Administer ordered medications to maintain glucose within target range  - Assess barriers to adequate nutritional intake and initiate nutrition consult as needed  - Instruct patient on self management of diabetes  Outcome: Progressing     Problem: Patient Centered Care  Goal: Patient preferences are identified and integrated in the patient's plan of care  Description: Interventions:  - What would you like us to know as we care for you? From home with family  - Provide timely, complete, and accurate information to patient/family  - Incorporate patient and family knowledge, values, beliefs, and cultural backgrounds into the planning and delivery of care  - Encourage patient/family to participate in care and decision-making at the level they choose  - Honor patient and family perspectives and choices  Outcome: Progressing     Problem: Patient/Family Goals  Goal: Patient/Family Long Term Goal  Description: Patient's Long Term Goal: cure infection    Interventions:  - antiobiotics  - See additional Care Plan goals for specific interventions  Outcome: Progressing  Goal: Patient/Family Short Term Goal  Description: Patient's Short Term Goal: decrease pain    Interventions:   - change pain meds  - See additional Care Plan goals for specific interventions  Outcome: Progressing     Problem: RESPIRATORY - ADULT  Goal: Achieves optimal ventilation and oxygenation  Description: INTERVENTIONS:  - Assess for changes in respiratory status  - Assess for changes in mentation and behavior  - Position to facilitate oxygenation and minimize respiratory effort  - Oxygen supplementation based on oxygen saturation or ABGs  - Provide Smoking Cessation handout, if applicable  - Encourage broncho-pulmonary hygiene including  cough, deep breathe, Incentive Spirometry  - Assess the need for suctioning and perform as needed  - Assess and instruct to report SOB or any respiratory difficulty  - Respiratory Therapy support as indicated  - Manage/alleviate anxiety  - Monitor for signs/symptoms of CO2 retention  Outcome: Progressing     Problem: METABOLIC/FLUID AND ELECTROLYTES - ADULT  Goal: Electrolytes maintained within normal limits  Description: INTERVENTIONS:  - Monitor labs and rhythm and assess patient for signs and symptoms of electrolyte imbalances  - Administer electrolyte replacement as ordered  - Monitor response to electrolyte replacements, including rhythm and repeat lab results as appropriate  - Fluid restriction as ordered  - Instruct patient on fluid and nutrition restrictions as appropriate  Outcome: Progressing     Problem: PAIN - ADULT  Goal: Verbalizes/displays adequate comfort level or patient's stated pain goal  Description: INTERVENTIONS:  - Encourage pt to monitor pain and request assistance  - Assess pain using appropriate pain scale  - Administer analgesics based on type and severity of pain and evaluate response  - Implement non-pharmacological measures as appropriate and evaluate response  - Consider cultural and social influences on pain and pain management  - Manage/alleviate anxiety  - Utilize distraction and/or relaxation techniques  - Monitor for opioid side effects  - Notify MD/LIP if interventions unsuccessful or patient reports new pain  - Anticipate increased pain with activity and pre-medicate as appropriate  Outcome: Progressing     Problem: RISK FOR INFECTION - ADULT  Goal: Absence of fever/infection during anticipated neutropenic period  Description: INTERVENTIONS  - Monitor WBC  - Administer growth factors as ordered  - Implement neutropenic guidelines  Outcome: Progressing     Problem: SAFETY ADULT - FALL  Goal: Free from fall injury  Description: INTERVENTIONS:  - Assess pt frequently for physical  needs  - Identify cognitive and physical deficits and behaviors that affect risk of falls.  - [place] fall precautions as indicated by assessment.  - Educate pt/family on patient safety including physical limitations  - Instruct pt to call for assistance with activity based on assessment  - Modify environment to reduce risk of injury  - Provide assistive devices as appropriate  - Consider OT/PT consult to assist with strengthening/mobility  - Encourage toileting schedule  Outcome: Progressing

## 2024-12-31 NOTE — PROGRESS NOTES
INFECTIOUS DISEASE PROGRESS NOTE  [facility]  part of [facility] ID PROGRESS NOTE    Suki Rosenberg Patient Status:  Inpatient    1970 MRN [id number]   Location [facility] 5SW/SE Attending Tevin Guy MD   Hosp Day # 3 PCP Graham Recio,      Subjective:  ROS reviewed. Was fatigued yesterday. Getting dilaudid for pain.    ASSESSMENT:    Antibiotics: Vancomycin, zosyn, clindamycin -     # L heel cellulitis, MRI without osteomyelitis or abscess               -s/p bedside debridement   # NAYELI  # R shoulder pain s/p fall   # Hypotension, R/o bacteremia  # Diabetes mellitus  # Obesity  # BLE lymphedema     PLAN:  -  Will stop vancomycin, zosyn, and clindamycin and narrow to unasyn which can be converted to PO augmentin x 10 days.  -  Follow fever curve, wbc.  -  Reviewed labs, micro, imaging reports, available old records.  -  Case d/w patient, RN.     History of Present Illness:  Suki Rosenberg is a 54 year old female with a history of morbid obesity, BLE lymphedema, diabetes mellitus, who presented to [facility] ED on  with worsening drainage from her left heel. States that the left heel wound has bee present for about two weeks. Previously was followed at the wound clinic for wound on other heel. Patient was seen in  on  for L heel wound where she was sent home with silvadene and told to follow up with the wound clinic but unable to get an appointment until 1/3/25. Noticed more odor coming from foot on [place] with worsening pain. Patient did hit her right shoulder on a concrete wall yesterday and states unable to move her shoulder. No recent antibiotics. No fevers or chills at home. On arrival, afebrile, wbc 6.8, XR L foot pending, XR R shoulder pending, started on vancomycin and zosyn. Podiatry to see. Hypotensive this AM and receiving fluid bolus. Taking morphine for pain. ID consulted.    Physical Exam:  BP (!) 159/106 (BP Location: Left  arm)   Pulse 89   Temp 98.1 °F (36.7 °C) (Oral)   Resp 16   Ht 5' 2\" (1.575 m)   Wt 246 lb 14.6 oz (112 kg)   LMP 10/29/2018   SpO2 97%   BMI 45.16 kg/m²     Gen:   Awake, in bed  HEENT:  EOMI, neck supple  CV/lungs:  Regular rate and rhythm, CTAB  Abdom:  Soft, NT/ND, +BS  Skin/extrem:  No rashes, L heel wrapped  Lines:  PIV+    Laboratory Data: Reviewed    Microbiology: Reviewed    Radiology: Reviewed      [facility]  [phone]  12/31/2024

## 2024-12-31 NOTE — PROGRESS NOTES
[facility]  Progress Note     Suki Rosenberg  : 1970    Status: Inpatient  Day #: 3    Attending: Tevin Guy MD  PCP: Graham Recio DO     Assessment and Plan:    Diabetic foot infection.  Cannot rule out osteomyelitis, abscess.  -MRI pending  -Arterial studies pending  -Podiatry infectious disease consult appreciated  -Pain control  -Continue current antibiotics    NAYELI  -hold lasix and aldactone  -IVF  -BMP in AM     Other problems  History of PE  Asthma  Lupus?  Per chart  Schizophrenia?  Per chart  HTN        DVT Mechanical Prophylaxis:   SCDs,    DVT Pharmacologic Prophylaxis   Medication    [START ON 2025] rivaroxaban (Xarelto) tab 15 mg               Subjective:      Initial Chief Complaint:  foot wound    Stable pain in left heel.     10 point ROS completed and was negative, except for pertinent positive and negatives stated in subjective.      Objective:      Temp:  [97.5 °F (36.4 °C)-98.3 °F (36.8 °C)] 98.1 °F (36.7 °C)  Pulse:  [78-89] 89  Resp:  [16-18] 16  BP: (112-159)/() 159/106  SpO2:  [92 %-97 %] 97 %  General:  Alert, no distress  HEENT:  Normocephalic, atraumatic  Cardiac:  Regular rate, regular rhythm  Pulmonary:  Clear to auscultation bilaterally, respirations unlabored  Gastrointestinal:  Soft, non-tender, normal bowel sounds  Musculoskeletal:  No joint swelling  Extremities:  No edema, no cyanosis, no clubbing. L foot wrapped.  Neurologic:  nonfocal  Psychiatric:  Normal affect, calm and appropriate    Intake/Output Summary (Last 24 hours) at 2024 1515  Last data filed at 2024 1334  Gross per 24 hour   Intake 810 ml   Output 3200 ml   Net -2390 ml         Recent Labs   Lab 24  0021 24  0818 24  1043   WBC 6.8 6.2 6.9   HGB 13.5 12.1 12.9   HCT 40.5 37.4 37.9   .0 241.0 221.0   RBC 4.61 4.09 4.29   MCV 87.9 91.4 88.3   MCH 29.3 29.6 30.1   MCHC 33.3 32.4 34.0   RDW 13.7 13.8 13.6   NEPRELIM 3.94 3.30 4.60     Recent Labs    Lab 12/28/24  0021 12/29/24  0818 12/30/24  1043 12/31/24  0932   BUN 10 11 17  --    CREATSERUM 0.83 1.14* 2.33* 2.44*   CA 9.7 8.8 9.0  --     140 142  --    K 3.3* 3.8  3.8 3.8  --     108 107  --    CO2 31.0 26.0 26.0  --    * 187* 170*  --    CRP 4.40*  --   --   --        MRI FOOT, LEFT (CPT=73718)    Result Date: 12/30/2024  CONCLUSION:   Soft tissue and skin ulceration within the posterior heel without abscess.  No osteomyelitis.  6 mm osteochondral lesion of the medial talar dome without collapse of the articular surface.  Split tear of the peroneus brevis with peroneal tenosynovitis.  Mild Achilles tendinosis.  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  Multiple other incidental findings as described in the body of the report.    Dictated by (CST): Marlon Horne MD on 12/30/2024 at 2:44 PM     Finalized by (CST): Marlon Horne MD on 12/30/2024 at 2:53 PM          US ARTERIAL DUPLEX LOWER EXTREMITY BILATERAL (CPT=93925)    Result Date: 12/30/2024  CONCLUSION: Duplex arterial ultrasound demonstrates patent vessels with triphasic arterial Doppler flow.  Somewhat limited evaluation of the below knee tibial vessels on the left due to overlying calf edema and bandaging in the foot, though the vessels are patent where visualized.    Dictated by (CST): Yves Goff MD on 12/30/2024 at 11:41 AM     Finalized by (CST): Yves Goff MD on 12/30/2024 at 11:56 AM             Medications:   ampicillin-sulbactam  3 g Intravenous q12h    [START ON 1/1/2025] rivaroxaban  15 mg Oral Daily with food    amitriptyline  75 mg Oral Daily    ARIPiprazole  5 mg Oral Daily    carvedilol  25 mg Oral BID with meals    [Held by provider] furosemide  40 mg Oral Daily    NIFEdipine ER  30 mg Oral Daily    rosuvastatin  20 mg Oral Nightly    sertraline  50 mg Oral QPM    [Held by provider] spironolactone  50 mg Oral Daily    insulin aspart  1-7 Units  Subcutaneous TID CC and HS    insulin aspart  5 Units Subcutaneous TID AC    insulin degludec  10 Units Subcutaneous Nightly    LORazepam  0.5 mg Intravenous Once      PRN Meds:   zolpidem    polyethylene glycol (PEG 3350)    sennosides    bisacodyl    fleet enema    ondansetron    prochlorperazine    glucose **OR** glucose **OR** glucose-vitamin C **OR** dextrose **OR** glucose **OR** glucose **OR** glucose-vitamin C    HYDROmorphone **OR** HYDROmorphone **OR** HYDROmorphone    HYDROmorphone    acetaminophen    Supplementary Documentation:   DVT Mechanical Prophylaxis:   SCDs,    DVT Pharmacologic Prophylaxis   Medication    [START ON 1/1/2025] rivaroxaban (Xarelto) tab 15 mg                Code Status: Full Code  [place]: No urinary catheter in place  [place] Duration (in days):   Central line:    ISRA[place]: 1/2/2025                    [facility]. Time spent on chart/note review, review of labs/imaging, discussion with patient, physical exam, discussion with staff, consultants, coordinating care, writing progress note, discussion of plan of care.      Tevin Guy MD

## 2024-12-31 NOTE — OCCUPATIONAL THERAPY NOTE
OCCUPATIONAL THERAPY EVALUATION - INPATIENT     Room Number: 552/552-A  Evaluation Date: 12/31/2024  Type of Evaluation: Initial   Presenting Problem: recurrent diabetic left heel ulcer  Co-Morbidities: Anemia, biploar 2 disorder, depression, diabetes, lupus, neuropathy    Physician Order: IP Consult to Occupational Therapy  Reason for Therapy: ADL/IADL Dysfunction and Discharge Planning    OCCUPATIONAL THERAPY ASSESSMENT   Patient is a 54 year old female admitted 12/27/2024 for recurrent diabetic left heel ulcer.  Prior to admission, patient's baseline is independent with all ADLs and functional mobility.  Patient is currently functioning below baseline with ADLs, bed mobility, functional transfers and functional mobility.  Patient is requiring  CGA for bed mobility, max assist for ADLs, and min assist x2 for functional mobility  as a result of the following impairments: decreased functional strength, decreased endurance, pain, impaired standing balance, decreased muscular endurance, medical status, and limited RUE ROM. Occupational Therapy will continue to follow for duration of hospitalization.    Patient will benefit from continued skilled OT Services to promote return to prior level of function and safety with continuous assistance and gradual rehabilitative therapy.    PLAN DURING HOSPITALIZATION  OT Device Recommendations: TBD  OT Treatment Plan: Balance activities;Energy conservation/work simplification techniques;ADL training;Functional transfer training;Patient/Family education;Patient/Family training;Equipment eval/education;Compensatory technique education     OCCUPATIONAL THERAPY MEDICAL/SOCIAL HISTORY   Problem List  Principal Problem:    Diabetic ulcer of left heel associated with type 2 diabetes mellitus, unspecified ulcer stage (HCC)  Active Problems:    Diabetic ulcer of heel (HCC)    HOME SITUATION  Type of Home: Apartment  Home Layout: One level; Other (Comment) (8 GUILLERMO)  Lives With:  Daughter  Toilet and Equipment: Standard height toilet  Shower/Tub and Equipment: Walk-in shower  Other Equipment: None  Patient Regularly Uses: None    Prior Level of [place]: Prior to admission pt was independent with all ADLs and functional mobility.     SUBJECTIVE  The boot will take some getting used to.     OCCUPATIONAL THERAPY EXAMINATION      OBJECTIVE  Precautions: Other (Comment) (CAM boot for mobility)      PAIN ASSESSMENT  Ratin  Location: L heel  Management Techniques: Activity promotion; Repositioning; Relaxation      ACTIVITY TOLERANCE           BP: (!) 147/91  BP Location: Left arm  BP Method: Automatic  Patient Position: Sitting    COGNITION  Overall Cognitive Status:  WFL - within functional limits  Arousal/Alertness:  appropriate responses to stimuli    RANGE OF MOTION   LUE: WFL   RUE: Impaired shoulder flexion due to pain from rotator cuff tear    STRENGTH ASSESSMENT  LUE: WFL   RUE: Impaired     ACTIVITIES OF DAILY LIVING ASSESSMENT  AM-PAC ‘6-Clicks’ Inpatient Daily Activity Short Form  How much help from another person does the patient currently need…  -   Putting on and taking off regular lower body clothing?: A Lot  -   Bathing (including washing, rinsing, drying)?: A Lot  -   Toileting, which includes using toilet, bedpan or urinal? : A Lot  -   Putting on and taking off regular upper body clothing?: A Little  -   Taking care of personal grooming such as brushing teeth?: A Little  -   Eating meals?: None    AM-PAC Score:  Score: 16  Approx Degree of Impairment: 53.32%  Standardized Score (AM-PAC Scale): 35.96  CMS Modifier (G-Code): CK    FUNCTIONAL TRANSFER ASSESSMENT  Sit to Stand: Edge of Bed  Edge of Bed: Minimal Assist  Simulated toilet transfer: Min assist x2 at RW level    BED MOBILITY  Supine to Sit : Contact Guard Assist    BALANCE ASSESSMENT  Static Sitting: Stand-by Assist  Static Standing: Contact Guard Assist    FUNCTIONAL ADL ASSESSMENT  LB Dressing Seated: Maximum  Assist  Comments: Pt required max assist to don CAM boot and R gym shoe seated EOB.        Skilled Therapy Provided:   RN cleared pt for participation. Session coordinated with PT. Pt received in bed with no visitors present for session. Pt agreeable to participation in therapy. Pt benefited from increased time, cues for sequencing, and RW for support.  To assess pt's participation during tasks while also providing intermittent education to maximize participation, OT facilitated the following: bed mobility, functional transfers, functional mobility, and LB dressing. Pt completed all tasks with assist levels listed above. Pt requiring min assist x2 to complete functional mobility with RW assist to help manage RW due to limited RUE ROM. Pt remained in chair with alarm set and all needs in reach.         EDUCATION PROVIDED  Patient Education : Role of Occupational Therapy; Plan of Care; Functional Transfer Techniques; Fall Prevention; Weight Bear Status; Posture/Positioning; Proper Body Mechanics; Energy Conservation  Patient's Response to Education: Verbalized Understanding; Returned Demonstration    The patient's Approx Degree of Impairment: 53.32% has been calculated based on documentation in the [facility] '6 clicks' Inpatient Daily Activity Short Form.  Research supports that patients with this level of impairment may benefit from rehab.  Final disposition will be made by interdisciplinary medical team.     Patient End of Session: Up in chair;Needs met;Call light within reach;All patient questions and concerns addressed;RN aware of session/findings;Alarm set    OT Goals  Patients self stated goal is: none stated      Patient will complete functional transfer with SUP  Comment:     Patient will complete toileting with SUP  Comment:     Patient will tolerate standing for 5 minutes in prep for adls with SUP   Comment:    Patient will complete item retrieval with SUP  Comment:          Goals  on: 25  Frequency:  3-5x/week    Patient Evaluation Complexity Level:   Occupational Profile/Medical History MODERATE - Expanded review of history including review of medical or therapy record   Specific performance deficits impacting engagement in ADL/IADL MODERATE  3 - 5 performance deficits   Client Assessment/Performance Deficits MODERATE - Comorbidities and min to mod modifications of tasks    Clinical Decision Making MODERATE - Analysis of occupational profile, detailed assessments, several treatment options    Overall Complexity MODERATE     OT Session Time: 25 minutes  Self-Care Home Management: 25 minutes

## 2024-12-31 NOTE — DIABETES ED
Patient screened for A1C>8%. Pt was extremely drowsy during visit. Unable to participate in the discussion or education at this time. Pt requested to visit another time.     Follows with Dr. Kline and Dr. Mandujano. Will attempt to revisit patient.     Emilia Irene RDN, [facility]  12/31/2024  2:11 PM

## 2025-01-01 ENCOUNTER — APPOINTMENT (OUTPATIENT)
Dept: ULTRASOUND IMAGING | Facility: HOSPITAL | Age: 55
End: 2025-01-01
Attending: HOSPITALIST
Payer: COMMERCIAL

## 2025-01-01 LAB
ANION GAP SERPL CALC-SCNC: 6 MMOL/L (ref 0–18)
BASOPHILS # BLD AUTO: 0.02 X10(3) UL (ref 0–0.2)
BASOPHILS NFR BLD AUTO: 0.3 %
BUN BLD-MCNC: 17 MG/DL (ref 9–23)
BUN/CREAT SERPL: 9 (ref 10–20)
CALCIUM BLD-MCNC: 9.2 MG/DL (ref 8.7–10.4)
CHLORIDE SERPL-SCNC: 109 MMOL/L (ref 98–112)
CO2 SERPL-SCNC: 28 MMOL/L (ref 21–32)
CREAT BLD-MCNC: 1.88 MG/DL
DEPRECATED RDW RBC AUTO: 44 FL (ref 35.1–46.3)
EGFRCR SERPLBLD CKD-EPI 2021: 32 ML/MIN/1.73M2 (ref 60–?)
EOSINOPHIL # BLD AUTO: 0.22 X10(3) UL (ref 0–0.7)
EOSINOPHIL NFR BLD AUTO: 3.8 %
ERYTHROCYTE [DISTWIDTH] IN BLOOD BY AUTOMATED COUNT: 13.6 % (ref 11–15)
GLUCOSE BLD-MCNC: 93 MG/DL (ref 70–99)
GLUCOSE BLDC GLUCOMTR-MCNC: 115 MG/DL (ref 70–99)
GLUCOSE BLDC GLUCOMTR-MCNC: 163 MG/DL (ref 70–99)
GLUCOSE BLDC GLUCOMTR-MCNC: 197 MG/DL (ref 70–99)
HCT VFR BLD AUTO: 39.2 %
HGB BLD-MCNC: 13.4 G/DL
IMM GRANULOCYTES # BLD AUTO: 0.02 X10(3) UL (ref 0–1)
IMM GRANULOCYTES NFR BLD: 0.3 %
LYMPHOCYTES # BLD AUTO: 2.32 X10(3) UL (ref 1–4)
LYMPHOCYTES NFR BLD AUTO: 40.3 %
MCH RBC QN AUTO: 30.2 PG (ref 26–34)
MCHC RBC AUTO-ENTMCNC: 34.2 G/DL (ref 31–37)
MCV RBC AUTO: 88.3 FL
MONOCYTES # BLD AUTO: 0.57 X10(3) UL (ref 0.1–1)
MONOCYTES NFR BLD AUTO: 9.9 %
NEUTROPHILS # BLD AUTO: 2.6 X10 (3) UL (ref 1.5–7.7)
NEUTROPHILS # BLD AUTO: 2.6 X10(3) UL (ref 1.5–7.7)
NEUTROPHILS NFR BLD AUTO: 45.4 %
OSMOLALITY SERPL CALC.SUM OF ELEC: 297 MOSM/KG (ref 275–295)
PLATELET # BLD AUTO: 261 10(3)UL (ref 150–450)
POTASSIUM SERPL-SCNC: 3.2 MMOL/L (ref 3.5–5.1)
RBC # BLD AUTO: 4.44 X10(6)UL
SODIUM SERPL-SCNC: 143 MMOL/L (ref 136–145)
WBC # BLD AUTO: 5.8 X10(3) UL (ref 4–11)

## 2025-01-01 PROCEDURE — 76775 US EXAM ABDO BACK WALL LIM: CPT | Performed by: HOSPITALIST

## 2025-01-01 PROCEDURE — 99233 SBSQ HOSP IP/OBS HIGH 50: CPT | Performed by: HOSPITALIST

## 2025-01-01 NOTE — PLAN OF CARE
Problem: Diabetes/Glucose Control  Goal: Glucose maintained within prescribed range  Description: INTERVENTIONS:  - Monitor Blood Glucose as ordered  - Assess for signs and symptoms of hyperglycemia and hypoglycemia  - Administer ordered medications to maintain glucose within target range  - Assess barriers to adequate nutritional intake and initiate nutrition consult as needed  - Instruct patient on self management of diabetes  Outcome: Progressing     Problem: Patient Centered Care  Goal: Patient preferences are identified and integrated in the patient's plan of care  Description: Interventions:  - What would you like us to know as we care for you? From home with family  - Provide timely, complete, and accurate information to patient/family  - Incorporate patient and family knowledge, values, beliefs, and cultural backgrounds into the planning and delivery of care  - Encourage patient/family to participate in care and decision-making at the level they choose  - Honor patient and family perspectives and choices  Outcome: Progressing     Problem: Patient/Family Goals  Goal: Patient/Family Long Term Goal  Description: Patient's Long Term Goal: cure infection    Interventions:  - antiobiotics  - See additional Care Plan goals for specific interventions  Outcome: Progressing     Problem: RESPIRATORY - ADULT  Goal: Achieves optimal ventilation and oxygenation  Description: INTERVENTIONS:  - Assess for changes in respiratory status  - Assess for changes in mentation and behavior  - Position to facilitate oxygenation and minimize respiratory effort  - Oxygen supplementation based on oxygen saturation or ABGs  - Provide Smoking Cessation handout, if applicable  - Encourage broncho-pulmonary hygiene including cough, deep breathe, Incentive Spirometry  - Assess the need for suctioning and perform as needed  - Assess and instruct to report SOB or any respiratory difficulty  - Respiratory Therapy support as indicated  -  Manage/alleviate anxiety  - Monitor for signs/symptoms of CO2 retention  Outcome: Progressing     Problem: METABOLIC/FLUID AND ELECTROLYTES - ADULT  Goal: Electrolytes maintained within normal limits  Description: INTERVENTIONS:  - Monitor labs and rhythm and assess patient for signs and symptoms of electrolyte imbalances  - Administer electrolyte replacement as ordered  - Monitor response to electrolyte replacements, including rhythm and repeat lab results as appropriate  - Fluid restriction as ordered  - Instruct patient on fluid and nutrition restrictions as appropriate  Outcome: Progressing     Problem: RISK FOR INFECTION - ADULT  Goal: Absence of fever/infection during anticipated neutropenic period  Description: INTERVENTIONS  - Monitor WBC  - Administer growth factors as ordered  - Implement neutropenic guidelines  Outcome: Progressing     Problem: SAFETY ADULT - FALL  Goal: Free from fall injury  Description: INTERVENTIONS:  - Assess pt frequently for physical needs  - Identify cognitive and physical deficits and behaviors that affect risk of falls.  - [place] fall precautions as indicated by assessment.  - Educate pt/family on patient safety including physical limitations  - Instruct pt to call for assistance with activity based on assessment  - Modify environment to reduce risk of injury  - Provide assistive devices as appropriate  - Consider OT/PT consult to assist with strengthening/mobility  - Encourage toileting schedule  Outcome: Progressing     Problem: Patient/Family Goals  Goal: Patient/Family Short Term Goal  Description: Patient's Short Term Goal: decrease pain    Interventions:   - change pain meds  - See additional Care Plan goals for specific interventions  Outcome: Not Progressing     Problem: PAIN - ADULT  Goal: Verbalizes/displays adequate comfort level or patient's stated pain goal  Description: INTERVENTIONS:  - Encourage pt to monitor pain and request assistance  - Assess pain using  appropriate pain scale  - Administer analgesics based on type and severity of pain and evaluate response  - Implement non-pharmacological measures as appropriate and evaluate response  - Consider cultural and social influences on pain and pain management  - Manage/alleviate anxiety  - Utilize distraction and/or relaxation techniques  - Monitor for opioid side effects  - Notify MD/LIP if interventions unsuccessful or patient reports new pain  - Anticipate increased pain with activity and pre-medicate as appropriate  Outcome: Not Progressing

## 2025-01-01 NOTE — PROGRESS NOTES
[facility]  Progress Note     Suki Rosenberg  : 1970    Status: Inpatient  Day #: 4    Attending: Tevin Guy MD  PCP: Graham Recio DO     Assessment and Plan:    Diabetic foot infection.  Cannot rule out osteomyelitis, abscess.  -MRI reviewed. No OM  -Arterial duplex ok  -Podiatry infectious disease consult appreciated  -Pain control  -Continue current antibiotics    NAYELI - cr improved today  -hold lasix and aldactone  -IVF  -BMP in AM  -renal US ok    Right shoulder pain  -XR reviewed  -outpatient PT and ortho f/u    Other problems  History of PE  Asthma  Lupus?  Per chart  Schizophrenia?  Per chart  HTN    Dispo:  discharge planning. Discharge soon if renal function cont improve.         DVT Mechanical Prophylaxis:   SCDs,    DVT Pharmacologic Prophylaxis   Medication    rivaroxaban (Xarelto) tab 15 mg               Subjective:      Initial Chief Complaint:  foot wound    Stable pain in left heel.     10 point ROS completed and was negative, except for pertinent positive and negatives stated in subjective.      Objective:      Temp:  [98 °F (36.7 °C)-98.3 °F (36.8 °C)] 98.3 °F (36.8 °C)  Pulse:  [76-83] 83  Resp:  [18] 18  BP: (128-147)/(78-91) 138/80  SpO2:  [90 %-95 %] 90 %  General:  Alert, no distress  HEENT:  Normocephalic, atraumatic  Cardiac:  Regular rate, regular rhythm  Pulmonary:  Clear to auscultation bilaterally, respirations unlabored  Gastrointestinal:  Soft, non-tender, normal bowel sounds  Musculoskeletal:  No joint swelling  Extremities:  No edema, no cyanosis, no clubbing. L foot wrapped.  Neurologic:  nonfocal  Psychiatric:  Normal affect, calm and appropriate    Intake/Output Summary (Last 24 hours) at 2025 1137  Last data filed at 2025 0517  Gross per 24 hour   Intake 1021.35 ml   Output 2001 ml   Net -979.65 ml         Recent Labs   Lab 24  0818 24  1043 25  0752   WBC 6.2 6.9 5.8   HGB 12.1 12.9 13.4   HCT 37.4 37.9 39.2   .0 221.0  261.0   RBC 4.09 4.29 4.44   MCV 91.4 88.3 88.3   MCH 29.6 30.1 30.2   MCHC 32.4 34.0 34.2   RDW 13.8 13.6 13.6   NEPRELIM 3.30 4.60 2.60     Recent Labs   Lab 12/28/24  0021 12/29/24  0818 12/30/24  1043 12/31/24  0932 01/01/25  0752   BUN 10 11 17  --  17   CREATSERUM 0.83 1.14* 2.33* 2.44* 1.88*   CA 9.7 8.8 9.0  --  9.2    140 142  --  143   K 3.3* 3.8  3.8 3.8  --  3.2*    108 107  --  109   CO2 31.0 26.0 26.0  --  28.0   * 187* 170*  --  93   CRP 4.40*  --   --   --   --        No results found.      Medications:   ampicillin-sulbactam  3 g Intravenous q12h    rivaroxaban  15 mg Oral Daily with food    lidocaine-menthol  1 patch Transdermal Daily    amitriptyline  75 mg Oral Daily    ARIPiprazole  5 mg Oral Daily    carvedilol  25 mg Oral BID with meals    [Held by provider] furosemide  40 mg Oral Daily    NIFEdipine ER  30 mg Oral Daily    rosuvastatin  20 mg Oral Nightly    sertraline  50 mg Oral QPM    [Held by provider] spironolactone  50 mg Oral Daily    insulin aspart  1-7 Units Subcutaneous TID CC and HS    insulin aspart  5 Units Subcutaneous TID AC    insulin degludec  10 Units Subcutaneous Nightly    LORazepam  0.5 mg Intravenous Once      PRN Meds:   zolpidem    polyethylene glycol (PEG 3350)    sennosides    bisacodyl    fleet enema    ondansetron    prochlorperazine    glucose **OR** glucose **OR** glucose-vitamin C **OR** dextrose **OR** glucose **OR** glucose **OR** glucose-vitamin C    HYDROmorphone **OR** HYDROmorphone **OR** HYDROmorphone    HYDROmorphone    acetaminophen    Supplementary Documentation:   DVT Mechanical Prophylaxis:   SCDs,    DVT Pharmacologic Prophylaxis   Medication    rivaroxaban (Xarelto) tab 15 mg                Code Status: Full Code  Vent[place]: No urinary catheter in place  [place] Duration (in days):   Central line:    ISRAEL: 1/2/2025                    [facility]. Time spent on chart/note review, review of labs/imaging, discussion with patient, physical  exam, discussion with staff, consultants, coordinating care, writing progress note, discussion of plan of care.      Tevin Guy MD

## 2025-01-01 NOTE — PLAN OF CARE
Problem: Diabetes/Glucose Control  Goal: Glucose maintained within prescribed range  Description: INTERVENTIONS:  - Monitor Blood Glucose as ordered  - Assess for signs and symptoms of hyperglycemia and hypoglycemia  - Administer ordered medications to maintain glucose within target range  - Assess barriers to adequate nutritional intake and initiate nutrition consult as needed  - Instruct patient on self management of diabetes  Outcome: Progressing     Problem: Patient Centered Care  Goal: Patient preferences are identified and integrated in the patient's plan of care  Description: Interventions:  - What would you like us to know as we care for you? From home with family  - Provide timely, complete, and accurate information to patient/family  - Incorporate patient and family knowledge, values, beliefs, and cultural backgrounds into the planning and delivery of care  - Encourage patient/family to participate in care and decision-making at the level they choose  - Honor patient and family perspectives and choices  Outcome: Progressing     Problem: Patient/Family Goals  Goal: Patient/Family Long Term Goal  Description: Patient's Long Term Goal: cure infection    Interventions:  - antiobiotics  - See additional Care Plan goals for specific interventions  Outcome: Progressing  Goal: Patient/Family Short Term Goal  Description: Patient's Short Term Goal: decrease pain    Interventions:   - change pain meds  - See additional Care Plan goals for specific interventions  Outcome: Progressing     Problem: RESPIRATORY - ADULT  Goal: Achieves optimal ventilation and oxygenation  Description: INTERVENTIONS:  - Assess for changes in respiratory status  - Assess for changes in mentation and behavior  - Position to facilitate oxygenation and minimize respiratory effort  - Oxygen supplementation based on oxygen saturation or ABGs  - Provide Smoking Cessation handout, if applicable  - Encourage broncho-pulmonary hygiene including  cough, deep breathe, Incentive Spirometry  - Assess the need for suctioning and perform as needed  - Assess and instruct to report SOB or any respiratory difficulty  - Respiratory Therapy support as indicated  - Manage/alleviate anxiety  - Monitor for signs/symptoms of CO2 retention  Outcome: Progressing     Problem: METABOLIC/FLUID AND ELECTROLYTES - ADULT  Goal: Electrolytes maintained within normal limits  Description: INTERVENTIONS:  - Monitor labs and rhythm and assess patient for signs and symptoms of electrolyte imbalances  - Administer electrolyte replacement as ordered  - Monitor response to electrolyte replacements, including rhythm and repeat lab results as appropriate  - Fluid restriction as ordered  - Instruct patient on fluid and nutrition restrictions as appropriate  Outcome: Progressing     Problem: PAIN - ADULT  Goal: Verbalizes/displays adequate comfort level or patient's stated pain goal  Description: INTERVENTIONS:  - Encourage pt to monitor pain and request assistance  - Assess pain using appropriate pain scale  - Administer analgesics based on type and severity of pain and evaluate response  - Implement non-pharmacological measures as appropriate and evaluate response  - Consider cultural and social influences on pain and pain management  - Manage/alleviate anxiety  - Utilize distraction and/or relaxation techniques  - Monitor for opioid side effects  - Notify MD/LIP if interventions unsuccessful or patient reports new pain  - Anticipate increased pain with activity and pre-medicate as appropriate  Outcome: Progressing     Problem: RISK FOR INFECTION - ADULT  Goal: Absence of fever/infection during anticipated neutropenic period  Description: INTERVENTIONS  - Monitor WBC  - Administer growth factors as ordered  - Implement neutropenic guidelines  Outcome: Progressing     Problem: SAFETY ADULT - FALL  Goal: Free from fall injury  Description: INTERVENTIONS:  - Assess pt frequently for physical  needs  - Identify cognitive and physical deficits and behaviors that affect risk of falls.  - [place] fall precautions as indicated by assessment.  - Educate pt/family on patient safety including physical limitations  - Instruct pt to call for assistance with activity based on assessment  - Modify environment to reduce risk of injury  - Provide assistive devices as appropriate  - Consider OT/PT consult to assist with strengthening/mobility  - Encourage toileting schedule  Outcome: Progressing

## 2025-01-02 LAB
ANION GAP SERPL CALC-SCNC: 8 MMOL/L (ref 0–18)
BASOPHILS # BLD AUTO: 0.04 X10(3) UL (ref 0–0.2)
BASOPHILS NFR BLD AUTO: 0.7 %
BUN BLD-MCNC: 19 MG/DL (ref 9–23)
BUN/CREAT SERPL: 10.4 (ref 10–20)
CALCIUM BLD-MCNC: 9 MG/DL (ref 8.7–10.4)
CHLORIDE SERPL-SCNC: 109 MMOL/L (ref 98–112)
CO2 SERPL-SCNC: 26 MMOL/L (ref 21–32)
CREAT BLD-MCNC: 1.83 MG/DL
DEPRECATED RDW RBC AUTO: 47.3 FL (ref 35.1–46.3)
EGFRCR SERPLBLD CKD-EPI 2021: 33 ML/MIN/1.73M2 (ref 60–?)
EOSINOPHIL # BLD AUTO: 0.24 X10(3) UL (ref 0–0.7)
EOSINOPHIL NFR BLD AUTO: 4.2 %
ERYTHROCYTE [DISTWIDTH] IN BLOOD BY AUTOMATED COUNT: 14.1 % (ref 11–15)
GLUCOSE BLD-MCNC: 201 MG/DL (ref 70–99)
GLUCOSE BLDC GLUCOMTR-MCNC: 138 MG/DL (ref 70–99)
GLUCOSE BLDC GLUCOMTR-MCNC: 139 MG/DL (ref 70–99)
GLUCOSE BLDC GLUCOMTR-MCNC: 155 MG/DL (ref 70–99)
GLUCOSE BLDC GLUCOMTR-MCNC: 188 MG/DL (ref 70–99)
HCT VFR BLD AUTO: 37.2 %
HGB BLD-MCNC: 12 G/DL
IMM GRANULOCYTES # BLD AUTO: 0.02 X10(3) UL (ref 0–1)
IMM GRANULOCYTES NFR BLD: 0.3 %
LYMPHOCYTES # BLD AUTO: 2.12 X10(3) UL (ref 1–4)
LYMPHOCYTES NFR BLD AUTO: 36.9 %
MCH RBC QN AUTO: 29.4 PG (ref 26–34)
MCHC RBC AUTO-ENTMCNC: 32.3 G/DL (ref 31–37)
MCV RBC AUTO: 91.2 FL
MONOCYTES # BLD AUTO: 0.54 X10(3) UL (ref 0.1–1)
MONOCYTES NFR BLD AUTO: 9.4 %
NEUTROPHILS # BLD AUTO: 2.78 X10 (3) UL (ref 1.5–7.7)
NEUTROPHILS # BLD AUTO: 2.78 X10(3) UL (ref 1.5–7.7)
NEUTROPHILS NFR BLD AUTO: 48.5 %
OSMOLALITY SERPL CALC.SUM OF ELEC: 304 MOSM/KG (ref 275–295)
PLATELET # BLD AUTO: 247 10(3)UL (ref 150–450)
POTASSIUM SERPL-SCNC: 3.7 MMOL/L (ref 3.5–5.1)
RBC # BLD AUTO: 4.08 X10(6)UL
SODIUM SERPL-SCNC: 143 MMOL/L (ref 136–145)
WBC # BLD AUTO: 5.7 X10(3) UL (ref 4–11)

## 2025-01-02 PROCEDURE — 99233 SBSQ HOSP IP/OBS HIGH 50: CPT | Performed by: HOSPITALIST

## 2025-01-02 RX ORDER — INSULIN ASPART 100 [IU]/ML
15 INJECTION, SOLUTION INTRAVENOUS; SUBCUTANEOUS
Qty: 40.5 ML | Refills: 0 | Status: SHIPPED | OUTPATIENT
Start: 2025-01-02 | End: 2025-04-02

## 2025-01-02 RX ORDER — HYDRALAZINE HYDROCHLORIDE 20 MG/ML
10 INJECTION INTRAMUSCULAR; INTRAVENOUS EVERY 6 HOURS PRN
Status: DISCONTINUED | OUTPATIENT
Start: 2025-01-02 | End: 2025-01-03

## 2025-01-02 RX ORDER — HYDROCODONE BITARTRATE AND ACETAMINOPHEN 10; 325 MG/1; MG/1
1 TABLET ORAL EVERY 4 HOURS PRN
Status: DISCONTINUED | OUTPATIENT
Start: 2025-01-02 | End: 2025-01-03

## 2025-01-02 RX ORDER — TRAMADOL HYDROCHLORIDE 50 MG/1
50 TABLET ORAL EVERY 6 HOURS PRN
Qty: 20 TABLET | Refills: 0 | Status: SHIPPED | OUTPATIENT
Start: 2025-01-02 | End: 2025-01-03

## 2025-01-02 NOTE — CM/SW NOTE
01/02/25 0900   CM/SW Referral Data   Referral Source    Reason for Referral Discharge planning   Informant Patient   Medical Hx   Does patient have an established PCP? Yes   Significant Past Medical/Mental Health Hx diabetes, diabetic ulcer   Patient Info   Patient's Current Mental Status at Time of Assessment Alert;Oriented   Patient's Home Environment Condo/Apt no elevator   Number of Stair in Home 8   Patient lives with Other  (3 children)   Patient Status Prior to Admission   Independent with ADLs and Mobility Yes   Discharge Needs   Anticipated D/C needs Home health care   Choice of Post-Acute Provider   Informed patient of right to choose their preferred provider Yes   Patient declines recommended services Yes  ([facility])     Has 8 stairs to appt.  Patient lives with her 3 children, prefers home over rehab.    F2F and HH referrals sent per patient's choice.  Declining [facility] at this time, has support at home.    Becca Delgado MBA BSN RN CRRN   RN Case Manager  [phone]

## 2025-01-02 NOTE — PLAN OF CARE
Problem: Diabetes/Glucose Control  Goal: Glucose maintained within prescribed range  Description: INTERVENTIONS:  - Monitor Blood Glucose as ordered  - Assess for signs and symptoms of hyperglycemia and hypoglycemia  - Administer ordered medications to maintain glucose within target range  - Assess barriers to adequate nutritional intake and initiate nutrition consult as needed  - Instruct patient on self management of diabetes  Outcome: Progressing     Problem: Patient Centered Care  Goal: Patient preferences are identified and integrated in the patient's plan of care  Description: Interventions:  - What would you like us to know as we care for you? From home with family  - Provide timely, complete, and accurate information to patient/family  - Incorporate patient and family knowledge, values, beliefs, and cultural backgrounds into the planning and delivery of care  - Encourage patient/family to participate in care and decision-making at the level they choose  - Honor patient and family perspectives and choices  Outcome: Progressing     Problem: Patient/Family Goals  Goal: Patient/Family Long Term Goal  Description: Patient's Long Term Goal: cure infection    Interventions:  - antiobiotics  - See additional Care Plan goals for specific interventions  Outcome: Progressing  Goal: Patient/Family Short Term Goal  Description: Patient's Short Term Goal: decrease pain    Interventions:   - change pain meds  - See additional Care Plan goals for specific interventions  Outcome: Progressing     Problem: RESPIRATORY - ADULT  Goal: Achieves optimal ventilation and oxygenation  Description: INTERVENTIONS:  - Assess for changes in respiratory status  - Assess for changes in mentation and behavior  - Position to facilitate oxygenation and minimize respiratory effort  - Oxygen supplementation based on oxygen saturation or ABGs  - Provide Smoking Cessation handout, if applicable  - Encourage broncho-pulmonary hygiene including  cough, deep breathe, Incentive Spirometry  - Assess the need for suctioning and perform as needed  - Assess and instruct to report SOB or any respiratory difficulty  - Respiratory Therapy support as indicated  - Manage/alleviate anxiety  - Monitor for signs/symptoms of CO2 retention  Outcome: Progressing     Problem: METABOLIC/FLUID AND ELECTROLYTES - ADULT  Goal: Electrolytes maintained within normal limits  Description: INTERVENTIONS:  - Monitor labs and rhythm and assess patient for signs and symptoms of electrolyte imbalances  - Administer electrolyte replacement as ordered  - Monitor response to electrolyte replacements, including rhythm and repeat lab results as appropriate  - Fluid restriction as ordered  - Instruct patient on fluid and nutrition restrictions as appropriate  Outcome: Progressing     Problem: RISK FOR INFECTION - ADULT  Goal: Absence of fever/infection during anticipated neutropenic period  Description: INTERVENTIONS  - Monitor WBC  - Administer growth factors as ordered  - Implement neutropenic guidelines  Outcome: Progressing     Problem: SAFETY ADULT - FALL  Goal: Free from fall injury  Description: INTERVENTIONS:  - Assess pt frequently for physical needs  - Identify cognitive and physical deficits and behaviors that affect risk of falls.  - [place] fall precautions as indicated by assessment.  - Educate pt/family on patient safety including physical limitations  - Instruct pt to call for assistance with activity based on assessment  - Modify environment to reduce risk of injury  - Provide assistive devices as appropriate  - Consider OT/PT consult to assist with strengthening/mobility  - Encourage toileting schedule  Outcome: Progressing

## 2025-01-02 NOTE — DIABETES ED
[facility]    Diabetes Education  Note    Suki Rosenberg Patient Status:  Inpatient   1970 MRN [id number]  Location [facility] 5SW/SE Attending Jeevan Silva MD  Hosp Day # 5 PCP Graham Recio DO      Labs:    HEMOGLOBIN A1C (%)   Date Value   2024 8.2 (A)     HgbA1C (%)   Date Value   2024 8.0 (H)         Reason for Visit:Met with patient and family in room for elevated blood sugar. We discussed current A1c value and goals for persons with Diabetes. She states prior A1c values have been higher (>12%) however her MD has changed her medication, she utilized a meagan and states she is trying to eat healthier type foods.    Patient educated regarding diabetes diet basics. Discussed carbohydrate foods, portion sizes, and food label reading.  Handout given and initial meal plan provided. Encouraged to attend outpatient diabetes education. Questions answered. Receptive to information.    Instructed on the importance of blood sugar control to aid in the healing process.    She utilized a Meagan CGM device however experiences issues with device remaining on her skin. Encouraged her to review information for a tape for placing over the CGM.    She states she experiences low blood sugar symptoms at 110 mg/dL.  Instructed her to confirm blood sugar with use of glucose meter if CGM value does not match symptoms.      Education Provided:  Basic Diet Guidelines  Beginning carb counting - initial meal plan provided  Importance of good BG control to prevent short and long term complications  Importance of close follow up with PCP and medical team    Patient verbalized understanding and was receptive to information provided.      Recommendations:    Attend outpatient diabetes education-discuss with outpatient medical team.          Caron Rendon RN  Diabetes Educator  2025  9:35 AM

## 2025-01-02 NOTE — PAYOR COMM NOTE
--------------  CONTINUED STAY REVIEW    Payor: [employer] PPO  Subscriber #:  [id number]  Authorization Number: [id number]    Admit date: 12/28/24  Admit time:  1:31 AM    REVIEW DOCUMENTATION:  1-1-25    Assessment and Plan:     Diabetic foot infection.  Cannot rule out osteomyelitis, abscess.  -MRI reviewed. No OM  -Arterial duplex ok  -Podiatry infectious disease consult appreciated  -Pain control  -Continue current antibiotics     NAYELI - cr improved today  -hold lasix and aldactone  -IVF  -BMP in AM  -renal US ok     Right shoulder pain  -XR reviewed  -outpatient PT and ortho f/u     Other problems  History of PE  Asthma  Lupus?  Per chart  Schizophrenia?  Per chart  HTN     Dispo:  discharge planning. Discharge soon if renal function cont improve.               Lab 01/01/25  0752   WBC 5.8   HGB 13.4   HCT 39.2   .0   RBC 4.44   MCV 88.3   MCH 30.2   MCHC 34.2   RDW 13.6   NEPRELIM 2.60           Recent Labs   Lab 12/31/24  0932 01/01/25  0752   BUN  --  17   CREATSERUM 2.44* 1.88*   CA  --  9.2   NA  --  143   K  --  3.2*   CL  --  109   CO2  --  28.0   GLU  --  93   CRP  --   --              MEDICATIONS ADMINISTERED IN LAST 1 DAY:  amitriptyline (Elavil) tab 75 mg       Date Action Dose Route User    1/2/2025 0844 Given 75 mg Oral Melissa Monique RN          ampicillin-sulbactam (Unasyn) 3 g in sodium chloride 0.9% 100mL IVPB-ADD       Date Action Dose Route User    1/2/2025 0040 New Bag 3 g Intravenous Lesly Head RN    1/1/2025 1312 New Bag 3 g Intravenous [place] Betancourt RN          ARIPiprazole (Abilify) tab 5 mg       Date Action Dose Route User    1/2/2025 0845 Given 5 mg Oral Melissa Monique RN          carvedilol (Coreg) tab 25 mg       Date Action Dose Route User    1/2/2025 0845 Given 25 mg Oral Melissa Monique RN    1/1/2025 1707 Given 25 mg Oral Quirk, [place] R, RN          HYDROmorphone (Dilaudid) 1 MG/ML injection 0.4 mg       Date Action Dose Route User    1/2/2025 3019  Given 0.4 mg Intravenous Lesly Head RN    1/1/2025 2140 Given 0.4 mg Intravenous Lesly Head RN          HYDROmorphone (Dilaudid) 1 MG/ML injection 0.8 mg       Date Action Dose Route User    1/2/2025 0921 Given 0.8 mg Intravenous Melissa Monique RN    1/1/2025 1707 Given 0.8 mg Intravenous [place] Betancourt RN          HYDROmorphone (Dilaudid) tab 2 mg       Date Action Dose Route User    1/1/2025 1319 Given 2 mg Oral [place] Betancourt RN          insulin aspart (NovoLOG) 100 Units/mL FlexPen 1-7 Units       Date Action Dose Route User    1/2/2025 0846 Given 2 Units Subcutaneous (Right Lower Abdomen) Melissa Monique RN    1/1/2025 2140 Given 2 Units Subcutaneous (Left Upper Arm) Lesly Head RN    1/1/2025 1700 Given 1 Units Subcutaneous (Left Upper Arm) [place] Betancourt RN          insulin aspart (NovoLOG) 100 Units/mL FlexPen 5 Units       Date Action Dose Route User    1/2/2025 0846 Given 5 Units Subcutaneous (Right Lower Abdomen) Melissa Monique RN    1/1/2025 1700 Given 5 Units Subcutaneous (Left Upper Arm) [place] Betancourt RN          insulin degludec (Tresiba) 100 units/mL flextouch 10 Units       Date Action Dose Route User    1/1/2025 2140 Given 10 Units Subcutaneous (Left Upper Arm) Lesly Head RN          lidocaine-menthol 4-1 % patch 1 patch       Date Action Dose Route User    1/2/2025 0845 Patch Applied 1 patch Transdermal (Right Upper Arm) Melissa Monique RN          NIFEdipine ER (Procardia-XL) 24 hr tab 30 mg       Date Action Dose Route User    1/2/2025 0845 Given 30 mg Oral Melissa Monique RN          rivaroxaban (Xarelto) tab 15 mg       Date Action Dose Route User    1/2/2025 0845 Given 15 mg Oral Melissa Monique RN          rosuvastatin (Crestor) tab 20 mg       Date Action Dose Route User    1/1/2025 2140 Given 20 mg Oral Lesly Head, RN          sertraline (Zoloft) tab 50 mg       Date Action Dose Route User    1/1/2025 1707 Given 50 mg Oral  [place] Betancourt RN          sodium chloride 0.9% infusion  83 ml hr        Date Action Dose Route User    1/2/2025 0507 New Bag (none) Intravenous Lesly Head RN    1/1/2025 1713 New Bag (none) Intravenous [place] Betancourt RN            Vitals (last day)       Date/Time Temp Pulse Resp BP SpO2 Weight O2 Device O2 Flow Rate (L/min) [facility]    01/02/25 1206 98.2 °F (36.8 °C) 80 18 146/89 95 % -- None (Room air) -- YAO    01/02/25 0843 98.4 °F (36.9 °C) 87 18 173/97 95 % -- None (Room air) -- YAO    01/02/25 0551 -- -- -- -- -- 251 lb 15.8 oz (114.3 kg) -- -- JR    01/02/25 0505 98.6 °F (37 °C) 84 16 132/81 91 % -- None (Room air) -- RB    01/01/25 2127 98.9 °F (37.2 °C) 83 16 124/74 92 % -- Nasal cannula 2 L/min RB    01/01/25 1702 98.5 °F (36.9 °C) 85 18 131/73 91 % -- None (Room air) -- TQ    01/01/25 0829 98.3 °F (36.8 °C) 83 18 138/80 90 % -- None (Room air) -- TQ    01/01/25 0631 -- -- -- -- -- 247 lb 9.6 oz (112.3 kg) -- -- EO    01/01/25 0517 98 °F (36.7 °C) 76 18 128/78 91 % -- None (Room air) -- EM

## 2025-01-02 NOTE — PROGRESS NOTES
[facility]  Progress Note     Suki Rosenberg  : 1970    Status: Inpatient  Day #: 5    Attending: Jeevan Silva MD  PCP: Graham Recio DO     Assessment and Plan:    Diabetic foot infection.  Cannot rule out osteomyelitis, abscess.  -MRI reviewed. No OM  -Arterial duplex ok  -Podiatry infectious disease consult appreciated  -Pain control  -Continue current antibiotics    NAYELI - cr improved today slightly   -cont hold lasix and aldactone  -IVF  -BMP in AM  -renal US ok    Right shoulder pain  -XR reviewed  -outpatient PT and ortho f/u    Other problems  History of PE  Asthma  Lupus?  Per chart  Schizophrenia?  Per chart  HTN    Dispo:  discharge planning. Discharge in AM if Cr continues to trend down         DVT Mechanical Prophylaxis:   SCDs,    DVT Pharmacologic Prophylaxis   Medication    rivaroxaban (Xarelto) tab 15 mg               Subjective:      Initial Chief Complaint:  foot wound    Lying in bed, NAD  Pain controlled in L foot     10 point ROS completed and was negative, except for pertinent positive and negatives stated in subjective.      Objective:      Temp:  [98.4 °F (36.9 °C)-98.9 °F (37.2 °C)] 98.4 °F (36.9 °C)  Pulse:  [83-87] 87  Resp:  [16-18] 18  BP: (124-173)/(73-97) 173/97  SpO2:  [91 %-95 %] 95 %  General:  Alert, no distress  HEENT:  Normocephalic, atraumatic  Cardiac:  Regular rate, regular rhythm  Pulmonary:  Clear to auscultation bilaterally, respirations unlabored  Gastrointestinal:  Soft, non-tender, normal bowel sounds  Musculoskeletal:  No joint swelling  Extremities:  No edema, no cyanosis, no clubbing. L foot wrapped.  Neurologic:  nonfocal  Psychiatric:  Normal affect, calm and appropriate    Intake/Output Summary (Last 24 hours) at 2025 1140  Last data filed at 2025 1015  Gross per 24 hour   Intake 2638 ml   Output 1000 ml   Net 1638 ml         Recent Labs   Lab 24  1043 25  0752 25  0531   WBC 6.9 5.8 5.7   HGB 12.9 13.4 12.0    HCT 37.9 39.2 37.2   .0 261.0 247.0   RBC 4.29 4.44 4.08   MCV 88.3 88.3 91.2   MCH 30.1 30.2 29.4   MCHC 34.0 34.2 32.3   RDW 13.6 13.6 14.1   NEPRELIM 4.60 2.60 2.78     Recent Labs   Lab 12/28/24  0021 12/29/24  0818 12/30/24  1043 12/31/24  0932 01/01/25  0752 01/02/25  0754   BUN 10   < > 17  --  17 19   CREATSERUM 0.83   < > 2.33* 2.44* 1.88* 1.83*   CA 9.7   < > 9.0  --  9.2 9.0      < > 142  --  143 143   K 3.3*   < > 3.8  --  3.2* 3.7      < > 107  --  109 109   CO2 31.0   < > 26.0  --  28.0 26.0   *   < > 170*  --  93 201*   CRP 4.40*  --   --   --   --   --     < > = values in this interval not displayed.       No results found.      Medications:   ampicillin-sulbactam  3 g Intravenous q12h    rivaroxaban  15 mg Oral Daily with food    lidocaine-menthol  1 patch Transdermal Daily    amitriptyline  75 mg Oral Daily    ARIPiprazole  5 mg Oral Daily    carvedilol  25 mg Oral BID with meals    [Held by provider] furosemide  40 mg Oral Daily    NIFEdipine ER  30 mg Oral Daily    rosuvastatin  20 mg Oral Nightly    sertraline  50 mg Oral QPM    [Held by provider] spironolactone  50 mg Oral Daily    insulin aspart  1-7 Units Subcutaneous TID CC and HS    insulin aspart  5 Units Subcutaneous TID AC    insulin degludec  10 Units Subcutaneous Nightly    LORazepam  0.5 mg Intravenous Once      PRN Meds:   zolpidem    polyethylene glycol (PEG 3350)    sennosides    bisacodyl    fleet enema    ondansetron    prochlorperazine    glucose **OR** glucose **OR** glucose-vitamin C **OR** dextrose **OR** glucose **OR** glucose **OR** glucose-vitamin C    HYDROmorphone **OR** HYDROmorphone **OR** HYDROmorphone    HYDROmorphone    acetaminophen    Supplementary Documentation:   DVT Mechanical Prophylaxis:   SCDs,    DVT Pharmacologic Prophylaxis   Medication    rivaroxaban (Xarelto) tab 15 mg                Code Status: Full Code  [place]: No urinary catheter in place  [place] Duration (in days):    Central line:    ISRAEL: 1/3/2025                    [facility]

## 2025-01-02 NOTE — CM/SW NOTE
Patient's preference is Nadiya ABBOTT.  Nadiya reserved in [place] and notified that dc will be 1/3/24.    HH information provided on AVS.    PLAN:  Home with Nadiya ABBOTT at dc.    Becca Delgado MBA BSN RN CRRN   RN Case Manager  [phone]

## 2025-01-02 NOTE — PROGRESS NOTES
INFECTIOUS DISEASE PROGRESS NOTE  [facility]  part of [facility] ID PROGRESS NOTE    Suki Rosenberg Patient Status:  Inpatient    1970 MRN [id number]   Location [facility] 5SW/SE Attending Tevin Guy MD   Hosp Day # 5 PCP Graham Recio,      Subjective:  ROS reviewed. +L foot pain. Tolerating abx.    ASSESSMENT:    Antibiotics: IV unasyn;  (Vancomycin, zosyn, clindamycin -)     # L heel cellulitis, MRI without osteomyelitis or abscess               -s/p bedside debridement   # NAYELI  # R shoulder pain s/p fall   # Hypotension, R/o bacteremia  # Diabetes mellitus  # Obesity  # BLE lymphedema     PLAN:    -  continue IV unasyn which can be converted to PO augmentin x 10 days - script in  -  has outpatient wound care follow up  -  Follow fever curve, wbc.  -  Reviewed labs, micro, imaging reports  -  Case d/w patient, RN.     History of Present Illness:  Suki Rosenberg is a 54 year old female with a history of morbid obesity, BLE lymphedema, diabetes mellitus, who presented to [facility] ED on  with worsening drainage from her left heel. States that the left heel wound has bee present for about two weeks. Previously was followed at the wound clinic for wound on other heel. Patient was seen in  on  for L heel wound where she was sent home with silvadene and told to follow up with the wound clinic but unable to get an appointment until 1/3/25. Noticed more odor coming from foot on [place] with worsening pain. Patient did hit her right shoulder on a concrete wall yesterday and states unable to move her shoulder. No recent antibiotics. No fevers or chills at home. On arrival, afebrile, wbc 6.8, XR L foot pending, XR R shoulder pending, started on vancomycin and zosyn. Podiatry to see. Hypotensive this AM and receiving fluid bolus. Taking morphine for pain. ID consulted.    Physical Exam:  /89 (BP Location: Left arm)   Pulse 80   Temp  98.2 °F (36.8 °C) (Oral)   Resp 18   Ht 157.5 cm (5' 2\")   Wt 251 lb 15.8 oz (114.3 kg)   LMP 10/29/2018   SpO2 95%   BMI 46.09 kg/m²     Gen:   Awake, in bed  HEENT:  EOMI, neck supple  Abdom:  Soft, NT/ND, +BS  Skin/extrem:  No rashes, L heel with resolved blister; improved necrotic tissue; +ttp  Lines:  PIV+    Laboratory Data: Reviewed    Microbiology: Reviewed    Radiology: Reviewed      MD [facility]  [phone]  12/31/2024

## 2025-01-03 ENCOUNTER — TELEPHONE (OUTPATIENT)
Facility: CLINIC | Age: 55
End: 2025-01-03

## 2025-01-03 ENCOUNTER — TELEPHONE (OUTPATIENT)
Dept: ENDOCRINOLOGY CLINIC | Facility: CLINIC | Age: 55
End: 2025-01-03

## 2025-01-03 ENCOUNTER — APPOINTMENT (OUTPATIENT)
Dept: WOUND CARE | Facility: HOSPITAL | Age: 55
End: 2025-01-03
Attending: NURSE PRACTITIONER
Payer: COMMERCIAL

## 2025-01-03 VITALS
RESPIRATION RATE: 18 BRPM | TEMPERATURE: 99 F | HEIGHT: 62 IN | DIASTOLIC BLOOD PRESSURE: 88 MMHG | WEIGHT: 249 LBS | BODY MASS INDEX: 45.82 KG/M2 | HEART RATE: 97 BPM | SYSTOLIC BLOOD PRESSURE: 156 MMHG | OXYGEN SATURATION: 97 %

## 2025-01-03 VITALS
BODY MASS INDEX: 45.84 KG/M2 | DIASTOLIC BLOOD PRESSURE: 87 MMHG | WEIGHT: 249.13 LBS | HEIGHT: 62 IN | HEART RATE: 85 BPM | OXYGEN SATURATION: 98 % | RESPIRATION RATE: 18 BRPM | SYSTOLIC BLOOD PRESSURE: 160 MMHG | TEMPERATURE: 99 F

## 2025-01-03 DIAGNOSIS — R52 PAIN ASSOCIATED WITH WOUND: ICD-10-CM

## 2025-01-03 DIAGNOSIS — T14.8XXA PAIN ASSOCIATED WITH WOUND: ICD-10-CM

## 2025-01-03 DIAGNOSIS — L97.423 DIABETIC ULCER OF LEFT HEEL ASSOCIATED WITH TYPE 2 DIABETES MELLITUS, WITH NECROSIS OF MUSCLE (HCC): Primary | ICD-10-CM

## 2025-01-03 DIAGNOSIS — G62.9 NEUROPATHY: ICD-10-CM

## 2025-01-03 DIAGNOSIS — E11.621 DIABETIC ULCER OF LEFT HEEL ASSOCIATED WITH TYPE 2 DIABETES MELLITUS, WITH NECROSIS OF MUSCLE (HCC): Primary | ICD-10-CM

## 2025-01-03 PROBLEM — L97.411 DIABETIC ULCER OF RIGHT HEEL ASSOCIATED WITH DIABETES MELLITUS DUE TO UNDERLYING CONDITION, LIMITED TO BREAKDOWN OF SKIN (HCC): Status: RESOLVED | Noted: 2024-07-19 | Resolved: 2025-01-03

## 2025-01-03 PROBLEM — E08.621 DIABETIC ULCER OF RIGHT HEEL ASSOCIATED WITH DIABETES MELLITUS DUE TO UNDERLYING CONDITION, LIMITED TO BREAKDOWN OF SKIN (HCC): Status: RESOLVED | Noted: 2024-07-19 | Resolved: 2025-01-03

## 2025-01-03 PROBLEM — L97.911 NON-PRESSURE CHRONIC ULCER OF RIGHT LOWER LEG, LIMITED TO BREAKDOWN OF SKIN (HCC): Status: RESOLVED | Noted: 2023-12-04 | Resolved: 2025-01-03

## 2025-01-03 LAB
ALBUMIN SERPL-MCNC: 4.2 G/DL (ref 3.2–4.8)
ANION GAP SERPL CALC-SCNC: 4 MMOL/L (ref 0–18)
BASOPHILS # BLD AUTO: 0.03 X10(3) UL (ref 0–0.2)
BASOPHILS NFR BLD AUTO: 0.5 %
BUN BLD-MCNC: 11 MG/DL (ref 9–23)
BUN/CREAT SERPL: 7.7 (ref 10–20)
CALCIUM BLD-MCNC: 9.6 MG/DL (ref 8.7–10.4)
CHLORIDE SERPL-SCNC: 110 MMOL/L (ref 98–112)
CO2 SERPL-SCNC: 30 MMOL/L (ref 21–32)
CREAT BLD-MCNC: 1.42 MG/DL
DEPRECATED RDW RBC AUTO: 42.4 FL (ref 35.1–46.3)
EGFRCR SERPLBLD CKD-EPI 2021: 44 ML/MIN/1.73M2 (ref 60–?)
EOSINOPHIL # BLD AUTO: 0.24 X10(3) UL (ref 0–0.7)
EOSINOPHIL NFR BLD AUTO: 3.9 %
ERYTHROCYTE [DISTWIDTH] IN BLOOD BY AUTOMATED COUNT: 13.5 % (ref 11–15)
GLUCOSE BLD-MCNC: 141 MG/DL (ref 70–99)
HCT VFR BLD AUTO: 38.5 %
HGB BLD-MCNC: 13 G/DL
IMM GRANULOCYTES # BLD AUTO: 0.04 X10(3) UL (ref 0–1)
IMM GRANULOCYTES NFR BLD: 0.7 %
LYMPHOCYTES # BLD AUTO: 1.65 X10(3) UL (ref 1–4)
LYMPHOCYTES NFR BLD AUTO: 26.9 %
MCH RBC QN AUTO: 29.2 PG (ref 26–34)
MCHC RBC AUTO-ENTMCNC: 33.8 G/DL (ref 31–37)
MCV RBC AUTO: 86.5 FL
MONOCYTES # BLD AUTO: 0.51 X10(3) UL (ref 0.1–1)
MONOCYTES NFR BLD AUTO: 8.3 %
NEUTROPHILS # BLD AUTO: 3.67 X10 (3) UL (ref 1.5–7.7)
NEUTROPHILS # BLD AUTO: 3.67 X10(3) UL (ref 1.5–7.7)
NEUTROPHILS NFR BLD AUTO: 59.7 %
OSMOLALITY SERPL CALC.SUM OF ELEC: 300 MOSM/KG (ref 275–295)
PHOSPHATE SERPL-MCNC: 3.3 MG/DL (ref 2.4–5.1)
PLATELET # BLD AUTO: 303 10(3)UL (ref 150–450)
POTASSIUM SERPL-SCNC: 3 MMOL/L (ref 3.5–5.1)
RBC # BLD AUTO: 4.45 X10(6)UL
SODIUM SERPL-SCNC: 144 MMOL/L (ref 136–145)
WBC # BLD AUTO: 6.1 X10(3) UL (ref 4–11)

## 2025-01-03 PROCEDURE — 97597 DBRDMT OPN WND 1ST 20 CM/<: CPT | Performed by: NURSE PRACTITIONER

## 2025-01-03 PROCEDURE — 99215 OFFICE O/P EST HI 40 MIN: CPT | Performed by: NURSE PRACTITIONER

## 2025-01-03 PROCEDURE — 99239 HOSP IP/OBS DSCHRG MGMT >30: CPT | Performed by: HOSPITALIST

## 2025-01-03 PROCEDURE — 0HDNXZZ EXTRACTION OF LEFT FOOT SKIN, EXTERNAL APPROACH: ICD-10-PCS | Performed by: STUDENT IN AN ORGANIZED HEALTH CARE EDUCATION/TRAINING PROGRAM

## 2025-01-03 RX ORDER — HYDROCODONE BITARTRATE AND ACETAMINOPHEN 10; 325 MG/1; MG/1
1 TABLET ORAL EVERY 6 HOURS PRN
Qty: 15 TABLET | Refills: 0 | Status: SHIPPED | OUTPATIENT
Start: 2025-01-03

## 2025-01-03 RX ORDER — FLUCONAZOLE 150 MG/1
150 TABLET ORAL ONCE
Status: COMPLETED | OUTPATIENT
Start: 2025-01-03 | End: 2025-01-03

## 2025-01-03 RX ORDER — POTASSIUM CHLORIDE 1500 MG/1
40 TABLET, EXTENDED RELEASE ORAL EVERY 4 HOURS
Status: DISCONTINUED | OUTPATIENT
Start: 2025-01-03 | End: 2025-01-03

## 2025-01-03 RX ORDER — MICONAZOLE NITRATE 2 %
1 CREAM WITH APPLICATOR VAGINAL NIGHTLY
Qty: 1 EACH | Refills: 0 | Status: SHIPPED | OUTPATIENT
Start: 2025-01-03

## 2025-01-03 RX ORDER — FUROSEMIDE 40 MG/1
20 TABLET ORAL DAILY
Qty: 90 TABLET | Refills: 1 | Status: SHIPPED | OUTPATIENT
Start: 2025-01-03

## 2025-01-03 RX ORDER — SODIUM HYPOCHLORITE 1.25 MG/ML
SOLUTION TOPICAL
Qty: 1 EACH | Refills: 5 | Status: SHIPPED | OUTPATIENT
Start: 2025-01-03

## 2025-01-03 NOTE — TELEPHONE ENCOUNTER
[facility]  request[facility]'s to have Home health visit for nursing to see patient for wound care and other nursing needs. Recently discharged from [facility]     Please advise

## 2025-01-03 NOTE — TELEPHONE ENCOUNTER
Called Minna at [facility] ,verified patient  name and date of birth.  Gave verbal order for start of home health nursing as per Dr Recio's 1/3/25 note below. Minna will fax orders to office- number confirmed.

## 2025-01-03 NOTE — CM/SW NOTE
01/03/25 0900   Discharge disposition   Expected discharge disposition Home or Self   Post Acute Care Provider   (Nadiya ABBOTT)   Discharge transportation Private car     Pt received MDO for discharge. Patient will be going home with Nadiya ABBOTT. Nadiya ABBOTT made aware of DC today. AVS uploaded into aidin.     CORNELL/CM to remain available for support and/or discharge planning.     Katelynn White MSW, LSW   x [phone]

## 2025-01-03 NOTE — TELEPHONE ENCOUNTER
Patient was last seen in the clinic in June 2024.  Televisit in October 2024.  Received notification from the hospital about recent admission.  Please add on my schedule in January 2024 [place] Wednesday before 2 PM.  In person visit.  Thanks.

## 2025-01-03 NOTE — PROGRESS NOTES
Patient ID: Suki Rosenberg is a 54 year old female.    Debridement Left Heel   Wound 01/03/25 Heel Left    Performed by: Jose M Johnson APRN  Authorized by: Jose M Johnson APRN      Consent   Consent obtained? verbal  Consent given by: patient  Risks discussed? procedural risks discussed  Time out called at 1/3/2025 12:42 PM    Debridement Details  Performed by: NP  Debridement type: conservative sharp  Pain control administration type: topical    Pre-debridement measurements  Length (cm): 4  Width (cm): 6  Depth (cm): 0.1  Surface Area (cm^2): 24    Post-debridement measurements  Length (cm): 4.1  Width (cm): 6  Depth (cm): 0.1  Percent debrided: 50%  Surface Area (cm^2): 24.6  Area Debrided (cm^2): 12.3  Volume (cm^3): 2.46    Devitalized tissue debrided: biofilm, callus and slough  Instrument(s) utilized: blade, forceps and scissors  Bleeding: none  Hemostasis obtained with: not applicable  Procedural pain (0-10): 5  Post-procedural pain: 3   Response to treatment: procedure was tolerated well

## 2025-01-03 NOTE — PLAN OF CARE
Problem: Diabetes/Glucose Control  Goal: Glucose maintained within prescribed range  Description: INTERVENTIONS:  - Monitor Blood Glucose as ordered  - Assess for signs and symptoms of hyperglycemia and hypoglycemia  - Administer ordered medications to maintain glucose within target range  - Assess barriers to adequate nutritional intake and initiate nutrition consult as needed  - Instruct patient on self management of diabetes  Outcome: Progressing     Problem: Patient Centered Care  Goal: Patient preferences are identified and integrated in the patient's plan of care  Description: Interventions:  - What would you like us to know as we care for you? From home with family  - Provide timely, complete, and accurate information to patient/family  - Incorporate patient and family knowledge, values, beliefs, and cultural backgrounds into the planning and delivery of care  - Encourage patient/family to participate in care and decision-making at the level they choose  - Honor patient and family perspectives and choices  Outcome: Progressing     Problem: Patient/Family Goals  Goal: Patient/Family Long Term Goal  Description: Patient's Long Term Goal: cure infection    Interventions:  - antiobiotics  - See additional Care Plan goals for specific interventions  Outcome: Progressing  Goal: Patient/Family Short Term Goal  Description: Patient's Short Term Goal: decrease pain    Interventions:   - change pain meds  - See additional Care Plan goals for specific interventions  Outcome: Progressing     Problem: RESPIRATORY - ADULT  Goal: Achieves optimal ventilation and oxygenation  Description: INTERVENTIONS:  - Assess for changes in respiratory status  - Assess for changes in mentation and behavior  - Position to facilitate oxygenation and minimize respiratory effort  - Oxygen supplementation based on oxygen saturation or ABGs  - Provide Smoking Cessation handout, if applicable  - Encourage broncho-pulmonary hygiene including  cough, deep breathe, Incentive Spirometry  - Assess the need for suctioning and perform as needed  - Assess and instruct to report SOB or any respiratory difficulty  - Respiratory Therapy support as indicated  - Manage/alleviate anxiety  - Monitor for signs/symptoms of CO2 retention  Outcome: Progressing     Problem: METABOLIC/FLUID AND ELECTROLYTES - ADULT  Goal: Electrolytes maintained within normal limits  Description: INTERVENTIONS:  - Monitor labs and rhythm and assess patient for signs and symptoms of electrolyte imbalances  - Administer electrolyte replacement as ordered  - Monitor response to electrolyte replacements, including rhythm and repeat lab results as appropriate  - Fluid restriction as ordered  - Instruct patient on fluid and nutrition restrictions as appropriate  Outcome: Progressing     Problem: PAIN - ADULT  Goal: Verbalizes/displays adequate comfort level or patient's stated pain goal  Description: INTERVENTIONS:  - Encourage pt to monitor pain and request assistance  - Assess pain using appropriate pain scale  - Administer analgesics based on type and severity of pain and evaluate response  - Implement non-pharmacological measures as appropriate and evaluate response  - Consider cultural and social influences on pain and pain management  - Manage/alleviate anxiety  - Utilize distraction and/or relaxation techniques  - Monitor for opioid side effects  - Notify MD/LIP if interventions unsuccessful or patient reports new pain  - Anticipate increased pain with activity and pre-medicate as appropriate  Outcome: Progressing     Problem: RISK FOR INFECTION - ADULT  Goal: Absence of fever/infection during anticipated neutropenic period  Description: INTERVENTIONS  - Monitor WBC  - Administer growth factors as ordered  - Implement neutropenic guidelines  Outcome: Progressing     Problem: SAFETY ADULT - FALL  Goal: Free from fall injury  Description: INTERVENTIONS:  - Assess pt frequently for physical  needs  - Identify cognitive and physical deficits and behaviors that affect risk of falls.  - [place] fall precautions as indicated by assessment.  - Educate pt/family on patient safety including physical limitations  - Instruct pt to call for assistance with activity based on assessment  - Modify environment to reduce risk of injury  - Provide assistive devices as appropriate  - Consider OT/PT consult to assist with strengthening/mobility  - Encourage toileting schedule  Outcome: Progressing     Patient is presently resting in the bed. Alert x 4. Vital signs taken and stable. Patient is medicated as needed for pain or discomfort. Fall risk precautions are followed at all times. Continuous intravenous fluids infusing and tolerated. Patient receives intravenous antibiotics per order. Wound care dressing change was done to left heel. Dressing is dry and intact. Patient will be discharge home tomorrow morning per order, but hospitalist must be notified prior to discharge. Call light within reach at all times.

## 2025-01-03 NOTE — PROGRESS NOTES
Subjective   Suki Rosenberg is a 54 year old female.    Chief Complaint   Patient presents with    Wound Care     Patient is here for a left foot foot wound , started as a blister around Dec. 6, was infected. Was on IV antibiotics and is prescribed oral antibiotics after hospital discharge today once she gets it.       HPI  1/3/2025: Patient stated she developed left heel blister and wound again, she has history of heels blister in Dec 2023 and then right heel blister and wound on July 2024. 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 She has nerve pain in the foot, described as pins and needles. She stated on [place] day she walked wrong and blister opened with foul smell. She was admitted to [facility] for left heel wound infection. She was treated with IV antibiotics, MRI showed no bone infection. She was discharged today with home health services for wound care.  Last A1c on 12/28/2024 was 8.0    8/16/2024: Patient is here for follow up, no drainage in the past few days, still pain in the wound area.   8/2/2024: Patient is here for follow up, her daughter has been doing dressing, she reports no drainage from the wound.  7/19/2024: Patient is returning to clinic with new blood blister on right heel, she stated happened few days ago, she opened the blister to drain which reduced the pain. She has her lymphedema pump and using them daily, states wearing her compression stockings as well. She feels her A1c is better managed now came down from 13 to 8.2 on 6/12/2024.     12/11/2023: Patient is here for follow up, tolerated the compression, reports continues to have some pain while walking on left heel, no other concern.   12/4/2023: Patient is 53 year old woman with chronic leg edema and recent bilateral on posterior heel. Patient states she sits for 10-14 hours on chair for her job with very little time to break for walking. She has purchased compression stockings , she has had hard  time putting them on or keeping them on.   Patient medical history significant for type two diabetes on Jardiance 25 mg, hx of PE on xarelto 20 mg, neuropathy, fibromyalgia, hx of breast cancer, depression, bipolar, obesity, chronic low back pain with bilateral sciatica, herniated lumbar disc, chronic left knee pain, and osteoarthritis bilateral knees.     Review of Systems   Constitutional:  Positive for activity change. Negative for chills, fatigue and fever.   HENT:  Negative for congestion.    Respiratory:  Negative for shortness of breath.    Cardiovascular:  Positive for leg swelling. Negative for chest pain.   Gastrointestinal:  Positive for constipation.        Hx of constipation    Musculoskeletal:  Positive for arthralgias, back pain and gait problem.   Skin:  Positive for wound.        Left heel wound  Neurological:  Positive for numbness. Negative for weakness.        Feeling numbness on her toes   Psychiatric/Behavioral:          Hx f anxiety, depression and bipolar      Objective  Physical Exam  Vitals reviewed.   Constitutional:       Appearance: Normal appearance. She is obese.   HENT:      Head: Normocephalic.   Cardiovascular:      Rate and Rhythm: Normal rate.      Pulses: Normal pulses.           Dorsalis pedis pulses are 2+ on the left side.        Posterior tibial pulses are 2+ on the left side.   Pulmonary:      Effort: Pulmonary effort is normal.   Abdominal:      General: Bowel sounds are normal.   Musculoskeletal:      Cervical back: Normal range of motion.      Right lower leg: Edema present.      Left lower leg: Edema present.        Feet: left heel dry blister with eschar cover wound in the center  Feet:      Right foot:      Toenail Condition: Right toenails are normal.      Left foot:      Skin integrity: Ulcer present.      Toenail Condition: Left toenails are normal.   Skin:     General: Skin is warm and dry.      Capillary Refill: Capillary refill takes 2 to 3 seconds.       Findings: No erythema.   Neurological:      Mental Status: She is alert and oriented to person, place, and time    Wound Assessment  Wound 01/03/25 Heel Left (Active)   Date First Assessed: 01/03/25   Location: Heel  Wound Location Orientation: Left      Assessments 1/3/2025  9:56 AM   Wound Image            Site Assessment Black;Brown;Pink;Tan;Yellow;Dry;Moist;Edema   Closure Not approximated   Drainage Amount Small   Drainage Description Serosanguineous   Treatments Cleansed;Honey Gel;Dakins   Dressing 4x4s (spandage)   Dressing Changed New   Dressing Status Clean;Dry;Intact   Wound Length (cm) 4.1 cm   Wound Width (cm) 6 cm   Wound Surface Area (cm^2) 24.6 cm^2   Wound Depth (cm) 0 cm   Wound Volume (cm^3) 0 cm^3   Margins Attached edges   Madelaine-wound Assessment Dry;Painful;Hyperpigmented;Fragile;Edema;Ecchymosis   Wound Granulation Tissue Pink   Wound Bed Granulation (%) 10 %   Wound Bed Epithelium (%) 0 %   Wound Bed Slough (%) 10 %   Wound Bed Eschar (%) 80 %   Wound Bed Fibrin (%) 0 %   State of Healing Non-healing   Wound Odor Strong       Active Orders   Date Order Priority Status Authorizing Provider   01/03/25 1214 OP Wound Dressing Routine Active Jose M Johnson APRN     - Cleansing:    Cleanse with normal saline or wound cleanser     - Dressing:    Honey gel     - Dressing:    Dry gauze     - Additional Wound Dressing Information:    spandage     - Frequency:    Change dressing daily and PRN       Inactive Orders   Date Order Priority Status Authorizing Provider   01/03/25 1012 Debridement Left Heel Routine Discontinued Jose M Johnson APRN     Lower Extremity Measurements   Calf Ankle Foot Heel to Knee Knee to Thigh   Right                                Left                              Vital Signs    01/03/25 0940 01/03/25 1032   BP: (!) 181/107 156/88   Pulse: 97    Resp: 18    Temp: 99 °F (37.2 °C)    PainSc: 9 - (Severe)    BP is elevated, patient was upset, it had improved, later in the visit.      Allergies  Allergies[1]  Assessment   Left heel wound, pressure injury with diabetes:  -eschar cover wound bed, dried blister and debris in te wound, which was removed.  -no erythema in the te-wound  -edema +4 dorsal foot and +2 ankle edema  -painful to touch, described pins and needles pain, touch sensitivity classic sign of diabetic neuropathy      Reviewed note, imagining and labs in Epic and Care Every Where.  Recent labs: 1/3/2025: WBC 6.1, H&H 13.0&38.5, BUN 11, Creatinine 1.42, albumin 4.2  12/30/2024: US of bilateral lower ext, vascular study:  Impression   CONCLUSION:  Duplex arterial ultrasound demonstrates patent vessels with triphasic arterial Doppler flow.  Somewhat limited evaluation of the below knee tibial vessels on the left due to overlying calf edema and bandaging in the foot, though the vessels are patent  where visualized.       12/30/2024: MRI of left foot:  Impression   CONCLUSION:  Soft tissue and skin ulceration within the posterior heel without abscess.  No osteomyelitis.  6 mm osteochondral lesion of the medial talar dome without collapse of the articular surface.  Split tear of the peroneus brevis with peroneal tenosynovitis.  Mild Achilles tendinosis.  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  Multiple other incidental findings as described in the body of the report.  Dictated by (CST): Marlon Horne MD on 12/30/2024 at 2:44 PM      Encounter Diagnosis  1. Diabetic ulcer of left heel associated with type 2 diabetes mellitus, with necrosis of muscle (HCC)    2. Pain associated with wound    3. 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[facility])    Anemia due to blood loss, acute    Atrial septal defect ([facility])    Hypercoagulable state ([facility])    Lymphedema of both lower extremities    Symptomatic anemia    Microcytic anemia    Other pulmonary embolism without acute cor pulmonale (HCC)    Severe episode of recurrent major depressive disorder, without psychotic features ([facility])    Uterine bleeding    Obesity (BMI 30-39.9)    Acquired trigger finger of right middle finger    Chronic anticoagulation    Chronic pain of left knee    Non-pressure chronic ulcer of left lower leg, limited to breakdown of skin ([facility])    Moderate left ventricular hypertrophy    Non-healing open wound of right heel    Pain associated with wound    BRCA gene mutation negative in female    Morbid obesity with BMI of 40.0-44.9, adult ([facility])    Dyslipidemia    HTN (hypertension), benign    Type 2 diabetes mellitus without complication, with long-term current use of insulin ([facility])    Stress    Diabetic ulcer of left heel associated with type 2 diabetes mellitus, unspecified ulcer stage ([facility])    Diabetic ulcer of left heel associated with type 2 diabetes mellitus, with necrosis of muscle ([facility])    Neuropathy     Plan  Orders  Orders Placed This Encounter   Procedures    Debridement Left Heel    OP Wound Dressing   Risk factors: Lack of mobility, sleeping in recliner, work behind desk with crossing legs, high BMI, type two diabetes and recent renal disease.  Discussed pressure injury prevention, recommended heel elevation, avoid crossing legs which add more pressure to the heel.   Discussed the etiology of the pressure ulcer and treatment and management of these wounds.  Discussed signs and symptoms of infection, encourage patient to assess skin daily. May use Dakin's solution to reduce foul smell in the foot wound and te wound with each  dressing changes.  Initiated medi-honey gel with gauze daily. Discussed ways to offload and reduce pressure from the wound bed by elevating heel when sitting on small table or ottoman, heel elevator foams or wedges.      Recommended nutrition for wound healing and encourage reduce carbohydrate intake, increase protein intake, and glycemic control diet (A1c is 8 which hinders wound healing.   Patient reported being on gabapentin 600 mg (and higher does), she has ankle edema, at this time oral gabapentin is not advisable. Recommended gabapentin paste 10% topical apply to te wound to reduce pain topically.  We will consider Lyrica for nerve pain, pregabalin 50 mg three times a day to manage neuropathy.  Discussed the treatment plan with patient and her daughter, they verbalized understanding and agreed to these plan.  Total face to face time was 60 min, more than 50% of the time was spent in counseling and/or coordination of care related to BLE wounds.  Follow-Up  Return in about 1 week (around 1/10/2025) for APN visit for 30 min.            [1]   Allergies  Allergen Reactions    Latex HIVES and SWELLING    Influenza Vaccines OTHER (SEE COMMENTS)     Pt passed out     Radiology Contrast Iodinated Dyes ITCHING

## 2025-01-03 NOTE — DISCHARGE SUMMARY
[facility]  part of [facility]    Discharge Summary    Suki Rosenberg Patient Status:  Inpatient    1970 MRN [id number]   Location [facility] 5SW/SE Attending Jeevan Silva MD   Hosp Day # 6 PCP Graham Recio DO     Date of Admission: 2024 Disposition: Home     Date of Discharge: 1/3/25    Admitting Diagnosis: Diabetic ulcer of left heel associated with type 2 diabetes mellitus, unspecified ulcer stage (HCC) [E11.621, L97.429]    Hospital Discharge Diagnoses: L heel DM ulcer, NAYELI    Lace+ Score: 43  59-90 High Risk  29-58 Medium Risk  0-28   Low Risk.    TCM Follow-Up Recommendation:  LACE 29-58: Moderate Risk of readmission after discharge from the hospital.    Problem List:   Patient Active Problem List   Diagnosis    Iron deficiency anemia due to chronic blood loss    Recurrent pulmonary embolism (HCC)    Menorrhagia with regular cycle    Chronic low back pain with bilateral sciatica    left > right L5 radiculopathies    L5-S1 right mild foraminal & left mild far lateral, L4-5 mild diffuse, L2-3 right mild far lateral bulging discs    Urinary incontinence    Anemia    Anemia, unspecified type    Congenital anomaly of heart (HCC)    Anemia due to blood loss, acute    Atrial septal defect (HCC)    Hypercoagulable state (HCC)    Lymphedema of both lower extremities    Symptomatic anemia    Microcytic anemia    Other pulmonary embolism without acute cor pulmonale (HCC)    Severe episode of recurrent major depressive disorder, without psychotic features (HCC)    Uterine bleeding    Obesity (BMI 30-39.9)    Acquired trigger finger of right middle finger    Chronic anticoagulation    Chronic pain of left knee    Non-pressure chronic ulcer of right lower leg, limited to breakdown of skin (HCC)    Non-pressure chronic ulcer of left lower leg, limited to breakdown of skin (HCC)    Moderate left ventricular hypertrophy    Diabetic ulcer of right heel associated with  diabetes mellitus due to underlying condition, limited to breakdown of skin (HCC)    Non-healing open wound of right heel    Pain associated with wound    BRCA gene mutation negative in female    Morbid obesity with BMI of 40.0-44.9, adult (HCC)    Dyslipidemia    HTN (hypertension), benign    Type 2 diabetes mellitus without complication, with long-term current use of insulin (HCC)    Stress    Diabetic ulcer of left heel associated with type 2 diabetes mellitus, unspecified ulcer stage (HCC)    Diabetic ulcer of heel (HCC)       Reason for Admission: L heel swelling    Physical Exam:   Vitals:    01/03/25 0811   BP: 160/87   Pulse: 85   Resp: 18   Temp: 98.6 °F (37 °C)     General:  Alert, no distress  HEENT:  Normocephalic, atraumatic  Cardiac:  Regular rate, regular rhythm  Pulmonary:  Clear to auscultation bilaterally, respirations unlabored  Gastrointestinal:  Soft, non-tender, normal bowel sounds  Musculoskeletal:  No joint swelling  Extremities:  No edema, no cyanosis, no clubbing. L foot wrapped.  Neurologic:  nonfocal  Psychiatric:  Normal affect, calm and appropriate       History of Present Illness:   Per Dr. Jimenez Cohn RUY Rosenberg is a(n) 54 year old female, who presents for evaluation of left heel swelling erythema foul-smelling drainage.  Patient was seen earlier at immediate care and advised to come to the ER for antibiotics.  Patient has had recurrent left heel ulcer for the last year.  Previously she followed with wound care and had the ulcer drained but for the last couple of weeks she has had an increase in pain and swelling to the left heel.  Today the smell was unbearable along with the pain and patient decided to come for evaluation.  She is not able to see wound care until January 3, 2025.  Patient reports chills but no fever.  She denies any other symptoms.  Denies any chest pain or shortness of breath.  Denies any fever.  Denies any lightheadedness or dizziness.  She noticed drainage  from the left heel today.  On presentation to the ED, initial vital signs reveal temp 98.7, heart rate 96, respiratory 18, blood pressure 168/92, SpO2 98% on room air.  Lab work reveals WBC 6.8.  Left foot x-ray pending at this time.  Patient was given a dose of Zosyn and vancomycin as well as morphine in the ED.  Patient admitted under hospitalist service with consultation to podiatry and ID.     Hospital Course:   Diabetic foot infection.   -MRI reviewed. No OM  -Arterial duplex ok  -Podiatry infectious disease consult appreciated  -Pain controlled currently  -rec'd IV abx antibiotics, dc on PO     NAYELI - cr improved   -cont hold lasix and aldactone as OP, can resume once seen by PCP  -repeat labs as OP  -renal US ok     Right shoulder pain  -XR reviewed  -outpatient PT and ortho f/u     Other problems  History of PE  Asthma  Lupus?  Per chart  Schizophrenia?  Per chart  HTN    Consultations: Podiatry , ID    Procedures: none    Complications: none    Discharge Condition: Stable    Discharge Medications:      Discharge Medications        START taking these medications        Instructions Prescription details   amoxicillin clavulanate 875-125 MG Tabs  Commonly known as: Augmentin      Take 1 tablet by mouth 2 (two) times daily for 10 days.   Stop taking on: January 13, 2025  Quantity: 20 tablet  Refills: 0     HYDROcodone-acetaminophen  MG Tabs  Commonly known as: Norco      Take 1 tablet by mouth every 6 (six) hours as needed for Pain.   Quantity: 15 tablet  Refills: 0     Miconazole Nitrate 2 % Crea  Commonly known as: Miconazole 7      Place 1 applicator vaginally nightly.   Quantity: 1 each  Refills: 0            CHANGE how you take these medications        Instructions Prescription details   furosemide 40 MG Tabs  Commonly known as: Lasix  What changed: how much to take      Take 0.5 tablets (20 mg total) by mouth daily.   Quantity: 90 tablet  Refills: 1            CONTINUE taking these medications         Instructions Prescription details   amitriptyline 75 MG Tabs  Commonly known as: Elavil      Take 1 tablet (75 mg total) by mouth daily.   Quantity: 90 tablet  Refills: 1     ARIPiprazole 5 MG Tabs  Commonly known as: Abilify       Refills: 0     carvedilol 25 MG Tabs  Commonly known as: Coreg      Take 1 tablet (25 mg total) by mouth 2 (two) times daily with meals.   Quantity: 60 tablet  Refills: 0     FreeStyle Meagan 2 Sensor Misc      1 each every 14 (fourteen) days.   Quantity: 2 each  Refills: 3     lidocaine 5 % Ptch  Commonly known as: Lidoderm      Place 1 patch onto the skin daily.   Quantity: 30 patch  Refills: 2     NIFEdipine ER 30 MG Tb24  Commonly known as: Adalat CC      Take 1 tablet (30 mg total) by mouth daily.   Quantity: 30 tablet  Refills: 0     NovoLOG FlexPen 100 UNIT/ML Sopn  Generic drug: insulin aspart      Inject 15 Units into the skin 3 (three) times daily before meals.   Stop taking on: April 2, 2025  Quantity: 40.5 mL  Refills: 0     Ozempic (2 MG/DOSE) 8 MG/3ML Sopn  Generic drug: semaglutide      Inject 2 mg into the skin once a week.   Quantity: 9 mL  Refills: 0     rivaroxaban 20 MG Tabs  Commonly known as: Xarelto      Take 1 tablet (20 mg total) by mouth daily with food.   Quantity: 90 tablet  Refills: 1     rosuvastatin 20 MG Tabs  Commonly known as: Crestor      Take 1 tablet (20 mg total) by mouth nightly.   Quantity: 90 tablet  Refills: 0     sertraline 50 MG Tabs  Commonly known as: Zoloft      Take 1 tablet (50 mg total) by mouth every evening. Take half tablet for the first week   Quantity: 30 tablet  Refills: 5     zolpidem 10 MG Tabs  Commonly known as: Ambien      Take 1 tablet (10 mg total) by mouth daily as needed for Sleep.   Quantity: 30 tablet  Refills: 0            STOP taking these medications      losartan 100 MG Tabs  Commonly known as: Cozaar        metFORMIN HCl 1000 MG Tabs  Commonly known as: GLUCOPHAGE        spironolactone 50 MG Tabs  Commonly known as:  Aldactone        traMADol 50 MG Tabs  Commonly known as: Ultram                  Where to Get Your Medications        These medications were sent to [id number] - [place], [place] - [address] AT [facility] [address], [phone], [phone] [address], [facility] [address]      Phone: [phone]   amoxicillin clavulanate 875-125 MG Tabs  furosemide 40 MG Tabs  HYDROcodone-acetaminophen  MG Tabs  Miconazole Nitrate 2 % Crea  NovoLOG FlexPen 100 UNIT/ML Sopn         Greater than 35 minutes spent, >50% spent counseling re: treatment plan and workup     Jeevan Silva MD  1/3/2025

## 2025-01-03 NOTE — PAYOR COMM NOTE
--------------  DISCHARGE REVIEW    Payor: [employer]  Subscriber #:  [id number]  Authorization Number: [id number]    Admit date: 12/28/24  Admit time:   1:31 AM  Discharge Date: 1/3/2025  9:08 AM     Admitting Physician: Winsome Gaona MD  Attending Physician:  No att. providers found  Primary Care Physician: Graham Recio DO

## 2025-01-03 NOTE — PLAN OF CARE
Problem: Diabetes/Glucose Control  Goal: Glucose maintained within prescribed range  Description: INTERVENTIONS:  - Monitor Blood Glucose as ordered  - Assess for signs and symptoms of hyperglycemia and hypoglycemia  - Administer ordered medications to maintain glucose within target range  - Assess barriers to adequate nutritional intake and initiate nutrition consult as needed  - Instruct patient on self management of diabetes  Outcome: Progressing     Problem: Patient Centered Care  Goal: Patient preferences are identified and integrated in the patient's plan of care  Description: Interventions:  - What would you like us to know as we care for you? From home with family  - Provide timely, complete, and accurate information to patient/family  - Incorporate patient and family knowledge, values, beliefs, and cultural backgrounds into the planning and delivery of care  - Encourage patient/family to participate in care and decision-making at the level they choose  - Honor patient and family perspectives and choices  Outcome: Progressing     Problem: Patient/Family Goals  Goal: Patient/Family Long Term Goal  Description: Patient's Long Term Goal: cure infection    Interventions:  - antiobiotics  - See additional Care Plan goals for specific interventions  Outcome: Progressing  Goal: Patient/Family Short Term Goal  Description: Patient's Short Term Goal: decrease pain    Interventions:   - change pain meds  - See additional Care Plan goals for specific interventions  Outcome: Progressing     Problem: RESPIRATORY - ADULT  Goal: Achieves optimal ventilation and oxygenation  Description: INTERVENTIONS:  - Assess for changes in respiratory status  - Assess for changes in mentation and behavior  - Position to facilitate oxygenation and minimize respiratory effort  - Oxygen supplementation based on oxygen saturation or ABGs  - Provide Smoking Cessation handout, if applicable  - Encourage broncho-pulmonary hygiene including  cough, deep breathe, Incentive Spirometry  - Assess the need for suctioning and perform as needed  - Assess and instruct to report SOB or any respiratory difficulty  - Respiratory Therapy support as indicated  - Manage/alleviate anxiety  - Monitor for signs/symptoms of CO2 retention  Outcome: Progressing     Problem: METABOLIC/FLUID AND ELECTROLYTES - ADULT  Goal: Electrolytes maintained within normal limits  Description: INTERVENTIONS:  - Monitor labs and rhythm and assess patient for signs and symptoms of electrolyte imbalances  - Administer electrolyte replacement as ordered  - Monitor response to electrolyte replacements, including rhythm and repeat lab results as appropriate  - Fluid restriction as ordered  - Instruct patient on fluid and nutrition restrictions as appropriate  Outcome: Progressing     Problem: PAIN - ADULT  Goal: Verbalizes/displays adequate comfort level or patient's stated pain goal  Description: INTERVENTIONS:  - Encourage pt to monitor pain and request assistance  - Assess pain using appropriate pain scale  - Administer analgesics based on type and severity of pain and evaluate response  - Implement non-pharmacological measures as appropriate and evaluate response  - Consider cultural and social influences on pain and pain management  - Manage/alleviate anxiety  - Utilize distraction and/or relaxation techniques  - Monitor for opioid side effects  - Notify MD/LIP if interventions unsuccessful or patient reports new pain  - Anticipate increased pain with activity and pre-medicate as appropriate  Outcome: Progressing     Problem: RISK FOR INFECTION - ADULT  Goal: Absence of fever/infection during anticipated neutropenic period  Description: INTERVENTIONS  - Monitor WBC  - Administer growth factors as ordered  - Implement neutropenic guidelines  Outcome: Progressing     Problem: SAFETY ADULT - FALL  Goal: Free from fall injury  Description: INTERVENTIONS:  - Assess pt frequently for physical  needs  - Identify cognitive and physical deficits and behaviors that affect risk of falls.  - [place] fall precautions as indicated by assessment.  - Educate pt/family on patient safety including physical limitations  - Instruct pt to call for assistance with activity based on assessment  - Modify environment to reduce risk of injury  - Provide assistive devices as appropriate  - Consider OT/PT consult to assist with strengthening/mobility  - Encourage toileting schedule  Outcome: Progressing

## 2025-01-03 NOTE — PLAN OF CARE
Patient to discharge during day shift, education and care plan complete. Will endorse to day shift nurse to reach out to MD prior to patient leaving and to go over AVS paperwork/meds with the patient.   Problem: Diabetes/Glucose Control  Goal: Glucose maintained within prescribed range  Description: INTERVENTIONS:  - Monitor Blood Glucose as ordered  - Assess for signs and symptoms of hyperglycemia and hypoglycemia  - Administer ordered medications to maintain glucose within target range  - Assess barriers to adequate nutritional intake and initiate nutrition consult as needed  - Instruct patient on self management of diabetes  1/3/2025 0559 by Lesly Head RN  Outcome: Adequate for Discharge  1/2/2025 2245 by Lesly Head RN  Outcome: Progressing     Problem: Patient Centered Care  Goal: Patient preferences are identified and integrated in the patient's plan of care  Description: Interventions:  - What would you like us to know as we care for you? From home with family  - Provide timely, complete, and accurate information to patient/family  - Incorporate patient and family knowledge, values, beliefs, and cultural backgrounds into the planning and delivery of care  - Encourage patient/family to participate in care and decision-making at the level they choose  - Honor patient and family perspectives and choices  1/3/2025 0559 by Lesyl Head RN  Outcome: Adequate for Discharge  1/2/2025 2245 by Lesly Head RN  Outcome: Progressing     Problem: Patient/Family Goals  Goal: Patient/Family Long Term Goal  Description: Patient's Long Term Goal: cure infection    Interventions:  - antiobiotics  - See additional Care Plan goals for specific interventions  1/3/2025 0559 by Lesly Head RN  Outcome: Adequate for Discharge  1/2/2025 2245 by Lesly Head RN  Outcome: Progressing  Goal: Patient/Family Short Term Goal  Description: Patient's Short Term Goal: decrease  pain    Interventions:   - change pain meds  - See additional Care Plan goals for specific interventions  1/3/2025 0559 by Lesly Head RN  Outcome: Adequate for Discharge  1/2/2025 2245 by Lesly Head RN  Outcome: Progressing     Problem: RESPIRATORY - ADULT  Goal: Achieves optimal ventilation and oxygenation  Description: INTERVENTIONS:  - Assess for changes in respiratory status  - Assess for changes in mentation and behavior  - Position to facilitate oxygenation and minimize respiratory effort  - Oxygen supplementation based on oxygen saturation or ABGs  - Provide Smoking Cessation handout, if applicable  - Encourage broncho-pulmonary hygiene including cough, deep breathe, Incentive Spirometry  - Assess the need for suctioning and perform as needed  - Assess and instruct to report SOB or any respiratory difficulty  - Respiratory Therapy support as indicated  - Manage/alleviate anxiety  - Monitor for signs/symptoms of CO2 retention  1/3/2025 0559 by Lesly Head RN  Outcome: Adequate for Discharge  1/2/2025 2245 by Lesly Head RN  Outcome: Progressing     Problem: METABOLIC/FLUID AND ELECTROLYTES - ADULT  Goal: Electrolytes maintained within normal limits  Description: INTERVENTIONS:  - Monitor labs and rhythm and assess patient for signs and symptoms of electrolyte imbalances  - Administer electrolyte replacement as ordered  - Monitor response to electrolyte replacements, including rhythm and repeat lab results as appropriate  - Fluid restriction as ordered  - Instruct patient on fluid and nutrition restrictions as appropriate  1/3/2025 0559 by Lesly Head RN  Outcome: Adequate for Discharge  1/2/2025 2245 by Lesly Head RN  Outcome: Progressing     Problem: PAIN - ADULT  Goal: Verbalizes/displays adequate comfort level or patient's stated pain goal  Description: INTERVENTIONS:  - Encourage pt to monitor pain and request assistance  - Assess pain using  appropriate pain scale  - Administer analgesics based on type and severity of pain and evaluate response  - Implement non-pharmacological measures as appropriate and evaluate response  - Consider cultural and social influences on pain and pain management  - Manage/alleviate anxiety  - Utilize distraction and/or relaxation techniques  - Monitor for opioid side effects  - Notify MD/LIP if interventions unsuccessful or patient reports new pain  - Anticipate increased pain with activity and pre-medicate as appropriate  1/3/2025 0559 by Lesly Head RN  Outcome: Adequate for Discharge  1/2/2025 2245 by Lesly Head RN  Outcome: Progressing     Problem: RISK FOR INFECTION - ADULT  Goal: Absence of fever/infection during anticipated neutropenic period  Description: INTERVENTIONS  - Monitor WBC  - Administer growth factors as ordered  - Implement neutropenic guidelines  1/3/2025 0559 by Lesly Head RN  Outcome: Adequate for Discharge  1/2/2025 2245 by Lesly Head RN  Outcome: Progressing     Problem: SAFETY ADULT - FALL  Goal: Free from fall injury  Description: INTERVENTIONS:  - Assess pt frequently for physical needs  - Identify cognitive and physical deficits and behaviors that affect risk of falls.  - [place] fall precautions as indicated by assessment.  - Educate pt/family on patient safety including physical limitations  - Instruct pt to call for assistance with activity based on assessment  - Modify environment to reduce risk of injury  - Provide assistive devices as appropriate  - Consider OT/PT consult to assist with strengthening/mobility  - Encourage toileting schedule  1/3/2025 0559 by Lesly Head RN  Outcome: Adequate for Discharge  1/2/2025 2245 by Lesly Head RN  Outcome: Progressing

## 2025-01-06 ENCOUNTER — PATIENT OUTREACH (OUTPATIENT)
Dept: CASE MANAGEMENT | Age: 55
End: 2025-01-06

## 2025-01-06 NOTE — PROGRESS NOTES
DM appointment request (discharged 01/03)    Dr Nati Kline  Endocrinology  [address] [facility] [address]  [place], [place] [id number] [phone]    Dr Graham Recio  [address]  [place] [address] [phone]    Attempt #1:  Unable to contact patient (mailbox full)

## 2025-01-07 NOTE — PROGRESS NOTES
DM appointment request (discharged 01/03)     Dr Nati Kline  Endocrinology  [address] [facility] [address]  [place] [address]  [phone]     Dr Graham Recio  [address]  [place], [place] [id number] [phone]     Attempt #2:  Unable to contact patient (phone rang 1x, then busy signal)

## 2025-01-08 ENCOUNTER — OFFICE VISIT (OUTPATIENT)
Dept: WOUND CARE | Facility: HOSPITAL | Age: 55
End: 2025-01-08
Attending: NURSE PRACTITIONER
Payer: COMMERCIAL

## 2025-01-08 ENCOUNTER — TELEPHONE (OUTPATIENT)
Facility: CLINIC | Age: 55
End: 2025-01-08

## 2025-01-08 ENCOUNTER — TELEPHONE (OUTPATIENT)
Dept: WOUND CARE | Facility: HOSPITAL | Age: 55
End: 2025-01-08

## 2025-01-08 VITALS
WEIGHT: 238 LBS | OXYGEN SATURATION: 97 % | HEART RATE: 96 BPM | TEMPERATURE: 98 F | RESPIRATION RATE: 18 BRPM | SYSTOLIC BLOOD PRESSURE: 154 MMHG | DIASTOLIC BLOOD PRESSURE: 107 MMHG | BODY MASS INDEX: 44 KG/M2

## 2025-01-08 DIAGNOSIS — E11.621 DIABETIC ULCER OF LEFT HEEL ASSOCIATED WITH TYPE 2 DIABETES MELLITUS, WITH NECROSIS OF MUSCLE (HCC): Primary | ICD-10-CM

## 2025-01-08 DIAGNOSIS — L97.423 DIABETIC ULCER OF LEFT HEEL ASSOCIATED WITH TYPE 2 DIABETES MELLITUS, WITH NECROSIS OF MUSCLE (HCC): Primary | ICD-10-CM

## 2025-01-08 DIAGNOSIS — R52 PAIN ASSOCIATED WITH WOUND: ICD-10-CM

## 2025-01-08 DIAGNOSIS — T14.8XXA PAIN ASSOCIATED WITH WOUND: ICD-10-CM

## 2025-01-08 DIAGNOSIS — G62.9 NEUROPATHY: ICD-10-CM

## 2025-01-08 PROCEDURE — 99213 OFFICE O/P EST LOW 20 MIN: CPT | Performed by: NURSE PRACTITIONER

## 2025-01-08 NOTE — PROGRESS NOTES
Subjective   Suki Rosenberg is a 54 year old female.    Chief Complaint   Patient presents with    Wound Recheck     Right heel     HPI  1/8/2025: Patient is here for follow up, has been doing dressing, minimum drainage. Patient has not refill gabapentin paste for pain management. Also, she stated she had Lyrica before as well as gabapentin which caused significant ankle edema. She is not sure if she is currently taking Amitriptyline.   1/3/2025: Patient stated she developed left heel blister and wound again, she has history of heels blister in Dec 2023 and then right heel blister and wound on July 2024. 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 She has nerve pain in the foot, described as pins and needles. She stated on [place] day she walked wrong and blister opened with foul smell. She was admitted to [facility] for left heel wound infection. She was treated with IV antibiotics, MRI showed no bone infection. She was discharged today with home health services for wound care.  Last A1c on 12/28/2024 was 8.0     8/16/2024: Patient is here for follow up, no drainage in the past few days, still pain in the wound area.   8/2/2024: Patient is here for follow up, her daughter has been doing dressing, she reports no drainage from the wound.  7/19/2024: Patient is returning to clinic with new blood blister on right heel, she stated happened few days ago, she opened the blister to drain which reduced the pain. She has her lymphedema pump and using them daily, states wearing her compression stockings as well. She feels her A1c is better managed now came down from 13 to 8.2 on 6/12/2024.     12/11/2023: Patient is here for follow up, tolerated the compression, reports continues to have some pain while walking on left heel, no other concern.   12/4/2023: Patient is 53 year old woman with chronic leg edema and recent bilateral on posterior heel. Patient states she sits for 10-14 hours on  chair for her job with very little time to break for walking. She has purchased compression stockings , she has had hard time putting them on or keeping them on.   Patient medical history significant for type two diabetes on Jardiance 25 mg, hx of PE on xarelto 20 mg, neuropathy, fibromyalgia, hx of breast cancer, depression, bipolar, obesity, chronic low back pain with bilateral sciatica, herniated lumbar disc, chronic left knee pain, and osteoarthritis bilateral knees.     Review of Systems   Constitutional:  Positive for activity change. Negative for chills, fatigue and fever.   HENT:  Negative for congestion.    Respiratory:  Negative for shortness of breath.    Cardiovascular:  Positive for leg swelling. Negative for chest pain.   Gastrointestinal:  Positive for constipation.        Hx of constipation    Musculoskeletal:  Positive for arthralgias, back pain and gait problem.   Skin:  Positive for wound.        Left heel wound  Neurological:  Positive for numbness. Negative for weakness.        Feeling numbness on her toes   Psychiatric/Behavioral:          Hx f anxiety, depression and bipolar      Objective  Physical Exam  Vitals reviewed.   Constitutional:       Appearance: Normal appearance. She is obese.   HENT:      Head: Normocephalic.   Cardiovascular:      Rate and Rhythm: Normal rate.      Pulses: Normal pulses.           Dorsalis pedis pulses are 2+ on the left side.        Posterior tibial pulses are 2+ on the left side.   Pulmonary:      Effort: Pulmonary effort is normal.   Abdominal:      General: Bowel sounds are normal.   Musculoskeletal:      Cervical back: Normal range of motion.      Right lower leg: Edema present.      Left lower leg: Edema present.        Feet: left heel dry blister with eschar cover wound in the center  Feet:      Right foot:      Toenail Condition: Right toenails are normal.      Left foot:      Skin integrity: Ulcer present.      Toenail Condition: Left toenails are  normal.   Skin:     General: Skin is warm and dry.      Capillary Refill: Capillary refill takes 2 to 3 seconds.      Findings: No erythema.   Neurological:      Mental Status: She is alert and oriented to person, place, and time  Wound Assessment  Wound 01/03/25 Heel Left (Active)   Date First Assessed: 01/03/25   Location: Heel  Wound Location Orientation: Left      Assessments 1/3/2025  9:56 AM 1/8/2025  1:47 PM   Wound Image          Site Assessment Black;Brown;Pink;Tan;Yellow;Dry;Moist;Edema Black;Brown;Pink;Tan;Yellow;Dry;Moist;Edema   Closure Not approximated Not approximated   Drainage Amount Small Small   Drainage Description Serosanguineous Serosanguineous   Treatments Cleansed;Honey Gel;Dakins Cleansed;Saline;Betadine   Dressing 4x4s (spandage) Aquacel Foam (foam formed as heel cap)   Dressing Changed New Changed   Dressing Status Clean;Dry;Intact Clean;Dry;Intact   Wound Length (cm) 4.1 cm 3.5 cm   Wound Width (cm) 6 cm 5 cm   Wound Surface Area (cm^2) 24.6 cm^2 17.5 cm^2   Wound Depth (cm) 0 cm 0 cm   Wound Volume (cm^3) 0 cm^3 0 cm^3   Margins Attached edges Attached edges   Madelaine-wound Assessment Dry;Painful;Hyperpigmented;Fragile;Edema;Ecchymosis Dry;Painful;Hyperpigmented;Fragile;Edema;Ecchymosis   Wound Granulation Tissue Pink --   Wound Bed Granulation (%) 10 % --   Wound Bed Epithelium (%) 0 % --   Wound Bed Slough (%) 10 % --   Wound Bed Eschar (%) 80 % 100 %   Wound Bed Fibrin (%) 0 % --   State of Healing Non-healing Non-healing   Wound Odor Strong None   Pressure Injury Stage Unstageable Unstageable       Active Orders   Date Order Priority Status Authorizing Provider   01/08/25 1459 OP Wound Dressing Routine Active Jose M Johnson APRN     - Cleansing:    Cleanse with normal saline or wound cleanser     - Dressing:    Betadine     - Additional Wound Dressing Information:    aquacel foam cut in the shape of a heel cup   01/03/25 1214 OP Wound Dressing Routine Active Jose M Johnson APRN     -  Cleansing:    Cleanse with normal saline or wound cleanser     - Dressing:    Honey gel     - Dressing:    Dry gauze     - Additional Wound Dressing Information:    spandage     - Frequency:    Change dressing daily and PRN       Inactive Orders   Date Order Priority Status Authorizing Provider   01/03/25 1242 Debridement Left Heel Routine Completed Jose M Johnson APRN   01/03/25 1012 Debridement Left Heel Routine Discontinued Jose M Johnson, APRN     Vital Signs    01/08/25 1342   BP: (!) 154/107   Pulse: 96   Resp: 18   Temp: 98.4 °F (36.9 °C)     Allergies  Allergies[1]    Assessment   Left heel wound, pressure injury with diabetes:  -eschar cover wound bed, stable eschar  -no boggy, not soft  -no erythema in the te-wound  -edema +4 dorsal foot and +2 ankle edema  -painful to touch, described pins and needles pain, touch sensitivity classic sign of diabetic neuropathy      Reviewed note, imagining and labs in Epic and Care Every Where.  Recent labs: 1/3/2025: WBC 6.1, H&H 13.0&38.5, BUN 11, Creatinine 1.42, albumin 4.2  12/30/2024: US of bilateral lower ext, vascular study:  Impression   CONCLUSION:  Duplex arterial ultrasound demonstrates patent vessels with triphasic arterial Doppler flow.  Somewhat limited evaluation of the below knee tibial vessels on the left due to overlying calf edema and bandaging in the foot, though the vessels are patent  where visualized.        12/30/2024: MRI of left foot:  Impression   CONCLUSION:  Soft tissue and skin ulceration within the posterior heel without abscess.  No osteomyelitis.  6 mm osteochondral lesion of the medial talar dome without collapse of the articular surface.  Split tear of the peroneus brevis with peroneal tenosynovitis.  Mild Achilles tendinosis.  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  Multiple other incidental findings as described in the body of the report.  Dictated by (CST): Marlon Horne  MD on 12/30/2024 at 2:44 PM      Encounter Diagnosis  1. Diabetic ulcer of left heel associated with type 2 diabetes mellitus, with necrosis of muscle ([facility])    2. Neuropathy    3. 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[facility])    Anemia due to blood loss, acute    Atrial septal defect ([facility])    Hypercoagulable state ([facility])    Lymphedema of both lower extremities    Symptomatic anemia    Microcytic anemia    Other pulmonary embolism without acute cor pulmonale ([facility])    Severe episode of recurrent major depressive disorder, without psychotic features ([facility])    Uterine bleeding    Obesity (BMI 30-39.9)    Acquired trigger finger of right middle finger    Chronic anticoagulation    Chronic pain of left knee    Non-pressure chronic ulcer of left lower leg, limited to breakdown of skin ([facility])    Moderate left ventricular hypertrophy    Non-healing open wound of right heel    Pain associated with wound    BRCA gene mutation negative in female    Morbid obesity with BMI of 40.0-44.9, adult (HCC)    Dyslipidemia    HTN (hypertension), benign    Type 2 diabetes mellitus without complication, with long-term current use of insulin (HCC)    Stress    Diabetic ulcer of left heel associated with type 2 diabetes mellitus, unspecified ulcer stage (HCC)    Diabetic ulcer of left heel associated with type 2 diabetes mellitus, with necrosis of muscle (HCC)    Neuropathy     Plan  Orders  Orders Placed This Encounter   Procedures    OP Wound Dressing     Discussed again the etiology of  the pressure ulcer due to re-occuring pressure injury on right heel, traying to encourage patient to modify her work place, the way she sits, the way she crosses her legs, to eliminate pressure in her heels.  Right heel: daily betadine with foam padding to reduce risk of infection, when we can manage her pain and reduced the edema in her leg/foot we can start the debridement of eschar, then use topical dressing or negative pressure wound therapy to close this wound.   Monitor for signs and symptoms of infection, encourage patient to assess skin daily. May use Dakin's solution to reduce foul smell in the foot wound and te wound with each dressing changes.  Discussed ways to offload and reduce pressure from the wound bed by elevating heel when sitting on small table or ottoman, heel elevator foams or wedges. For example, using egg crate cushion under her feet to reduce pressure on his heels.     Recommended nutrition for wound healing and encourage reduce carbohydrate intake, increase protein intake, and glycemic control diet (A1c is 8 which hinders wound healing.   Patient reported being on gabapentin 600 mg (and higher does), she has ankle edema, at this time oral gabapentin is not advisable. Recommended gabapentin paste 10% topical apply to te wound to reduce pain topically.  We will consider Lyrica for nerve pain, pregabalin 50 mg three times a day to manage neuropathy.  Discussed the treatment plan with patient and her daughter, they verbalized understanding and agreed to these plan.  Total face to face time was 60 min, more than 50% of the time was spent in counseling and/or coordination of care related to BLE wounds.  Follow-Up  Return in about 1 week (around 1/15/2025) for APN visit for 30 min.              [1]   Allergies  Allergen Reactions    Latex HIVES and SWELLING    Influenza Vaccines OTHER (SEE COMMENTS)     Pt passed out     Radiology Contrast Iodinated Dyes ITCHING

## 2025-01-08 NOTE — TELEPHONE ENCOUNTER
Returned call to the patient. Scheduled 1/15/25 in the [place] office at 9:30 am as there was a cancellation. In person. Appt date, time, location, parking directions were provided to the patient. She states she will completed pending fasting lab work prior to appt.

## 2025-01-08 NOTE — PROGRESS NOTES
Weekly Wound Education Note    Teaching Provided To: Patient  Training Topics: Cleasing and general instructions;Dressing;Nutrition;Signs and symptoms of infection;Skin care;Diabetes;Discharge instructions;Foot care  Training Method: Demonstration;Explain/Verbal  Training Response: Patient responds and understands        Notes: Right heel: Betadine, Aquacel foam as heel cap. Medigrip E with hole for heel cut out to prevent pressure over wound.

## 2025-01-08 NOTE — TELEPHONE ENCOUNTER
Outreach patient and stated the message below and also help patient schedule for 1/13/2025 at 4 pm.

## 2025-01-08 NOTE — TELEPHONE ENCOUNTER
Outpatient scheduling called to assist with making an appointment for the patient however was informed that a nurse has been trying to get in contact with the patient to schedule an appointment with the patient. The patient asked if she can have a telehealth visit or if her visit needs to be in person. Please call the patient.

## 2025-01-08 NOTE — TELEPHONE ENCOUNTER
Incoming call from Michelle at [facility] to schedule and ED follow up. Patient was released from the hospital yesterday and would like to be seen as soon as possible.     Please advise

## 2025-01-08 NOTE — PROGRESS NOTES
DM appointment request (discharged 01/03)     Dr Nati Kline  Endocrinology  [address]  [place], [place] [id number] [phone]  Dr Kline's office will call patient, due to no availability in needed time frame     Dr Graham Recio  Family Medicine  [address]  [place] [address] [phone]  Dr Recio's office will call patient, due to no availability in needed time frame  Left Voicemail letting patient know that both offices will be calling to schedule appointment; if still need assistance to call [phone]   Closing encounter

## 2025-01-09 NOTE — PROCEDURES
Pre-procedure diagnosis: Heel Ulcer, LEFT foot  Post-procedure diagnosis: Same  Procedures performed:  Incision and drainage of wound,  LEFT foot  Excisional debridement of all non viable soft tissue down to and including skin and subcutaneous tissue of an area measuring less than 20 sq cm, LEFT foot  Primary Surgeon: Jatin Campbell DPM  Anesthesia: Local block consisting of 20cc of f 1% lidocaine plain, LEFT foot  Estimated blood loss in ml's: 5mL  Specimens removed: Culture of Left Heel taken   Complications: None  Hemostasis: Local control  Indication for procedure:  This patient is an 54 year old female who presents with a heel wound to the left foot that patient states has been present for a few weeks.  Risks, benefits, complications, and alternatives of the procedure were discussed with her in detail. Patient instructed that this bedside procedure is being performed to prevent her from needing to go to the Operating Room at this time but no guarantees can be made at this time. No guarantees have been given or implied. All questions have been answered to the patient's satisfaction.  PREPARATION FOR THE PROCEDURE: Local block consisting of 20cc of f 1% lidocaine plain, LEFT foot. The LEFT foot was then scrubbed, prepped in a sterile fashion with betadine.  Attention was directed to the LEFT foot heel- Utilizing a #15 blade a longitudinal incision was made over the area of heel and all nonviable soft tissue was removed.  Blunt dissection was performed down to and including the level of subcutaneous tissue. Utilizing a hemostat all planes were explored and no pus was noted. Healthy tissue was noted at the site. No tracking was noted at this time.  At this time deep surgical cultures were taken from the plantar incision and was sent to microbiology for further evaluation. At this time  irrigation was performed with saline/betadine mix and cleanse the wound site on the LEFT foot. The surgical sites were then  dressed with betadine soaked adaptic, 4x4, kerlix, and an ACE. The patient tolerated the bedside procedure well  with all vital signs stable and vascular status intact to the feet.  Podiatry team will follow up with them tomorrow on the floors for re-evaluation.

## 2025-01-10 ENCOUNTER — PATIENT OUTREACH (OUTPATIENT)
Dept: CASE MANAGEMENT | Age: 55
End: 2025-01-10

## 2025-01-10 NOTE — PROGRESS NOTES
Patient is scheduled for hospital follow-up appointment with primary care provider office.  Encounter closing.

## 2025-01-12 ENCOUNTER — LAB ENCOUNTER (OUTPATIENT)
Dept: LAB | Facility: HOSPITAL | Age: 55
End: 2025-01-12
Attending: INTERNAL MEDICINE
Payer: COMMERCIAL

## 2025-01-12 DIAGNOSIS — E11.9 TYPE 2 DIABETES MELLITUS WITHOUT COMPLICATION, WITH LONG-TERM CURRENT USE OF INSULIN (HCC): ICD-10-CM

## 2025-01-12 DIAGNOSIS — E11.69 TYPE 2 DIABETES MELLITUS WITH OTHER SPECIFIED COMPLICATION, WITH LONG-TERM CURRENT USE OF INSULIN (HCC): ICD-10-CM

## 2025-01-12 DIAGNOSIS — N17.9 AKI (ACUTE KIDNEY INJURY) (HCC): ICD-10-CM

## 2025-01-12 DIAGNOSIS — E66.01 MORBID OBESITY WITH BMI OF 40.0-44.9, ADULT (HCC): ICD-10-CM

## 2025-01-12 DIAGNOSIS — Z79.4 TYPE 2 DIABETES MELLITUS WITHOUT COMPLICATION, WITH LONG-TERM CURRENT USE OF INSULIN (HCC): ICD-10-CM

## 2025-01-12 DIAGNOSIS — E78.5 DYSLIPIDEMIA: ICD-10-CM

## 2025-01-12 DIAGNOSIS — F43.9 STRESS: ICD-10-CM

## 2025-01-12 DIAGNOSIS — I10 HTN (HYPERTENSION), BENIGN: ICD-10-CM

## 2025-01-12 DIAGNOSIS — E55.9 VITAMIN D DEFICIENCY: ICD-10-CM

## 2025-01-12 DIAGNOSIS — Z79.4 TYPE 2 DIABETES MELLITUS WITH OTHER SPECIFIED COMPLICATION, WITH LONG-TERM CURRENT USE OF INSULIN (HCC): ICD-10-CM

## 2025-01-12 LAB
ALBUMIN SERPL-MCNC: 4.6 G/DL (ref 3.2–4.8)
ANION GAP SERPL CALC-SCNC: 10 MMOL/L (ref 0–18)
BUN BLD-MCNC: 16 MG/DL (ref 9–23)
BUN/CREAT SERPL: 15.8 (ref 10–20)
CALCIUM BLD-MCNC: 10.2 MG/DL (ref 8.7–10.4)
CHLORIDE SERPL-SCNC: 101 MMOL/L (ref 98–112)
CHOLEST SERPL-MCNC: 164 MG/DL (ref ?–200)
CO2 SERPL-SCNC: 28 MMOL/L (ref 21–32)
CREAT BLD-MCNC: 1.01 MG/DL
CREAT UR-SCNC: 73.1 MG/DL
EGFRCR SERPLBLD CKD-EPI 2021: 67 ML/MIN/1.73M2 (ref 60–?)
EST. AVERAGE GLUCOSE BLD GHB EST-MCNC: 183 MG/DL (ref 68–126)
FASTING PATIENT LIPID ANSWER: YES
GLUCOSE BLD-MCNC: 180 MG/DL (ref 70–99)
HBA1C MFR BLD: 8 % (ref ?–5.7)
HDLC SERPL-MCNC: 39 MG/DL (ref 40–59)
LDLC SERPL CALC-MCNC: 93 MG/DL (ref ?–100)
MICROALBUMIN UR-MCNC: 1.5 MG/DL
MICROALBUMIN/CREAT 24H UR-RTO: 20.5 UG/MG (ref ?–30)
NONHDLC SERPL-MCNC: 125 MG/DL (ref ?–130)
OSMOLALITY SERPL CALC.SUM OF ELEC: 294 MOSM/KG (ref 275–295)
PHOSPHATE SERPL-MCNC: 2.7 MG/DL (ref 2.4–5.1)
POTASSIUM SERPL-SCNC: 3.2 MMOL/L (ref 3.5–5.1)
SODIUM SERPL-SCNC: 139 MMOL/L (ref 136–145)
TRIGL SERPL-MCNC: 184 MG/DL (ref 30–149)
VIT B12 SERPL-MCNC: 765 PG/ML (ref 211–911)
VIT D+METAB SERPL-MCNC: 7.8 NG/ML (ref 30–100)
VLDLC SERPL CALC-MCNC: 30 MG/DL (ref 0–30)

## 2025-01-12 PROCEDURE — 82607 VITAMIN B-12: CPT | Performed by: INTERNAL MEDICINE

## 2025-01-12 PROCEDURE — 82306 VITAMIN D 25 HYDROXY: CPT | Performed by: INTERNAL MEDICINE

## 2025-01-13 ENCOUNTER — OFFICE VISIT (OUTPATIENT)
Facility: CLINIC | Age: 55
End: 2025-01-13
Payer: COMMERCIAL

## 2025-01-13 VITALS
OXYGEN SATURATION: 98 % | WEIGHT: 233 LBS | HEIGHT: 62 IN | HEART RATE: 87 BPM | SYSTOLIC BLOOD PRESSURE: 156 MMHG | BODY MASS INDEX: 42.88 KG/M2 | DIASTOLIC BLOOD PRESSURE: 96 MMHG

## 2025-01-13 DIAGNOSIS — G89.29 CHRONIC RIGHT SHOULDER PAIN: ICD-10-CM

## 2025-01-13 DIAGNOSIS — I10 ESSENTIAL HYPERTENSION: ICD-10-CM

## 2025-01-13 DIAGNOSIS — L97.509 TYPE 2 DIABETES MELLITUS WITH FOOT ULCER, WITH LONG-TERM CURRENT USE OF INSULIN (HCC): ICD-10-CM

## 2025-01-13 DIAGNOSIS — E78.5 DYSLIPIDEMIA: ICD-10-CM

## 2025-01-13 DIAGNOSIS — N17.9 AKI (ACUTE KIDNEY INJURY) (HCC): ICD-10-CM

## 2025-01-13 DIAGNOSIS — M25.511 CHRONIC RIGHT SHOULDER PAIN: ICD-10-CM

## 2025-01-13 DIAGNOSIS — E11.621 TYPE 2 DIABETES MELLITUS WITH FOOT ULCER, WITH LONG-TERM CURRENT USE OF INSULIN (HCC): ICD-10-CM

## 2025-01-13 DIAGNOSIS — E11.621 DIABETIC ULCER OF LEFT HEEL ASSOCIATED WITH TYPE 2 DIABETES MELLITUS, WITH NECROSIS OF MUSCLE (HCC): Primary | ICD-10-CM

## 2025-01-13 DIAGNOSIS — Z79.4 TYPE 2 DIABETES MELLITUS WITH FOOT ULCER, WITH LONG-TERM CURRENT USE OF INSULIN (HCC): ICD-10-CM

## 2025-01-13 DIAGNOSIS — L97.423 DIABETIC ULCER OF LEFT HEEL ASSOCIATED WITH TYPE 2 DIABETES MELLITUS, WITH NECROSIS OF MUSCLE (HCC): Primary | ICD-10-CM

## 2025-01-13 DIAGNOSIS — R60.0 PERIPHERAL EDEMA: ICD-10-CM

## 2025-01-13 NOTE — PROGRESS NOTES
Subjective:   Suki Rosenberg is a 54 year old female who presents for hospital follow up.   She was discharged from [facility] to Home or Self Care  Admission Date: 12/27/24   Discharge Date: 1/3/25  Hospital Discharge Diagnosis: Left heel diabetic ulcer, NAYELI    Interactive contact within 2 business days post discharge first initiated on Date: 1/10/2025    I accessed [facility] and/or vogogo Everywhere and personally reviewed the following for the recent hospitalization: provider notes, consults, summaries, labs and other test results and the pertinent findings are documented below.     HPI:   Was seen by podiatry and ID.   Lasix, aldactone, metformin, and losartan were held during hospital stay. She was informed to hold these medications until her appt today, although she has resumed medications upon discharge. Of note, furosemide was decreased from 40mg daily to 20mg daily upon discharge, but she is taking 40mg daily.  Discharged home on augmentin x10 days and norco #15 tablets.     Taking augmentin as prescribed.   Norco does not help with pain.   She was previously on amitriptyline for pains but didn't help.   Pregabalin caused swelling.   Gabapentin didn't help much.   She was on duloxetine previously but unsure if it helped.   Has not seen psychiatrist since last year.   Has wound care appt on 1/15/25.   No fevers.   No new or worsening symptoms.   Due for cardiology f/u with Dr. Galvan.    Has right shoulder pain. This is chronic from fall.     History/Other:   Current Medications:  Medication Reconciliation:  I am aware of an inpatient discharge within the last 30 days.  The discharge medication list has been reconciled with the patient's current medication list and reviewed by me. See medication list for additions of new medication, and changes to current doses of medications and discontinued medications.  Outpatient Medications Marked as Taking for the 1/13/25 encounter (Office Visit) with Graham Recio DO    Medication Sig    NIFEdipine ER 60 MG Oral Tablet 24 Hr Take 1 tablet (60 mg total) by mouth daily.    Miconazole Nitrate (MICONAZOLE 7) 2 % Vaginal Cream Place 1 applicator vaginally nightly.    furosemide (LASIX) 40 MG Oral Tab Take 0.5 tablets (20 mg total) by mouth daily.    HYDROcodone-acetaminophen  MG Oral Tab Take 1 tablet by mouth every 6 (six) hours as needed for Pain.    sodium hypochlorite 0.125 % External Solution 10 ml to gauze and apply to wound bed with each dressing changes for 10-20 min, to reduce risk of infection and eliminate foul odor.    insulin aspart (NOVOLOG FLEXPEN) 100 Units/mL Subcutaneous Solution Pen-injector Inject 15 Units into the skin 3 (three) times daily before meals.    amoxicillin clavulanate 875-125 MG Oral Tab Take 1 tablet by mouth 2 (two) times daily for 10 days.    zolpidem 10 MG Oral Tab Take 1 tablet (10 mg total) by mouth daily as needed for Sleep.    sertraline 50 MG Oral Tab Take 1 tablet (50 mg total) by mouth every evening. Take half tablet for the first week    rosuvastatin 20 MG Oral Tab Take 1 tablet (20 mg total) by mouth nightly.    semaglutide (OZEMPIC, 2 MG/DOSE,) 8 MG/3ML Subcutaneous Solution Pen-injector Inject 2 mg into the skin once a week.    Continuous Glucose Sensor (FREESTYLE NAA 2 SENSOR) Does not apply Misc 1 each every 14 (fourteen) days.    rivaroxaban (XARELTO) 20 MG Oral Tab Take 1 tablet (20 mg total) by mouth daily with food.    ARIPiprazole 5 MG Oral Tab     lidocaine 5 % External Patch Place 1 patch onto the skin daily.    carvedilol 25 MG Oral Tab Take 1 tablet (25 mg total) by mouth 2 (two) times daily with meals.       Review of Systems:  GENERAL: weight stable, energy stable, no sweating  SKIN: see HPI  EYES: denies blurred vision or double vision  HEENT: denies nasal congestion, sinus pain or ST  LUNGS: denies shortness of breath with exertion  CARDIOVASCULAR: denies chest pain on exertion or palpitations  GI: denies  abdominal pain, denies heartburn, denies diarrhea  MUSCULOSKELETAL: denies pain, normal range of motion of extremities  NEURO: denies headaches, denies dizziness, denies weakness  PSYCHE: depression, anxiety  HEMATOLOGIC: denies hx of anemia, or bruising, denies bleeding  ENDOCRINE: denies thyroid history  ALL/ASTHMA: negative    Objective:   Patient's last menstrual period was 10/29/2018.  Estimated body mass index is 42.62 kg/m² as calculated from the following:    Height as of this encounter: 5' 2\" (1.575 m).    Weight as of this encounter: 233 lb (105.7 kg).   BP (!) 156/96   Pulse 87   Ht 5' 2\" (1.575 m)   Wt 233 lb (105.7 kg)   LMP 10/29/2018   SpO2 98%   BMI 42.62 kg/m²    GENERAL: well developed, well nourished, in no apparent distress  SKIN: round ulcer to left posterior heel with appropriate ttp, no erythema or fluctuation or warmth  HEENT: atraumatic, normocephalic, ears and throat are clear  EYES: PERRLA, EOMI, conjunctiva are clear  NECK: supple, no adenopathy, no bruits  CHEST: no chest tenderness  LUNGS: clear to auscultation  CARDIO: RRR without murmur  EXTREMITIES: 1+ pitting edema in b/l lower legs  NEURO: Oriented times three, cranial nerves are intact, motor and sensory are grossly intact    Assessment & Plan:   1. Diabetic ulcer of left heel associated with type 2 diabetes mellitus, with necrosis of muscle ([facility]) (Primary)  2. NAYELI (acute kidney injury) ([facility])  3. Type 2 diabetes mellitus with foot ulcer, with long-term current use of insulin ([facility])  4. Essential hypertension  5. Dyslipidemia  6. Peripheral edema  7. Chronic right shoulder pain  -     Ortho Referral - In Network  Other orders  -     NIFEdipine ER; Take 1 tablet (60 mg total) by mouth daily.  Dispense: 90 tablet; Refill: 0    -Ulcer: Stable since discharge from a wound standpoint. Continue augmentin as prescribed. Has wound care appt on 1/15/25. Recommend she confirm with psychiatrist if duloxetine would be okay to add for pain  control.   -DM: Per endo. Has appt on 1/15/25.   -NAYELI: Repeat outpt renal function normal.   -HTN: Uncontrolled. Pain likely contributing (plan as above). Needs to f/u with Dr. Galvan and she will schedule. Recommend increasing nifedipine to 60mg daily in the meantime, although to closely monitor for worsening peripheral edema. Defer increasing aldactone 2/2 recent NAYELI.   -Right shoulder pain: Ortho referral per request.           No follow-ups on file.

## 2025-01-14 ENCOUNTER — TELEPHONE (OUTPATIENT)
Dept: ENDOCRINOLOGY CLINIC | Facility: CLINIC | Age: 55
End: 2025-01-14

## 2025-01-14 DIAGNOSIS — Z79.4 TYPE 2 DIABETES MELLITUS WITH OTHER SPECIFIED COMPLICATION, WITH LONG-TERM CURRENT USE OF INSULIN (HCC): Primary | ICD-10-CM

## 2025-01-14 DIAGNOSIS — E55.9 VITAMIN D DEFICIENCY: Primary | ICD-10-CM

## 2025-01-14 DIAGNOSIS — E11.69 TYPE 2 DIABETES MELLITUS WITH OTHER SPECIFIED COMPLICATION, WITH LONG-TERM CURRENT USE OF INSULIN (HCC): Primary | ICD-10-CM

## 2025-01-14 RX ORDER — INSULIN GLARGINE 100 [IU]/ML
INJECTION, SOLUTION SUBCUTANEOUS NIGHTLY
Qty: 15 ML | Refills: 1 | Status: SHIPPED | OUTPATIENT
Start: 2025-01-14

## 2025-01-14 RX ORDER — ERGOCALCIFEROL 1.25 MG/1
50000 CAPSULE, LIQUID FILLED ORAL WEEKLY
Qty: 12 CAPSULE | Refills: 0 | Status: SHIPPED | OUTPATIENT
Start: 2025-01-14 | End: 2025-04-02

## 2025-01-14 NOTE — TELEPHONE ENCOUNTER
Patient requesting 1/15/2025 Office visit to be converted to Telemedicine?  Please call.  Thank you.

## 2025-01-14 NOTE — TELEPHONE ENCOUNTER
RN confirmed patient is taking basaglar 15-17 units daily. Rx sent to [facility].     Communicated new orders below. Understands to hold ozempic until next appointment. Patient rescheduled appointment with Cinthia POLK for tomorrow at 1145.

## 2025-01-14 NOTE — TELEPHONE ENCOUNTER
Patient requesting refills for Ozempic and new prescription for Basaglar.  Please call.  Thank you.      Current Outpatient Medications   Medication Sig Dispense Refill    semaglutide (OZEMPIC, 2 MG/DOSE,) 8 MG/3ML Subcutaneous Solution Pen-injector Inject 2 mg into the skin once a week. 9 mL 0

## 2025-01-14 NOTE — TELEPHONE ENCOUNTER
Since Bg are low and she has been out of ozempic for a month   Please hold ozempic ti ll upcoming apt   Metformin 1000 mg BID -> continue  Novolog 7-10 units TID -> decrease to 4-7 units TID with meals  Basaglar 15-17 units -> continue    Okay to refill basaglar -- please confirm one more time that she is taking this and at the current dose  since it is not listed on ly last OV notes, although I did recommend that she start taking this in the visit prior to this   Can send after confirming    Thanks

## 2025-01-14 NOTE — TELEPHONE ENCOUNTER
Patient calling to inform Endocrinologist that she had her labs done and requesting to discuss results.  Please call.  Thank you.

## 2025-01-14 NOTE — TELEPHONE ENCOUNTER
Dr. Kline --    Spoke to pt. Pt can't come tomorrow due to scheduling conflicts with other appointment so she's been rescheduled to 2/3. She did complete labs however. Pt needs refill on basaglar and ozempic 2mg. Do you want pt to be restarted on this same dose? She hasn't taken 1 in month due to running out.     RX pended for basagar, unsure if you want to change dose.    Pt states she had a low sugar earlier today of 61 before lunch.     Hypoglycemia    Onset of hypoglycemia: earlier today at 61, before lunch    BG levels: pt is no longer wearing chayito CGM, checks via glucometer.      Fasting Before lunch   1/14  61 -> 1 hour later, 89   1/13 166 111   1/12 113 152   1/11 130 97     Pattern of hypoglycemia (occurring mostly when/random?): random    Symptoms: diaphoretic, shaky.    Acute illness: diabetic foot infection    Hypoglycemia Mx/Rule of 15: pt had some coffee with creamer and rechecked BG in 1 hour. Reviewed rule of 15.    Change in Diet: Hasn't been skipping any meals.    List Medications/Compliance:   Metformin 1000 mg BID -> confrimed   Novolog 7-10 units TID -> confirmed   Basaglar 15-17 units -> confirmed   Ozempic 2 mg -> pt hasn't taken this in 1 month, pt ran out.

## 2025-01-15 ENCOUNTER — TELEPHONE (OUTPATIENT)
Dept: ENDOCRINOLOGY CLINIC | Facility: CLINIC | Age: 55
End: 2025-01-15

## 2025-01-15 ENCOUNTER — OFFICE VISIT (OUTPATIENT)
Dept: ENDOCRINOLOGY CLINIC | Facility: CLINIC | Age: 55
End: 2025-01-15

## 2025-01-15 ENCOUNTER — OFFICE VISIT (OUTPATIENT)
Dept: SURGERY | Facility: CLINIC | Age: 55
End: 2025-01-15
Payer: COMMERCIAL

## 2025-01-15 ENCOUNTER — OFFICE VISIT (OUTPATIENT)
Dept: WOUND CARE | Facility: HOSPITAL | Age: 55
End: 2025-01-15
Attending: NURSE PRACTITIONER
Payer: COMMERCIAL

## 2025-01-15 VITALS
RESPIRATION RATE: 18 BRPM | DIASTOLIC BLOOD PRESSURE: 91 MMHG | TEMPERATURE: 97 F | HEART RATE: 94 BPM | OXYGEN SATURATION: 98 % | SYSTOLIC BLOOD PRESSURE: 155 MMHG

## 2025-01-15 VITALS
OXYGEN SATURATION: 98 % | RESPIRATION RATE: 16 BRPM | SYSTOLIC BLOOD PRESSURE: 128 MMHG | BODY MASS INDEX: 42.35 KG/M2 | HEART RATE: 100 BPM | HEIGHT: 62.8 IN | DIASTOLIC BLOOD PRESSURE: 80 MMHG | WEIGHT: 239 LBS

## 2025-01-15 VITALS
WEIGHT: 239 LBS | HEIGHT: 62 IN | HEART RATE: 89 BPM | BODY MASS INDEX: 43.98 KG/M2 | SYSTOLIC BLOOD PRESSURE: 145 MMHG | DIASTOLIC BLOOD PRESSURE: 99 MMHG

## 2025-01-15 DIAGNOSIS — G62.9 NEUROPATHY: Primary | ICD-10-CM

## 2025-01-15 DIAGNOSIS — T14.8XXA PAIN ASSOCIATED WITH WOUND: ICD-10-CM

## 2025-01-15 DIAGNOSIS — E78.5 DYSLIPIDEMIA: ICD-10-CM

## 2025-01-15 DIAGNOSIS — F43.9 STRESS: ICD-10-CM

## 2025-01-15 DIAGNOSIS — Z79.4 TYPE 2 DIABETES MELLITUS WITHOUT COMPLICATION, WITH LONG-TERM CURRENT USE OF INSULIN (HCC): Primary | ICD-10-CM

## 2025-01-15 DIAGNOSIS — E11.621 DIABETIC ULCER OF LEFT HEEL ASSOCIATED WITH TYPE 2 DIABETES MELLITUS, WITH NECROSIS OF MUSCLE (HCC): ICD-10-CM

## 2025-01-15 DIAGNOSIS — L97.423 DIABETIC ULCER OF LEFT HEEL ASSOCIATED WITH TYPE 2 DIABETES MELLITUS, WITH NECROSIS OF MUSCLE (HCC): ICD-10-CM

## 2025-01-15 DIAGNOSIS — E11.9 TYPE 2 DIABETES MELLITUS WITHOUT COMPLICATION, WITH LONG-TERM CURRENT USE OF INSULIN (HCC): Primary | ICD-10-CM

## 2025-01-15 DIAGNOSIS — I10 HTN (HYPERTENSION), BENIGN: ICD-10-CM

## 2025-01-15 DIAGNOSIS — R52 PAIN ASSOCIATED WITH WOUND: ICD-10-CM

## 2025-01-15 DIAGNOSIS — E11.65 UNCONTROLLED TYPE 2 DIABETES MELLITUS WITH HYPERGLYCEMIA (HCC): Primary | ICD-10-CM

## 2025-01-15 DIAGNOSIS — E66.01 MORBID OBESITY WITH BMI OF 40.0-44.9, ADULT (HCC): ICD-10-CM

## 2025-01-15 LAB
GLUCOSE BLOOD: 256
TEST STRIP LOT #: NORMAL NUMERIC

## 2025-01-15 PROCEDURE — 3080F DIAST BP >= 90 MM HG: CPT

## 2025-01-15 PROCEDURE — 99214 OFFICE O/P EST MOD 30 MIN: CPT

## 2025-01-15 PROCEDURE — 99214 OFFICE O/P EST MOD 30 MIN: CPT | Performed by: INTERNAL MEDICINE

## 2025-01-15 PROCEDURE — 3008F BODY MASS INDEX DOCD: CPT | Performed by: INTERNAL MEDICINE

## 2025-01-15 PROCEDURE — 3079F DIAST BP 80-89 MM HG: CPT | Performed by: INTERNAL MEDICINE

## 2025-01-15 PROCEDURE — 3008F BODY MASS INDEX DOCD: CPT

## 2025-01-15 PROCEDURE — 82947 ASSAY GLUCOSE BLOOD QUANT: CPT

## 2025-01-15 PROCEDURE — 3077F SYST BP >= 140 MM HG: CPT

## 2025-01-15 PROCEDURE — 3074F SYST BP LT 130 MM HG: CPT | Performed by: INTERNAL MEDICINE

## 2025-01-15 PROCEDURE — 99213 OFFICE O/P EST LOW 20 MIN: CPT | Performed by: NURSE PRACTITIONER

## 2025-01-15 RX ORDER — TIRZEPATIDE 7.5 MG/.5ML
7.5 INJECTION, SOLUTION SUBCUTANEOUS WEEKLY
Qty: 2 ML | Refills: 5 | Status: SHIPPED | OUTPATIENT
Start: 2025-01-15

## 2025-01-15 RX ORDER — INSULIN GLARGINE 100 [IU]/ML
12 INJECTION, SOLUTION SUBCUTANEOUS NIGHTLY
Qty: 15 ML | Refills: 5 | Status: SHIPPED | OUTPATIENT
Start: 2025-01-15

## 2025-01-15 RX ORDER — DIETHYLPROPION HYDROCHLORIDE 25 MG/1
1 TABLET ORAL DAILY
Qty: 30 TABLET | Refills: 5 | Status: SHIPPED | OUTPATIENT
Start: 2025-01-15 | End: 2025-02-14

## 2025-01-15 RX ORDER — SERTRALINE HYDROCHLORIDE 100 MG/1
100 TABLET, FILM COATED ORAL EVERY EVENING
Qty: 90 TABLET | Refills: 1 | Status: SHIPPED | OUTPATIENT
Start: 2025-01-15 | End: 2025-04-15

## 2025-01-15 NOTE — PROGRESS NOTES
Weekly Wound Education Note    Teaching Provided To: Patient  Training Topics: Cleasing and general instructions;Purpose and directions to contact wound care physician or PCP;Safe and effective use of medical equipement and /or supplies;Signs and symptoms of infection;Skin care  Training Method: Explain/Verbal;Demonstration  Training Response: Patient responds and understands        Notes: Left Heel:Betadine, ABD pad as heel cap, tape, Medigrip E from toes up

## 2025-01-15 NOTE — PROGRESS NOTES
Subjective   Suki Rosenberg is a 54 year old female.    Chief Complaint   Patient presents with    Wound Care     Left Heel     HPI  1/15/2025: Patient is here for follow up, reports not tolerating gabapentin, and lyrica. She had been given Zoloft, she stated she is concern about the side effect and cost. She is complaining of pain in her heel.  1/8/2025: Patient is here for follow up, has been doing dressing, minimum drainage. Patient has not refill gabapentin paste for pain management. Also, she stated she had Lyrica before as well as gabapentin which caused significant ankle edema. She is not sure if she is currently taking Amitriptyline.   1/3/2025: Patient stated she developed left heel blister and wound again, she has history of heels blister in Dec 2023 and then right heel blister and wound on July 2024. 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 She has nerve pain in the foot, described as pins and needles. She stated on [place] day she walked wrong and blister opened with foul smell. She was admitted to [facility] for left heel wound infection. She was treated with IV antibiotics, MRI showed no bone infection. She was discharged today with home health services for wound care.  Last A1c on 12/28/2024 was 8.0     8/16/2024: Patient is here for follow up, no drainage in the past few days, still pain in the wound area.   8/2/2024: Patient is here for follow up, her daughter has been doing dressing, she reports no drainage from the wound.  7/19/2024: Patient is returning to clinic with new blood blister on right heel, she stated happened few days ago, she opened the blister to drain which reduced the pain. She has her lymphedema pump and using them daily, states wearing her compression stockings as well. She feels her A1c is better managed now came down from 13 to 8.2 on 6/12/2024.     12/11/2023: Patient is here for follow up, tolerated the compression, reports continues to  have some pain while walking on left heel, no other concern.   12/4/2023: Patient is 53 year old woman with chronic leg edema and recent bilateral on posterior heel. Patient states she sits for 10-14 hours on chair for her job with very little time to break for walking. She has purchased compression stockings , she has had hard time putting them on or keeping them on.   Patient medical history significant for type two diabetes on Jardiance 25 mg, hx of PE on xarelto 20 mg, neuropathy, fibromyalgia, hx of breast cancer, depression, bipolar, obesity, chronic low back pain with bilateral sciatica, herniated lumbar disc, chronic left knee pain, and osteoarthritis bilateral knees.     Review of Systems   Constitutional:  Positive for activity change. Negative for chills, fatigue and fever.   HENT:  Negative for congestion.    Respiratory:  Negative for shortness of breath.    Cardiovascular:  Positive for leg swelling. Negative for chest pain.   Gastrointestinal:  Positive for constipation.        Hx of constipation    Musculoskeletal:  Positive for arthralgias, back pain and gait problem.   Skin:  Positive for wound.        Left heel wound  Neurological:  Positive for numbness. Negative for weakness.        Feeling numbness on her toes   Psychiatric/Behavioral:          Hx f anxiety, depression and bipolar      Objective  Physical Exam  Vitals reviewed.   Constitutional:       Appearance: Normal appearance. She is obese.   HENT:      Head: Normocephalic.   Cardiovascular:      Rate and Rhythm: Normal rate.      Pulses: Normal pulses.           Dorsalis pedis pulses are 2+ on the left side.        Posterior tibial pulses are 2+ on the left side.   Pulmonary:      Effort: Pulmonary effort is normal.   Abdominal:      General: Bowel sounds are normal.   Musculoskeletal:      Cervical back: Normal range of motion.      Right lower leg: Edema present.      Left lower leg: Edema present.        Feet: left heel dry blister  with eschar cover wound in the center  Feet:      Right foot:      Toenail Condition: Right toenails are normal.      Left foot:      Skin integrity: Ulcer present.      Toenail Condition: Left toenails are normal.   Skin:     General: Skin is warm and dry.      Capillary Refill: Capillary refill takes 2 to 3 seconds.      Findings: No erythema.   Neurological:      Mental Status: She is alert and oriented to person, place, and time    Wound Assessment  Wound 01/03/25 Heel Left (Active)   Date First Assessed: 01/03/25   Location: Heel  Wound Location Orientation: Left      Assessments 1/3/2025  9:56 AM 1/15/2025  3:40 PM   Wound Image          Site Assessment Black;Brown;Pink;Tan;Yellow;Dry;Moist;Edema Brown;Black;Gonzalez   Closure Not approximated Not approximated   Drainage Amount Small Moderate   Drainage Description Serosanguineous Brown   Treatments Cleansed;Honey Gel;Dakins Cleansed;Saline;Betadine   Dressing 4x4s (spandage) ABD Pad (formed as heel cap)   Dressing Changed New Changed   Dressing Status Clean;Dry;Intact Clean;Dry;Intact   Wound Length (cm) 4.1 cm 3.2 cm   Wound Width (cm) 6 cm 4 cm   Wound Surface Area (cm^2) 24.6 cm^2 12.8 cm^2   Wound Depth (cm) 0 cm 0 cm   Wound Volume (cm^3) 0 cm^3 0 cm^3   Margins Attached edges Poorly defined   Non-staged Wound Description -- Not applicable   Madelaine-wound Assessment Dry;Painful;Hyperpigmented;Fragile;Edema;Ecchymosis Dry;Hyperpigmented;Painful   Wound Granulation Tissue Pink --   Wound Bed Granulation (%) 10 % 0 %   Wound Bed Epithelium (%) 0 % 0 %   Wound Bed Slough (%) 10 % 0 %   Wound Bed Eschar (%) 80 % 100 %   Wound Bed Fibrin (%) 0 % 0 %   State of Healing Non-healing Non-healing   Wound Odor Strong None   Pressure Injury Stage Unstageable Unstageable       Active Orders   Date Order Priority Status Authorizing Provider   01/15/25 7019 OP Wound Dressing Routine Active Jose M Johnson APRN     - Cleansing:    Cleanse with normal saline or wound cleanser      - Dressing:    ABD pad     - Additional Wound Dressing Information:    form as a heel cap     - Frequency:    Change dressing daily and PRN   01/08/25 1459 OP Wound Dressing Routine Active Jose M Johnson APRN     - Cleansing:    Cleanse with normal saline or wound cleanser     - Dressing:    Betadine     - Additional Wound Dressing Information:    aquacel foam cut in the shape of a heel cup   01/03/25 1214 OP Wound Dressing Routine Active Jose M Johnson APRN     - Cleansing:    Cleanse with normal saline or wound cleanser     - Dressing:    Honey gel     - Dressing:    Dry gauze     - Additional Wound Dressing Information:    spandage     - Frequency:    Change dressing daily and PRN       Inactive Orders   Date Order Priority Status Authorizing Provider   01/03/25 1242 Debridement Left Heel Routine Completed Jose M Johnson APRN   01/03/25 1012 Debridement Left Heel Routine Discontinued Jose M Johnson APRN       Compression Wrap 01/15/25 Leg Left (Active)   Placement Date: 01/15/25   Location: Leg  Wound Location Orientation: Left      Assessments 1/15/2025  3:40 PM   Response to Treatment Well tolerated   Compression Layers Single   Compression Product Type Tubigrip/Spanda (Medigrip E cut out on heel)   Dressing Applied Yes   Compression Wrap Location Toes to Knee   Compression Wrap Status Clean;Dry;Intact       No associated orders.     Vital Signs    01/15/25 1536   BP: (!) 155/91   Pulse: 94   Resp: 18   Temp: 97.3 °F (36.3 °C)   PainSc: 8 - (Severe)   PainLoc: Foot     Allergies  Allergies[1]  Assessment   Left heel wound, pressure injury with diabetes:  -hard eschar cover wound bed, stable eschar  -no boggy, not soft  -no erythema in the te-wound  -edema +4 dorsal foot and +2 ankle edema  -painful to touch, described pins and needles pain, touch sensitivity classic sign of diabetic neuropathy      Reviewed note, imagining and labs in Epic and Care Every Where.  Recent labs: 1/3/2025: WBC 6.1, H&H 13.0&38.5,  BUN 11, Creatinine 1.42, albumin 4.2  12/30/2024: US of bilateral lower ext, vascular study:  Impression   CONCLUSION:  Duplex arterial ultrasound demonstrates patent vessels with triphasic arterial Doppler flow.  Somewhat limited evaluation of the below knee tibial vessels on the left due to overlying calf edema and bandaging in the foot, though the vessels are patent  where visualized.        12/30/2024: MRI of left foot:  Impression   CONCLUSION:  Soft tissue and skin ulceration within the posterior heel without abscess.  No osteomyelitis.  6 mm osteochondral lesion of the medial talar dome without collapse of the articular surface.  Split tear of the peroneus brevis with peroneal tenosynovitis.  Mild Achilles tendinosis.  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  Multiple other incidental findings as described in the body of the report.  Dictated by (CST): Marlon Horne MD on 12/30/2024 at 2:44 PM     Encounter Diagnosis  1. Neuropathy    2. Diabetic ulcer of left heel associated with type 2 diabetes mellitus, with necrosis of muscle (HCC)    3. Pain associated with wound      Problem List  Patient Active Problem List   Diagnosis    Iron deficiency anemia due to chronic blood loss    Recurrent pulmonary embolism (HCC)    Menorrhagia with regular cycle    Chronic low back pain with bilateral sciatica    left > right L5 radiculopathies    L5-S1 right mild foraminal & left mild far lateral, L4-5 mild diffuse, L2-3 right mild far lateral bulging discs    Urinary incontinence    Anemia    Anemia, unspecified type    Congenital anomaly of heart (HCC)    Anemia due to blood loss, acute    Atrial septal defect (HCC)    Hypercoagulable state (HCC)    Lymphedema of both lower extremities    Symptomatic anemia    Microcytic anemia    Other pulmonary embolism without acute cor pulmonale (HCC)    Severe episode of recurrent major depressive disorder, without  psychotic features (HCC)    Uterine bleeding    Obesity (BMI 30-39.9)    Acquired trigger finger of right middle finger    Chronic anticoagulation    Chronic pain of left knee    Non-pressure chronic ulcer of left lower leg, limited to breakdown of skin (HCC)    Moderate left ventricular hypertrophy    Non-healing open wound of right heel    Pain associated with wound    BRCA gene mutation negative in female    Morbid obesity with BMI of 40.0-44.9, adult (HCC)    Dyslipidemia    HTN (hypertension), benign    Type 2 diabetes mellitus without complication, with long-term current use of insulin (HCC)    Stress    Diabetic ulcer of left heel associated with type 2 diabetes mellitus, unspecified ulcer stage (HCC)    Diabetic ulcer of left heel associated with type 2 diabetes mellitus, with necrosis of muscle (HCC)    Neuropathy     Plan  Orders  Orders Placed This Encounter   Procedures    OP Wound Dressing   Right heel: Continue daily betadine with foam padding to reduce risk of infection, when we can manage her pain and reduced the edema in her leg/foot we can start the debridement of eschar, then use topical dressing or negative pressure wound therapy to close this wound. Encouraged patient to see Pain Management services as she can not tolerate touch due to nerve pain. She stated she has tried gabapenin and lyrica. She has not start zoloft. She is willing to read about duloxetine and let me know if she is willing to try this medication for nerve pain.  Monitor for signs and symptoms of infection, encourage patient to assess skin daily. May use Dakin's solution to reduce foul smell in the foot wound and te wound with each dressing changes.  Continue offload and reduce pressure from the wound bed by elevating heel when sitting on small table or ottoman, heel elevator foams or wedges. For example, using egg crate cushion under her feet to reduce pressure on his heels.     Recommended nutrition for wound healing and  encourage reduce carbohydrate intake, increase protein intake, and glycemic control diet (A1c is 8 which hinders wound healing.   Patient reported being on gabapentin 600 mg (and higher does), she has ankle edema, at this time oral gabapentin is not advisable. Recommended gabapentin paste 10% topical apply to te wound to reduce pain topically.  We will consider Lyrica for nerve pain, pregabalin 50 mg three times a day to manage neuropathy.  Discussed the treatment plan with patient and her daughter, they verbalized understanding and agreed to these plan.  Total face to face time was 40 min, more than 50% of the time was spent in counseling and/or coordination of care related to BLE wounds.  Follow-Up  Return in about 1 week (around 1/22/2025) for APN visit x 30 mins.              [1]   Allergies  Allergen Reactions    Latex HIVES and SWELLING    Influenza Vaccines OTHER (SEE COMMENTS)     Pt passed out     Radiology Contrast Iodinated Dyes ITCHING

## 2025-01-15 NOTE — TELEPHONE ENCOUNTER
Received fax from [facility] Dated on 1/15/25 stating the MOUNJARO 7.5MG/0.5ML pen-inj has been approved valid from 12/16/24 ends 1/15/26. [facility] sent

## 2025-01-15 NOTE — PROGRESS NOTES
[facility], [facility], [place]  [address] [facility] [address] [place] [id number]  Dept: [phone]       Patient:  Suki Rosenberg  :      1970  MRN:      [id number]    Chief Complaint:    Chief Complaint   Patient presents with    Follow - Up     Down 4       SUBJECTIVE     History of Present Illness:  Suki is being seen today for a follow-up for non surgical weight loss    Past Medical History:   Past Medical History:    Anemia    due to blood loss    Anxiety    Asthma (HCC)    Atrial septal defect (HCC)    Back problem    Bipolar 2 disorder (HCC)    BRCA gene mutation negative in female    Negative 28 gene hereditary breast/gyn cancers panel, report in media tab    Breast cancer (HCC)    L sided, treated with chemo, radiation    Chronic back pain    Chronic constipation    COVID-19 long hauler    Depression    Diabetes (HCC)    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 due to underlying condition, limited to breakdown of skin (HCC)    Essential hypertension    Fibromyalgia    History of blood transfusion    4-5 per year    Hx of motion sickness    Hypercoagulable state (HCC)    Incontinence    Lupus    Migraines    Morbid obesity with BMI of 40.0-44.9, adult (HCC)    Muscle weakness    Neuropathy    Non-pressure chronic ulcer of right lower leg, limited to breakdown of skin (HCC)    PE (pulmonary thromboembolism) (HCC)     after child birth and again  (bilateral)    Pneumonia due to organism    PONV (postoperative nausea and vomiting)    Pulmonary embolism (HCC)    Schizophrenia (HCC)    Visual impairment    Glasses        Comorbidities:  Back pain-Improvement?  yes, Joint pain-Improvement?  yes, Diabetes-Improvement?  yes, Hypertension-Improvement?  yes, ARIAN-Improvement?  yes, Snoring-Improvement?  yes, and Sleep apnea-Improvement?  yes    OBJECTIVE     Vitals: /80   Pulse 100   Resp 16   Ht 5' 2.8\" (1.595 m)   Wt 239 lb  (108.4 kg)   [facility] 10/29/2018   SpO2 98%   BMI 42.61 kg/m²     Initial weight loss: -05   Total weight loss: -05    Start weight: 244    Wt Readings from Last 3 Encounters:   01/15/25 239 lb (108.4 kg)   01/15/25 239 lb (108.4 kg)   01/13/25 233 lb (105.7 kg)       Patient Medications:    Current Outpatient Medications   Medication Sig Dispense Refill    Tirzepatide (MOUNJARO) 7.5 MG/0.5ML Subcutaneous Solution Auto-injector Inject 7.5 mg into the skin once a week. 2 mL 5    insulin glargine (BASAGLAR KWIKPEN) 100 UNIT/ML Subcutaneous Solution Pen-injector Inject 12 Units into the skin nightly. 15 mL 5    ergocalciferol 1.25 MG (31158 UT) Oral Cap Take 1 capsule (50,000 Units total) by mouth once a week for 12 doses. 12 capsule 0    insulin glargine (BASAGLAR KWIKPEN) 100 UNIT/ML Subcutaneous Solution Pen-injector Inject 15-17 Units into the skin nightly. 15 mL 1    NIFEdipine ER 60 MG Oral Tablet 24 Hr Take 1 tablet (60 mg total) by mouth daily. 90 tablet 0    Miconazole Nitrate (MICONAZOLE 7) 2 % Vaginal Cream Place 1 applicator vaginally nightly. 1 each 0    furosemide (LASIX) 40 MG Oral Tab Take 0.5 tablets (20 mg total) by mouth daily. 90 tablet 1    HYDROcodone-acetaminophen  MG Oral Tab Take 1 tablet by mouth every 6 (six) hours as needed for Pain. 15 tablet 0    sodium hypochlorite 0.125 % External Solution 10 ml to gauze and apply to wound bed with each dressing changes for 10-20 min, to reduce risk of infection and eliminate foul odor. 1 each 5    insulin aspart (NOVOLOG FLEXPEN) 100 Units/mL Subcutaneous Solution Pen-injector Inject 15 Units into the skin 3 (three) times daily before meals. (Patient taking differently: Inject 5-7 Units into the skin 3 (three) times daily before meals.) 40.5 mL 0    zolpidem 10 MG Oral Tab Take 1 tablet (10 mg total) by mouth daily as needed for Sleep. 30 tablet 0    sertraline 50 MG Oral Tab Take 1 tablet (50 mg total) by mouth every evening. Take half tablet for  the first week 30 tablet 5    rosuvastatin 20 MG Oral Tab Take 1 tablet (20 mg total) by mouth nightly. 90 tablet 0    Continuous Glucose Sensor (FREESTYLE NAA 2 SENSOR) Does not apply Misc 1 each every 14 (fourteen) days. 2 each 3    rivaroxaban (XARELTO) 20 MG Oral Tab Take 1 tablet (20 mg total) by mouth daily with food. 90 tablet 1    ARIPiprazole 5 MG Oral Tab       lidocaine 5 % External Patch Place 1 patch onto the skin daily. 30 patch 2    carvedilol 25 MG Oral Tab Take 1 tablet (25 mg total) by mouth 2 (two) times daily with meals. 60 tablet 0     Allergies:  Latex, Influenza vaccines, and Radiology contrast iodinated dyes     Social History:    Social History     Socioeconomic History    Marital status: Single     Spouse name: Not on file    Number of children: Not on file    Years of education: Not on file    Highest education level: Not on file   Occupational History    Occupation: works for [employer] as [employer]   Tobacco Use    Smoking status: Former     Current packs/day: 0.00     Types: Cigarettes     Quit date: 2010     Years since quittin.4    Smokeless tobacco: Never   Vaping Use    Vaping status: Never Used   Substance and Sexual Activity    Alcohol use: Not Currently     Comment: Occ    Drug use: Not Currently     Types: Cannabis     Comment: Tea    Sexual activity: Not Currently     Partners: Male   Other Topics Concern     Service Not Asked    Blood Transfusions No    Caffeine Concern No    Occupational Exposure Not Asked    Hobby Hazards Not Asked    Sleep Concern Not Asked    Stress Concern Not Asked    Weight Concern Not Asked    Special Diet Not Asked    Back Care Not Asked    Exercise No    Bike Helmet Not Asked    Seat Belt Not Asked    Self-Exams Not Asked   Social History Narrative        Her daughter in [place] is due at the end of 2018, but just recently had cerclage out.She is currently premed. She has daughters 23, 22, 19 and 8. Her 7 y/o is  disabled b/c she was a 28 W premie with PPROM at [facility]. Patient is currently homeless b/c she lost her home after prolonged disability.        She is currently living with friends and family members. Plans to transfer her job to [place]     Social Drivers of Health     Financial Resource Strain: Not on file   Food Insecurity: No Food Insecurity (2024)    Food Insecurity     Food Insecurity: Never true   Transportation Needs: No Transportation Needs (2024)    Transportation Needs     Lack of Transportation: No     Car Seat: Not on file   Physical Activity: Not on file   Stress: Not on file   Social Connections: Not on file   Housing Stability: Low Risk  (2024)    Housing Stability     Housing Instability: No     Housing Instability Emergency: Not on file     Crib or Bassinette: Not on file     Surgical History:    Past Surgical History:   Procedure Laterality Date    Breast biopsy Left     for breast cancer          x 3    Cholecystectomy      Inguinal hernia repair  2011    Had inguinal and ventral hernia repair together @ [facility]    Needle biopsy left  2022    us guided bx axilla    Removal anal fistula,submuscular      repair of anal vaginal fistula after child birth    Repair rotator cuff,acute Left     after trauma    Ventral hernia repair  2011    after last c/s     Family History:    Family History   Problem Relation Age of Onset    Breast Cancer Self 49            Depression Mother     Hypertension Mother     Psychiatric Mother     Heart Disorder Father     Hypertension Father     Heart Disease Father     Other (a-Fib) Father     Heart Disease Brother     No Known Problems Maternal Grandmother     Heart Attack Maternal Grandfather     Dementia Paternal Grandmother     Heart Attack Paternal Grandfather     DVT/VTE Paternal Aunt 42         from PE    DVT/VTE Paternal Aunt 58         from PE    Heart Disease Other     Blood Disorder Other      Ovarian Cancer Other     Colon Cancer Neg        Food Journal  Reviewed and Discussed:       Patient has a Food Journal?: yes   Patient is reading nutrition labels?  yes  Average Caloric Intake:     Average CHO Intake: 130  Is patient exercising? no  Type of exercise? ADLs    Eating Habits  Patient states the following:  Eats 1 meal(s) per day  Length of time it takes to consume a meal:  20  # of snacks per day: 1 Type of snacks:  honey buns  Amount of soda consumption per day:    Amount of water (in ounces) per day:  64  Drinking between meals only:  yes  Toughest challenge:  fatigue. Exercise     Nutritional Goals  Limit carbohydrates to 100 gms per day, Eat 100-200 calories within 1 hour of waking , and Eat 3-4 cups of fresh fruits or vegetables daily    Behavior Modifications Reviewed and Discussed  Eat breakfast, Eat 3 meals per day, Plan meals in advance, Read nutrition labels, Drink 64 oz of water per day, Maintain a daily food journal, No drinking 30 minutes before or after meals, Utlize portion control strategies to reduce calorie intake, Identify triggers for eating and manage cues, and Eat slowly and take 20 to 30 minutes to complete each meal    Exercise Goals Reviewed and Discussed    Increase as tolerated    ROS:    Constitutional: positive for fatigue  Respiratory: positive for dyspnea on exertion  Cardiovascular: negative  Gastrointestinal: positive for reflux symptoms  Musculoskeletal:positive for arthralgias and back pain  Neurological: negative  Behavioral/Psych: positive for fatigue  Endocrine: negative  All other systems were reviewed and are negative    Physical Exam:   General appearance: alert, appears stated age, cooperative, and moderately obese  Head: Normocephalic, without obvious abnormality, atraumatic  Back: symmetric, no curvature. ROM normal. No CVA tenderness.  Lungs: clear to auscultation bilaterally  Heart: S1, S2 normal, no murmur, click, rub or gallop, regular rate and  rhythm  Abdomen:  soft, obese, non tender  Extremities: edema trace  Pulses: 2+ and symmetric  Skin: Skin color, texture, turgor normal. No rashes or lesions  Neurologic: Grossly normal    ASSESSMENT     DIABETES:  The patient's blood sugars were reviewed with the patient with averages ranging from 130-150.  The patient denies any episodes of hypoglycemia since her last clinic visit.  she denies any lower extremity skin breakdown or foot ulcers.    HYPERTENSION:  The patient's blood pressure has been well controlled.  she has been checking it as instructed and has remained in relatively good control.    HYPERCHOLESTEROLEMIA:  The patient states that her cholesterol has been well controlled on her current medication.    Lab Results   Component Value Date/Time    CHOLEST 164 01/12/2025 02:25 PM    LDL 93 01/12/2025 02:25 PM    HDL 39 (L) 01/12/2025 02:25 PM    TRIG 184 (H) 01/12/2025 02:25 PM    VLDL 30 01/12/2025 02:25 PM       Encounter Diagnosis(ses):   Encounter Diagnoses   Name Primary?    Type 2 diabetes mellitus without complication, with long-term current use of insulin (HCC) Yes    HTN (hypertension), benign     Dyslipidemia     Stress     Morbid obesity with BMI of 40.0-44.9, adult (HCC)        PLAN     Patient is not interested in bariatric surgery. Patient desires to pursue traditional weight loss at this time.      HYPERTENSION: Blood pressure stable on the above medications. No interval change in antihypertensive medication.     DEGENERATIVE JOINT DISEASE: Of the knees is stable. Encourage upper body exercises if unable to perform weight-bearing exercises.      DIABETES: Continue current medications.    Goals for next month:  1. Keep a food log.  2. Drink 48-64 ounces of non-caloric beverages per day. No fruit juices or regular soda.  3. Increase activity-upper body exercises, walk 10 minutes per day.  4. Increase fruit and vegetable servings to 5-6 per day.      Attended seminar  Refer to RD  Refer to  surgical team      Zoloft: tolerating well  Increase dose to 100 mg    Follow up with wound clinic    Started on Mounjaro per endo team    Diethylpropion: Since the patient would like to try Diethylpropion, and is aware of the potential side effects (hypertension, palpitations, tachycardia, and anxiety), I will give a prescription today to be used in conjunction with the above diet and exercise program. The patient will check  heart rate and blood pressure on a regular basis. They will call me if her BP goes over 140/90 or has palpitations or racing heart rate. Patient understands that I will not call in the prescription they must have an appointment to have the medication refilled.   Will start at 25 mg  Has a hard time sleeping so will start low  EKG done      Refer for sleep study            Diagnoses and all orders for this visit:    Type 2 diabetes mellitus without complication, with long-term current use of insulin (HCC)    HTN (hypertension), benign    Dyslipidemia    Stress    Morbid obesity with BMI of 40.0-44.9, adult (HCC)          iMles Mandujano MD

## 2025-01-15 NOTE — PATIENT INSTRUCTIONS
Medications:     Continue Metformin twice daily     Restart Basaglar 12 units subcutaneous once daily     Continue Novolog at current dose     Start Mounjaro 7.5mg subcutaneous weekly     Restart Freestyle Meagan 3        Having pre diabetes or diabetes does not mean you cannot eat your favorite foods. It is more about a combination of what, how much and when to eat.   There is no one diet or meal plan that works for everyone with diabetes. The important thing is to follow a meal plan that is tailored to your personal preferences and lifestyle. The goal is helps achieve better blood sugar levels, cholesterol and triglycerides levels, blood pressure, and decrease in weight.  Based on the research, for most people with diabetes, the first tool for managing blood glucose is some type of carbohydrate counting.  Many times people will think they do not eat carbs because they don't eat cookies or candy but there are three main types of carbohydrate in food       Starches (also known as complex carbohydrates)      Sugars (some foods have natural sugars in them such as fruit and milk)       Fiber    On the nutrition label, the term \"total carbohydrate\" includes all three types of carbohydrates. This is the number you should pay attention when planning meals and snacks.   On the nutrition facts label, the number of sugar grams includes both added and natural sugars.  Try to eat 3 meals/day   Aim for 30-45 grams of carbohydrate at each meal.   If you skip a meal, you cannot make up for this at the next meal.       There are some great smart phone apps that can help you identify carbohydrates in the foods you are eating as well as tracking your carbs at meals /snacks daily   Suggestions:   Go Meal,  Carb Manager  Peggy pal  Fat secrets    Try using one of these apps so that you can identify your food choices and make adjustments to your portions and /or choices accordingly   High carbohydrate liquids can quickly raise blood  sugar levels. Strictly limit fruit juice and regular sodas.  Diet drinks and other beverages with artificial sweeteners won't raise your blood sugar levels.  Fruit juice is high in natural sugars. Eight ounces of natural fruit juice, without added sugar, has the same amount of sugar as 8 ounces of regular soda, about 6 teaspoons of sugar. Eat fruit instead of drinking fruit juice.      Exercise if you are physically able. It is  recommend to participate in at least 30 minutes of moderate exercise a day. This can be split into two 15-minute sessions or three 10-minute sessions. If you are not currently active, please start with five to 10 minutes of exercise a day and increase your exercise by five minutes each week.      Continue making lifestyle changes that focus on good nutrition, regular physical activity and stress management  Balanced Nutrition includes:      Eat regular meals (breakfast, lunch, dinner) about every 4-5 hours  Eat a balanced plate with protein and produce at all meals: 1/4 plate- protein, 1/2 plate non starchy veggie preferred over fruit  Water with all meals  Eliminate/reduce late night eating after 7pm.

## 2025-01-22 ENCOUNTER — OFFICE VISIT (OUTPATIENT)
Dept: WOUND CARE | Facility: HOSPITAL | Age: 55
End: 2025-01-22
Attending: NURSE PRACTITIONER
Payer: COMMERCIAL

## 2025-01-22 VITALS
HEART RATE: 104 BPM | OXYGEN SATURATION: 97 % | DIASTOLIC BLOOD PRESSURE: 76 MMHG | SYSTOLIC BLOOD PRESSURE: 118 MMHG | TEMPERATURE: 97 F

## 2025-01-22 DIAGNOSIS — I89.0 LYMPHEDEMA OF BOTH LOWER EXTREMITIES: ICD-10-CM

## 2025-01-22 DIAGNOSIS — G62.9 NEUROPATHY: ICD-10-CM

## 2025-01-22 DIAGNOSIS — E11.621 DIABETIC ULCER OF LEFT HEEL ASSOCIATED WITH TYPE 2 DIABETES MELLITUS, UNSPECIFIED ULCER STAGE (HCC): Primary | ICD-10-CM

## 2025-01-22 DIAGNOSIS — L97.429 DIABETIC ULCER OF LEFT HEEL ASSOCIATED WITH TYPE 2 DIABETES MELLITUS, UNSPECIFIED ULCER STAGE (HCC): Primary | ICD-10-CM

## 2025-01-22 DIAGNOSIS — T14.8XXA PAIN ASSOCIATED WITH WOUND: ICD-10-CM

## 2025-01-22 DIAGNOSIS — R52 PAIN ASSOCIATED WITH WOUND: ICD-10-CM

## 2025-01-22 PROCEDURE — 99213 OFFICE O/P EST LOW 20 MIN: CPT | Performed by: NURSE PRACTITIONER

## 2025-01-22 NOTE — PROGRESS NOTES
Weekly Wound Education Note    Teaching Provided To: Patient  Training Topics: Cleasing and general instructions;Dressing;Pain management;Off-loading;Edema control     Training Response: Patient responds and understands        Notes: Left Heel:Betadine, ABD pad as heel cap, tape, Medigrip E from toes up heel cut out

## 2025-01-22 NOTE — PROGRESS NOTES
Subjective   Suki Rosenberg is a 54 year old female.    Chief Complaint   Patient presents with    Wound Recheck     Pt presents today for L heel wound recheck and denies any new acute sx's of concern. Pt reports pain is 6/10 and is tender/sore.        HPI  1/22/2025: Patient is here for follow up, she has not seen pain managmetnt services for neuropathy. She is not taking any nerve pain medication.   1/15/2025: Patient is here for follow up, reports not tolerating gabapentin, and lyrica. She had been given Zoloft, she stated she is concern about the side effect and cost. She is complaining of pain in her heel.  1/8/2025: Patient is here for follow up, has been doing dressing, minimum drainage. Patient has not refill gabapentin paste for pain management. Also, she stated she had Lyrica before as well as gabapentin which caused significant ankle edema. She is not sure if she is currently taking Amitriptyline.   1/3/2025: Patient stated she developed left heel blister and wound again, she has history of heels blister in Dec 2023 and then right heel blister and wound on July 2024. 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 She has nerve pain in the foot, described as pins and needles. She stated on Arabella day she walked wrong and blister opened with foul smell. She was admitted to [facility] for left heel wound infection. She was treated with IV antibiotics, MRI showed no bone infection. She was discharged today with home health services for wound care.  Last A1c on 12/28/2024 was 8.0     8/16/2024: Patient is here for follow up, no drainage in the past few days, still pain in the wound area.   8/2/2024: Patient is here for follow up, her daughter has been doing dressing, she reports no drainage from the wound.  7/19/2024: Patient is returning to clinic with new blood blister on right heel, she stated happened few days ago, she opened the blister to drain which reduced the pain. She  has her lymphedema pump and using them daily, states wearing her compression stockings as well. She feels her A1c is better managed now came down from 13 to 8.2 on 6/12/2024.     12/11/2023: Patient is here for follow up, tolerated the compression, reports continues to have some pain while walking on left heel, no other concern.   12/4/2023: Patient is 53 year old woman with chronic leg edema and recent bilateral on posterior heel. Patient states she sits for 10-14 hours on chair for her job with very little time to break for walking. She has purchased compression stockings , she has had hard time putting them on or keeping them on.   Patient medical history significant for type two diabetes on Jardiance 25 mg, hx of PE on xarelto 20 mg, neuropathy, fibromyalgia, hx of breast cancer, depression, bipolar, obesity, chronic low back pain with bilateral sciatica, herniated lumbar disc, chronic left knee pain, and osteoarthritis bilateral knees.     Review of Systems   Constitutional:  Positive for activity change. Negative for chills, fatigue and fever.   HENT:  Negative for congestion.    Respiratory:  Negative for shortness of breath.    Cardiovascular:  Positive for leg swelling. Negative for chest pain.   Gastrointestinal:  Positive for constipation.        Hx of constipation    Musculoskeletal:  Positive for arthralgias, back pain and gait problem.   Skin:  Positive for wound.        Left heel wound  Neurological:  Positive for numbness. Negative for weakness.        Feeling numbness on her toes   Psychiatric/Behavioral:          Hx f anxiety, depression and bipolar      Objective  Physical Exam  Vitals reviewed.   Constitutional:       Appearance: Normal appearance. She is obese.   HENT:      Head: Normocephalic.   Cardiovascular:      Rate and Rhythm: Normal rate.      Pulses: Normal pulses.           Dorsalis pedis pulses are 2+ on the left side.        Posterior tibial pulses are 2+ on the left side.    Pulmonary:      Effort: Pulmonary effort is normal.   Abdominal:      General: Bowel sounds are normal.   Musculoskeletal:      Cervical back: Normal range of motion.      Right lower leg: Edema present.      Left lower leg: Edema present.        Feet: left heel dry blister with eschar cover wound in the center  Feet:      Right foot:      Toenail Condition: Right toenails are normal.      Left foot:      Skin integrity: Ulcer present.      Toenail Condition: Left toenails are normal.   Skin:     General: Skin is warm and dry.      Capillary Refill: Capillary refill takes 2 to 3 seconds.      Findings: No erythema.   Neurological:      Mental Status: She is alert and oriented to person, place, and time  Wound Assessment  Wound 01/03/25 Heel Left (Active)   Date First Assessed: 01/03/25   Location: Heel  Wound Location Orientation: Left      Assessments 1/3/2025  9:56 AM 1/22/2025  3:40 PM   Wound Image          Site Assessment Black;Brown;Pink;Tan;Yellow;Dry;Moist;Edema Brown;Black;Gonzalez   Closure Not approximated Not approximated   Drainage Amount Small Moderate   Drainage Description Serosanguineous Tan   Treatments Cleansed;Honey Gel;Dakins Cleansed;Saline;Betadine   Dressing 4x4s (spandage) ABD Pad (formed as heel cap)   Dressing Changed New Changed   Dressing Status Clean;Dry;Intact Clean;Dry;Intact   Wound Length (cm) 4.1 cm 3.1 cm   Wound Width (cm) 6 cm 3.5 cm   Wound Surface Area (cm^2) 24.6 cm^2 10.85 cm^2   Wound Depth (cm) 0 cm 0 cm   Wound Volume (cm^3) 0 cm^3 0 cm^3   Margins Attached edges Poorly defined   Non-staged Wound Description -- Not applicable   Madelaine-wound Assessment Dry;Painful;Hyperpigmented;Fragile;Edema;Ecchymosis Dry;Callous;Hyperpigmented;Painful   Wound Granulation Tissue Pink --   Wound Bed Granulation (%) 10 % 0 %   Wound Bed Epithelium (%) 0 % 0 %   Wound Bed Slough (%) 10 % 0 %   Wound Bed Eschar (%) 80 % 100 %   Wound Bed Fibrin (%) 0 % 0 %   State of Healing Non-healing  Non-healing   Wound Odor Strong None   Pressure Injury Stage Unstageable Unstageable       Active Orders   Date Order Priority Status Authorizing Provider   01/15/25 1639 OP Wound Dressing Routine Active Jose M Johnson APRN     - Cleansing:    Cleanse with normal saline or wound cleanser     - Dressing:    ABD pad     - Additional Wound Dressing Information:    form as a heel cap     - Frequency:    Change dressing daily and PRN       Compression Wrap 01/15/25 Leg Left (Active)   Placement Date: 01/15/25   Location: Leg  Wound Location Orientation: Left      Assessments 1/15/2025  3:40 PM 1/22/2025  3:40 PM   Response to Treatment Well tolerated Well tolerated   Compression Layers Single Single   Compression Product Type Tubigrip/Spanda (Medigrip E cut out on heel) Tubigrip/Spanda (Size E, cut Heel off)   Dressing Applied Yes Yes   Compression Wrap Location Toes to Knee Toes to Knee   Compression Wrap Status Clean;Dry;Intact Clean;Dry;Intact       No associated orders.      Lower Extremity Measurements   Calf Ankle Foot Heel to Knee Knee to Thigh   Right                                Left Left Calf from:: Heel  Calf Left cm:: 47.6 Left Ankle from:: Heel  Left Ankle cm:: 31.2    Left Foot from:: Great toe  Left Foot cm:: 28 Left Heel to Knee: 39          Vital Signs    01/22/25 1500   BP: 118/76   Pulse: 104   Temp: 97.4 °F (36.3 °C)   Allergies  Allergies[1]    Assessment   Left heel wound, pressure injury with diabetes:  -hard eschar cover wound bed, stable eschar. No drainage, no bogginess  -no erythema in the te-wound  -edema +4 dorsal foot and +2 ankle edema  -painful to touch, described pins and needles pain, touch sensitivity classic sign of diabetic neuropathy      Reviewed note, imagining and labs in Epic and Care Every Where.  Recent labs: 1/3/2025: WBC 6.1, H&H 13.0&38.5, BUN 11, Creatinine 1.42, albumin 4.2  12/30/2024: US of bilateral lower ext, vascular study:  Impression   CONCLUSION:  Duplex  arterial ultrasound demonstrates patent vessels with triphasic arterial Doppler flow.  Somewhat limited evaluation of the below knee tibial vessels on the left due to overlying calf edema and bandaging in the foot, though the vessels are patent  where visualized.        12/30/2024: MRI of left foot:  Impression   CONCLUSION:  Soft tissue and skin ulceration within the posterior heel without abscess.  No osteomyelitis.  6 mm osteochondral lesion of the medial talar dome without collapse of the articular surface.  Split tear of the peroneus brevis with peroneal tenosynovitis.  Mild Achilles tendinosis.  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  Multiple other incidental findings as described in the body of the report.  Dictated by (CST): Marlon Horne MD on 12/30/2024 at 2:44 PM     Encounter Diagnosis  1. Diabetic ulcer of left heel associated with type 2 diabetes mellitus, unspecified ulcer stage ([facility]) [E11.621, L97.429]    2. Neuropathy    3. Pain associated with wound    4. 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[facility])    Anemia due to blood loss, acute    Atrial septal defect (HCC)    Hypercoagulable state ([facility])    Lymphedema of both lower extremities    Symptomatic anemia    Microcytic anemia    Other pulmonary embolism without acute cor pulmonale (HCC)    Severe episode of recurrent major depressive disorder, without psychotic features ([facility])    Uterine bleeding    Obesity (BMI 30-39.9)     Acquired trigger finger of right middle finger    Chronic anticoagulation    Chronic pain of left knee    Non-pressure chronic ulcer of left lower leg, limited to breakdown of skin (HCC)    Moderate left ventricular hypertrophy    Non-healing open wound of right heel    Pain associated with wound    BRCA gene mutation negative in female    Morbid obesity with BMI of 40.0-44.9, adult (HCC)    Dyslipidemia    HTN (hypertension), benign    Type 2 diabetes mellitus without complication, with long-term current use of insulin (HCC)    Stress    Diabetic ulcer of left heel associated with type 2 diabetes mellitus, unspecified ulcer stage (HCC)    Diabetic ulcer of left heel associated with type 2 diabetes mellitus, with necrosis of muscle (HCC)    Neuropathy     Plan  Orders  Right heel: Continue daily betadine with foam padding to reduce risk of infection, when we can manage her pain able to tolerate debridement, we can start the debridement of eschar, then use topical dressing or negative pressure wound therapy to close this wound.   Encouraged patient to see Pain Management services as she can not tolerate touch due to nerve pain. She stated she has tried gabapentin and lyrica with adverse effect, not willing to do it again. She has not start zoloft. She is willing to read about duloxetine and let me know if she is willing to try this medication for nerve pain. Today, she said she is willing to get the topical gabapentin. Recommended gabapentin paste 10% topical apply to te wound to reduce pain topically.  Monitor for signs and symptoms of infection, encourage patient to assess skin daily. May use Dakin's solution to reduce foul smell in the foot wound and te wound with each dressing changes.  Continue offload and reduce pressure from the wound bed by elevating heel when sitting on small table or ottoman, heel elevator foams or wedges. For example, using egg crate cushion under her feet to reduce pressure on his  heels. Also, velma benefit from wide shoe with large toe box.     Recommended nutrition for wound healing and encourage reduce carbohydrate intake, increase protein intake, and glycemic control diet (A1c is 8 which hinders wound healing.     Discussed the treatment plan with patient and her daughter, they verbalized understanding and agreed to these plan.  Total face to face time was 40 min, more than 50% of the time was spent in counseling and/or coordination of care related to BLE wounds.  Follow-Up  Return in about 2 weeks (around 2/5/2025) for KODAK Salguero x 30 minutes.              [1]   Allergies  Allergen Reactions    Latex HIVES and SWELLING    Influenza Vaccines OTHER (SEE COMMENTS)     Pt passed out     Radiology Contrast Iodinated Dyes ITCHING

## 2025-01-25 DIAGNOSIS — I26.99 OTHER PULMONARY EMBOLISM WITHOUT ACUTE COR PULMONALE (HCC): ICD-10-CM

## 2025-01-29 ENCOUNTER — APPOINTMENT (OUTPATIENT)
Dept: WOUND CARE | Facility: HOSPITAL | Age: 55
End: 2025-01-29
Attending: NURSE PRACTITIONER
Payer: COMMERCIAL

## 2025-01-29 RX ORDER — LIDOCAINE 50 MG/G
1 PATCH TOPICAL EVERY 24 HOURS
Qty: 30 PATCH | Refills: 2 | Status: SHIPPED | OUTPATIENT
Start: 2025-01-29

## 2025-01-29 RX ORDER — LIDOCAINE 50 MG/G
PATCH TOPICAL
Qty: 30 PATCH | Refills: 2 | OUTPATIENT
Start: 2025-01-29

## 2025-01-29 RX ORDER — RIVAROXABAN 20 MG/1
20 TABLET, FILM COATED ORAL
Qty: 90 TABLET | Refills: 1 | Status: SHIPPED | OUTPATIENT
Start: 2025-01-29

## 2025-01-29 NOTE — TELEPHONE ENCOUNTER
Refill Request    Medication request: lidocaine 5 % External Patch. Place 1 patch onto the skin daily.      LOV:10/4/2023 with Dr Stiles  Due back to clinic per last office note:  The patient will follow up in 2-3 months, but the patient will call me 2 weeks after having the injection to let me know how the injection worked.   NOV: Visit date not found      [place]: [place] to obtain    Urine drug screen (if applicable): N/A  Pain contract: N/A    LOV plan (if weaning or changing medications): not mentioned    Appointment is needed.

## 2025-01-29 NOTE — TELEPHONE ENCOUNTER
Pt stated she have missed 2 doses , Pharmacy gave her only 1 tablet for emergency    LOV 1/13/25    2) pt asking if Dr will give refill on Lidocaine for hip/back , no longer see Dr Stiles

## 2025-01-30 NOTE — PROGRESS NOTES
Provider Clarification     Additional clarification of the term 'debridement' in the procedure note:    The Conservative sharp is non excisional debridement/selective debridement.     This note is part of the patient's medical record.

## 2025-02-05 ENCOUNTER — OFFICE VISIT (OUTPATIENT)
Dept: WOUND CARE | Facility: HOSPITAL | Age: 55
End: 2025-02-05
Attending: NURSE PRACTITIONER
Payer: COMMERCIAL

## 2025-02-05 VITALS
SYSTOLIC BLOOD PRESSURE: 171 MMHG | DIASTOLIC BLOOD PRESSURE: 94 MMHG | OXYGEN SATURATION: 100 % | HEART RATE: 96 BPM | TEMPERATURE: 98 F | RESPIRATION RATE: 20 BRPM

## 2025-02-05 DIAGNOSIS — R52 PAIN ASSOCIATED WITH WOUND: ICD-10-CM

## 2025-02-05 DIAGNOSIS — T14.8XXA PAIN ASSOCIATED WITH WOUND: ICD-10-CM

## 2025-02-05 DIAGNOSIS — I89.0 LYMPHEDEMA OF BOTH LOWER EXTREMITIES: ICD-10-CM

## 2025-02-05 DIAGNOSIS — L97.429 DIABETIC ULCER OF LEFT HEEL ASSOCIATED WITH TYPE 2 DIABETES MELLITUS, UNSPECIFIED ULCER STAGE (HCC): Primary | ICD-10-CM

## 2025-02-05 DIAGNOSIS — E11.621 DIABETIC ULCER OF LEFT HEEL ASSOCIATED WITH TYPE 2 DIABETES MELLITUS, UNSPECIFIED ULCER STAGE (HCC): Primary | ICD-10-CM

## 2025-02-05 DIAGNOSIS — G62.9 NEUROPATHY: ICD-10-CM

## 2025-02-05 PROCEDURE — 99213 OFFICE O/P EST LOW 20 MIN: CPT | Performed by: NURSE PRACTITIONER

## 2025-02-05 NOTE — PROGRESS NOTES
Weekly Wound Education Note    Teaching Provided To: Patient  Training Topics: Cleasing and general instructions;Dressing;Pain management;Off-loading;Edema control     Training Response: Patient responds and understands        Notes: Left Heel:Betadine, ABD pad as heel cap, tape, Medigrip E

## 2025-02-06 NOTE — PROGRESS NOTES
Subjective   Suki Rosenberg is a 54 year old female.    Chief Complaint   Patient presents with    Wound Care     Follow up visit for      HPI  2/5/2025: Patient is here with her daughter, her daughter is doing dressing changes, no other changes in her medications or neuropathy treatment plan.   1/22/2025: Patient is here for follow up, she has not seen pain managmetnt services for neuropathy. She is not taking any nerve pain medication.   1/15/2025: Patient is here for follow up, reports not tolerating gabapentin, and lyrica. She had been given Zoloft, she stated she is concern about the side effect and cost. She is complaining of pain in her heel.  1/8/2025: Patient is here for follow up, has been doing dressing, minimum drainage. Patient has not refill gabapentin paste for pain management. Also, she stated she had Lyrica before as well as gabapentin which caused significant ankle edema. She is not sure if she is currently taking Amitriptyline.   1/3/2025: Patient stated she developed left heel blister and wound again, she has history of heels blister in Dec 2023 and then right heel blister and wound on July 2024. 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 She has nerve pain in the foot, described as pins and needles. She stated on [place] day she walked wrong and blister opened with foul smell. She was admitted to [facility] for left heel wound infection. She was treated with IV antibiotics, MRI showed no bone infection. She was discharged today with home health services for wound care.  Last A1c on 12/28/2024 was 8.0     8/16/2024: Patient is here for follow up, no drainage in the past few days, still pain in the wound area.   8/2/2024: Patient is here for follow up, her daughter has been doing dressing, she reports no drainage from the wound.  7/19/2024: Patient is returning to clinic with new blood blister on right heel, she stated happened few days ago, she opened the  blister to drain which reduced the pain. She has her lymphedema pump and using them daily, states wearing her compression stockings as well. She feels her A1c is better managed now came down from 13 to 8.2 on 6/12/2024.     12/11/2023: Patient is here for follow up, tolerated the compression, reports continues to have some pain while walking on left heel, no other concern.   12/4/2023: Patient is 53 year old woman with chronic leg edema and recent bilateral on posterior heel. Patient states she sits for 10-14 hours on chair for her job with very little time to break for walking. She has purchased compression stockings , she has had hard time putting them on or keeping them on.   Patient medical history significant for type two diabetes on Jardiance 25 mg, hx of PE on xarelto 20 mg, neuropathy, fibromyalgia, hx of breast cancer, depression, bipolar, obesity, chronic low back pain with bilateral sciatica, herniated lumbar disc, chronic left knee pain, and osteoarthritis bilateral knees.     Review of Systems   Constitutional:  Positive for activity change. Negative for chills, fatigue and fever.   HENT:  Negative for congestion.    Respiratory:  Negative for shortness of breath.    Cardiovascular:  Positive for leg swelling. Negative for chest pain.   Gastrointestinal:  Positive for constipation.        Hx of constipation    Musculoskeletal:  Positive for arthralgias, back pain and gait problem.   Skin:  Positive for wound.        Left heel wound  Neurological:  Positive for numbness. Negative for weakness.        Feeling numbness on her toes   Psychiatric/Behavioral:          Hx f anxiety, depression and bipolar      Objective  Physical Exam  Vitals reviewed.   Constitutional:       Appearance: Normal appearance. She is obese.   HENT:      Head: Normocephalic.   Cardiovascular:      Rate and Rhythm: Normal rate.      Pulses: Normal pulses.           Dorsalis pedis pulses are 2+ on the left side.        Posterior  tibial pulses are 2+ on the left side.   Pulmonary:      Effort: Pulmonary effort is normal.   Abdominal:      General: Bowel sounds are normal.   Musculoskeletal:      Cervical back: Normal range of motion.      Right lower leg: Edema present.      Left lower leg: Edema present.        Feet: left heel dry blister with eschar cover wound in the center  Feet:      Right foot:      Toenail Condition: Right toenails are normal.      Left foot:      Skin integrity: Ulcer present.      Toenail Condition: Left toenails are normal.   Skin:     General: Skin is warm and dry.      Capillary Refill: Capillary refill takes 2 to 3 seconds.      Findings: No erythema.   Neurological:      Mental Status: She is alert and oriented to person, place, and time    Wound Assessment  Wound 01/03/25 Heel Left (Active)   Date First Assessed: 01/03/25   Location: Heel  Wound Location Orientation: Left      Assessments 1/3/2025  9:56 AM 2/5/2025  3:52 PM   Wound Image        Site Assessment Black;Brown;Pink;Tan;Yellow;Dry;Moist;Edema Brown;Black;Gonzalez;Yellow   Closure Not approximated Not approximated   Drainage Amount Small None   Drainage Description Serosanguineous --   Treatments Cleansed;Honey Gel;Dakins Cleansed;Saline;Betadine   Dressing 4x4s (spandage) Aquacel Foam;Kerlix roll (cut as a heel cap)   Dressing Changed New Changed   Dressing Status Clean;Dry;Intact Clean;Dry;Intact   Wound Length (cm) 4.1 cm 3.3 cm   Wound Width (cm) 6 cm 3.9 cm   Wound Surface Area (cm^2) 24.6 cm^2 12.87 cm^2   Wound Depth (cm) 0 cm 0 cm   Wound Volume (cm^3) 0 cm^3 0 cm^3   Margins Attached edges Poorly defined;Attached edges   Non-staged Wound Description -- Full thickness   Madelaine-wound Assessment Dry;Painful;Hyperpigmented;Fragile;Edema;Ecchymosis Dry;Callous;Hyperpigmented;Painful   Wound Granulation Tissue Pink --   Wound Bed Granulation (%) 10 % --   Wound Bed Epithelium (%) 0 % --   Wound Bed Slough (%) 10 % --   Wound Bed Eschar (%) 80 % 100 %    Wound Bed Fibrin (%) 0 % --   State of Healing Non-healing Non-healing;Eschar   Wound Odor Strong None   Pressure Injury Stage Unstageable Unstageable       Active Orders   Date Order Priority Status Authorizing Provider   02/05/25 1849 OP Wound Dressing Routine Active Jose M Johnson APRN     - Cleansing:    Cleanse with normal saline or wound cleanser     - Dressing:    Betadine     - Additional Wound Dressing Information:    nonbordered aquacel foam placed cut in a heel cap. Kerlix and medigrip E     - Frequency:    Change dressing daily and PRN   01/15/25 1639 OP Wound Dressing Routine Active Jose M Johnson APRN     - Cleansing:    Cleanse with normal saline or wound cleanser     - Dressing:    ABD pad     - Additional Wound Dressing Information:    form as a heel cap     - Frequency:    Change dressing daily and PRN   01/08/25 1459 OP Wound Dressing Routine Active Jose M Johnson APRN     - Cleansing:    Cleanse with normal saline or wound cleanser     - Dressing:    Betadine     - Additional Wound Dressing Information:    aquacel foam cut in the shape of a heel cup   01/03/25 1214 OP Wound Dressing Routine Active Jose M Johnson APRN     - Cleansing:    Cleanse with normal saline or wound cleanser     - Dressing:    Honey gel     - Dressing:    Dry gauze     - Additional Wound Dressing Information:    spandage     - Frequency:    Change dressing daily and PRN       Inactive Orders   Date Order Priority Status Authorizing Provider   01/03/25 1242 Debridement Left Heel Routine Completed Jose M Johnson APRN   01/03/25 1012 Debridement Left Heel Routine Discontinued Jose M Johnson APRN       Compression Wrap 01/15/25 Leg Left (Active)   Placement Date: 01/15/25   Location: Leg  Wound Location Orientation: Left      Assessments 1/15/2025  3:40 PM 2/5/2025  3:52 PM   Response to Treatment Well tolerated Well tolerated   Compression Layers Single Single   Compression Product Type Tubigrip/Spanda (Medigrip E cut out on  heel) Tubigrip/Spanda (E)   Dressing Applied Yes Yes   Compression Wrap Location Toes to Knee Toes to Knee   Compression Wrap Status Clean;Dry;Intact Clean;Dry;Intact       No associated orders.     Vital Signs    02/05/25 1557   BP: (!) 171/94   Pulse: 96   Resp: 20   Temp: 97.6 °F (36.4 °C)   Allergies  Allergies[1]  Assessment   Left heel wound, pressure injury with diabetes:  -black eschar cover wound bed, stable eschar. No drainage, no bogginess  -no erythema in the te-wound  -edema +4 dorsal foot and +2 ankle edema  -painful to touch, described pins and needles pain, classic sign of diabetic neuropathy      Reviewed note, imagining and labs in Epic and Care Every Where.  Recent labs: 1/3/2025: WBC 6.1, H&H 13.0&38.5, BUN 11, Creatinine 1.42, albumin 4.2  12/30/2024: US of bilateral lower ext, vascular study:  Impression   CONCLUSION:  Duplex arterial ultrasound demonstrates patent vessels with triphasic arterial Doppler flow.  Somewhat limited evaluation of the below knee tibial vessels on the left due to overlying calf edema and bandaging in the foot, though the vessels are patent  where visualized.        12/30/2024: MRI of left foot:  Impression   CONCLUSION:  Soft tissue and skin ulceration within the posterior heel without abscess.  No osteomyelitis.  6 mm osteochondral lesion of the medial talar dome without collapse of the articular surface.  Split tear of the peroneus brevis with peroneal tenosynovitis.  Mild Achilles tendinosis.  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  Multiple other incidental findings as described in the body of the report.  Dictated by (CST): Marlon Horne MD on 12/30/2024 at 2:44 PM     Encounter Diagnosis  1. Diabetic ulcer of left heel associated with type 2 diabetes mellitus, unspecified ulcer stage (HCC) [E11.621, L97.429]    2. Neuropathy    3. Pain associated with wound    4. 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[facility])    Anemia due to blood loss, acute    Atrial septal defect (HCC)    Hypercoagulable state (HCC)    Lymphedema of both lower extremities    Symptomatic anemia    Microcytic anemia    Other pulmonary embolism without acute cor pulmonale (HCC)    Severe episode of recurrent major depressive disorder, without psychotic features ([facility])    Uterine bleeding    Obesity (BMI 30-39.9)    Acquired trigger finger of right middle finger    Chronic anticoagulation    Chronic pain of left knee    Non-pressure chronic ulcer of left lower leg, limited to breakdown of skin ([facility])    Moderate left ventricular hypertrophy    Non-healing open wound of right heel    Pain associated with wound    BRCA gene mutation negative in female    Morbid obesity with BMI of 40.0-44.9, adult ([facility])    Dyslipidemia    HTN (hypertension), benign    Type 2 diabetes mellitus without complication, with long-term current use of insulin ([facility])    Stress    Diabetic ulcer of left heel associated with type 2 diabetes mellitus, unspecified ulcer stage (HCC)    Diabetic ulcer of left heel associated with type 2 diabetes mellitus, with necrosis of muscle (HCC)    Neuropathy     Plan  Orders  Orders Placed This Encounter   Procedures    OP Wound Dressing   Discussed briefly two treatment options:  1# may continue with daily Betadine with foam padding to reduce risk of infection, as long as the eschar remain hard and no drainage, this is safe. The goal is her body build tissue from inside and  heal this wound.  2# if patient can manage her neuropathy pain ( medication was sent, and discussed, pain management referral has been placed), and she is interested in the debridement of the wound with or without application of the negative pressure wound therapy ( wound vac) she can use Medi-honey gel for autolytic debridement of the wound, following with selective debridement of the wound at our clinic.  I have repeated the option to patient and her daughter, she agreed to have option one today, and think about the second option.  Monitor for signs and symptoms of infection, encourage patient to assess skin daily. May use Dakin's solution to reduce foul smell in the foot wound and te wound with each dressing changes.  Continue offload and reduce pressure from the wound bed by elevating heel when sitting on small table or ottoman, heel elevator foams or wedges. For example, using egg crate cushion under her feet to reduce pressure on his heels. Also, velma benefit from wide shoe with large toe box.     Recommended nutrition for wound healing and encourage reduce carbohydrate intake, increase protein intake, and glycemic control diet (A1c is 8 which hinders wound healing.      Discussed the treatment plan with patient and her daughter, they verbalized understanding and agreed to these plan.  Total face to face time was 20 min, more than 50% of the time was spent in counseling and/or coordination of care related to BLE wounds.  Note to patient:   The 21st Century Cures Act makes medical notes like these available to patients in the interest of transparency. Please be advised this is a medical document. Medical documents are intended to carry relevant information, facts as evident, and the clinical opinion of the practitioner. The medical note is intended as peer to peer communication and may appear blunt or direct. It is written in medical language and may contain abbreviations or verbiage that are unfamiliar.    Follow-Up  Return in about 1 week (around 2/12/2025) for APN visit for 30 min.              [1]   Allergies  Allergen Reactions    Latex HIVES and SWELLING    Influenza Vaccines OTHER (SEE COMMENTS)     Pt passed out     Radiology Contrast Iodinated Dyes ITCHING

## 2025-02-06 NOTE — PATIENT INSTRUCTIONS
As we discussed you have two possible treatment options:  1# may continue with daily Betadine with foam padding to reduce risk of infection, as long as the eschar remain hard and no drainage, this is safe. The goal is her body build tissue from inside and heal this wound.    2# if patient can manage her neuropathy pain ( medication was sent, and discussed, pain management referral has been placed), and she is interested in the debridement of the wound with or without application of the negative pressure wound therapy ( wound vac) she can use Medi-honey gel for autolytic debridement of the wound, following with selective debridement of the wound at our clinic.    Monitor for sign and symptom of infection, redness on the skin around the wound, increase pain(dull and continues pain), increase temp (heat), increased foul smell, fever and chills, call clinic M-F 8-4 other times go to ED or Urgent Care for assessment of the wound.    Question call MARILYN Ram DNP at [phone] (M-F 8am-4pm)

## 2025-02-12 ENCOUNTER — OFFICE VISIT (OUTPATIENT)
Dept: WOUND CARE | Facility: HOSPITAL | Age: 55
End: 2025-02-12
Attending: NURSE PRACTITIONER
Payer: COMMERCIAL

## 2025-02-12 VITALS
TEMPERATURE: 97 F | BODY MASS INDEX: 42 KG/M2 | SYSTOLIC BLOOD PRESSURE: 179 MMHG | HEART RATE: 106 BPM | WEIGHT: 237.5 LBS | OXYGEN SATURATION: 100 % | DIASTOLIC BLOOD PRESSURE: 99 MMHG

## 2025-02-12 DIAGNOSIS — G62.9 NEUROPATHY: ICD-10-CM

## 2025-02-12 DIAGNOSIS — E11.621 DIABETIC ULCER OF LEFT HEEL ASSOCIATED WITH TYPE 2 DIABETES MELLITUS, UNSPECIFIED ULCER STAGE (HCC): Primary | ICD-10-CM

## 2025-02-12 DIAGNOSIS — L97.429 DIABETIC ULCER OF LEFT HEEL ASSOCIATED WITH TYPE 2 DIABETES MELLITUS, UNSPECIFIED ULCER STAGE (HCC): Primary | ICD-10-CM

## 2025-02-12 DIAGNOSIS — R52 PAIN ASSOCIATED WITH WOUND: ICD-10-CM

## 2025-02-12 DIAGNOSIS — T14.8XXA PAIN ASSOCIATED WITH WOUND: ICD-10-CM

## 2025-02-12 PROCEDURE — 97597 DBRDMT OPN WND 1ST 20 CM/<: CPT | Performed by: NURSE PRACTITIONER

## 2025-02-12 NOTE — PROGRESS NOTES
Weekly Wound Education Note    Teaching Provided To: Patient;Family  Training Topics: Cleasing and general instructions;Compression;Dressing;Edema control;Discharge instructions;Nutrition;Off-loading;Pain management;Precautions;Pressure relief;Purpose and directions to contact wound care physician or PCP;Safe and effective use of medical equipement and /or supplies;Signs and symptoms of infection;Skin care (Will apply for a vac dressing, change 2 times a day)  Training Method: Demonstration;Explain/Verbal  Training Response: Patient responds and understands        Notes: Left heel Medihoney, gauze, 6x6 heel cup, Kerlix roll, tape daily, offload, elevate, Spandagrip E 1 -will do vac paperwork (Left heel - medihoney, gauze, 6x6 heel cup, kerlix roll,tape, Spandagrip E 1 layer, post op shoe)

## 2025-02-12 NOTE — PROGRESS NOTES
Patient ID: Suki Rosenberg is a 54 year old female.    Debridement Left Heel   Wound 01/03/25 Heel Left    Performed by: Jose M Johnson APRN  Authorized by: Jose M Johnson APRN      Consent   Consent obtained? verbal  Consent given by: patient  Risks discussed? procedural risks discussed  Time out called at 2/12/2025 1:15 PM  Immediately prior to the procedure a time out was called and the performing provider verified the correct patient, procedure, equipment, support staff, and site/side marked as required.    Debridement Details  Performed by: NP  Debridement type: conservative sharp  Pain control: lidocaine 2% and benzocaine 20%  Pain control administration type: topical    Pre-debridement measurements  Length (cm): 3.1  Width (cm): 3.2  Depth (cm): 0  Surface Area (cm^2): 9.92    Post-debridement measurements  Length (cm): 3  Width (cm): 3.5  Depth (cm): 0.1  Percent debrided: 100%  Surface Area (cm^2): 10.5  Area Debrided (cm^2): 10.5  Volume (cm^3): 1.05    Tissue and other material debrided: subcutaneous tissue  Devitalized tissue debrided: callus, necrotic debris and slough  Instrument(s) utilized: blade and forceps  Bleeding: none  Hemostasis obtained with: not applicable  Procedural pain (0-10): 7  Post-procedural pain: 7   Response to treatment: procedure was tolerated well

## 2025-02-12 NOTE — PROGRESS NOTES
Subjective   Suki Rosenberg is a 54 year old female.    Chief Complaint   Patient presents with    Wound Recheck     Here for left heel wound care follow up     HPI  2/12/2025: Patient is here with her daughter for right heel wound, her daughters are doing the dressing, pain has somewhat reduced.  2/5/2025: Patient is here with her daughter, her daughter is doing dressing changes, no other changes in her medications or neuropathy treatment plan.   1/22/2025: Patient is here for follow up, she has not seen pain managmetnt services for neuropathy. She is not taking any nerve pain medication.   1/15/2025: Patient is here for follow up, reports not tolerating gabapentin, and lyrica. She had been given Zoloft, she stated she is concern about the side effect and cost. She is complaining of pain in her heel.  1/8/2025: Patient is here for follow up, has been doing dressing, minimum drainage. Patient has not refill gabapentin paste for pain management. Also, she stated she had Lyrica before as well as gabapentin which caused significant ankle edema. She is not sure if she is currently taking Amitriptyline.   1/3/2025: Patient stated she developed left heel blister and wound again, she has history of heels blister in Dec 2023 and then right heel blister and wound on July 2024. 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 She has nerve pain in the foot, described as pins and needles. She stated on [place] day she walked wrong and blister opened with foul smell. She was admitted to [facility] for left heel wound infection. She was treated with IV antibiotics, MRI showed no bone infection. She was discharged today with home health services for wound care.  Last A1c on 12/28/2024 was 8.0     8/16/2024: Patient is here for follow up, no drainage in the past few days, still pain in the wound area.   8/2/2024: Patient is here for follow up, her daughter has been doing dressing, she reports no  drainage from the wound.  7/19/2024: Patient is returning to clinic with new blood blister on right heel, she stated happened few days ago, she opened the blister to drain which reduced the pain. She has her lymphedema pump and using them daily, states wearing her compression stockings as well. She feels her A1c is better managed now came down from 13 to 8.2 on 6/12/2024.     12/11/2023: Patient is here for follow up, tolerated the compression, reports continues to have some pain while walking on left heel, no other concern.   12/4/2023: Patient is 53 year old woman with chronic leg edema and recent bilateral on posterior heel. Patient states she sits for 10-14 hours on chair for her job with very little time to break for walking. She has purchased compression stockings , she has had hard time putting them on or keeping them on.   Patient medical history significant for type two diabetes on Jardiance 25 mg, hx of PE on xarelto 20 mg, neuropathy, fibromyalgia, hx of breast cancer, depression, bipolar, obesity, chronic low back pain with bilateral sciatica, herniated lumbar disc, chronic left knee pain, and osteoarthritis bilateral knees.     Review of Systems   Constitutional:  Positive for activity change. Negative for chills, fatigue and fever.   HENT:  Negative for congestion.    Respiratory:  Negative for shortness of breath.    Cardiovascular:  Positive for leg swelling. Negative for chest pain.   Gastrointestinal:  Positive for constipation.        Hx of constipation    Musculoskeletal:  Positive for arthralgias, back pain and gait problem.   Skin:  Positive for wound.        Left heel wound  Neurological:  Positive for numbness. Negative for weakness.        Feeling numbness on her toes   Psychiatric/Behavioral:          Hx f anxiety, depression and bipolar      Objective  Physical Exam  Vitals reviewed.   Constitutional:       Appearance: Normal appearance. She is obese.   HENT:      Head: Normocephalic.    Cardiovascular:      Rate and Rhythm: Normal rate.      Pulses: Normal pulses.           Dorsalis pedis pulses are 2+ on the left side.        Posterior tibial pulses are 2+ on the left side.   Pulmonary:      Effort: Pulmonary effort is normal.   Abdominal:      General: Bowel sounds are normal.   Musculoskeletal:      Cervical back: Normal range of motion.      Right lower leg: Edema present.      Left lower leg: Edema present.        Feet: left heel dry blister with eschar cover wound in the center  Feet:      Right foot:      Toenail Condition: Right toenails are normal.      Left foot:      Skin integrity: Ulcer present.      Toenail Condition: Left toenails are normal.   Skin:     General: Skin is warm and dry.      Capillary Refill: Capillary refill takes 2 to 3 seconds.      Findings: No erythema.   Neurological:      Mental Status: She is alert and oriented to person, place, and time    Wound Assessment  Wound 01/03/25 Heel Left (Active)   Date First Assessed: 01/03/25   Location: Heel  Wound Location Orientation: Left      Assessments 1/3/2025  9:56 AM 2/12/2025  1:10 PM   Wound Image                 Site Assessment Black;Brown;Pink;Tan;Yellow;Dry;Moist;Edema Brown;Black;Gonzalez;Yellow   Closure Not approximated Not approximated   Drainage Amount Small Scant   Drainage Description Serosanguineous Yellow   Treatments Cleansed;Honey Gel;Dakins Cleansed;Saline;Site Care;Topical (Barrier/Moisturizer/Ointment) (zinc to the te wound)   Dressing 4x4s (spandage) 4x4s;Aquacel Foam;Kerlix roll;Tape (Medihoney on gauze, 6x6 heel cup, Spandagrip E, post op shoe)   Dressing Changed New Changed   Dressing Status Clean;Dry;Intact Clean;Dry;Intact   Wound Length (cm) 4.1 cm 3.1 cm (post debridement- 3 cm)   Wound Width (cm) 6 cm 3.2 cm (post debridement 3.5 cm)   Wound Surface Area (cm^2) 24.6 cm^2 9.92 cm^2   Wound Depth (cm) 0 cm 0 cm (post debridement 0.1 cm)   Wound Volume (cm^3) 0 cm^3 0 cm^3   Margins Attached edges  Poorly defined;Attached edges   Non-staged Wound Description -- Full thickness   Te-wound Assessment Dry;Painful;Hyperpigmented;Fragile;Edema;Ecchymosis Dry;Callous;Hyperpigmented;Painful   Wound Granulation Tissue Pink --   Wound Bed Granulation (%) 10 % 0 %   Wound Bed Epithelium (%) 0 % 0 %   Wound Bed Slough (%) 10 % 0 %   Wound Bed Eschar (%) 80 % 100 %   Wound Bed Fibrin (%) 0 % 0 %   State of Healing Non-healing Non-healing;Eschar   Wound Odor Strong None   Pressure Injury Stage Unstageable Unstageable       Active Orders   Date Order Priority Status Authorizing Provider   02/12/25 1335 Debridement Left Heel Routine Active Jose M Johnson APRN   02/05/25 1849 OP Wound Dressing Routine Active Jose M Johnson APRN     - Cleansing:    Cleanse with normal saline or wound cleanser     - Dressing:    Betadine     - Additional Wound Dressing Information:    nonbordered aquacel foam placed cut in a heel cap. Kerlix and medigrip E     - Frequency:    Change dressing daily and PRN       Compression Wrap 01/15/25 Leg Left (Active)   Placement Date: 01/15/25   Location: Leg  Wound Location Orientation: Left      Assessments 1/15/2025  3:40 PM 2/12/2025  1:10 PM   Response to Treatment Well tolerated Well tolerated   Compression Layers Single Single (size E)   Compression Product Type Tubigrip/Spanda (Medigrip E cut out on heel) Tubigrip/Spanda (E)   Dressing Applied Yes Yes   Compression Wrap Location Toes to Knee Toes to Knee   Compression Wrap Status Clean;Dry;Intact Clean;Dry;Intact;Dressing Changed       No associated orders.                     Vital Signs    02/12/25 1305   BP: (!) 179/99   Pulse: 106   Temp: 97.2 °F (36.2 °C)   PainSc: 7 - (Severe)   PainLoc: Foot   Allergies  Allergies[1]    Assessment   Left heel wound, pressure injury with diabetes:  -black eschar cover wound bed, stable eschar. No drainage, no bogginess  -no erythema in the te-wound  -edema +4 dorsal foot and +2 ankle edema  -painful to  touch, described pins and needles pain, classic sign of diabetic neuropathy      Reviewed note, imagining and labs in Epic and Care Every Where.  Recent labs: 1/3/2025: WBC 6.1, H&H 13.0&38.5, BUN 11, Creatinine 1.42, albumin 4.2  12/30/2024: US of bilateral lower ext, vascular study:  Impression   CONCLUSION:  Duplex arterial ultrasound demonstrates patent vessels with triphasic arterial Doppler flow.  Somewhat limited evaluation of the below knee tibial vessels on the left due to overlying calf edema and bandaging in the foot, though the vessels are patent  where visualized.        12/30/2024: MRI of left foot:  Impression   CONCLUSION:  Soft tissue and skin ulceration within the posterior heel without abscess.  No osteomyelitis.  6 mm osteochondral lesion of the medial talar dome without collapse of the articular surface.  Split tear of the peroneus brevis with peroneal tenosynovitis.  Mild Achilles tendinosis.  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  Multiple other incidental findings as described in the body of the report.  Dictated by (CST): Marlon Horne MD on 12/30/2024 at 2:44 PM     Encounter Diagnosis  1. Diabetic ulcer of left heel associated with type 2 diabetes mellitus, unspecified ulcer stage ([facility]) [E11.621, L97.429]    2. Neuropathy    3. Pain associated with wound      Problem List  Patient Active Problem List   Diagnosis    Iron deficiency anemia due to chronic blood loss    Recurrent pulmonary embolism (HCC)    Menorrhagia with regular cycle    Chronic low back pain with bilateral sciatica    left > right L5 radiculopathies    L5-S1 right mild foraminal & left mild far lateral, L4-5 mild diffuse, L2-3 right mild far lateral bulging discs    Urinary incontinence    Anemia    Anemia, unspecified type    Congenital anomaly of heart (HCC)    Anemia due to blood loss, acute    Atrial septal defect (HCC)    Hypercoagulable state (HCC)     Lymphedema of both lower extremities    Symptomatic anemia    Microcytic anemia    Other pulmonary embolism without acute cor pulmonale (HCC)    Severe episode of recurrent major depressive disorder, without psychotic features (HCC)    Uterine bleeding    Obesity (BMI 30-39.9)    Acquired trigger finger of right middle finger    Chronic anticoagulation    Chronic pain of left knee    Non-pressure chronic ulcer of left lower leg, limited to breakdown of skin (HCC)    Moderate left ventricular hypertrophy    Non-healing open wound of right heel    Pain associated with wound    BRCA gene mutation negative in female    Morbid obesity with BMI of 40.0-44.9, adult (HCC)    Dyslipidemia    HTN (hypertension), benign    Type 2 diabetes mellitus without complication, with long-term current use of insulin (HCC)    Stress    Diabetic ulcer of left heel associated with type 2 diabetes mellitus, unspecified ulcer stage (HCC)    Diabetic ulcer of left heel associated with type 2 diabetes mellitus, with necrosis of muscle (HCC)    Neuropathy     Plan  Orders  Orders Placed This Encounter   Procedures    Debridement Left Heel    OP Wound Dressing   The left heel wound was debrided today, eschar removed, healthy fat tissue is exposed.  Initiated moist Medihoney dressing, change daily. We will order the negative pressure wound therapy ( wound vac) to enhance wound healing and reduce risk of complication.    Monitor for signs and symptoms of infection, encourage patient to assess skin daily. May use Dakin's solution to reduce foul smell in the foot wound and te wound with each dressing changes.  Continue offload and reduce pressure from the wound bed by elevating heel when sitting on small table or ottoman, heel elevator foams or wedges. For example, using egg crate cushion under her feet to reduce pressure on his heels. Also, velma benefit from wide shoe with large toe box.     Recommended nutrition for wound healing and encourage  reduce carbohydrate intake, increase protein intake, and glycemic control diet (A1c is 8 which hinders wound healing.      Discussed the treatment plan with patient and her daughter, they verbalized understanding and agreed to these plan.  Total face to face time was 45 min, more than 50% of the time was spent in counseling and/or coordination of care related to BLE wounds.  Note to patient:   The 21st Century Cures Act makes medical notes like these available to patients in the interest of transparency. Please be advised this is a medical document. Medical documents are intended to carry relevant information, facts as evident, and the clinical opinion of the practitioner. The medical note is intended as peer to peer communication and may appear blunt or direct. It is written in medical language and may contain abbreviations or verbiage that are unfamiliar  Follow-Up  Return in about 1 week (around 2/19/2025) for APN x 30 min.            [1]   Allergies  Allergen Reactions    Latex HIVES and SWELLING    Influenza Vaccines OTHER (SEE COMMENTS)     Pt passed out     Radiology Contrast Iodinated Dyes ITCHING

## 2025-02-17 DIAGNOSIS — G47.00 INSOMNIA, UNSPECIFIED TYPE: ICD-10-CM

## 2025-02-19 ENCOUNTER — APPOINTMENT (OUTPATIENT)
Dept: WOUND CARE | Facility: HOSPITAL | Age: 55
End: 2025-02-19
Attending: NURSE PRACTITIONER
Payer: COMMERCIAL

## 2025-02-20 ENCOUNTER — OFFICE VISIT (OUTPATIENT)
Dept: WOUND CARE | Facility: HOSPITAL | Age: 55
End: 2025-02-20
Attending: NURSE PRACTITIONER
Payer: COMMERCIAL

## 2025-02-20 VITALS
SYSTOLIC BLOOD PRESSURE: 138 MMHG | HEART RATE: 105 BPM | RESPIRATION RATE: 18 BRPM | OXYGEN SATURATION: 98 % | DIASTOLIC BLOOD PRESSURE: 56 MMHG

## 2025-02-20 DIAGNOSIS — E11.621 DIABETIC ULCER OF LEFT HEEL ASSOCIATED WITH TYPE 2 DIABETES MELLITUS, UNSPECIFIED ULCER STAGE (HCC): Primary | ICD-10-CM

## 2025-02-20 DIAGNOSIS — R52 PAIN ASSOCIATED WITH WOUND: ICD-10-CM

## 2025-02-20 DIAGNOSIS — G62.9 NEUROPATHY: ICD-10-CM

## 2025-02-20 DIAGNOSIS — T14.8XXA PAIN ASSOCIATED WITH WOUND: ICD-10-CM

## 2025-02-20 DIAGNOSIS — L97.429 DIABETIC ULCER OF LEFT HEEL ASSOCIATED WITH TYPE 2 DIABETES MELLITUS, UNSPECIFIED ULCER STAGE (HCC): Primary | ICD-10-CM

## 2025-02-20 PROCEDURE — 99213 OFFICE O/P EST LOW 20 MIN: CPT | Performed by: NURSE PRACTITIONER

## 2025-02-20 RX ORDER — TRAMADOL HYDROCHLORIDE 50 MG/1
50 TABLET ORAL 2 TIMES DAILY PRN
Qty: 30 TABLET | Refills: 0 | Status: SHIPPED | OUTPATIENT
Start: 2025-02-20 | End: 2025-03-07

## 2025-02-20 NOTE — PROGRESS NOTES
Subjective   Suki Rosenberg is a 54 year old female.    Chief Complaint   Patient presents with    Wound Recheck     Left heel     HPI  2/20/2025: Patient is here for follow up wound care on left heel wound. She has been approved for NPWT (wound vac), no new wounds.  2/12/2025: Patient is here with her daughter for right heel wound, her daughters are doing the dressing, pain has somewhat reduced.  2/5/2025: Patient is here with her daughter, her daughter is doing dressing changes, no other changes in her medications or neuropathy treatment plan.   1/22/2025: Patient is here for follow up, she has not seen pain managmetnt services for neuropathy. She is not taking any nerve pain medication.   1/15/2025: Patient is here for follow up, reports not tolerating gabapentin, and lyrica. She had been given Zoloft, she stated she is concern about the side effect and cost. She is complaining of pain in her heel.  1/8/2025: Patient is here for follow up, has been doing dressing, minimum drainage. Patient has not refill gabapentin paste for pain management. Also, she stated she had Lyrica before as well as gabapentin which caused significant ankle edema. She is not sure if she is currently taking Amitriptyline.   1/3/2025: Patient stated she developed left heel blister and wound again, she has history of heels blister in Dec 2023 and then right heel blister and wound on July 2024. 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 She has nerve pain in the foot, described as pins and needles. She stated on Arabella day she walked wrong and blister opened with foul smell. She was admitted to [facility] for left heel wound infection. She was treated with IV antibiotics, MRI showed no bone infection. She was discharged today with home health services for wound care.  Last A1c on 12/28/2024 was 8.0     8/16/2024: Patient is here for follow up, no drainage in the past few days, still pain in the wound area.    8/2/2024: Patient is here for follow up, her daughter has been doing dressing, she reports no drainage from the wound.  7/19/2024: Patient is returning to clinic with new blood blister on right heel, she stated happened few days ago, she opened the blister to drain which reduced the pain. She has her lymphedema pump and using them daily, states wearing her compression stockings as well. She feels her A1c is better managed now came down from 13 to 8.2 on 6/12/2024.     12/11/2023: Patient is here for follow up, tolerated the compression, reports continues to have some pain while walking on left heel, no other concern.   12/4/2023: Patient is 53 year old woman with chronic leg edema and recent bilateral on posterior heel. Patient states she sits for 10-14 hours on chair for her job with very little time to break for walking. She has purchased compression stockings , she has had hard time putting them on or keeping them on.   Patient medical history significant for type two diabetes on Jardiance 25 mg, hx of PE on xarelto 20 mg, neuropathy, fibromyalgia, hx of breast cancer, depression, bipolar, obesity, chronic low back pain with bilateral sciatica, herniated lumbar disc, chronic left knee pain, and osteoarthritis bilateral knees.     Review of Systems   Constitutional:  Positive for activity change. Negative for chills, fatigue and fever.   HENT:  Negative for congestion.    Respiratory:  Negative for shortness of breath.    Cardiovascular:  Positive for leg swelling. Negative for chest pain.   Gastrointestinal:  Positive for constipation.        Hx of constipation    Musculoskeletal:  Positive for arthralgias, back pain and gait problem.   Skin:  Positive for wound.        Left heel wound  Neurological:  Positive for numbness. Negative for weakness.        Feeling numbness on her toes   Psychiatric/Behavioral:          Hx f anxiety, depression and bipolar      Objective  Physical Exam  Vitals reviewed.    Constitutional:       Appearance: Normal appearance. She is obese.   HENT:      Head: Normocephalic.   Cardiovascular:      Rate and Rhythm: Normal rate.      Pulses: Normal pulses.           Dorsalis pedis pulses are 2+ on the left side.        Posterior tibial pulses are 2+ on the left side.   Pulmonary:      Effort: Pulmonary effort is normal.   Abdominal:      General: Bowel sounds are normal.   Musculoskeletal:      Cervical back: Normal range of motion.      Right lower leg: Edema present.      Left lower leg: Edema present.        Feet: left heel dry blister with eschar cover wound in the center  Feet:      Right foot:      Toenail Condition: Right toenails are normal.      Left foot:      Skin integrity: Ulcer present.      Toenail Condition: Left toenails are normal.   Skin:     General: Skin is warm and dry.      Capillary Refill: Capillary refill takes 2 to 3 seconds.      Findings: No erythema.   Neurological:      Mental Status: She is alert and oriented to person, place, and time  Wound Assessment  Wound 01/03/25 Heel Left (Active)   Date First Assessed: 01/03/25   Location: Heel  Wound Location Orientation: Left      Assessments 1/3/2025  9:56 AM 2/20/2025  3:21 PM   Wound Image          Site Assessment Black;Brown;Pink;Tan;Yellow;Dry;Moist;Edema Moist;Pink;Yellow   Closure Not approximated Not approximated   Drainage Amount Small Small   Drainage Description Serosanguineous Serosanguineous   Treatments Cleansed;Honey Gel;Dakins Cleansed;Vashe   Dressing 4x4s (spandage) Wound vac sponge;Kerlix roll;Ace bandage   Dressing Changed New Changed   Dressing Status Clean;Dry;Intact Clean;Dry;Intact   Wound Length (cm) 4.1 cm 2.1 cm   Wound Width (cm) 6 cm 3 cm   Wound Surface Area (cm^2) 24.6 cm^2 6.3 cm^2   Wound Depth (cm) 0 cm 0.1 cm   Wound Volume (cm^3) 0 cm^3 0.63 cm^3   Margins Attached edges Poorly defined;Attached edges   Madelaine-wound Assessment Dry;Painful;Hyperpigmented;Fragile;Edema;Ecchymosis  Dry;Callous;Hyperpigmented   Wound Granulation Tissue Pink --   Wound Bed Granulation (%) 10 % 10 %   Wound Bed Epithelium (%) 0 % 10 %   Wound Bed Slough (%) 10 % 80 %   Wound Bed Eschar (%) 80 % 0 %   Wound Bed Fibrin (%) 0 % 0 %   State of Healing Non-healing Non-healing   Wound Odor Strong None   Pressure Injury Stage Unstageable Stage 3       Active Orders   Date Order Priority Status Authorizing Provider   02/05/25 1849 OP Wound Dressing Routine Active Jose M Johnson APRN     - Cleansing:    Cleanse with normal saline or wound cleanser     - Dressing:    Betadine     - Additional Wound Dressing Information:    nonbordered aquacel foam placed cut in a heel cap. Kerlix and medigrip E     - Frequency:    Change dressing daily and PRN   02/12/25 1335 Debridement Left Heel Routine Completed Jose M Johnson APRN   01/03/25 1242 Debridement Left Heel Routine Completed Jose M Johnson APRN   01/03/25 1012 Debridement Left Heel Routine Discontinued Jose M Johnson APRN       Compression Wrap 01/15/25 Leg Left (Active)   Placement Date: 01/15/25   Location: Leg  Wound Location Orientation: Left      Assessments 1/15/2025  3:40 PM 2/20/2025  3:21 PM   Response to Treatment Well tolerated Well tolerated   Compression Layers Single Single   Compression Product Type Tubigrip/Spanda (Medigrip E cut out on heel) Ace Wrap 4in (E)   Dressing Applied Yes Yes   Compression Wrap Location Toes to Knee Toes to Knee   Compression Wrap Status Clean;Dry;Intact Clean;Dry;Intact;Dressing Changed       No associated orders.       Negative Pressure Wound Therapy Left;Plantar (Active)   Placement Date/Time: 02/20/25 1524   Wound Location Orientation: Left;Plantar      Assessments 2/20/2025  3:21 PM   Wound photographed/measured Yes   Machine Status (On) Yes   Site Assessment Moist;Pink;Yellow   Madelaine-wound Assessment Callous;Dry;Hyperpigmented   Unit Type Medela Invia   Dressing Type Black foam   Number of Foam Pieces Used 1   Cycle  Continuous;On   Target Pressure (mmHg) 125   Drainage Description Serosanguineous   Dressing Status Clean;Dry;Intact   Canister Changed No       No associated orders.     Vital Signs    02/20/25 1513   BP: 138/56   Pulse: 105   Resp: 18   PainSc: 7 - (Severe)   Allergies  Allergies[1]    Assessment   Left heel wound, pressure injury with diabetes:  -granulation and dried slough/adipose tissue  -no erythema in the te-wound  -edema +4 dorsal foot and +2 ankle edema  -painful to touch, described pins and needles pain, classic sign of diabetic neuropathy, reports more pain with application of wound vac     Reviewed note, imagining and labs in [facility].  Recent labs: 1/3/2025: WBC 6.1, H&H 13.0&38.5, BUN 11, Creatinine 1.42, albumin 4.2  12/30/2024: US of bilateral lower ext, vascular study:  Impression   CONCLUSION:  Duplex arterial ultrasound demonstrates patent vessels with triphasic arterial Doppler flow.  Somewhat limited evaluation of the below knee tibial vessels on the left due to overlying calf edema and bandaging in the foot, though the vessels are patent  where visualized.        12/30/2024: MRI of left foot:  Impression   CONCLUSION:  Soft tissue and skin ulceration within the posterior heel without abscess.  No osteomyelitis.  6 mm osteochondral lesion of the medial talar dome without collapse of the articular surface.  Split tear of the peroneus brevis with peroneal tenosynovitis.  Mild Achilles tendinosis.  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  Multiple other incidental findings as described in the body of the report.  Dictated by (CST): Marlon Horne MD on 12/30/2024 at 2:44 PM     Encounter Diagnosis  1. Diabetic ulcer of left heel associated with type 2 diabetes mellitus, unspecified ulcer stage (HCC) [E11.621, L97.429]    2. Neuropathy    3. Pain associated with wound      Problem List  Patient Active Problem List    Diagnosis    Iron deficiency anemia due to chronic blood loss    Recurrent pulmonary embolism (HCC)    Menorrhagia with regular cycle    Chronic low back pain with bilateral sciatica    left > right L5 radiculopathies    L5-S1 right mild foraminal & left mild far lateral, L4-5 mild diffuse, L2-3 right mild far lateral bulging discs    Urinary incontinence    Anemia    Anemia, unspecified type    Congenital anomaly of heart (HCC)    Anemia due to blood loss, acute    Atrial septal defect (HCC)    Hypercoagulable state (HCC)    Lymphedema of both lower extremities    Symptomatic anemia    Microcytic anemia    Other pulmonary embolism without acute cor pulmonale (HCC)    Severe episode of recurrent major depressive disorder, without psychotic features (HCC)    Uterine bleeding    Obesity (BMI 30-39.9)    Acquired trigger finger of right middle finger    Chronic anticoagulation    Chronic pain of left knee    Non-pressure chronic ulcer of left lower leg, limited to breakdown of skin (HCC)    Moderate left ventricular hypertrophy    Non-healing open wound of right heel    Pain associated with wound    BRCA gene mutation negative in female    Morbid obesity with BMI of 40.0-44.9, adult (HCC)    Dyslipidemia    HTN (hypertension), benign    Type 2 diabetes mellitus without complication, with long-term current use of insulin (HCC)    Stress    Diabetic ulcer of left heel associated with type 2 diabetes mellitus, unspecified ulcer stage (HCC)    Diabetic ulcer of left heel associated with type 2 diabetes mellitus, with necrosis of muscle (HCC)    Neuropathy     Plan  Orders  Orders Placed This Encounter   Procedures    OP Wound Dressing     Initiated the negative pressure wound therapy ( wound vac) at 125mmHg to enhance wound healing and reduce risk of complication, change dressing twice a week.  [place]  reviewed, patient is requesting medication to mitigate pain, tramadol was sent to her pharmacy. May take tramadol with  500 mg of acetaminophen for optimum pain management.      Monitor for signs and symptoms of infection, encourage patient to assess skin daily. May use Dakin's solution to reduce foul smell in the foot wound and te wound with each dressing changes.  Continue offload and reduce pressure from the wound bed by elevating heel when sitting on small table or ottoman, heel elevator foams or wedges. For example, using egg crate cushion under her feet to reduce pressure on his heels. Also, may benefit from wide shoe with large toe box.     Recommended nutrition for wound healing and encourage reduce carbohydrate intake, increase protein intake, and glycemic control diet (A1c is 8 which hinders wound healing.      Discussed the treatment plan with patient and her daughter, they verbalized understanding and agreed to these plan.  Total face to face time was 45 min, more than 50% of the time was spent in counseling and/or coordination of care related to BLE wounds.  Note to patient:   The 21st Century Cures Act makes medical notes like these available to patients in the interest of transparency. Please be advised this is a medical document. Medical documents are intended to carry relevant information, facts as evident, and the clinical opinion of the practitioner. The medical note is intended as peer to peer communication and may appear blunt or direct. It is written in medical language and may contain abbreviations or verbiage that are unfamiliar  Follow-Up  Return in about 2 weeks (around 3/6/2025) for APN visit x 30 mins.            [1]   Allergies  Allergen Reactions    Latex HIVES and SWELLING    Influenza Vaccines OTHER (SEE COMMENTS)     Pt passed out     Radiology Contrast Iodinated Dyes ITCHING

## 2025-02-20 NOTE — PROGRESS NOTES
Weekly Wound Education Note    Teaching Provided To: Patient;Family  Training Topics: Cleasing and general instructions;Dressing;Skin care;Negative presssure therapy;Pain management  Training Method: Demonstration;Explain/Verbal           Notes: Left heel: An Blair NPWT 125 mmHg,Kerlix,Acewrap

## 2025-02-21 ENCOUNTER — TELEPHONE (OUTPATIENT)
Dept: WOUND CARE | Facility: HOSPITAL | Age: 55
End: 2025-02-21

## 2025-02-21 RX ORDER — ZOLPIDEM TARTRATE 10 MG/1
10 TABLET ORAL NIGHTLY PRN
Qty: 30 TABLET | Refills: 0 | Status: SHIPPED | OUTPATIENT
Start: 2025-02-21

## 2025-02-21 NOTE — TELEPHONE ENCOUNTER
Please review.  Protocol Failed.    Recent fills each # 30 : 9/12, 1/4  Last prescription written: 1/4/25  Last office visit:  1/13/2025    Requested Prescriptions   Pending Prescriptions Disp Refills    zolpidem 10 MG Oral Tab [Pharmacy Med Name: ZOLPIDEM 10MG TABLETS] 30 tablet 0     Sig: Take 1 tablet (10 mg total) by mouth nightly as needed for Sleep.       Controlled Substance Medication Failed - 2/21/2025  3:28 PM        Failed - This medication is a controlled substance - forward to provider to refill        Failed - Medication is active on med list

## 2025-02-21 NOTE — TELEPHONE ENCOUNTER
[facility] called that the start of care is Monday 2/24/25.  They needed the H&P which was faxed to them.

## 2025-02-27 ENCOUNTER — APPOINTMENT (OUTPATIENT)
Dept: WOUND CARE | Facility: HOSPITAL | Age: 55
End: 2025-02-27
Attending: NURSE PRACTITIONER
Payer: COMMERCIAL

## 2025-02-28 ENCOUNTER — TELEPHONE (OUTPATIENT)
Dept: WOUND CARE | Facility: HOSPITAL | Age: 55
End: 2025-02-28

## 2025-02-28 NOTE — TELEPHONE ENCOUNTER
Call placed to patient to check on wound status and dressing. Patient does not have HH anymore.  Daughter and sister who are a nurse who changed the wound vac.  The patient will leave the wound vac in place to Monday and will change the dressing to dakin daily starting on Monday 3/3/25.  Patient has a follow up visit on 3/6/25. APN was informed.

## 2025-03-06 ENCOUNTER — APPOINTMENT (OUTPATIENT)
Dept: WOUND CARE | Facility: HOSPITAL | Age: 55
End: 2025-03-06
Attending: NURSE PRACTITIONER
Payer: COMMERCIAL

## 2025-03-07 ENCOUNTER — OFFICE VISIT (OUTPATIENT)
Dept: WOUND CARE | Facility: HOSPITAL | Age: 55
End: 2025-03-07
Attending: NURSE PRACTITIONER
Payer: COMMERCIAL

## 2025-03-07 VITALS
DIASTOLIC BLOOD PRESSURE: 90 MMHG | HEART RATE: 95 BPM | SYSTOLIC BLOOD PRESSURE: 142 MMHG | OXYGEN SATURATION: 100 % | RESPIRATION RATE: 20 BRPM | TEMPERATURE: 98 F

## 2025-03-07 DIAGNOSIS — L97.429 DIABETIC ULCER OF LEFT HEEL ASSOCIATED WITH TYPE 2 DIABETES MELLITUS, UNSPECIFIED ULCER STAGE (HCC): Primary | ICD-10-CM

## 2025-03-07 DIAGNOSIS — G62.9 NEUROPATHY: ICD-10-CM

## 2025-03-07 DIAGNOSIS — E11.621 DIABETIC ULCER OF LEFT HEEL ASSOCIATED WITH TYPE 2 DIABETES MELLITUS, UNSPECIFIED ULCER STAGE (HCC): Primary | ICD-10-CM

## 2025-03-07 PROCEDURE — 99213 OFFICE O/P EST LOW 20 MIN: CPT | Performed by: NURSE PRACTITIONER

## 2025-03-07 NOTE — PROGRESS NOTES
Subjective   Suki Rosenberg is a 54 year old female.    Chief Complaint   Patient presents with    Wound Care     Follow up visit for left heel wound.     HPI  3/7/2025: patient is here with her daughter and another family member. Home health has been in her place, applied wound vac (per patient in 2.5 hours with noise (indicative of leak). Later on  company called and stated no longer able to provide care due to unsigned contract. Patient has done topical dressing, no fever, no chills.   2/20/2025: Patient is here for follow up wound care on left heel wound. She has been approved for NPWT (wound vac), no new wounds.  2/12/2025: Patient is here with her daughter for right heel wound, her daughters are doing the dressing, pain has somewhat reduced.  2/5/2025: Patient is here with her daughter, her daughter is doing dressing changes, no other changes in her medications or neuropathy treatment plan.   1/22/2025: Patient is here for follow up, she has not seen pain managmetnt services for neuropathy. She is not taking any nerve pain medication.   1/15/2025: Patient is here for follow up, reports not tolerating gabapentin, and lyrica. She had been given Zoloft, she stated she is concern about the side effect and cost. She is complaining of pain in her heel.  1/8/2025: Patient is here for follow up, has been doing dressing, minimum drainage. Patient has not refill gabapentin paste for pain management. Also, she stated she had Lyrica before as well as gabapentin which caused significant ankle edema. She is not sure if she is currently taking Amitriptyline.   1/3/2025: Patient stated she developed left heel blister and wound again, she has history of heels blister in Dec 2023 and then right heel blister and wound on July 2024. 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 She has nerve pain in the foot, described as pins and needles. She stated on [place] day she walked wrong and  blister opened with foul smell. She was admitted to [facility] for left heel wound infection. She was treated with IV antibiotics, MRI showed no bone infection. She was discharged today with home health services for wound care.  Last A1c on 12/28/2024 was 8.0     8/16/2024: Patient is here for follow up, no drainage in the past few days, still pain in the wound area.   8/2/2024: Patient is here for follow up, her daughter has been doing dressing, she reports no drainage from the wound.  7/19/2024: Patient is returning to clinic with new blood blister on right heel, she stated happened few days ago, she opened the blister to drain which reduced the pain. She has her lymphedema pump and using them daily, states wearing her compression stockings as well. She feels her A1c is better managed now came down from 13 to 8.2 on 6/12/2024.     12/11/2023: Patient is here for follow up, tolerated the compression, reports continues to have some pain while walking on left heel, no other concern.   12/4/2023: Patient is 53 year old woman with chronic leg edema and recent bilateral on posterior heel. Patient states she sits for 10-14 hours on chair for her job with very little time to break for walking. She has purchased compression stockings , she has had hard time putting them on or keeping them on.   Patient medical history significant for type two diabetes on Jardiance 25 mg, hx of PE on xarelto 20 mg, neuropathy, fibromyalgia, hx of breast cancer, depression, bipolar, obesity, chronic low back pain with bilateral sciatica, herniated lumbar disc, chronic left knee pain, and osteoarthritis bilateral knees.     Review of Systems   Constitutional:  Positive for activity change. Negative for chills, fatigue and fever.   HENT:  Negative for congestion.    Respiratory:  Negative for shortness of breath.    Cardiovascular:  Positive for leg swelling. Negative for chest pain.   Gastrointestinal:  Positive for constipation.        Hx of  constipation    Musculoskeletal:  Positive for arthralgias, back pain and gait problem.   Skin:  Positive for wound.        Left heel wound  Neurological:  Positive for numbness. Negative for weakness.        Feeling numbness on her toes   Psychiatric/Behavioral:          Hx f anxiety, depression and bipolar      Objective  Physical Exam  Vitals reviewed.   Constitutional:       Appearance: Normal appearance. She is obese.   HENT:      Head: Normocephalic.   Cardiovascular:      Rate and Rhythm: Normal rate.      Pulses: Normal pulses.           Dorsalis pedis pulses are 2+ on the left side.        Posterior tibial pulses are 2+ on the left side.   Pulmonary:      Effort: Pulmonary effort is normal.   Abdominal:      General: Bowel sounds are normal.   Musculoskeletal:      Cervical back: Normal range of motion.      Right lower leg: Edema present.      Left lower leg: Edema present.        Feet: left heel dry blister with eschar cover wound in the center  Feet:      Right foot:      Toenail Condition: Right toenails are normal.      Left foot:      Skin integrity: Ulcer present.      Toenail Condition: Left toenails are normal.   Skin:     General: Skin is warm and dry.      Capillary Refill: Capillary refill takes 2 to 3 seconds.      Findings: No erythema.   Neurological:      Mental Status: She is alert and oriented to person, place, and time    Wound Assessment  Wound 01/03/25 Heel Left (Active)   Date First Assessed: 01/03/25   Location: Heel  Wound Location Orientation: Left      Assessments 1/3/2025  9:56 AM 3/7/2025  9:18 AM   Wound Image          Site Assessment Black;Brown;Pink;Tan;Yellow;Dry;Moist;Edema Moist;Pink;Yellow   Closure Not approximated Not approximated   Drainage Amount Small Moderate   Drainage Description Serosanguineous Serosanguineous;Yellow   Treatments Cleansed;Honey Gel;Dakins Cleansed;Vashe   Dressing 4x4s (spandage) Wound vac sponge;Tape;Coban (heel cap)   Dressing Changed New  Changed   Dressing Status Clean;Dry;Intact Clean;Dry;Intact   Wound Length (cm) 4.1 cm 2.4 cm   Wound Width (cm) 6 cm 2.5 cm   Wound Surface Area (cm^2) 24.6 cm^2 6 cm^2   Wound Depth (cm) 0 cm 0.1 cm   Wound Volume (cm^3) 0 cm^3 0.6 cm^3   Margins Attached edges Attached edges   Non-staged Wound Description -- Full thickness   Madelaine-wound Assessment Dry;Painful;Hyperpigmented;Fragile;Edema;Ecchymosis Dry;Callous;Hyperpigmented   Wound Granulation Tissue Pink Pink   Wound Bed Granulation (%) 10 % 100 %   Wound Bed Epithelium (%) 0 % 0 %   Wound Bed Slough (%) 10 % 0 %   Wound Bed Eschar (%) 80 % 0 %   Wound Bed Fibrin (%) 0 % 0 %   State of Healing Non-healing Non-healing;Fully granulated   Wound Odor Strong None   Pressure Injury Stage Unstageable Stage 3       Active Orders   Date Order Priority Status Authorizing Provider   03/07/25 0959 OP Wound Dressing Routine Active Jose M Johnson APRN     - Cleansing:    Cleanse with normal saline or wound cleanser     - Dressing:    Thin foam     - Additional Wound Dressing Information:    wound vac sponge, coban, heel cap from foam, stockinette and artiflex     - Frequency:    Change dressing three times per week   02/12/25 1335 Debridement Left Heel Routine Completed Jose M Johnson APRN   01/03/25 1242 Debridement Left Heel Routine Completed Jose M Johnson APRN   01/03/25 1012 Debridement Left Heel Routine Discontinued Jose M Johnson APRN       Compression Wrap 01/15/25 Leg Left (Active)   Placement Date: 01/15/25   Location: Leg  Wound Location Orientation: Left      Assessments 1/15/2025  3:40 PM 3/7/2025  9:18 AM   Response to Treatment Well tolerated Well tolerated   Compression Layers Single Single   Compression Product Type Tubigrip/Spanda (Medigrip E cut out on heel) Coban   Dressing Applied Yes Yes   Compression Wrap Location Toes to Knee Toes to Knee   Compression Wrap Status Clean;Dry;Intact Clean;Dry;Intact;Dressing Changed       No associated orders.        Negative Pressure Wound Therapy Left;Plantar (Active)   Placement Date/Time: 02/20/25 1524   Wound Location Orientation: Left;Plantar      Assessments 2/20/2025  3:21 PM 3/7/2025  9:18 AM   Wound photographed/measured Yes Yes   Machine Status (On) Yes Yes   Site Assessment Moist;Pink;Yellow Moist;Pink;Yellow   Madelaine-wound Assessment Callous;Dry;Hyperpigmented Callous;Dry;Hyperpigmented   Unit Type Medela Invia Medela Invia   Dressing Type Black foam Black foam   Number of Foam Pieces Used 1 1   Cycle Continuous;On Continuous;On   Target Pressure (mmHg) 125 125   Drainage Description Serosanguineous Serosanguineous   Dressing Status Clean;Dry;Intact Clean;Dry;Intact   Canister Changed No No       Active Orders   Date Order Priority Status Authorizing Provider   03/07/25 0959 OP Wound Dressing Routine Active Jose M Johnson APRN     - Cleansing:    Cleanse with normal saline or wound cleanser     - Dressing:    Thin foam     - Additional Wound Dressing Information:    wound vac sponge, coban, heel cap from foam, stockinette and artiflex     - Frequency:    Change dressing three times per week     Negative Pressure Wound Therapy Left;Plantar (Active)   02/20/25 1524    Placed by External Staff?:    Inserted by:    Wound Type:    Wound Location Orientation: Left;Plantar   Removal Reason:    Wound photographed/measured Yes 03/07/25 0918   Machine Status (On) Yes 03/07/25 0918   Site Assessment Moist;Pink;Yellow 03/07/25 0918   Madelaine-wound Assessment Callous;Dry;Hyperpigmented 03/07/25 0918   Unit Type Medela Invia 03/07/25 0918   Dressing Type Black foam 03/07/25 0918   Number of Foam Pieces Used 1 03/07/25 0918   Cycle Continuous;On 03/07/25 0918   Target Pressure (mmHg) 125 03/07/25 0918   Drainage Description Serosanguineous 03/07/25 0918   Dressing Status Clean;Dry;Intact 03/07/25 0918   Canister Changed No 03/07/25 0918     Vital Signs    03/07/25 0912   BP: 142/90   Pulse: 95   Resp: 20   Temp: 97.6 °F (36.4 °C)      Allergies  Allergies[1]  Assessment   Left heel wound, pressure injury with diabetes:  -granulation and very minimum slough/adipose tissue  -no erythema in the te-wound  -edema +4 dorsal foot and +2 ankle edema  -painful to touch, described pins and needles pain, classic sign of diabetic neuropathy, reports more pain with application of wound vac     Reviewed note, imagining and labs in Epic and Care Every Where.  Recent labs: 1/3/2025: WBC 6.1, H&H 13.0&38.5, BUN 11, Creatinine 1.42, albumin 4.2  12/30/2024: US of bilateral lower ext, vascular study:  Impression   CONCLUSION:  Duplex arterial ultrasound demonstrates patent vessels with triphasic arterial Doppler flow.  Somewhat limited evaluation of the below knee tibial vessels on the left due to overlying calf edema and bandaging in the foot, though the vessels are patent  where visualized.        12/30/2024: MRI of left foot:  Impression   CONCLUSION:  Soft tissue and skin ulceration within the posterior heel without abscess.  No osteomyelitis.  6 mm osteochondral lesion of the medial talar dome without collapse of the articular surface.  Split tear of the peroneus brevis with peroneal tenosynovitis.  Mild Achilles tendinosis.  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  Multiple other incidental findings as described in the body of the report.  Dictated by (CST): Marlon Horne MD on 12/30/2024 at 2:44 PM     Encounter Diagnosis  1. Diabetic ulcer of left heel associated with type 2 diabetes mellitus, unspecified ulcer stage ([facility]) [E11.621, L97.429]    2. Neuropathy      Problem List  Patient Active Problem List   Diagnosis    Iron deficiency anemia due to chronic blood loss    Recurrent pulmonary embolism (HCC)    Menorrhagia with regular cycle    Chronic low back pain with bilateral sciatica    left > right L5 radiculopathies    L5-S1 right mild foraminal & left mild far lateral, L4-5 mild diffuse,  L2-3 right mild far lateral bulging discs    Urinary incontinence    Anemia    Anemia, unspecified type    Congenital anomaly of heart (HCC)    Anemia due to blood loss, acute    Atrial septal defect (HCC)    Hypercoagulable state ([facility])    Lymphedema of both lower extremities    Symptomatic anemia    Microcytic anemia    Other pulmonary embolism without acute cor pulmonale (HCC)    Severe episode of recurrent major depressive disorder, without psychotic features ([facility])    Uterine bleeding    Obesity (BMI 30-39.9)    Acquired trigger finger of right middle finger    Chronic anticoagulation    Chronic pain of left knee    Non-pressure chronic ulcer of left lower leg, limited to breakdown of skin ([facility])    Moderate left ventricular hypertrophy    Non-healing open wound of right heel    Pain associated with wound    BRCA gene mutation negative in female    Morbid obesity with BMI of 40.0-44.9, adult ([facility])    Dyslipidemia    HTN (hypertension), benign    Type 2 diabetes mellitus without complication, with long-term current use of insulin ([facility])    Stress    Diabetic ulcer of left heel associated with type 2 diabetes mellitus, unspecified ulcer stage (HCC)    Diabetic ulcer of left heel associated with type 2 diabetes mellitus, with necrosis of muscle ([facility])    Neuropathy     Plan  Orders  Orders Placed This Encounter   Procedures    OP Wound Dressing   We had provided patient's daughter on application of the wound vac, indication, use and precaution.  Patient's daughter today was hand's on, she demonstrated adequate knowledge and skills on application of NPWT correctly. Patient and her daughter are agreeable on application of this device.  Continue negative pressure wound therapy ( wound vac) at 125mmHg to enhance wound healing and reduce risk of complication, change dressing twice a week.    Monitor for signs and symptoms of infection, encourage patient to assess skin daily. May use Dakin's solution to reduce foul smell in  the foot wound and te wound with each dressing changes.  Continue offload and reduce pressure from the wound bed by elevating heel when sitting on small table or ottoman, heel elevator foams or wedges. For example, using egg crate cushion under her feet to reduce pressure on his heels. Also, may benefit from wide shoe with large toe box.     Recommended nutrition for wound healing and encourage reduce carbohydrate intake, increase protein intake, and glycemic control diet (A1c is 8 which hinders wound healing.      Discussed the treatment plan with patient and her daughter, they verbalized understanding and agreed to these plan.  Total face to face time was 45 min, more than 50% of the time was spent in counseling and/or coordination of care related to BLE wounds.  Note to patient: The 21st Century Cures Act makes medical notes like these available to patients in the interest of transparency. Please be advised this is a medical document. Medical documents are intended to carry relevant information, facts as evident, and the clinical opinion of the practitioner. The medical note is intended as peer to peer communication and may appear blunt or direct. It is written in medical language and may contain abbreviations or verbiage that are unfamiliar    Follow-Up  Return in about 2 weeks (around 3/21/2025) for APN visit for 30 min.              [1]   Allergies  Allergen Reactions    Latex HIVES and SWELLING    Influenza Vaccines OTHER (SEE COMMENTS)     Pt passed out     Radiology Contrast Iodinated Dyes ITCHING

## 2025-03-13 ENCOUNTER — OFFICE VISIT (OUTPATIENT)
Dept: WOUND CARE | Facility: HOSPITAL | Age: 55
End: 2025-03-13
Attending: NURSE PRACTITIONER
Payer: COMMERCIAL

## 2025-03-13 VITALS
RESPIRATION RATE: 18 BRPM | SYSTOLIC BLOOD PRESSURE: 149 MMHG | DIASTOLIC BLOOD PRESSURE: 101 MMHG | TEMPERATURE: 97 F | HEART RATE: 96 BPM | OXYGEN SATURATION: 100 %

## 2025-03-13 DIAGNOSIS — L97.429 DIABETIC ULCER OF LEFT HEEL ASSOCIATED WITH TYPE 2 DIABETES MELLITUS, UNSPECIFIED ULCER STAGE (HCC): Primary | ICD-10-CM

## 2025-03-13 DIAGNOSIS — E08.621 DIABETIC ULCER OF RIGHT HEEL ASSOCIATED WITH DIABETES MELLITUS DUE TO UNDERLYING CONDITION, LIMITED TO BREAKDOWN OF SKIN (HCC): ICD-10-CM

## 2025-03-13 DIAGNOSIS — T14.8XXA PAIN ASSOCIATED WITH WOUND: ICD-10-CM

## 2025-03-13 DIAGNOSIS — I89.0 LYMPHEDEMA OF BOTH LOWER EXTREMITIES: ICD-10-CM

## 2025-03-13 DIAGNOSIS — L89.611 PRESSURE INJURY OF RIGHT HEEL, STAGE 1: ICD-10-CM

## 2025-03-13 DIAGNOSIS — E11.621 DIABETIC ULCER OF LEFT HEEL ASSOCIATED WITH TYPE 2 DIABETES MELLITUS, UNSPECIFIED ULCER STAGE (HCC): Primary | ICD-10-CM

## 2025-03-13 DIAGNOSIS — R52 PAIN ASSOCIATED WITH WOUND: ICD-10-CM

## 2025-03-13 DIAGNOSIS — S91.104A OPEN WOUND OF LESSER TOE OF RIGHT FOOT: ICD-10-CM

## 2025-03-13 DIAGNOSIS — L97.411 DIABETIC ULCER OF RIGHT HEEL ASSOCIATED WITH DIABETES MELLITUS DUE TO UNDERLYING CONDITION, LIMITED TO BREAKDOWN OF SKIN (HCC): ICD-10-CM

## 2025-03-13 PROCEDURE — 99213 OFFICE O/P EST LOW 20 MIN: CPT | Performed by: NURSE PRACTITIONER

## 2025-03-13 NOTE — PROGRESS NOTES
Weekly Wound Education Note    Teaching Provided To: Patient  Training Topics: Dressing, Nutrition, Signs and symptoms of infection, Off-loading, Discharge instructions, Foot care  Training Method: Demonstration, Explain/Verbal  Training Response: Patient responds and understands        Notes: Right heel: bordered foam  Left heel: Hydrofera blue transfer, Dry gauze, kerlix, tape, Medigrip E to BLE

## 2025-03-13 NOTE — PROGRESS NOTES
Subjective   Suki Rosenberg is a 54 year old female.    Chief Complaint   Patient presents with    Wound Recheck     Bilateral heel     HPI  3/13/2025: Patient is here with her daugher, she stated she had wound vac till yesterday, now has dressing, she continues to have nerve pain. Also reporting new ulceration on right heel.  3/7/2025: patient is here with her daughter and another family member. Home health has been in her place, applied wound vac (per patient in 2.5 hours with noise (indicative of leak). Later on  company called and stated no longer able to provide care due to unsigned contract. Patient has done topical dressing, no fever, no chills.   2/20/2025: Patient is here for follow up wound care on left heel wound. She has been approved for NPWT (wound vac), no new wounds.  2/12/2025: Patient is here with her daughter for right heel wound, her daughters are doing the dressing, pain has somewhat reduced.  2/5/2025: Patient is here with her daughter, her daughter is doing dressing changes, no other changes in her medications or neuropathy treatment plan.   1/22/2025: Patient is here for follow up, she has not seen pain managmetnt services for neuropathy. She is not taking any nerve pain medication.   1/15/2025: Patient is here for follow up, reports not tolerating gabapentin, and lyrica. She had been given Zoloft, she stated she is concern about the side effect and cost. She is complaining of pain in her heel.  1/8/2025: Patient is here for follow up, has been doing dressing, minimum drainage. Patient has not refill gabapentin paste for pain management. Also, she stated she had Lyrica before as well as gabapentin which caused significant ankle edema. She is not sure if she is currently taking Amitriptyline.   1/3/2025: Patient stated she developed left heel blister and wound again, she has history of heels blister in Dec 2023 and then right heel blister and wound on July 2024. She stated she sleeps in  recliner, and also sits all day working behind her desk. She has nerve pain in the foot, described as pins and needles. She stated on Arabella day she walked wrong and blister opened with foul smell. She was admitted to [facility] for left heel wound infection. She was treated with IV antibiotics, MRI showed no bone infection. She was discharged today with home health services for wound care.  Last A1c on 12/28/2024 was 8.0     8/16/2024: Patient is here for follow up, no drainage in the past few days, still pain in the wound area.   8/2/2024: Patient is here for follow up, her daughter has been doing dressing, she reports no drainage from the wound.  7/19/2024: Patient is returning to clinic with new blood blister on right heel, she stated happened few days ago, she opened the blister to drain which reduced the pain. She has her lymphedema pump and using them daily, states wearing her compression stockings as well. She feels her A1c is better managed now came down from 13 to 8.2 on 6/12/2024.     12/11/2023: Patient is here for follow up, tolerated the compression, reports continues to have some pain while walking on left heel, no other concern.   12/4/2023: Patient is 53 year old woman with chronic leg edema and recent bilateral on posterior heel. Patient states she sits for 10-14 hours on chair for her job with very little time to break for walking. She has purchased compression stockings , she has had hard time putting them on or keeping them on.   Patient medical history significant for type two diabetes on Jardiance 25 mg, hx of PE on xarelto 20 mg, neuropathy, fibromyalgia, hx of breast cancer, depression, bipolar, obesity, chronic low back pain with bilateral sciatica, herniated lumbar disc, chronic left knee pain, and osteoarthritis bilateral knees.     Review of Systems   Constitutional:  Positive for activity change. Negative for chills, fatigue and fever.   HENT:  Negative for congestion.    Respiratory:   Negative for shortness of breath.    Cardiovascular:  Positive for leg swelling. Negative for chest pain.   Gastrointestinal:  Positive for constipation.        Hx of constipation    Musculoskeletal:  Positive for arthralgias, back pain and gait problem.   Skin:  Positive for wound.        Left heel wound  Neurological:  Positive for numbness. Negative for weakness.        Feeling numbness on her toes   Psychiatric/Behavioral:          Hx f anxiety, depression and bipolar      Objective  Physical Exam  Vitals reviewed.   Constitutional:       Appearance: Normal appearance. She is obese.   HENT:      Head: Normocephalic.   Cardiovascular:      Rate and Rhythm: Normal rate.      Pulses: Normal pulses.           Dorsalis pedis pulses are 2+ on the left side.        Posterior tibial pulses are 2+ on the left side.   Pulmonary:      Effort: Pulmonary effort is normal.   Abdominal:      General: Bowel sounds are normal.   Musculoskeletal:      Cervical back: Normal range of motion.      Right lower leg: Edema present.      Left lower leg: Edema present.        Feet: left heel dry blister with eschar cover wound in the center  Feet:      Right foot:      Toenail Condition: Right toenails are normal.      Left foot:      Skin integrity: Ulcer present.      Toenail Condition: Left toenails are normal.   Skin:     General: Skin is warm and dry.      Capillary Refill: Capillary refill takes 2 to 3 seconds.      Findings: No erythema.   Neurological:      Mental Status: She is alert and oriented to person, place, and time  Wound Assessment  Wound 01/03/25 Heel Left (Active)   Date First Assessed: 01/03/25   Location: Heel  Wound Location Orientation: Left      Assessments 1/3/2025  9:56 AM 3/13/2025  3:23 PM   Wound Image          Site Assessment Black;Brown;Pink;Tan;Yellow;Dry;Moist;Edema Moist;Pink;Red   Closure Not approximated Not approximated   Drainage Amount Small Moderate   Drainage Description Serosanguineous  Serosanguineous;Yellow   Treatments Cleansed;Honey Gel;Dakins Cleansed;Saline;Vashe;Topical (Barrier/Moisturizer/Ointment) (zinc oxide to periwound)   Dressing 4x4s (spandage) Hydrofera transfer;4x4s;Gauze;Kerlix roll;Tape   Dressing Changed New Changed   Dressing Status Clean;Dry;Intact Clean;Dry;Intact   Wound Length (cm) 4.1 cm 2.2 cm   Wound Width (cm) 6 cm 2.5 cm   Wound Surface Area (cm^2) 24.6 cm^2 5.5 cm^2   Wound Depth (cm) 0 cm 0.1 cm   Wound Volume (cm^3) 0 cm^3 0.55 cm^3   Margins Attached edges Attached edges   Madelaine-wound Assessment Dry;Painful;Hyperpigmented;Fragile;Edema;Ecchymosis Dry;Callous;Hyperpigmented   Wound Granulation Tissue Pink Pink;Red   Wound Bed Granulation (%) 10 % 100 %   Wound Bed Epithelium (%) 0 % 0 %   Wound Bed Slough (%) 10 % 0 %   Wound Bed Eschar (%) 80 % 0 %   Wound Bed Fibrin (%) 0 % 0 %   State of Healing Non-healing Fully granulated   Wound Odor Strong None   Pressure Injury Stage Unstageable Stage 3       Active Orders   Date Order Priority Status Authorizing Provider   03/07/25 0959 OP Wound Dressing Routine Active Jose M Johnson APRN     - Cleansing:    Cleanse with normal saline or wound cleanser     - Dressing:    Thin foam     - Additional Wound Dressing Information:    wound vac sponge, coban, heel cap from foam, stockinette and artiflex     - Frequency:    Change dressing three times per week       Compression Wrap 01/15/25 Leg Left;Right (Active)   Placement Date: 01/15/25   Location: Leg  Wound Location Orientation: Left;Right      Assessments 1/15/2025  3:40 PM 3/13/2025  3:23 PM   Response to Treatment Well tolerated Well tolerated   Compression Layers Single Single   Compression Product Type Tubigrip/Spanda (Medigrip E cut out on heel) Tubigrip/Spanda (size E)   Dressing Applied Yes Yes   Compression Wrap Location Toes to Knee Toes to Knee   Compression Wrap Status Clean;Dry;Intact Clean;Dry;Intact;Dressing Changed       No associated orders.       Wound  03/13/25 2 Heel Right (Active)   Date First Assessed: 03/13/25    Wound Number (Wound Clinic Only): 2  Primary Wound Type: Pressure Injury  Location: Heel  Wound Location Orientation: Right      Assessments 3/13/2025  3:23 PM   Wound Image     Site Assessment Dry;Fragile;Pink   Closure Approximated   Drainage Amount None   Treatments Cleansed;Saline   Dressing Other (Bordered foam)   Dressing Changed New   Dressing Status Clean;Dry;Intact   Margins Flat and Intact   Non-staged Wound Description Not applicable   Te-wound Assessment Clean;Dry;Intact;Purple   Pressure Injury Stage Deep Tissue       No associated orders.     Vital Signs    03/13/25 1521   BP: (!) 149/101   Pulse: 96   Resp: 18   Temp: 97.4 °F (36.3 °C)   Allergies  Allergies[1]  Assessment   Left heel wound, pressure injury with diabetes:  -granular tissue, flat wound edges  -no erythema in the te-wound  -edema +4 dorsal foot and +2 ankle edema  -painful to touch, described pins and needles pain, classic sign of diabetic neuropathy, reports more pain with application of wound vac    Right 2nd toe, dark scab, pressure injury, no erythema  Right heel wound with dark scab, deep tissue injury, no erythema     Reviewed note, imagining and labs in Epic and Care Every Where.  Recent labs: 1/3/2025: WBC 6.1, H&H 13.0&38.5, BUN 11, Creatinine 1.42, albumin 4.2  12/30/2024: US of bilateral lower ext, vascular study:  Impression   CONCLUSION: Duplex arterial ultrasound demonstrates patent vessels with triphasic arterial Doppler flow.  Somewhat limited evaluation of the below knee tibial vessels on the left due to overlying calf edema and bandaging in the foot, though the vessels are patent  where visualized.        12/30/2024: MRI of left foot:  Impression   CONCLUSION: Soft tissue and skin ulceration within the posterior heel without abscess.  No osteomyelitis.  6 mm osteochondral lesion of the medial talar dome without collapse of the articular  surface.  Split tear of the peroneus brevis with peroneal tenosynovitis.  Mild Achilles tendinosis.  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  Multiple other incidental findings as described in the body of the report.  Dictated by (CST): Marlon Horne MD on 12/30/2024 at 2:44 PM     Encounter Diagnosis  1. Diabetic ulcer of left heel associated with type 2 diabetes mellitus, unspecified ulcer stage (HCC) [E11.621, L97.429]    2. Pain associated with wound    3. Pressure injury of right heel, stage 1    4. Lymphedema of both lower extremities    5. 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 due to underlying condition, limited to breakdown of skin ([facility])    6. Open wound of lesser toe of right foot      Problem List  Patient Active Problem List   Diagnosis    Iron deficiency anemia due to chronic blood loss    Recurrent pulmonary embolism (HCC)    Menorrhagia with regular cycle    Chronic low back pain with bilateral sciatica    left > right L5 radiculopathies    L5-S1 right mild foraminal & left mild far lateral, L4-5 mild diffuse, L2-3 right mild far lateral bulging discs    Urinary incontinence    Anemia    Anemia, unspecified type    Congenital anomaly of heart (HCC)    Anemia due to blood loss, acute    Atrial septal defect (HCC)    Hypercoagulable state (HCC)    Lymphedema of both lower extremities    Symptomatic anemia    Microcytic anemia    Other pulmonary embolism without acute cor pulmonale (HCC)    Severe episode of recurrent major depressive disorder, without psychotic features (HCC)    Uterine bleeding    Obesity (BMI 30-39.9)    Acquired trigger finger of right middle finger    Chronic anticoagulation    Chronic pain of left knee    Non-pressure chronic ulcer of left lower leg, limited to breakdown of skin (HCC)    Moderate left ventricular hypertrophy    Non-healing open wound of right heel    Pain associated with wound     BRCA gene mutation negative in female    Morbid obesity with BMI of 40.0-44.9, adult (HCC)    Dyslipidemia    HTN (hypertension), benign    Type 2 diabetes mellitus without complication, with long-term current use of insulin (HCC)    Stress    Diabetic ulcer of left heel associated with type 2 diabetes mellitus, unspecified ulcer stage (HCC)    Diabetic ulcer of left heel associated with type 2 diabetes mellitus, with necrosis of muscle (HCC)    Neuropathy    Pressure injury of right heel, stage 1    Open wound of lesser toe of right foot    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 due to underlying condition, limited to breakdown of skin (HCC)   Plan  Orders  Orders Placed This Encounter   Procedures    OP Wound Dressing    OP Wound Dressing   As the wound has become granular to the surface of the skin, this wound can heal without the wound vac, and since it is more convenient with topical dressing, discontinue wound vac today, 3/13/2025.  Left heel: Initiated Hydrofera Blue Transfer dressing secure with tape or rolled gauze, change 2-3 times a week. May use zinc oxide to te-wound to protect the skin.  Right heel: cover with bordered foam, if drainage present may use Hydrofera blue to assist with healing, change 2-3 times a week.  Monitor for signs and symptoms of infection, encourage patient to assess skin daily. May use Dakin's solution to reduce foul smell in the foot wound and te wound with each dressing changes.  Continue offload and reduce pressure from the wound bed by elevating heel when sitting on small table or ottoman, heel elevator foams or wedges. For example, using egg crate cushion under her feet to reduce pressure on his heels. Also, may benefit from wide shoe with large toe box.     Recommended nutrition for wound healing and encourage reduce carbohydrate intake, increase protein intake, and glycemic control diet (A1c is 8 which hinders wound healing.      Discussed the  treatment plan with patient and her daughter, they verbalized understanding and agreed to these plan.  Total face to face time was 45 min, more than 50% of the time was spent in counseling and/or coordination of care related to BLE wounds.  Note to patient: The 21st Century Cures Act makes medical notes like these available to patients in the interest of transparency. Please be advised this is a medical document. Medical documents are intended to carry relevant information, facts as evident, and the clinical opinion of the practitioner. The medical note is intended as peer to peer communication and may appear blunt or direct. It is written in medical language and may contain abbreviations or verbiage that are unfamiliar.  Follow-Up  Return in about 2 weeks (around 3/27/2025) for APN visit 30 min.              [1]   Allergies  Allergen Reactions    Latex HIVES and SWELLING    Influenza Vaccines OTHER (SEE COMMENTS)     Pt passed out     Radiology Contrast Iodinated Dyes ITCHING

## 2025-03-13 NOTE — LETTER
Date: 11/20/2023    Patient Name: Joanna Stark          To Whom it may concern: This patient was seen in my clinic today. Please excuse from work today & tomorrow.          Sincerely,        Yvon Concepcion, DO
no concerns

## 2025-03-20 ENCOUNTER — APPOINTMENT (OUTPATIENT)
Dept: WOUND CARE | Facility: HOSPITAL | Age: 55
End: 2025-03-20
Attending: NURSE PRACTITIONER
Payer: COMMERCIAL

## 2025-03-27 ENCOUNTER — APPOINTMENT (OUTPATIENT)
Dept: WOUND CARE | Facility: HOSPITAL | Age: 55
End: 2025-03-27
Attending: NURSE PRACTITIONER
Payer: COMMERCIAL

## 2025-04-03 ENCOUNTER — APPOINTMENT (OUTPATIENT)
Dept: WOUND CARE | Facility: HOSPITAL | Age: 55
End: 2025-04-03
Attending: NURSE PRACTITIONER
Payer: COMMERCIAL

## 2025-04-07 RX ORDER — SEMAGLUTIDE 2.68 MG/ML
INJECTION, SOLUTION SUBCUTANEOUS
Qty: 9 ML | Refills: 0 | OUTPATIENT
Start: 2025-04-07

## 2025-04-07 NOTE — TELEPHONE ENCOUNTER
Endocrine Refill protocol for oral and injectable diabetic medications    Protocol Criteria:  PASSED  Reason: N/A    If all below requirements are met, send a 90-day supply with 1 refill per provider protocol.    Verify appointment with Endocrinology completed in the last 6 months or scheduled in the next 3 months.  Verify A1C has been completed within the last 6 months and is below 8.5%     Last completed office visit: 1/15/2025 Jennyfer Mayorga APRN   Next scheduled Follow up:   Future Appointments   Date Time Provider Department Center   4/10/2025  3:00 PM Jose M Johnson APRN [facility] [place]   4/17/2025  3:00 PM Jose M Johnson APRN [facility] [place]   4/21/2025 12:30 PM Jennyfer Mayorga APRN [facility]   5/1/2025  3:00 PM Jose M Johnson APRN [facility] [place]   5/6/2025  3:00 PM Chin Vega MD [id number] [facility]   7/9/2025  2:15 PM [id number]      Last A1c result: Last A1c value was 8% done 1/12/2025.

## 2025-04-10 ENCOUNTER — OFFICE VISIT (OUTPATIENT)
Dept: WOUND CARE | Facility: HOSPITAL | Age: 55
End: 2025-04-10
Attending: NURSE PRACTITIONER
Payer: COMMERCIAL

## 2025-04-10 VITALS
TEMPERATURE: 98 F | SYSTOLIC BLOOD PRESSURE: 139 MMHG | RESPIRATION RATE: 18 BRPM | HEART RATE: 108 BPM | OXYGEN SATURATION: 98 % | DIASTOLIC BLOOD PRESSURE: 85 MMHG

## 2025-04-10 DIAGNOSIS — E11.621 DIABETIC ULCER OF LEFT HEEL ASSOCIATED WITH TYPE 2 DIABETES MELLITUS, UNSPECIFIED ULCER STAGE (HCC): Primary | ICD-10-CM

## 2025-04-10 DIAGNOSIS — T14.8XXA PAIN ASSOCIATED WITH WOUND: ICD-10-CM

## 2025-04-10 DIAGNOSIS — R52 PAIN ASSOCIATED WITH WOUND: ICD-10-CM

## 2025-04-10 DIAGNOSIS — M25.572 ACUTE LEFT ANKLE PAIN: ICD-10-CM

## 2025-04-10 DIAGNOSIS — L97.429 DIABETIC ULCER OF LEFT HEEL ASSOCIATED WITH TYPE 2 DIABETES MELLITUS, UNSPECIFIED ULCER STAGE (HCC): Primary | ICD-10-CM

## 2025-04-10 PROCEDURE — 97597 DBRDMT OPN WND 1ST 20 CM/<: CPT | Performed by: NURSE PRACTITIONER

## 2025-04-10 NOTE — PROGRESS NOTES
Subjective   Suki Rosenberg is a 54 year old female.    Chief Complaint   Patient presents with    Wound Recheck     Bilateral heels     HPI  4/10/2025: Patient is here for left heel wound follow up, she is reporting having left ankle pain started two weeks ago. No wound related concerns.   3/13/2025: Patient is here with her daugher, she stated she had wound vac till yesterday, now has dressing, she continues to have nerve pain. Also reporting new ulceration on right heel.  3/7/2025: patient is here with her daughter and another family member. Home health has been in her place, applied wound vac (per patient in 2.5 hours with noise (indicative of leak). Later on  company called and stated no longer able to provide care due to unsigned contract. Patient has done topical dressing, no fever, no chills.   2/20/2025: Patient is here for follow up wound care on left heel wound. She has been approved for NPWT (wound vac), no new wounds.  2/12/2025: Patient is here with her daughter for right heel wound, her daughters are doing the dressing, pain has somewhat reduced.  2/5/2025: Patient is here with her daughter, her daughter is doing dressing changes, no other changes in her medications or neuropathy treatment plan.   1/22/2025: Patient is here for follow up, she has not seen pain managmetnt services for neuropathy. She is not taking any nerve pain medication.   1/15/2025: Patient is here for follow up, reports not tolerating gabapentin, and lyrica. She had been given Zoloft, she stated she is concern about the side effect and cost. She is complaining of pain in her heel.  1/8/2025: Patient is here for follow up, has been doing dressing, minimum drainage. Patient has not refill gabapentin paste for pain management. Also, she stated she had Lyrica before as well as gabapentin which caused significant ankle edema. She is not sure if she is currently taking Amitriptyline.   1/3/2025: Patient stated she developed left heel  blister and wound again, she has history of heels blister in Dec 2023 and then right heel blister and wound on July 2024. 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 She has nerve pain in the foot, described as pins and needles. She stated on Arabella day she walked wrong and blister opened with foul smell. She was admitted to [facility] for left heel wound infection. She was treated with IV antibiotics, MRI showed no bone infection. She was discharged today with home health services for wound care.  Last A1c on 12/28/2024 was 8.0     8/16/2024: Patient is here for follow up, no drainage in the past few days, still pain in the wound area.   8/2/2024: Patient is here for follow up, her daughter has been doing dressing, she reports no drainage from the wound.  7/19/2024: Patient is returning to clinic with new blood blister on right heel, she stated happened few days ago, she opened the blister to drain which reduced the pain. She has her lymphedema pump and using them daily, states wearing her compression stockings as well. She feels her A1c is better managed now came down from 13 to 8.2 on 6/12/2024.     12/11/2023: Patient is here for follow up, tolerated the compression, reports continues to have some pain while walking on left heel, no other concern.   12/4/2023: Patient is 53 year old woman with chronic leg edema and recent bilateral on posterior heel. Patient states she sits for 10-14 hours on chair for her job with very little time to break for walking. She has purchased compression stockings , she has had hard time putting them on or keeping them on.   Patient medical history significant for type two diabetes on Jardiance 25 mg, hx of PE on xarelto 20 mg, neuropathy, fibromyalgia, hx of breast cancer, depression, bipolar, obesity, chronic low back pain with bilateral sciatica, herniated lumbar disc, chronic left knee pain, and osteoarthritis bilateral knees.     Review of  Systems   Constitutional:  Positive for activity change. Negative for chills, fatigue and fever.   HENT:  Negative for congestion.    Respiratory:  Negative for shortness of breath.    Cardiovascular:  Positive for leg swelling. Negative for chest pain.   Gastrointestinal:  Positive for constipation.        Hx of constipation    Musculoskeletal:  Positive for arthralgias, back pain and gait problem.   Skin:  Positive for wound.        Left heel wound  Neurological:  Positive for numbness. Negative for weakness.        Feeling numbness on her toes   Psychiatric/Behavioral:          Hx f anxiety, depression and bipolar      Objective  Physical Exam  Vitals reviewed.   Constitutional:       Appearance: Normal appearance. She is obese.   HENT:      Head: Normocephalic.   Cardiovascular:      Rate and Rhythm: Normal rate.      Pulses: Normal pulses.           Dorsalis pedis pulses are 2+ on the left side.        Posterior tibial pulses are 2+ on the left side.   Pulmonary:      Effort: Pulmonary effort is normal.   Abdominal:      General: Bowel sounds are normal.   Musculoskeletal:      Cervical back: Normal range of motion.      Right lower leg: Edema present.      Left lower leg: Edema present.        Feet: left heel dry blister with eschar cover wound in the center  Feet:      Right foot:      Toenail Condition: Right toenails are normal.      Left foot:      Skin integrity: Ulcer present.      Toenail Condition: Left toenails are normal.   Skin:     General: Skin is warm and dry.      Capillary Refill: Capillary refill takes 2 to 3 seconds.      Findings: No erythema.   Neurological:      Mental Status: She is alert and oriented to person, place, and time  Wound Assessment  Wound 01/03/25 Heel Left (Active)   Date First Assessed: 01/03/25   Location: Heel  Wound Location Orientation: Left      Assessments 1/3/2025  9:56 AM 4/10/2025  3:30 PM   Wound Image          Site Assessment  Black;Brown;Pink;Tan;Yellow;Dry;Moist;Edema Moist;Red;Granulation tissue;Yellow   Closure Not approximated Not approximated   Drainage Amount Small Moderate   Drainage Description Serosanguineous Serosanguineous;Yellow   Treatments Cleansed;Honey Gel;Dakins Cleansed;Saline;Vashe;Topical (Barrier/Moisturizer/Ointment) (zinc oxide to periwound)   Dressing 4x4s (spandage) Hydrofera transfer;4x4s;Gauze;Kerlix roll;Tape   Dressing Changed New Changed   Dressing Status Clean;Dry;Intact Clean;Dry;Intact   Wound Length (cm) 4.1 cm 1.4 cm   Wound Width (cm) 6 cm 1.5 cm   Wound Surface Area (cm^2) 24.6 cm^2 1.65 cm^2   Wound Depth (cm) 0 cm 0.1 cm   Wound Volume (cm^3) 0 cm^3 0.11 cm^3   Margins Attached edges Attached edges   Madelaine-wound Assessment Dry;Painful;Hyperpigmented;Fragile;Edema;Ecchymosis Dry;Callous;Hyperpigmented   Wound Granulation Tissue Pink Red   Wound Bed Granulation (%) 10 % 90 %   Wound Bed Epithelium (%) 0 % 0 %   Wound Bed Slough (%) 10 % 10 %   Wound Bed Eschar (%) 80 % 0 %   Wound Bed Fibrin (%) 0 % 0 %   State of Healing Non-healing Early/partial granulation   Wound Odor Strong None   Pressure Injury Stage Unstageable Stage 3       Active Orders   Date Order Priority Status Authorizing Provider   04/10/25 1621 OP Wound Dressing Routine Active Jose M Johnson APRN     - Cleansing:    Cleanse with normal saline or wound cleanser     - Cleansing:    Cleanse with Vashe     - Dressing:    Hydrofera transfer     - Dressing:    Dry gauze     - Dressing:    Conforming roll     - Additional Wound Dressing Information:    tape. zinc oxide to periwound     - Frequency:    Change dressing three times per week   04/10/25 1552 Debridement Left Heel Routine Active Jose M Johnson APRN       Compression Wrap 01/15/25 Leg Left;Right (Active)   Placement Date: 01/15/25   Location: Leg  Wound Location Orientation: Left;Right      Assessments 1/15/2025  3:40 PM 4/10/2025  3:30 PM   Response to Treatment Well tolerated Well  tolerated   Compression Layers Single Single   Compression Product Type Tubigrip/Spanda (Medigrip E cut out on heel) Tubigrip/Spanda (size E)   Dressing Applied Yes Yes   Compression Wrap Location Toes to Knee Toes to Knee   Compression Wrap Status Clean;Dry;Intact Clean;Dry;Intact;Dressing Changed       No associated orders.       Wound 03/13/25 2 Heel Right (Active)   Date First Assessed: 03/13/25    Wound Number (Wound Clinic Only): 2  Primary Wound Type: Pressure Injury  Location: Heel  Wound Location Orientation: Right      Assessments 3/13/2025  3:23 PM 4/10/2025  3:30 PM   Wound Image         Site Assessment Dry;Fragile;Pink Clean;Dry;Intact   Closure Approximated Approximated   Drainage Amount None --   Treatments Cleansed;Saline --   Dressing Other (Bordered foam) --   Dressing Changed New --   Dressing Status Clean;Dry;Intact --   Margins Flat and Intact --   Non-staged Wound Description Not applicable --   Te-wound Assessment Clean;Dry;Intact;Purple --   Pressure Injury Stage Deep Tissue --       Inactive Orders   Date Order Priority Status Authorizing Provider   03/13/25 1600 OP Wound Dressing Routine Discontinued Jose M Johnson APRN     - Cleansing:    Cleanse with normal saline or wound cleanser     - Dressing:    Bordered foam     - Frequency:    Change dressing two times per week     Vital Signs    04/10/25 1521   BP: 139/85   Pulse: 108   Resp: 18   Temp: 97.5 °F (36.4 °C)   PainSc: 0 - (None)   Allergies  Allergies[1]    Assessment   Left heel wound, pressure injury with diabetes:  -granular tissue, flat wound edges, smaller  -no erythema in the te-wound  -edema +4 dorsal foot and +2 ankle edema  -painful to touch, described pins and needles pain, classic sign of diabetic neuropathy, reports more pain with application of wound vac    Right heel: no open wound, no erythema     Reviewed note, imagining and labs in Epic and Care Every Where.  Recent labs: 1/3/2025: WBC 6.1, H&H 13.0&38.5, BUN 11,  Creatinine 1.42, albumin 4.2  12/30/2024: US of bilateral lower ext, vascular study:  Impression   CONCLUSION: Duplex arterial ultrasound demonstrates patent vessels with triphasic arterial Doppler flow.  Somewhat limited evaluation of the below knee tibial vessels on the left due to overlying calf edema and bandaging in the foot, though the vessels are patent  where visualized.        12/30/2024: MRI of left foot:  Impression   CONCLUSION: Soft tissue and skin ulceration within the posterior heel without abscess.  No osteomyelitis.  6 mm osteochondral lesion of the medial talar dome without collapse of the articular surface.  Split tear of the peroneus brevis with peroneal tenosynovitis.  Mild Achilles tendinosis.  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  Multiple other incidental findings as described in the body of the report.  Dictated by (CST): Marlon Horne MD on 12/30/2024 at 2:44 PM     Encounter Diagnosis  1. Diabetic ulcer of left heel associated with type 2 diabetes mellitus, unspecified ulcer stage (HCC) [E11.621, L97.429]    2. Pain associated with wound    3. Acute left ankle pain      Problem List  Problem List[2]    Plan  Orders  Orders Placed This Encounter   Procedures    Debridement Left Heel    OP Wound Dressing   Discontinued the wound vac on 3/13/2025.  Ordered xray of left ankle for acute pain for two weeks.    Left heel: Continue Hydrofera Blue Transfer dressing secure with tape or rolled gauze, change 2-3 times a week. May use zinc oxide to te-wound to protect the skin.    Monitor for signs and symptoms of infection, encourage patient to assess skin daily. May use Dakin's solution to reduce foul smell in the foot wound and te wound with each dressing changes.  Continue offload and reduce pressure from the wound bed by elevating heel when sitting on small table or ottoman, heel elevator foams or wedges. For example, using egg  crate cushion under her feet to reduce pressure on his heels. Also, may benefit from wide shoe with large toe box.     Recommended nutrition for wound healing and encourage reduce carbohydrate intake, increase protein intake, and glycemic control diet (A1c is 8 which hinders wound healing.      Discussed the treatment plan with patient and her daughter, they verbalized understanding and agreed to these plan.  Total face to face time was 45 min, more than 50% of the time was spent in counseling and/or coordination of care related to BLE wounds.  Note to patient: The 21st Century Cures Act makes medical notes like these available to patients in the interest of transparency. Please be advised this is a medical document. Medical documents are intended to carry relevant information, facts as evident, and the clinical opinion of the practitioner. The medical note is intended as peer to peer communication and may appear blunt or direct. It is written in medical language and may contain abbreviations or verbiage that are unfamiliar.  Follow-Up  Return in about 3 weeks (around 5/1/2025) for APN visit x 30 mins.              [1]   Allergies  Allergen Reactions    Latex HIVES and SWELLING    Influenza Vaccines OTHER (SEE COMMENTS)     Pt passed out     Radiology Contrast Iodinated Dyes ITCHING   [2]   Patient Active Problem List  Diagnosis    Iron deficiency anemia due to chronic blood loss    Recurrent pulmonary embolism (HCC)    Menorrhagia with regular cycle    Chronic low back pain with bilateral sciatica    left > right L5 radiculopathies    L5-S1 right mild foraminal & left mild far lateral, L4-5 mild diffuse, L2-3 right mild far lateral bulging discs    Urinary incontinence    Anemia    Anemia, unspecified type    Congenital anomaly of heart (HCC)    Anemia due to blood loss, acute    Atrial septal defect (HCC)    Hypercoagulable state (HCC)    Lymphedema of both lower extremities    Symptomatic anemia    Microcytic  anemia    Other pulmonary embolism without acute cor pulmonale (HCC)    Severe episode of recurrent major depressive disorder, without psychotic features (HCC)    Uterine bleeding    Obesity (BMI 30-39.9)    Acquired trigger finger of right middle finger    Chronic anticoagulation    Chronic pain of left knee    Non-pressure chronic ulcer of left lower leg, limited to breakdown of skin (HCC)    Moderate left ventricular hypertrophy    Non-healing open wound of right heel    Pain associated with wound    BRCA gene mutation negative in female    Morbid obesity with BMI of 40.0-44.9, adult (HCC)    Dyslipidemia    HTN (hypertension), benign    Type 2 diabetes mellitus without complication, with long-term current use of insulin (HCC)    Stress    Diabetic ulcer of left heel associated with type 2 diabetes mellitus, unspecified ulcer stage (HCC)    Diabetic ulcer of left heel associated with type 2 diabetes mellitus, with necrosis of muscle (HCC)    Neuropathy    Pressure injury of right heel, stage 1    Open wound of lesser toe of right foot    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 due to underlying condition, limited to breakdown of skin (HCC)

## 2025-04-10 NOTE — PROGRESS NOTES
Patient ID: Suki Rosenberg is a 54 year old female.    Debridement Left Heel   Wound 01/03/25 Heel Left    Performed by: Jose M Johnson APRN  Authorized by: Jose M Johnson APRN      Consent   Consent obtained? verbal  Consent given by: patient  Risks discussed? procedural risks discussed  Time out called at 4/10/2025 3:40 PM  Immediately prior to the procedure a time out was called and the performing provider verified the correct patient, procedure, equipment, support staff, and site/side marked as required.    Debridement Details  Performed by: NP  Debridement type: conservative sharp    Pre-debridement measurements  Length (cm): 1.4  Width (cm): 1.5  Depth (cm): 0.1  Surface Area (cm^2): 1.65    Post-debridement measurements  Length (cm): 1.4  Width (cm): 1.5  Depth (cm): 0.1  Percent debrided: 100%  Surface Area (cm^2): 1.65  Area Debrided (cm^2): 1.65  Volume (cm^3): 0.11    Devitalized tissue debrided: callus, fibrin and slough  Instrument(s) utilized: blade  Bleeding: small  Hemostasis obtained with: pressure  Procedural pain (0-10): 5  Post-procedural pain: 1   Response to treatment: procedure was tolerated well

## 2025-04-10 NOTE — PROGRESS NOTES
Weekly Wound Education Note    Teaching Provided To: Patient  Training Topics: Cleasing and general instructions, Dressing, Nutrition, Signs and symptoms of infection, Foot care, Off-loading  Training Method: Demonstration, Explain/Verbal  Training Response: Patient responds and understands        Notes: Left heel: Hydrofera blue, dry gauze, tape, zinc oxide to periwound, Medigrip E

## 2025-04-16 DIAGNOSIS — E55.9 VITAMIN D DEFICIENCY: ICD-10-CM

## 2025-04-16 DIAGNOSIS — G47.00 INSOMNIA, UNSPECIFIED TYPE: ICD-10-CM

## 2025-04-17 ENCOUNTER — APPOINTMENT (OUTPATIENT)
Dept: WOUND CARE | Facility: HOSPITAL | Age: 55
End: 2025-04-17
Attending: NURSE PRACTITIONER
Payer: COMMERCIAL

## 2025-04-17 RX ORDER — ERGOCALCIFEROL 1.25 MG/1
50000 CAPSULE, LIQUID FILLED ORAL WEEKLY
Qty: 12 CAPSULE | Refills: 0 | Status: SHIPPED | OUTPATIENT
Start: 2025-04-17

## 2025-04-21 ENCOUNTER — OFFICE VISIT (OUTPATIENT)
Dept: ENDOCRINOLOGY CLINIC | Facility: CLINIC | Age: 55
End: 2025-04-21
Payer: COMMERCIAL

## 2025-04-21 DIAGNOSIS — Z79.4 TYPE 2 DIABETES MELLITUS WITH OTHER SPECIFIED COMPLICATION, WITH LONG-TERM CURRENT USE OF INSULIN (HCC): ICD-10-CM

## 2025-04-21 DIAGNOSIS — Z12.11 ENCOUNTER FOR SCREENING COLONOSCOPY: ICD-10-CM

## 2025-04-21 DIAGNOSIS — E11.69 TYPE 2 DIABETES MELLITUS WITH OTHER SPECIFIED COMPLICATION, WITH LONG-TERM CURRENT USE OF INSULIN (HCC): ICD-10-CM

## 2025-04-21 DIAGNOSIS — E11.65 UNCONTROLLED TYPE 2 DIABETES MELLITUS WITH HYPERGLYCEMIA (HCC): Primary | ICD-10-CM

## 2025-04-21 LAB
GLUCOSE BLOOD: 298
HEMOGLOBIN A1C: 7.9 % (ref 4.3–5.6)
TEST STRIP LOT #: NORMAL NUMERIC

## 2025-04-21 PROCEDURE — 82947 ASSAY GLUCOSE BLOOD QUANT: CPT

## 2025-04-21 PROCEDURE — 99214 OFFICE O/P EST MOD 30 MIN: CPT

## 2025-04-21 PROCEDURE — 83036 HEMOGLOBIN GLYCOSYLATED A1C: CPT

## 2025-04-21 PROCEDURE — 3051F HG A1C>EQUAL 7.0%<8.0%: CPT

## 2025-04-21 PROCEDURE — 3077F SYST BP >= 140 MM HG: CPT

## 2025-04-21 PROCEDURE — 3079F DIAST BP 80-89 MM HG: CPT

## 2025-04-21 PROCEDURE — 3008F BODY MASS INDEX DOCD: CPT

## 2025-04-21 RX ORDER — INSULIN GLARGINE 100 [IU]/ML
15 INJECTION, SOLUTION SUBCUTANEOUS NIGHTLY
Qty: 15 ML | Refills: 1 | Status: SHIPPED | OUTPATIENT
Start: 2025-04-21

## 2025-04-21 RX ORDER — ZOLPIDEM TARTRATE 10 MG/1
10 TABLET ORAL NIGHTLY PRN
Qty: 30 TABLET | Refills: 0 | Status: SHIPPED | OUTPATIENT
Start: 2025-04-21

## 2025-04-21 RX ORDER — TIRZEPATIDE 15 MG/.5ML
15 INJECTION, SOLUTION SUBCUTANEOUS WEEKLY
Qty: 2 ML | Refills: 5 | Status: SHIPPED | OUTPATIENT
Start: 2025-04-21

## 2025-04-21 RX ORDER — ACYCLOVIR 800 MG/1
1 TABLET ORAL
Qty: 2 EACH | Refills: 5 | Status: SHIPPED | OUTPATIENT
Start: 2025-04-21

## 2025-04-21 NOTE — PROGRESS NOTES
HISTORY OF PRESENT ILLNESS   Suki Rosenberg is a 54 year old female who presents for follow up for diabetes mellitus    Has most recently been following with Dr. Kline - last visit 10/2024. 12/2024- hospitalized for diabetic ulcer of left heel. [facility].     Diabetes History:  Diagnosed- age 18   Patient has had hospitalizations for blood sugar issues- last time 2021    Dietary compliance: moderate compliance. Difficulty sleeping so some overnight eating/snacking    Exercise: Limited - chair yoga exercises at home.     Polyuria/polydipsia: No  Blurred vision: No    Episodes of hypoglycemia: Yes during the daytime hours- lower appetite during the day and then snacking overnight.     Blood Glucose:  Using naa, but states that these have been coming off easily- has not been wearing device.     Current DM Regimen:  Metformin 1000 mg PO BID  Novolog - 4-7 units TID with meals   Basaglar- 12 units daily - ran out of medication   Mounjaro 7.5mg subcutaneous weekly       --> Off jardiance due to recurrent foot infection    Modifying factors:  Medication adherence: Yes  Recent steroids, illness or infections: diabetic ulcer of L heel      REVIEW OF SYSTEMS  Eyes: Diabetic retinopathy present: No             Most recent visit to eye doctor in last 12 months: Yes-  Sep 2024    CV: Cardiovascular disease present: No          Hypertension present: Yes          Hyperlipidemia present: Yes - on statin therapy          Peripheral Vascular Disease present: No     : Nephropathy present: No     Neuro: Neuropathy present: Yes- numbness in feet bilaterally     Skin: Infection or ulceration: No     Osteoporosis: No     Thyroid disease: No     Medications:     Current Outpatient Medications:     Tirzepatide (MOUNJARO) 15 MG/0.5ML Subcutaneous Solution Auto-injector, Inject 15 mg into the skin once a week., Disp: 2 mL, Rfl: 5    Continuous Glucose Sensor (FREESTYLE NAA 3 SENSOR) Does not apply  Misc, 1 each every 14 (fourteen) days., Disp: 2 each, Rfl: 5    insulin glargine (BASAGLAR KWIKPEN) 100 UNIT/ML Subcutaneous Solution Pen-injector, Inject 15 Units into the skin nightly., Disp: 15 mL, Rfl: 1    ERGOCALCIFEROL 1.25 MG (16092 UT) Oral Cap, TAKE 1 CAPSULE BY MOUTH 1 TIME A WEEK FOR 12 DOSES, Disp: 12 capsule, Rfl: 0    XARELTO 20 MG Oral Tab, TAKE 1 TABLET BY MOUTH DAILY WITH FOOD., Disp: 90 tablet, Rfl: 1    lidocaine 5 % External Patch, Place 1 patch onto the skin daily., Disp: 30 patch, Rfl: 2    insulin glargine (BASAGLAR KWIKPEN) 100 UNIT/ML Subcutaneous Solution Pen-injector, Inject 12 Units into the skin nightly., Disp: 15 mL, Rfl: 5    NIFEdipine ER 60 MG Oral Tablet 24 Hr, Take 1 tablet (60 mg total) by mouth daily., Disp: 90 tablet, Rfl: 0    furosemide (LASIX) 40 MG Oral Tab, Take 0.5 tablets (20 mg total) by mouth daily., Disp: 90 tablet, Rfl: 1    sodium hypochlorite 0.125 % External Solution, 10 ml to gauze and apply to wound bed with each dressing changes for 10-20 min, to reduce risk of infection and eliminate foul odor., Disp: 1 each, Rfl: 5    rosuvastatin 20 MG Oral Tab, Take 1 tablet (20 mg total) by mouth nightly., Disp: 90 tablet, Rfl: 0    Continuous Glucose Sensor (FREESTYLE NAA 2 SENSOR) Does not apply Misc, 1 each every 14 (fourteen) days., Disp: 2 each, Rfl: 3    carvedilol 25 MG Oral Tab, Take 1 tablet (25 mg total) by mouth 2 (two) times daily with meals., Disp: 60 tablet, Rfl: 0    spironolactone 50 MG Oral Tab, Take 1 tablet (50 mg total) by mouth daily., Disp: 90 tablet, Rfl: 1    zolpidem 10 MG Oral Tab, Take 1 tablet (10 mg total) by mouth nightly as needed., Disp: 30 tablet, Rfl: 0    Miconazole Nitrate (MICONAZOLE 7) 2 % Vaginal Cream, Place 1 applicator vaginally nightly., Disp: 1 each, Rfl: 0    HYDROcodone-acetaminophen  MG Oral Tab, Take 1 tablet by mouth every 6 (six) hours as needed for Pain., Disp: 15 tablet, Rfl: 0    ARIPiprazole 5 MG Oral Tab, ,  Disp: , Rfl:      Allergies:   Allergies   Allergen Reactions    Latex HIVES and SWELLING    Influenza Vaccines OTHER (SEE COMMENTS)     Pt passed out     Radiology Contrast Iodinated Dyes ITCHING       Social History:   Social History     Socioeconomic History    Marital status: Single   Occupational History    Occupation: works for social security as [employer]   Tobacco Use    Smoking status: Former     Current packs/day: 0.00     Types: Cigarettes     Quit date: 2010     Years since quittin.7    Smokeless tobacco: Never   Vaping Use    Vaping status: Never Used   Substance and Sexual Activity    Alcohol use: Not Currently     Comment: Occ    Drug use: Not Currently     Types: Cannabis     Comment: Tea    Sexual activity: Not Currently     Partners: Male   Other Topics Concern    Blood Transfusions No    Caffeine Concern No    Exercise No       Medical History:   Past Medical History:    Anemia    due to blood loss    Anxiety    Asthma (HCC)    Atrial septal defect (HCC)    Back problem    Bipolar 2 disorder (HCC)    BRCA gene mutation negative in female    Negative 28 gene hereditary breast/gyn cancers panel, report in media tab    Breast cancer (HCC)    L sided, treated with chemo, radiation    Chronic back pain    Chronic constipation    COVID-19 long hauler    Depression    Diabetes (HCC)    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 due to underlying condition, limited to breakdown of skin (HCC)    Essential hypertension    Fibromyalgia    History of blood transfusion    4-5 per year    Hx of motion sickness    Hypercoagulable state (HCC)    Incontinence    Lupus    Migraines    Morbid obesity with BMI of 40.0-44.9, adult (HCC)    Muscle weakness    Neuropathy    Non-pressure chronic ulcer of right lower leg, limited to breakdown of skin (HCC)    PE (pulmonary thromboembolism) (HCC)     after child birth and again  (bilateral)    Pneumonia due to organism    PONV  (postoperative nausea and vomiting)    Pulmonary embolism (HCC)    Schizophrenia (HCC)    Visual impairment    Glasses       Surgical history:   Past Surgical History:   Procedure Laterality Date    Breast biopsy Left     for breast cancer          x 3    Cholecystectomy      Inguinal hernia repair  2011    Had inguinal and ventral hernia repair together @ [facility]    Needle biopsy left  2022    us guided bx axilla    Removal anal fistula,submuscular      repair of anal vaginal fistula after child birth    Repair rotator cuff,acute Left     after trauma    Ventral hernia repair  2011    after last c/s         PHYSICAL EXAM  Vitals:    25 1246 25 1300   BP: (!) 156/93 143/88   Pulse: 96    Weight: 247 lb (112 kg)    Height: 5' 2\" (1.575 m)            General Appearance:  alert, well developed, in no acute distress  Nutritional:  no extreme weight gain or loss  Head: Atraumatic  Eyes:  normal conjunctivae, sclera., normal sclera and normal pupils  Throat/Neck: normal sound to voice. Normal hearing, normal speech  Respiratory:  Speaking in full sentences, non-labored. no increased work of breathing, no audible wheezing    Neuro: motor grossly intact, moving all extremities without difficulty  Psychiatric:  oriented to time, self, and place        ASSESSMENT/PLAN:    Diabetes mellitus type 2 : hyperglycemia  - HgA1c- 7.9% POC today   -Reviewed ABC's of diabetes   - Reviewed pathogenesis of diabetes.   - Reviewed importance of good glycemic control to prevent microvascular and macrovascular complications including nephropathy, neuropathy, retinopathy, and cardiovascular disease.  - Reviewed importance of SBGM- check glucose 1-2 times daily   - Reviewed target glucose goals for patient  fasting and <180 post prandially   - Reviewed importance of following diabetic diet- recommended 135 grams of CHO per day or 45 grams per meal.     - Increase Basaglar to 15 units  subcutaneous once daily.     - Increase Novolog to 10 units TID with meals. Reviewed insulin timing and administration and importance of taking dose before each meal.     - Continue Mounjaro 15 mg subcutaneous weekly. Reviewed side effects and risks vs benefits of medication.     No personal or family history of MEN syndrome  Patient counselled regarding side effects including injection site reactions, nausea, vomiting, diarrhea, pancreatitis, gastroparesis and rare side effect phong Shakeel syndrome.    -Continue Metformin 1000mg PO BID  Take with food    - Dyslipidemia:  Rosuvastatin 20 mg daily, LDL was improved-  and rosuvastatin dose increased 10/2024. Repeat lipids showed improved LDL- 93  - UTD with optho   -Discussed importance of annual eye exams   -Restart Freestyle Meagan 3- restart and use overpatch as discussed.     Hypertension  BP elevated but improved on repeat   She is compliant with medications      RTCin 3 months   4/21/25  FELICITAS Paulino    A total of  35 minutes was spent on obtaining history, reviewing pertinent imaging/labs and specialists notes, evaluating patient, providing multiple treatment options, reinforcing diet/exercise and compliance, and completing documentation.

## 2025-04-21 NOTE — TELEPHONE ENCOUNTER
Please review; protocol failed/ has no protocol    Medication is listed as patient reported.      Recent fills: 02/22/2025,01/04/2025  Last Rx written: 02/21/2025  Last Office Visit: 01/13/2025    Recent Visits  Date Type Provider Dept   01/13/25 Office Visit Graham Recio DO [facility]

## 2025-04-23 RX ORDER — AMITRIPTYLINE HYDROCHLORIDE 75 MG/1
75 TABLET ORAL DAILY
Qty: 90 TABLET | Refills: 1 | OUTPATIENT
Start: 2025-04-23

## 2025-04-23 RX ORDER — SPIRONOLACTONE 50 MG/1
50 TABLET, FILM COATED ORAL DAILY
Qty: 90 TABLET | Refills: 1 | Status: SHIPPED | OUTPATIENT
Start: 2025-04-23

## 2025-04-23 NOTE — TELEPHONE ENCOUNTER
Please review.  Protocol failed / Has no protocol.    From hospital admit 12/27/24:    discontinued on 1/3/2025 by Jeevan Silva MD for the following reason: ** Stop Taking at Discharge.      Patient filled old prescription at pharmacy 1/2025, is medication to continue?    Requested Prescriptions   Pending Prescriptions Disp Refills    SPIRONOLACTONE 50 MG Oral Tab [Pharmacy Med Name: SPIRONOLACTONE 50 MG TABLET] 90 tablet 1     Sig: TAKE 1 TABLET BY MOUTH EVERY DAY       Hypertension Medications Protocol Failed - 4/23/2025 11:47 AM        Failed - Last BP reading less than 140/90     BP Readings from Last 1 Encounters:   04/21/25 (!) 156/93           Failed - Medication is active on med list        Passed - CMP or BMP in past 12 months        Passed - In person appointment or virtual visit in the past 12 mos or appointment in next 3 mos        Passed - EGFRCR or GFRNAA > 50

## 2025-04-24 ENCOUNTER — APPOINTMENT (OUTPATIENT)
Dept: WOUND CARE | Facility: HOSPITAL | Age: 55
End: 2025-04-24
Attending: NURSE PRACTITIONER
Payer: COMMERCIAL

## 2025-04-29 ENCOUNTER — APPOINTMENT (OUTPATIENT)
Dept: GENERAL RADIOLOGY | Facility: HOSPITAL | Age: 55
End: 2025-04-29
Attending: EMERGENCY MEDICINE
Payer: COMMERCIAL

## 2025-04-29 ENCOUNTER — HOSPITAL ENCOUNTER (OUTPATIENT)
Facility: HOSPITAL | Age: 55
Setting detail: OBSERVATION
Discharge: HOME | End: 2025-05-02
Attending: EMERGENCY MEDICINE | Admitting: STUDENT IN AN ORGANIZED HEALTH CARE EDUCATION/TRAINING PROGRAM
Payer: COMMERCIAL

## 2025-04-29 ENCOUNTER — NURSE TRIAGE (OUTPATIENT)
Facility: CLINIC | Age: 55
End: 2025-04-29

## 2025-04-29 VITALS
DIASTOLIC BLOOD PRESSURE: 88 MMHG | WEIGHT: 247 LBS | BODY MASS INDEX: 45.45 KG/M2 | HEART RATE: 96 BPM | SYSTOLIC BLOOD PRESSURE: 143 MMHG | HEIGHT: 62 IN

## 2025-04-29 DIAGNOSIS — R60.0 LOWER EXTREMITY EDEMA: ICD-10-CM

## 2025-04-29 DIAGNOSIS — R07.9 CHEST PAIN OF UNCERTAIN ETIOLOGY: ICD-10-CM

## 2025-04-29 DIAGNOSIS — E87.6 HYPOKALEMIA: Primary | ICD-10-CM

## 2025-04-29 PROBLEM — R73.9 HYPERGLYCEMIA: Status: ACTIVE | Noted: 2025-04-29

## 2025-04-29 LAB
ANION GAP SERPL CALC-SCNC: 8 MMOL/L (ref 0–18)
BASOPHILS # BLD AUTO: 0.03 X10(3) UL (ref 0–0.2)
BASOPHILS NFR BLD AUTO: 0.4 %
BILIRUB UR QL: NEGATIVE
BUN BLD-MCNC: 17 MG/DL (ref 9–23)
BUN/CREAT SERPL: 17.9 (ref 10–20)
CALCIUM BLD-MCNC: 7.9 MG/DL (ref 8.7–10.4)
CHLORIDE SERPL-SCNC: 106 MMOL/L (ref 98–112)
CLARITY UR: CLEAR
CO2 SERPL-SCNC: 25 MMOL/L (ref 21–32)
CREAT BLD-MCNC: 0.95 MG/DL (ref 0.55–1.02)
DEPRECATED RDW RBC AUTO: 44.1 FL (ref 35.1–46.3)
EGFRCR SERPLBLD CKD-EPI 2021: 72 ML/MIN/1.73M2 (ref 60–?)
EOSINOPHIL # BLD AUTO: 0.22 X10(3) UL (ref 0–0.7)
EOSINOPHIL NFR BLD AUTO: 3.3 %
ERYTHROCYTE [DISTWIDTH] IN BLOOD BY AUTOMATED COUNT: 13.9 % (ref 11–15)
GLUCOSE BLD-MCNC: 222 MG/DL (ref 70–99)
GLUCOSE UR-MCNC: >1000 MG/DL
HCT VFR BLD AUTO: 39.6 % (ref 35–48)
HGB BLD-MCNC: 13.2 G/DL (ref 12–16)
HGB UR QL STRIP.AUTO: NEGATIVE
IMM GRANULOCYTES # BLD AUTO: 0.02 X10(3) UL (ref 0–1)
IMM GRANULOCYTES NFR BLD: 0.3 %
KETONES UR-MCNC: NEGATIVE MG/DL
LEUKOCYTE ESTERASE UR QL STRIP.AUTO: NEGATIVE
LYMPHOCYTES # BLD AUTO: 3.15 X10(3) UL (ref 1–4)
LYMPHOCYTES NFR BLD AUTO: 46.8 %
MAGNESIUM SERPL-MCNC: 1.4 MG/DL (ref 1.6–2.6)
MCH RBC QN AUTO: 28.9 PG (ref 26–34)
MCHC RBC AUTO-ENTMCNC: 33.3 G/DL (ref 31–37)
MCV RBC AUTO: 86.8 FL (ref 80–100)
MONOCYTES # BLD AUTO: 0.51 X10(3) UL (ref 0.1–1)
MONOCYTES NFR BLD AUTO: 7.6 %
NEUTROPHILS # BLD AUTO: 2.8 X10 (3) UL (ref 1.5–7.7)
NEUTROPHILS # BLD AUTO: 2.8 X10(3) UL (ref 1.5–7.7)
NEUTROPHILS NFR BLD AUTO: 41.6 %
NITRITE UR QL STRIP.AUTO: NEGATIVE
NT-PROBNP SERPL-MCNC: <35 PG/ML (ref ?–125)
OSMOLALITY SERPL CALC.SUM OF ELEC: 296 MOSM/KG (ref 275–295)
PH UR: 6 [PH] (ref 5–8)
PLATELET # BLD AUTO: 256 10(3)UL (ref 150–450)
POTASSIUM SERPL-SCNC: 2.9 MMOL/L (ref 3.5–5.1)
PROT UR-MCNC: NEGATIVE MG/DL
RBC # BLD AUTO: 4.56 X10(6)UL (ref 3.8–5.3)
SODIUM SERPL-SCNC: 139 MMOL/L (ref 136–145)
SP GR UR STRIP: 1.02 (ref 1–1.03)
TROPONIN I SERPL HS-MCNC: 3 NG/L (ref ?–34)
UROBILINOGEN UR STRIP-ACNC: NORMAL
WBC # BLD AUTO: 6.7 X10(3) UL (ref 4–11)

## 2025-04-29 PROCEDURE — 71045 X-RAY EXAM CHEST 1 VIEW: CPT | Performed by: EMERGENCY MEDICINE

## 2025-04-29 PROCEDURE — 99223 1ST HOSP IP/OBS HIGH 75: CPT | Performed by: HOSPITALIST

## 2025-04-29 RX ORDER — AMITRIPTYLINE HYDROCHLORIDE 75 MG/1
75 TABLET ORAL DAILY
Qty: 90 TABLET | Refills: 1 | Status: SHIPPED | OUTPATIENT
Start: 2025-04-29

## 2025-04-29 RX ORDER — BUMETANIDE 0.25 MG/ML
2 INJECTION, SOLUTION INTRAMUSCULAR; INTRAVENOUS ONCE
Status: COMPLETED | OUTPATIENT
Start: 2025-04-29 | End: 2025-04-30

## 2025-04-29 RX ORDER — POTASSIUM CHLORIDE 1500 MG/1
40 TABLET, EXTENDED RELEASE ORAL ONCE
Status: DISCONTINUED | OUTPATIENT
Start: 2025-04-29 | End: 2025-04-29

## 2025-04-29 RX ORDER — MAGNESIUM SULFATE HEPTAHYDRATE 40 MG/ML
2 INJECTION, SOLUTION INTRAVENOUS ONCE
Status: COMPLETED | OUTPATIENT
Start: 2025-04-29 | End: 2025-04-30

## 2025-04-29 RX ORDER — METOCLOPRAMIDE HYDROCHLORIDE 5 MG/ML
5 INJECTION INTRAMUSCULAR; INTRAVENOUS ONCE
Status: COMPLETED | OUTPATIENT
Start: 2025-04-30 | End: 2025-04-30

## 2025-04-29 RX ORDER — POTASSIUM CHLORIDE 1.5 G/1.58G
40 POWDER, FOR SOLUTION ORAL ONCE
Status: COMPLETED | OUTPATIENT
Start: 2025-04-29 | End: 2025-04-29

## 2025-04-29 RX ORDER — DIPHENHYDRAMINE HYDROCHLORIDE 50 MG/ML
25 INJECTION, SOLUTION INTRAMUSCULAR; INTRAVENOUS ONCE
Status: COMPLETED | OUTPATIENT
Start: 2025-04-30 | End: 2025-04-30

## 2025-04-29 NOTE — TELEPHONE ENCOUNTER
Dr. Recio - please advise    Spoke to patient, full name and date of birth verified.  Patient calling to inquire about refills for zolpidem and amitriptyline.   Patient stated she has not been able to sleep since Saturday morning.   Has tried PM pain reliever, is not helping at all.     Zolpidem 10 mg was sent 4/21/25 quantity 30.    Amitriptyline 75 mg - refill was received 4/16/25 at 8:28 PM, a Bubble[employer] message was sent to patient on 4/21/25 to clarify, but patient did not read that message.     Patient does want the amitriptyline refilled, it appears to have been discontinued when patient was inpatient 1/3/25.    Patient stated she was just given a refill of the nifedipine 60 mg ER tablets, but this refill does not pass protocol.    Patient reports she is still seeing wound care for left foot, next appointment is Thursday 5/1/25.     Patient reports bilateral foot swelling, difficult to put shoes on and heels are tender, it is hard for her to ambulate.     Patient is asking if she is still supposed to be on Losartan (discontinued 5/24/23 on medication list).    RN called [facility] on the Zolpidem, spoke to Nicky who confirmed receipt of the prescription on 4/21/25, but it is showing a prior authorization is needed.     Separate encounter routed to Triage Support for prior authorization on Zolpidem.

## 2025-04-29 NOTE — TELEPHONE ENCOUNTER
[phone] (Last signed Verbal Release verified)--> phone continues to ring without answer [1st attempt]    [phone] (Last signed Verbal Release verified)-->Patient contacted and made aware of Dr. Recio's recommendation and Rxs sent. Patient verbalized understanding. She then reported symptoms-->see RN Triage encounter from today, 4/29/25. Follow-up visit was not scheduled yet as patient was advised to go to Emergency Department and advised to schedule Emergency Department follow-up visit post-discharge.

## 2025-04-29 NOTE — TELEPHONE ENCOUNTER
Rx's signed. Please recommend f/u appt as she is due. If she has not been taking losartan, can continue to hold for now until f/u. Thank you.

## 2025-04-30 ENCOUNTER — APPOINTMENT (OUTPATIENT)
Dept: CT IMAGING | Facility: HOSPITAL | Age: 55
End: 2025-04-30
Attending: EMERGENCY MEDICINE
Payer: COMMERCIAL

## 2025-04-30 ENCOUNTER — APPOINTMENT (OUTPATIENT)
Dept: ULTRASOUND IMAGING | Facility: HOSPITAL | Age: 55
End: 2025-04-30
Attending: HOSPITALIST
Payer: COMMERCIAL

## 2025-04-30 ENCOUNTER — APPOINTMENT (OUTPATIENT)
Dept: CV DIAGNOSTICS | Facility: HOSPITAL | Age: 55
End: 2025-04-30
Attending: HOSPITALIST
Payer: COMMERCIAL

## 2025-04-30 PROBLEM — R07.9 CHEST PAIN OF UNCERTAIN ETIOLOGY: Status: ACTIVE | Noted: 2025-04-30

## 2025-04-30 PROBLEM — R60.0 LOWER EXTREMITY EDEMA: Status: ACTIVE | Noted: 2025-04-30

## 2025-04-30 LAB
ALBUMIN SERPL-MCNC: 4.4 G/DL (ref 3.2–4.8)
ALP LIVER SERPL-CCNC: 84 U/L (ref 41–108)
ALT SERPL-CCNC: 25 U/L (ref 10–49)
ANION GAP SERPL CALC-SCNC: 10 MMOL/L (ref 0–18)
AST SERPL-CCNC: 27 U/L (ref ?–34)
ATRIAL RATE: 84 BPM
BASOPHILS # BLD AUTO: 0.04 X10(3) UL (ref 0–0.2)
BASOPHILS NFR BLD AUTO: 0.7 %
BILIRUB DIRECT SERPL-MCNC: 0.2 MG/DL (ref ?–0.3)
BILIRUB SERPL-MCNC: 0.6 MG/DL (ref 0.3–1.2)
BUN BLD-MCNC: 13 MG/DL (ref 9–23)
BUN/CREAT SERPL: 14.1 (ref 10–20)
CALCIUM BLD-MCNC: 9.5 MG/DL (ref 8.7–10.4)
CHLORIDE SERPL-SCNC: 101 MMOL/L (ref 98–112)
CO2 SERPL-SCNC: 26 MMOL/L (ref 21–32)
CREAT BLD-MCNC: 0.92 MG/DL (ref 0.55–1.02)
D DIMER PPP FEU-MCNC: 0.28 UG/ML FEU (ref ?–0.53)
DEPRECATED RDW RBC AUTO: 43.5 FL (ref 35.1–46.3)
EGFRCR SERPLBLD CKD-EPI 2021: 74 ML/MIN/1.73M2 (ref 60–?)
EOSINOPHIL # BLD AUTO: 0.19 X10(3) UL (ref 0–0.7)
EOSINOPHIL NFR BLD AUTO: 3.3 %
ERYTHROCYTE [DISTWIDTH] IN BLOOD BY AUTOMATED COUNT: 13.8 % (ref 11–15)
GLUCOSE BLD-MCNC: 152 MG/DL (ref 70–99)
GLUCOSE BLDC GLUCOMTR-MCNC: 120 MG/DL (ref 70–99)
GLUCOSE BLDC GLUCOMTR-MCNC: 154 MG/DL (ref 70–99)
GLUCOSE BLDC GLUCOMTR-MCNC: 189 MG/DL (ref 70–99)
GLUCOSE BLDC GLUCOMTR-MCNC: 214 MG/DL (ref 70–99)
GLUCOSE BLDC GLUCOMTR-MCNC: 251 MG/DL (ref 70–99)
HCT VFR BLD AUTO: 39.3 % (ref 35–48)
HGB BLD-MCNC: 13.5 G/DL (ref 12–16)
IMM GRANULOCYTES # BLD AUTO: 0.02 X10(3) UL (ref 0–1)
IMM GRANULOCYTES NFR BLD: 0.4 %
LYMPHOCYTES # BLD AUTO: 2.19 X10(3) UL (ref 1–4)
LYMPHOCYTES NFR BLD AUTO: 38.4 %
MAGNESIUM SERPL-MCNC: 2.4 MG/DL (ref 1.6–2.6)
MCH RBC QN AUTO: 29.7 PG (ref 26–34)
MCHC RBC AUTO-ENTMCNC: 34.4 G/DL (ref 31–37)
MCV RBC AUTO: 86.4 FL (ref 80–100)
MONOCYTES # BLD AUTO: 0.55 X10(3) UL (ref 0.1–1)
MONOCYTES NFR BLD AUTO: 9.6 %
NEUTROPHILS # BLD AUTO: 2.71 X10 (3) UL (ref 1.5–7.7)
NEUTROPHILS # BLD AUTO: 2.71 X10(3) UL (ref 1.5–7.7)
NEUTROPHILS NFR BLD AUTO: 47.6 %
OSMOLALITY SERPL CALC.SUM OF ELEC: 287 MOSM/KG (ref 275–295)
P AXIS: 45 DEGREES
P-R INTERVAL: 170 MS
PLATELET # BLD AUTO: 250 10(3)UL (ref 150–450)
POTASSIUM SERPL-SCNC: 4.2 MMOL/L (ref 3.5–5.1)
POTASSIUM SERPL-SCNC: 4.2 MMOL/L (ref 3.5–5.1)
PROT SERPL-MCNC: 7.2 G/DL (ref 5.7–8.2)
Q-T INTERVAL: 400 MS
QRS DURATION: 84 MS
QTC CALCULATION (BEZET): 472 MS
R AXIS: -10 DEGREES
RBC # BLD AUTO: 4.55 X10(6)UL (ref 3.8–5.3)
SODIUM SERPL-SCNC: 137 MMOL/L (ref 136–145)
T AXIS: 7 DEGREES
TROPONIN I SERPL HS-MCNC: 3 NG/L (ref ?–34)
VENTRICULAR RATE: 84 BPM
WBC # BLD AUTO: 5.7 X10(3) UL (ref 4–11)

## 2025-04-30 PROCEDURE — 70450 CT HEAD/BRAIN W/O DYE: CPT | Performed by: EMERGENCY MEDICINE

## 2025-04-30 PROCEDURE — 93306 TTE W/DOPPLER COMPLETE: CPT | Performed by: HOSPITALIST

## 2025-04-30 PROCEDURE — 93970 EXTREMITY STUDY: CPT | Performed by: HOSPITALIST

## 2025-04-30 PROCEDURE — 99233 SBSQ HOSP IP/OBS HIGH 50: CPT | Performed by: INTERNAL MEDICINE

## 2025-04-30 RX ORDER — AMITRIPTYLINE HYDROCHLORIDE 75 MG/1
75 TABLET ORAL DAILY
Status: DISCONTINUED | OUTPATIENT
Start: 2025-05-01 | End: 2025-05-02

## 2025-04-30 RX ORDER — INSULIN DEGLUDEC 100 U/ML
12 INJECTION, SOLUTION SUBCUTANEOUS NIGHTLY
Status: DISCONTINUED | OUTPATIENT
Start: 2025-04-30 | End: 2025-05-02

## 2025-04-30 RX ORDER — NICOTINE POLACRILEX 4 MG
15 LOZENGE BUCCAL
Status: DISCONTINUED | OUTPATIENT
Start: 2025-04-30 | End: 2025-05-02

## 2025-04-30 RX ORDER — NIFEDIPINE 30 MG/1
60 TABLET, EXTENDED RELEASE ORAL DAILY
Status: DISCONTINUED | OUTPATIENT
Start: 2025-04-30 | End: 2025-05-02

## 2025-04-30 RX ORDER — BUTALBITAL, ACETAMINOPHEN AND CAFFEINE 50; 325; 40 MG/1; MG/1; MG/1
1 TABLET ORAL EVERY 4 HOURS PRN
Status: DISCONTINUED | OUTPATIENT
Start: 2025-04-30 | End: 2025-05-02

## 2025-04-30 RX ORDER — CARVEDILOL 25 MG/1
25 TABLET ORAL 2 TIMES DAILY WITH MEALS
Status: DISCONTINUED | OUTPATIENT
Start: 2025-04-30 | End: 2025-05-02

## 2025-04-30 RX ORDER — HYDRALAZINE HYDROCHLORIDE 20 MG/ML
10 INJECTION INTRAMUSCULAR; INTRAVENOUS EVERY 4 HOURS PRN
Status: DISCONTINUED | OUTPATIENT
Start: 2025-04-30 | End: 2025-05-02

## 2025-04-30 RX ORDER — ROSUVASTATIN CALCIUM 20 MG/1
20 TABLET, COATED ORAL NIGHTLY
Status: DISCONTINUED | OUTPATIENT
Start: 2025-04-30 | End: 2025-05-02

## 2025-04-30 RX ORDER — HYDROCODONE BITARTRATE AND ACETAMINOPHEN 10; 325 MG/1; MG/1
1 TABLET ORAL EVERY 4 HOURS PRN
Refills: 0 | Status: DISCONTINUED | OUTPATIENT
Start: 2025-04-30 | End: 2025-05-02

## 2025-04-30 RX ORDER — ONDANSETRON 2 MG/ML
4 INJECTION INTRAMUSCULAR; INTRAVENOUS EVERY 6 HOURS PRN
Status: DISCONTINUED | OUTPATIENT
Start: 2025-04-30 | End: 2025-05-02

## 2025-04-30 RX ORDER — SPIRONOLACTONE 50 MG/1
50 TABLET, FILM COATED ORAL DAILY
Status: DISCONTINUED | OUTPATIENT
Start: 2025-04-30 | End: 2025-05-02

## 2025-04-30 RX ORDER — NICOTINE POLACRILEX 4 MG
30 LOZENGE BUCCAL
Status: DISCONTINUED | OUTPATIENT
Start: 2025-04-30 | End: 2025-05-02

## 2025-04-30 RX ORDER — MAGNESIUM SULFATE HEPTAHYDRATE 40 MG/ML
2 INJECTION, SOLUTION INTRAVENOUS ONCE
Status: COMPLETED | OUTPATIENT
Start: 2025-04-30 | End: 2025-04-30

## 2025-04-30 RX ORDER — ACETAMINOPHEN 325 MG/1
650 TABLET ORAL EVERY 6 HOURS PRN
Status: DISCONTINUED | OUTPATIENT
Start: 2025-04-30 | End: 2025-05-02

## 2025-04-30 RX ORDER — FUROSEMIDE 10 MG/ML
40 INJECTION INTRAMUSCULAR; INTRAVENOUS DAILY
Status: DISCONTINUED | OUTPATIENT
Start: 2025-04-30 | End: 2025-05-01

## 2025-04-30 RX ORDER — DEXTROSE MONOHYDRATE 25 G/50ML
50 INJECTION, SOLUTION INTRAVENOUS
Status: DISCONTINUED | OUTPATIENT
Start: 2025-04-30 | End: 2025-05-02

## 2025-04-30 RX ORDER — HYDROCODONE BITARTRATE AND ACETAMINOPHEN 5; 325 MG/1; MG/1
1 TABLET ORAL EVERY 6 HOURS PRN
Status: DISCONTINUED | OUTPATIENT
Start: 2025-04-30 | End: 2025-05-02

## 2025-04-30 RX ORDER — ZOLPIDEM TARTRATE 5 MG/1
10 TABLET ORAL NIGHTLY PRN
Status: DISCONTINUED | OUTPATIENT
Start: 2025-04-30 | End: 2025-05-02

## 2025-04-30 RX ORDER — TRAMADOL HYDROCHLORIDE 50 MG/1
50 TABLET ORAL 2 TIMES DAILY PRN
Status: DISCONTINUED | OUTPATIENT
Start: 2025-04-30 | End: 2025-05-02

## 2025-04-30 NOTE — PROGRESS NOTES
[facility]  part of [facility]     Hospitalist Progress Note     Suki Rosenberg Patient Status:  Observation    1970 MRN [id number]   Location [facility] 1W Attending Veena Tyler MD   Hosp Day # 0 PCP Graham Recio DO     Subjective:     Patient resting comfortably in bed and in NAD. Denied any active complaints at the time of interview.   She reports feeling better than she did on admission.   No overnight events reported by the nursing staff.       Objective:    Review of Systems:   ROS completed; pertinent positive and negatives stated in subjective.      Vital signs:  Temp:  [97.8 °F (36.6 °C)-98.2 °F (36.8 °C)] 97.8 °F (36.6 °C)  Pulse:  [80-89] 88  Resp:  [16-26] 20  BP: (116-153)/() 137/86  SpO2:  [98 %-100 %] 99 %      Physical Exam:    Gen: NAD AO x3  Chest: good air entry CTABL  CVS: normal s1 and s2 RR  Abd: NABS soft NT ND  Neuro: CN 2-12 grossly intact  Ext: no edema in bilateral LE      Diagnostic Data:    Labs:  Recent Labs   Lab 25  0645   WBC 6.7 5.7   HGB 13.2 13.5   MCV 86.8 86.4   .0 250.0       Recent Labs   Lab 25  2240 25  0645   * 152*   BUN 17 13   CREATSERUM 0.95 0.92   CA 7.9* 9.5   ALB  --  4.4    137   K 2.9* 4.2  4.2    101   CO2 25.0 26.0   ALKPHO  --  84   AST  --  27   ALT  --  25   BILT  --  0.6   TP  --  7.2       Estimated Creatinine Clearance: 55.9 mL/min (based on SCr of 0.92 mg/dL).    No results for input(s): \"PTP\", \"INR\" in the last 168 hours.           Imaging: Imaging data reviewed in Epic.    Medications: Scheduled Medications[1]    Assessment & Plan:     Acute heart failure exacerbation  Imagine reviewed  BNP WNL  Trop and EKG reviewed  Started on Lasix 40 mg IV BID in ER  Echo pending  Cardiology on consult  Continue IV diuresis  Patient may need inpatient ischemic eval during hospital stay  LE arterial dopplers pending  Net IO Since Admission: -2,000 mL [25  1444]  Strict Is and Os, daily weights  Migraine  Unable to use NSAIDs due to Xarelto  Fioricet ordered  Tramadol, Norco PRN  LE edema  LE venous dopplers negative  Supportive care  Uncontrolled DM  SSI and frequent accuchecks  Hx of PE  Continue xarelto  Hypokalemia/Hypomagnesemia  Electrolyte protocol  Morbid obesity with CRISTINO  Counseled on making healthy lifestyle and dietary changes      Plan of care discussed with patient or family at bedside.      Supplementary Documentation:     Quality:  DVT Prophylaxis: Xarelto  CODE status: Full       Estimated date of discharge: TBD  Discharge is dependent on: clinical stability  At this point Ms. Rosenberg is expected to be discharge to: home                   Veena Tyler MD  Hospitalist    MDM: High, I personally reviewed the available laboratories, imaging. I discussed the case with RN. I ordered laboratories, studies including AM labs.  Medical decision making high, risk is high.       The 21st Century Cures Act makes medical notes like these available to patients in the interest of transparency. Please be advised this is a medical document. Medical documents are intended to carry relevant information, facts as evident, and the clinical opinion of the practitioner. The medical note is intended as peer to peer communication and may appear blunt or direct. It is written in medical language and may contain abbreviations or verbiage that are unfamiliar.        [1]    insulin aspart  1-7 Units Subcutaneous TID CC and HS    carvedilol  25 mg Oral BID with meals    insulin degludec  12 Units Subcutaneous Nightly    NIFEdipine ER  60 mg Oral Daily    rosuvastatin  20 mg Oral Nightly    sertraline  100 mg Oral QPM    spironolactone  50 mg Oral Daily    rivaroxaban  20 mg Oral Daily with food    furosemide  40 mg Intravenous Daily

## 2025-04-30 NOTE — WOUND PROGRESS NOTE
Spoke to Sharon bedside RN and advised to use xeroform and bordered foam in place of hydraferra blue for the pt's chronic heel wound.

## 2025-04-30 NOTE — ED INITIAL ASSESSMENT (HPI)
Pt arrives to triage with c/o bilateral LLE swelling since 4/23/2025. Pt was sent by MD    Pt also C/o a migraine and hypertension 156/94 .    Non-pitting lower extremity swelling

## 2025-04-30 NOTE — ED PROVIDER NOTES
Patient Seen in: [facility] Emergency Department      History     Chief Complaint   Patient presents with    Swelling Edema     Bilateral lower extremity     Stated Complaint: BLE swelling, draining ucler to heel    Subjective:   HPI    Patient presents the emergency department complaining of increasing swelling in her bilateral lower legs, left-sided chest discomfort with exertion and shortness of breath.  She states that all the symptoms have been worse over the last 2 to 3 weeks.  She states that she is taking her water pill but she does not believe like she is urinating enough.  She denies fever or chills.  There is no other aggravating or alleviating factors.  History of Present Illness               Objective:     Past Medical History:    Anemia    due to blood loss    Anxiety    Asthma (HCC)    Atrial septal defect (HCC)    Back problem    Bipolar 2 disorder (HCC)    BRCA gene mutation negative in female    Negative 28 gene hereditary breast/gyn cancers panel, report in media tab    Breast cancer (HCC)    L sided, treated with chemo, radiation    Chronic back pain    Chronic constipation    COVID-19 long hauler    Depression    Diabetes (HCC)    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 due to underlying condition, limited to breakdown of skin (HCC)    Essential hypertension    Fibromyalgia    High blood pressure    High cholesterol    History of blood transfusion    4-5 per year    Hx of motion sickness    Hypercoagulable state (HCC)    Incontinence    Lupus    Migraines    Morbid obesity with BMI of 40.0-44.9, adult (HCC)    Muscle weakness    Neuropathy    Non-pressure chronic ulcer of right lower leg, limited to breakdown of skin (HCC)    PE (pulmonary thromboembolism) ([facility])    1999 after child birth and again 2014 (bilateral)    Pneumonia due to organism    PONV (postoperative nausea and vomiting)    Pulmonary embolism (HCC)    Schizophrenia (HCC)    Visual impairment     Glasses              Past Surgical History:   Procedure Laterality Date    Breast biopsy Left     for breast cancer          x 3    Cholecystectomy      Inguinal hernia repair  2011    Had inguinal and ventral hernia repair together @ [place]    Needle biopsy left  2022    us guided bx axilla    Removal anal fistula,submuscular      repair of anal vaginal fistula after child birth    Repair rotator cuff,acute Left     after trauma    Ventral hernia repair  2011    after last c/s                Social History     Socioeconomic History    Marital status: Single   Occupational History    Occupation: works for social [employer] as [employer]   Tobacco Use    Smoking status: Former     Current packs/day: 0.00     Types: Cigarettes     Quit date: 2010     Years since quittin.7    Smokeless tobacco: Never   Vaping Use    Vaping status: Never Used   Substance and Sexual Activity    Alcohol use: Not Currently     Comment: Occ    Drug use: Not Currently     Types: Cannabis     Comment: Tea    Sexual activity: Not Currently     Partners: Male   Other Topics Concern    Blood Transfusions No    Caffeine Concern No    Exercise No   Social History Narrative        Her daughter in [place] is due at the end of 2018, but just recently had cerclage out.She is currently premed. She has daughters 23, 22, 19 and 8. Her 7 y/o is disabled b/c she was a 28 W premie with PPROM at [place] [facility]. Patient is currently homeless b/c she lost her home after prolonged disability.        She is currently living with friends and family members. Plans to transfer her job to [place]     Social Drivers of Health     Food Insecurity: No Food Insecurity (2025)    NCSS - Food Insecurity     Worried About Running Out of Food in the Last Year: No     Ran Out of Food in the Last Year: No   Transportation Needs: No Transportation Needs (2025)    NCSS - Transportation     Lack of Transportation: No    Housing Stability: Not At Risk (4/30/2025)    NCSS - Housing/Utilities     Has Housing: Yes     Worried About Losing Housing: No     Unable to Get Utilities: No                                Physical Exam     ED Triage Vitals   BP 04/29/25 2140 153/85   Pulse 04/29/25 2140 88   Resp 04/29/25 2140 18   Temp 04/29/25 2144 98.2 °F (36.8 °C)   Temp src 04/30/25 0049 Oral   SpO2 04/29/25 2140 99 %   O2 Device 04/29/25 2140 None (Room air)       Current Vitals:   Vital Signs  BP: 99/68  Pulse: 74  Resp: 18  Temp: 97.9 °F (36.6 °C)  Temp src: Axillary  MAP (mmHg): 77    Oxygen Therapy  SpO2: 97 %  O2 Device: None (Room air)        Physical Exam  Vitals and nursing note reviewed.   Constitutional:       General: She is not in acute distress.     Appearance: She is well-developed.   HENT:      Head: Normocephalic.      Nose: Nose normal.      Mouth/Throat:      Mouth: Mucous membranes are moist.   Eyes:      Conjunctiva/sclera: Conjunctivae normal.   Cardiovascular:      Rate and Rhythm: Normal rate and regular rhythm.      Heart sounds: No murmur heard.  Pulmonary:      Effort: Pulmonary effort is normal. No respiratory distress.      Breath sounds: Normal breath sounds.   Abdominal:      General: There is no distension.      Palpations: Abdomen is soft.      Tenderness: There is no abdominal tenderness.   Musculoskeletal:         General: No tenderness. Normal range of motion.      Cervical back: Normal range of motion and neck supple.      Right lower leg: Edema present.      Left lower leg: Edema present.   Skin:     General: Skin is warm and dry.      Findings: No rash.   Neurological:      Mental Status: She is alert and oriented to person, place, and time.           Physical Exam                ED Course     Labs Reviewed   BASIC METABOLIC PANEL (8) - Abnormal; Notable for the following components:       Result Value    Glucose 222 (*)     Potassium 2.9 (*)     Calcium, Total 7.9 (*)     Calculated Osmolality 296  (*)     All other components within normal limits   URINALYSIS, ROUTINE - Abnormal; Notable for the following components:    Glucose Urine >1000 (*)     All other components within normal limits   MAGNESIUM - Abnormal; Notable for the following components:    Magnesium 1.4 (*)     All other components within normal limits   BASIC METABOLIC PANEL (8) - Abnormal; Notable for the following components:    Glucose 152 (*)     All other components within normal limits   COMP METABOLIC PANEL (14) - Abnormal; Notable for the following components:    Glucose 124 (*)     Creatinine 1.24 (*)     eGFR-Cr 52 (*)     All other components within normal limits   COMP METABOLIC PANEL (14) - Abnormal; Notable for the following components:    Glucose 117 (*)     Creatinine 1.23 (*)     eGFR-Cr 53 (*)     All other components within normal limits   CBC WITH DIFFERENTIAL WITH PLATELET - Abnormal; Notable for the following components:    RDW-SD 46.9 (*)     All other components within normal limits   POCT GLUCOSE - Abnormal; Notable for the following components:    POC Glucose  120 (*)     All other components within normal limits   POCT GLUCOSE - Abnormal; Notable for the following components:    POC Glucose  189 (*)     All other components within normal limits   POCT GLUCOSE - Abnormal; Notable for the following components:    POC Glucose  251 (*)     All other components within normal limits   POCT GLUCOSE - Abnormal; Notable for the following components:    POC Glucose  154 (*)     All other components within normal limits   POCT GLUCOSE - Abnormal; Notable for the following components:    POC Glucose  214 (*)     All other components within normal limits   POCT GLUCOSE - Abnormal; Notable for the following components:    POC Glucose  156 (*)     All other components within normal limits   POCT GLUCOSE - Abnormal; Notable for the following components:    POC Glucose  214 (*)     All other components within normal limits   POCT  GLUCOSE - Abnormal; Notable for the following components:    POC Glucose  238 (*)     All other components within normal limits   POCT GLUCOSE - Abnormal; Notable for the following components:    POC Glucose  183 (*)     All other components within normal limits   POCT GLUCOSE - Abnormal; Notable for the following components:    POC Glucose  54 (*)     All other components within normal limits   POCT GLUCOSE - Abnormal; Notable for the following components:    POC Glucose  68 (*)     All other components within normal limits   POCT GLUCOSE - Abnormal; Notable for the following components:    POC Glucose  161 (*)     All other components within normal limits   POCT GLUCOSE - Abnormal; Notable for the following components:    POC Glucose  225 (*)     All other components within normal limits   TROPONIN I HIGH SENSITIVITY - Normal   PRO BETA NATRIURETIC PEPTIDE - Normal   TROPONIN I HIGH SENSITIVITY - Normal   MAGNESIUM - Normal   HEPATIC FUNCTION PANEL (7) - Normal   POTASSIUM - Normal   D-DIMER - Normal   MAGNESIUM - Normal   CBC WITH DIFFERENTIAL WITH PLATELET   CBC WITH DIFFERENTIAL WITH PLATELET   CBC WITH DIFFERENTIAL WITH PLATELET     EKG    Rate, intervals and axes as noted on EKG Report.  Rate: 84 bpm  Rhythm: Sinus Rhythm  Reading: Normal EKG              Results                                 MDM          Admission disposition: 4/29/2025 11:34 PM           Medical Decision Making  Diagnosis considered for heart failure, electrolyte disturbance, dependent edema, venous insufficiency.    Problems Addressed:  Chest pain of uncertain etiology: acute illness or injury  Hypokalemia: acute illness or injury  Lower extremity edema: acute illness or injury    Amount and/or Complexity of Data Reviewed  Labs: ordered. Decision-making details documented in ED Course.     Details: Chemistry panel shows normal kidney function  Radiology: ordered and independent interpretation performed. Decision-making details documented  in ED Course.     Details: CT scan of the brain due to the development of headache in the emergency department shows normal head CT without intracranial hemorrhage.  Chest x-ray shows no evidence of heart failure  ECG/medicine tests: ordered and independent interpretation performed. Decision-making details documented in ED Course.  Discussion of management or test interpretation with external provider(s): Bumex given in the emergency department.  Discussed with cardiology and hospitalist.  Potassium replaced intravenously.  Magnesium replaced as well.    Risk  Prescription drug management.  Drug therapy requiring intensive monitoring for toxicity.  Decision regarding hospitalization.  Risk Details:   Critical Care Documentation    There were greater than 30 minutes of critical care time spent directly on this patient and this time did not include procedures but did include:    7 minutes for documentation  10 minutes for physical exam, re-examination and discussing results with patient and family  10 minutes discussing case with admitting physicians and consultants  5 minutes interpreting labs and diagnostic testing        Critical Care  Total time providing critical care: 32 minutes        Disposition and Plan     Clinical Impression:  1. Hypokalemia    2. Lower extremity edema    3. Chest pain of uncertain etiology         Disposition:  Admit  4/29/2025 11:34 pm    Follow-up:  Brandyn Viera MD  [address] [place] [id number]  [phone]    Follow up in 1 week(s)  Office will call you to schedule follow up          Medications Prescribed:  Current Discharge Medication List          Supplementary Documentation:         Hospital Problems       Present on Admission  Date Reviewed: 4/29/2025          ICD-10-CM Noted POA    * (Principal) Hypokalemia E87.6 4/29/2025 Unknown    Chest pain of uncertain etiology R07.9 4/30/2025 Unknown    Hyperglycemia R73.9 4/29/2025 Yes    Lower extremity edema R60.0  4/30/2025 Unknown

## 2025-04-30 NOTE — H&P
[facility]  part of [facility]    History & Physical    Suki Rosenberg Patient Status:  Emergency    1970 MRN [id number]   Location [facility] EMERGENCY DEPARTMENT Attending Wilmer Brown MD   Hosp Day # 0 PCP Graham Recio DO     Date:  2025  Date of Admission:  2025    Chief Complaint:  Chief Complaint   Patient presents with    Swelling Edema     Bilateral lower extremity       History of Present Illness:  Suki Rosenberg is a(n) 54 year old female, with past medical history significant for asthma, prior PE currently on anticoagulation and diabetes presents with a complaint of increasing shortness of breath particularly on exertion and increasing lower extremity swelling over the past few weeks.  Describes the onset as gradual with progressive worsening aggravated by minimal exertion with no relieving factors.  Claims she can walk barely 10 steps before she feels like her arm and chest are being squeezed, has to wait before she can get her breath back.  Does admit to a rather sedentary lifestyle, claims she sits at work 4 hours together without getting up.  Her lower extremity swelling has increased rather rheumatically particular in the left of late.  She also complains of severe headaches 2-3 times a week, associated with photophobia and has been diagnosed with migraines in the past.  Claims she takes Elavil with minimal improvement.    History:  Past Medical History:    Anemia    due to blood loss    Anxiety    Asthma (HCC)    Atrial septal defect (HCC)    Back problem    Bipolar 2 disorder (HCC)    BRCA gene mutation negative in female    Negative 28 gene hereditary breast/gyn cancers panel, report in media tab    Breast cancer (HCC)    L sided, treated with chemo, radiation    Chronic back pain    Chronic constipation    COVID-19 long hauler    Depression    Diabetes (HCC)    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 due to underlying  condition, limited to breakdown of skin (HCC)    Essential hypertension    Fibromyalgia    History of blood transfusion    4-5 per year    Hx of motion sickness    Hypercoagulable state (HCC)    Incontinence    Lupus    Migraines    Morbid obesity with BMI of 40.0-44.9, adult (HCC)    Muscle weakness    Neuropathy    Non-pressure chronic ulcer of right lower leg, limited to breakdown of skin (HCC)    PE (pulmonary thromboembolism) (HCC)     after child birth and again  (bilateral)    Pneumonia due to organism    PONV (postoperative nausea and vomiting)    Pulmonary embolism (HCC)    Schizophrenia (HCC)    Visual impairment    Glasses     Past Surgical History:   Procedure Laterality Date    Breast biopsy Left     for breast cancer          x 3    Cholecystectomy      Inguinal hernia repair  2011    Had inguinal and ventral hernia repair together @ [place]    Needle biopsy left  2022    us guided bx axilla    Removal anal fistula,submuscular      repair of anal vaginal fistula after child birth    Repair rotator cuff,acute Left     after trauma    Ventral hernia repair  2011    after last c/s     Family History   Problem Relation Age of Onset    Breast Cancer Self 50            Depression Mother     Hypertension Mother     Psychiatric Mother     Heart Disorder Father     Hypertension Father     Heart Disease Father     Other (a-Fib) Father     Heart Disease Brother     No Known Problems Maternal Grandmother     Heart Attack Maternal Grandfather     Dementia Paternal Grandmother     Heart Attack Paternal Grandfather     DVT/VTE Paternal Aunt 42         from PE    DVT/VTE Paternal Aunt 58         from PE    Heart Disease Other     Blood Disorder Other     Ovarian Cancer Other     Colon Cancer Neg       reports that she quit smoking about 14 years ago. Her smoking use included cigarettes. She has never used smokeless tobacco. She reports that she does not  currently use alcohol. She reports that she does not currently use drugs after having used the following drugs: Cannabis.  Started to care spot I will get a years    Allergies:  Allergies   Allergen Reactions    Latex HIVES and SWELLING    Influenza Vaccines OTHER (SEE COMMENTS)     Pt passed out     Radiology Contrast Iodinated Dyes ITCHING       Home Medications:  Prior to Admission Medications   Prescriptions Last Dose Informant Patient Reported? Taking?   ARIPiprazole 5 MG Oral Tab   Yes No   Continuous Glucose Sensor (FREESTYLE NAA 2 SENSOR) Does not apply Misc   No No   Si each every 14 (fourteen) days.   Continuous Glucose Sensor (FREESTYLE NAA 3 SENSOR) Does not apply Misc   No No   Si each every 14 (fourteen) days.   Diethylpropion HCl 25 MG Oral Tab   No No   Sig: Take 1 tablet by mouth daily.   ERGOCALCIFEROL 1.25 MG (03832 UT) Oral Cap   No No   Sig: TAKE 1 CAPSULE BY MOUTH 1 TIME A WEEK FOR 12 DOSES   HYDROcodone-acetaminophen  MG Oral Tab   No No   Sig: Take 1 tablet by mouth every 6 (six) hours as needed for Pain.   Miconazole Nitrate (MICONAZOLE 7) 2 % Vaginal Cream   No No   Sig: Place 1 applicator vaginally nightly.   NIFEDIPINE ER 60 MG Oral Tablet 24 Hr   No No   Sig: TAKE 1 TABLET(60 MG) BY MOUTH DAILY   Tirzepatide (MOUNJARO) 15 MG/0.5ML Subcutaneous Solution Auto-injector   No No   Sig: Inject 15 mg into the skin once a week.   XARELTO 20 MG Oral Tab   No No   Sig: TAKE 1 TABLET BY MOUTH DAILY WITH FOOD.   amitriptyline 75 MG Oral Tab   No No   Sig: Take 1 tablet (75 mg total) by mouth daily.   carvedilol 25 MG Oral Tab   No No   Sig: Take 1 tablet (25 mg total) by mouth 2 (two) times daily with meals.   furosemide (LASIX) 40 MG Oral Tab   No No   Sig: Take 0.5 tablets (20 mg total) by mouth daily.   insulin aspart (NOVOLOG FLEXPEN) 100 Units/mL Subcutaneous Solution Pen-injector   No No   Sig: Inject 15 Units into the skin 3 (three) times daily before meals.   Patient taking  differently: Inject 5-7 Units into the skin 3 (three) times daily before meals.   insulin glargine (BASAGLAR KWIKPEN) 100 UNIT/ML Subcutaneous Solution Pen-injector   No No   Sig: Inject 12 Units into the skin nightly.   insulin glargine (BASAGLAR KWIKPEN) 100 UNIT/ML Subcutaneous Solution Pen-injector   No No   Sig: Inject 15 Units into the skin nightly.   lidocaine 5 % External Patch   No No   Sig: Place 1 patch onto the skin daily.   rosuvastatin 20 MG Oral Tab   No No   Sig: Take 1 tablet (20 mg total) by mouth nightly.   sertraline 100 MG Oral Tab   No No   Sig: Take 1 tablet (100 mg total) by mouth every evening. Take half tablet for the first week   sodium hypochlorite 0.125 % External Solution   No No   Sig: 10 ml to gauze and apply to wound bed with each dressing changes for 10-20 min, to reduce risk of infection and eliminate foul odor.   spironolactone 50 MG Oral Tab   No No   Sig: Take 1 tablet (50 mg total) by mouth daily.   traMADol 50 MG Oral Tab   No No   Sig: Take 1 tablet (50 mg total) by mouth 2 (two) times daily as needed for Pain.   zolpidem 10 MG Oral Tab   No No   Sig: Take 1 tablet (10 mg total) by mouth nightly as needed.      Facility-Administered Medications: None       Review of Systems:  Constitutional:  Weakness, Fatigue.  Eye:  Negative.  Ear/Nose/Mouth/Throat:  Negative.  Respiratory:  Negative  Cardiovascular: Negative  Gastrointestinal:  Negative.  Genitourinary:  Negative  Endocrine:  Negative.  Immunologic:  Negative.  Musculoskeletal:  Negative.  Integumentary:  Negative.  Neurologic:  Negative.  Psychiatric:  Negative.  ROS reviewed as documented in chart    Physical Exam:  Temp:  [98.2 °F (36.8 °C)] 98.2 °F (36.8 °C)  Pulse:  [82-88] 82  Resp:  [16-18] 16  BP: (127-153)/(83-85) 127/83  SpO2:  [99 %] 99 %    General:  Alert and oriented.  Diffuse skin problem:  None.  Eye:  Pupils are equal, round and reactive to light, extraocular movements are intact, Normal  conjunctiva.  HENT:  Normocephalic, oral mucosa is moist.  Head:  Normocephalic, atraumatic.  Neck:  Supple, non-tender, no carotid bruit, no jugular venous distention, no lymphadenopathy, no thyromegaly.  Respiratory:  Lungs are clear to auscultation, respirations are non-labored, breath sounds are equal, symmetrical chest wall expansion.  Cardiovascular:  Normal rate, regular rhythm, no murmur, no edema.  Gastrointestinal:  Soft, non-tender, non-distended, normal bowel sounds, no organomegaly.  Lymphatics:  No lymphadenopathy neck, axilla, groin.  Musculoskeletal: Normal range of motion.  normal strength.  Feet:  Normal pulses.  Neurologic:  Alert, oriented, no focal deficits, cranial nerves II-XII are grossly intact.  Cognition and Speech:  Oriented, speech clear and coherent.  Psychiatric:  Cooperative, appropriate mood & affect.      Laboratory Data:   Lab Results   Component Value Date    WBC 6.7 04/29/2025    HGB 13.2 04/29/2025    HCT 39.6 04/29/2025    .0 04/29/2025    CREATSERUM 0.95 04/29/2025    BUN 17 04/29/2025     04/29/2025    K 2.9 04/29/2025     04/29/2025    CO2 25.0 04/29/2025     04/29/2025    CA 7.9 04/29/2025    MG 1.4 04/29/2025       Imaging:  No results found.     Assessment and Plan:    Possible acute diastolic heart failure  BNP within normal limits, will get 2D echo to further evaluate.  Patient started on Lasix 40 mg IV daily monitor I's and O's and daily weights.  Cardiology has been consulted.    Lower extremity edema  Will get D-dimer, venous Dopplers to rule out DVT considering hypercoagulable state and sedentary lifestyle.    Migraine headache  Unable to use NSAIDs because of her Xarelto, will start Fioricet as needed.    Uncontrolled diabetes   Suboptimally controlled, will resume home dose of insulin use sliding scale coverage as needed.  Last A1c 7.9    Prior pulmonary embolism  As stated previously will check venous Dopplers and D-dimer, continue  Xarelto    Hypokalemia/hypomagnesemia  Initiate electrolyte replacement protocol.    Morbid obesity with likely obstructive sleep apnea  BMI 44.  Patient counseled about diet and exercise once current issues resolve, consider CPAP at night.    Prophylaxis  Currently on Xarelto    CODE STATUS  Full    Primary care physician  Graham Recio DO    MDM: High, acute illness/severe exacerbation of chronic illness posing threat to life.  IV medications requiring close inpatient monitoring  60 minutes spent on this admission - examining patient, obtaining history, reviewing previous medical records, going over test results/imaging and discussing plan of care. All questions answered.     Disposition  Clinical course will dictate outcome      MARY HUMMEL MD  4/29/2025  11:42 PM

## 2025-04-30 NOTE — CONSULTS
Cardiology Consult Note    Suki Rosenberg Patient Status:  Observation    1970 MRN [id number]   Location [facility] 1W Attending Veena Tyler MD   Hosp Day # 0 PCP Graham Recio, DO     HPI.  Suki Rosenberg is a 54 year old female with a history of hypercoagulability on chronic DOAC, PE, former tobacco abuse, hypertension, diabetes who presented to hospital with worsening bilateral lower extremity edema, shortness of breath and chest discomfort.  Labs pertinent for glucose 222, glucosuria, hypomagnesemia and hypokalemia.  EKG shows sinus rhythm with no ischemic changes.  Chest x-ray  was negative for acute abnormalities.  Patient with complaint of headaches, CT head negative. Patient was deemed to have decomp heart failure, started on IV diuretics admitted for further workup.    Patient is followed by us in [facility], seen by Dr. Viera for venous insufficiency and has had history of left GSV ablation.  She has also been diagnosed with bilateral lymphedema. Pt has developed chronic heel wounds.    Prior cardiac workup  Echocardiogram 2024  Impression - TTE  The left ventricle is normal in size and global systolic function. The left ventricular ejection fraction is 59%. Left ventricular diastolic function is indeterminate. There is moderate concentric left ventricular hypertrophy. No regional wall motion abnormalities noted.   Impression - TTE  The right ventricle is normal in size. Right ventricular systolic function is normal.    Impression - TTE  No significant valvular regurgitation or stenosis noted.     Nuclear PET 1.Stress EKG is normal. 2.Pt denied chest pain.3.No significant ectopy.1.This is a normal perfusion study, no perfusion defects noted. 2.The left ventricular cavity is noted to be normal on the stress studies. The stress left ventricular ejection fraction was calculated to be 67% and left ventricular global function is normal. The rest left ventricular cavity is noted to be  normal. The rest left ventricular ejection fraction was calculated to be 64% and rest left ventricular global function is normal. 3.When compared to the resting ejection fraction (64%), the stress ejection fraction (67%) has increased. 4.The study quality is average. 5.This scan is suggestive of low risk for future cardiovascular events. 12/14/2023 2:45:00 PM Ruben Rush MD   Trans Thoracic Echocardiogram 1.The left ventricle is normal in size and global systolic function. The left ventricular ejection fraction is 59%. Left ventricular diastolic function is indeterminate. There is moderate concentric left ventricular hypertrophy. No regional wall motion abnormalities noted.2.The right ventricle is normal in size. Right ventricular systolic function is normal. 3.No significant valvular regurgitation or stenosis noted. 12/28/2023 8:00:00 AM Alonso Galvan MD   Lower Extremity Venous Ultrasound 1.The study quality is good. 2.Left leg status post endovenous with VenaSeal closure.3.The left common femoral vein compresses easily with no evidence of deep venous thrombosis.4.The left saphenofemoral junction is patent.5.The left greater saphenous vein is non-compressible from the proximal thigh to the mid calf. There is no evidence of patency by Doppler imaging.6.These findings are consistent with successful VenaSeal closure of the left greater saphenous vein. 3/22/2024 9:00:00 AM Brandyn Viera MD   Renal Vascular Ultrasound 1.The study quality is below average. 2.Technically challenging secondary to bowel gas, habitus, tortuous vessels.3.No evidence of significant renal artery stenosis in the visualized bilateral renal arteries.4.Resistive indicies are .8 or less, which are within normal limits.5.Ectasia of the distal abdominal aorta with a maximum diameter of 2.0 cm.6.Renal veins are patent bilaterally.              --------------------------------------------------------------------------------------------------------------------------------  ROS 10 systems reviewed, pertinent findings above.  ROS    History:  Past Medical History[1]  Past Surgical History[2]  Family History[3]   reports that she quit smoking about 14 years ago. Her smoking use included cigarettes. She has never used smokeless tobacco. She reports that she does not currently use alcohol. She reports that she does not currently use drugs after having used the following drugs: Cannabis.    Objective:   Temp: 97.8 °F (36.6 °C)  Pulse: 88  Resp: 20  BP: 116/72    Intake/Output:     Intake/Output Summary (Last 24 hours) at 4/30/2025 0841  Last data filed at 4/30/2025 0536  Gross per 24 hour   Intake 350 ml   Output 2000 ml   Net -1650 ml       Physical Exam:     General: AO x 3, no acute distress  HEENT: Normocephalic, anicteric sclera, neck supple.  Neck: No JVD, carotids 2+, no bruits.  Cardiac: Regular rate and rhythm. S1, S2 normal. No murmur, pericardial rub, S3.  Lungs: Clear without wheezes, rales, rhonchi or dullness.  Normal excursions and effort.  Abdomen: Soft, non-tender. BS-present.  Extremities: Without clubbing, cyanosis or edema.  Peripheral pulses are 2+.  Neurologic: Non-focal  Skin: Warm and dry.       Assessment:    Exertional chest pain, shortness of breath  History of venous insufficiency, lymphedema status post left GSV ablation  Hypocoagulability, chronic anticoagulation with Xarelto  Hypomagnesemia,  hypokalemia  Heel wounds  History of pulm embolism  Former tobacco abuse  Hypertension  Diabetes      Plan:  54-year-old female with above history admitted for workup and management of shortness of breath, chest pain.  Patient has initiated therapy for HFrEF and is on IV diuretics  Recommend very close monitoring of electrolytes given presenting electrolyte derangements  Aggressive electrolyte replacement  Follow-up on echocardiogram,  consider stress test as outpatient if patient chest pain resolves with diuresis.  Otherwise will need inpatient ischemic evaluation if having persistent chest pain  Check LE arterial dopplers given heel wounds, leg pain    Thank you for allowing me to take part in the care of Suki RUY Rosenberg. Please call with any questions of concerns.      Level of care: C5    Dr. Janusz Tavarez, DO  2025  8:41 AM           [1]   Past Medical History:   Anemia    due to blood loss    Anxiety    Asthma (HCC)    Atrial septal defect (HCC)    Back problem    Bipolar 2 disorder (HCC)    BRCA gene mutation negative in female    Negative 28 gene hereditary breast/gyn cancers panel, report in media tab    Breast cancer (HCC)    L sided, treated with chemo, radiation    Chronic back pain    Chronic constipation    COVID-19 long hauler    Depression    Diabetes (HCC)    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 due to underlying condition, limited to breakdown of skin (HCC)    Essential hypertension    Fibromyalgia    High blood pressure    High cholesterol    History of blood transfusion    4-5 per year    Hx of motion sickness    Hypercoagulable state (HCC)    Incontinence    Lupus    Migraines    Morbid obesity with BMI of 40.0-44.9, adult (HCC)    Muscle weakness    Neuropathy    Non-pressure chronic ulcer of right lower leg, limited to breakdown of skin (HCC)    PE (pulmonary thromboembolism) (HCC)     after child birth and again  (bilateral)    Pneumonia due to organism    PONV (postoperative nausea and vomiting)    Pulmonary embolism (HCC)    Schizophrenia (HCC)    Visual impairment    Glasses   [2]   Past Surgical History:  Procedure Laterality Date    Breast biopsy Left     for breast cancer          x 3    Cholecystectomy      Inguinal hernia repair  2011    Had inguinal and ventral hernia repair together @ [facility]    Needle biopsy left  2022    us  guided bx axilla    Removal anal fistula,submuscular      repair of anal vaginal fistula after child birth    Repair rotator cuff,acute Left     after trauma    Ventral hernia repair  2011    after last c/s   [3]   Family History  Problem Relation Age of Onset    Breast Cancer Self 49            Depression Mother     Hypertension Mother     Psychiatric Mother     Heart Disorder Father     Hypertension Father     Heart Disease Father     Other (a-Fib) Father     Heart Disease Brother     No Known Problems Maternal Grandmother     Heart Attack Maternal Grandfather     Dementia Paternal Grandmother     Heart Attack Paternal Grandfather     DVT/VTE Paternal Aunt 42         from PE    DVT/VTE Paternal Aunt 58         from PE    Heart Disease Other     Blood Disorder Other     Ovarian Cancer Other     Colon Cancer Neg

## 2025-04-30 NOTE — PLAN OF CARE
Problem: PAIN - ADULT  Goal: Verbalizes/displays adequate comfort level or patient's stated pain goal  Description: INTERVENTIONS:- Encourage pt to monitor pain and request assistance- Assess pain using appropriate pain scale- Administer analgesics based on type and severity of pain and evaluate response- Implement non-pharmacological measures as appropriate and evaluate response- Consider cultural and social influences on pain and pain management- Manage/alleviate anxiety- Utilize distraction and/or relaxation techniques- Monitor for opioid side effects- Notify MD/LIP if interventions unsuccessful or patient reports new pain- Anticipate increased pain with activity and pre-medicate as appropriate  Outcome: Progressing     Problem: SAFETY ADULT - FALL  Goal: Free from fall injury  Description: INTERVENTIONS:- Assess pt frequently for physical needs- Identify cognitive and physical deficits and behaviors that affect risk of falls.- [place] fall precautions as indicated by assessment.- Educate pt/family on patient safety including physical limitations- Instruct pt to call for assistance with activity based on assessment- Modify environment to reduce risk of injury- Provide assistive devices as appropriate- Consider OT/PT consult to assist with strengthening/mobility- Encourage toileting schedule  Outcome: Progressing

## 2025-04-30 NOTE — PLAN OF CARE
Suki Rosenberg Patient Status:  Observation    1970 MRN [id number]   Location [facility] 1W Attending Winsome Gaona MD   Hosp Day # 0 PCP Graham Recio DO       Cardiology Nocturnal APN Note    Page Received: Dr. Palencia, ED Physician    HPI:     Patient is a 54 year old female with PMH of PE, HTN, hypercoagulable state on Xarelto and DM II who presented to the ED with increased BLE edema, chest discomfort with activity and dyspnea. Pt reported her symptoms worsened over the past 2-3 weeks. Cardiac work up was negative for acute ischemia. IV Bumex was given for fluid overload. Potassium and magnesium levels were markedly low and were replaced. [facility] consulted for chest pain and acute CHF exacerbation. Echocardiogram completed 2023 showed normal left ventricular size and global systolic function. EF 59%. Indeterminate left ventricular diastolic function. Moderate concentric LVH. HPI obtained from chart review and information provided by ED physician.       ED Clinical Course    EKG: Normal sinus rhythm. No acute ischemic changes    Labs: K+ 2.9, Mg 1.4, troponin negative, BNP negative     Imaging: Final results unavailable    Medications: As noted above in HPI        Assessment/Plan:    - Continue to trend troponin  - Continue diuresis  - Monitor I/O  - Daily weights  - Continue to monitor electrolytes and replace as appropriate  - 2D echocardiogram pending  - Continue to monitor overnight on telemetry  - Formal cardiology consult to follow in AM.       FELICITAS Darby  [facility]  2025  3:57 AM

## 2025-04-30 NOTE — HISTORICAL OFFICE NOTE
[facility]  Outside Information  Continuity of Care Document  4/2/2024  Suki Rosenberg - 53 y.o. Female; born [birth year]  Summary of episode note  , generated on May 15, 2024May 15, 2024   CHIEF COMPLAINT    CHIEF COMPLAINT  Reason for Visit/Chief Complaint   Follow up   Patient is here for follow-up appointment after left GSV ablation. She also has bilateral lymphedema because of venous reflux disease. She is feeling some improvement in the symptoms in the left leg but not complete relief. She has some pain which is improved now. She denies any swelling.     PROBLEMS  Reconcile with Patient's ChartPROBLEMS  Problem Effective Dates Date resolved Problem Status   Varicose veins of bilateral lower extremities with pain, [SNOMED-CT: 92700796] 1/31/2024 - Active   PE (pulmonary embolism), [SNOMED-CT: 597887642] 12/4/2023 - Active   H/O hypercoagulable state, [SNOMED-CT: 89838236207346] 12/4/2023 - Active   Benign hypertension, [SNOMED-CT: 34549415] 12/4/2023 - Active   Fibromyalgia, [SNOMED-CT: 839305537] 12/4/2023 - Active   DM (diabetes mellitus) Type 2, [SNOMED-CT: 642646738] 12/4/2023 - Active   History of breast cancer - Hx, [SNOMED-CT: 376045941] 12/4/2023 - Active   Edema, unspecified, [SNOMED-CT: 039404200] 12/4/2023 - Active     ENCOUNTER DIAGNOSIS    ENCOUNTER DIAGNOSIS  Problem Effective Dates Date resolved Problem Status   Varicose veins of bilateral lower extremities with pain, [SNOMED-CT: 17995403] 1/31/2024 - Active   PE (pulmonary embolism), [SNOMED-CT: 674924070] 12/4/2023 - Active   H/O hypercoagulable state, [SNOMED-CT: 85280096068062] 12/4/2023 - Active   Benign hypertension, [SNOMED-CT: 47529959] 12/4/2023 - Active   DM (diabetes mellitus) Type 2, [SNOMED-CT: 681009969] 12/4/2023 - Active   History of breast cancer - Hx, [SNOMED-CT: 521876498] 12/4/2023 - Active     VITAL SIGNS    VITAL SIGNS  Date / Time: 4/2/2024   BP Systolic 112 mmHg   BP Diastolic 74  mmHg   Height 62 inches   Weight 220 lbs   Pulse Rate 103 bpm   BSA (Body Surface Area) 2.1 cc/m2   BMI (Body Mass Index) 40.2 cc/m2   Blood Pressure 112 / 74 mmHg     PHYSICAL EXAMINATION    PHYSICAL EXAMINATION  Header Details   Constitutional 95%o2sat   Vitals Left Arm Sitting  / 74 mmHg, Pulse rate 103 bpm, Regular, Height in 5' 2\", BMI: 40.2, Weight in 220.46 lbs (or) 100 kgs, BSA : 2.14 cc/m²   Head/Eyes/Ears/Nose/Mouth/Throat EOMI, PERRLA   Neck No carotid bruits, No JVD   Respiratory Unlabored, Lungs clear with normal breath sounds, Equal bilaterally   Cardiovascular Regular rhythm. Normal and normal S1 and S2   Gastrointestinal Abdomen soft, Non-tender   Gait Normal gait   Upper Extremities No clubbing, No cyanosis, No edema   Lower Extremities Pulses 2+ and equal bilaterally   Skin Warm and dry     ALLERGIES, ADVERSE REACTIONS, ALERTS    ALLERGIES, ADVERSE REACTIONS, ALERTS  Type Substance Reaction Severity Status   Intolerant Iodine and/or iodine compound (substance) [Snomed:663868745], [SNOMED: 007321940] swelling eye Severe Active   Intolerant Latex, Natural Rubber [Snomed:806482231], [SNOMED: 900137939] Eye swelling [Snomed:38532887] Severe Active     MEDICATIONS ADMINISTERED DURING VISIT    No data available    MEDICATIONS  Reconcile with Patient's ChartMEDICATIONS  Medication Start Date Route/Frequency Status   carvediloL 25 mg tablet, [RxNorm: 471154] 12/6/2023 Take 1 tablet orally once a day. Active   furosemide 40 mg tablet, [RxNorm: 526779] 12/6/2023 Take 1 tablet orally once a day. Active   insulin glargine-yfgn (U-100) 100 unit/mL (3 mL) subcutaneous pen, [RxNorm: 9003935] 12/6/2023 Inject 22 units (subcutaneous) once a day. Active   Jardiance 25 mg tablet, [RxNorm: 3797904] 12/6/2023 Take 1 tablet orally once a day. Active   losartan 100 mg tablet, [RxNorm: 649924] 12/6/2023 Take 1 tablet orally once a day. Active   metFORMIN 1,000 mg tablet, [RxNorm: 873409] 12/6/2023 Take 1 tablet  orally 2 times a day. Active   NIFEdipine ER 30 mg tablet,extended release, [RxNorm: 951912] 12/6/2023 Take 1 tablet orally once a day. Active   NovoLIN R FlexPen 100 unit/mL (3 mL) subcutaneous insulin pen, [RxNorm: 9929359] 12/6/2023 Inject 15 units (subcutaneous) 3 times a day. Active   rosuvastatin 10 mg tablet, [RxNorm: 327671] 12/6/2023 Take 1 tablet orally once a day. Active   spironolactone 25 mg tablet, [RxNorm: 212759] 12/6/2023 Take 1 tablet orally once a day. Active   Xarelto 20 mg tablet, [RxNorm: 9994440] 12/6/2023 Take 1 tablet orally once a day. Active   zolpidem 10 mg tablet, [RxNorm: 229838] 12/6/2023 Take 1 tablet orally once a day at night as needed. Active     ASSESSMENT    Continue conservative therapy and lymphedema compressions  Follow-up with me in 2 to 3 months to see if there is any improvement in her reflux symptoms in 2 to 3 months if there is improvement we will proceed with right GSV ablation  Follow-up with me in 3 months or sooner if neededIncreased BMI: Provide patient with information regarding diet and lifestyle changes.     FAMILY HISTORY    FAMILY HISTORY  Relationship Age Diagnosis   Father 0 Unspecified atrial fibrillation   Brother 0 ODALIS in LAD coronary artery, drug-eluting stent DESC     GENERAL STATUS    No data available    PAST MEDICAL HISTORY    PAST MEDICAL HISTORY  Problem Date diagonsed Date resolved Status   Varicose veins of bilateral lower extremities with pain, [SNOMED-CT: 56746881] 1/31/2024 - Active   PE (pulmonary embolism), [SNOMED-CT: 730696164] 12/4/2023 - Active   H/O hypercoagulable state, [SNOMED-CT: 84089665036830] 12/4/2023 - Active   Benign hypertension, [SNOMED-CT: 99798892] 12/4/2023 - Active   Fibromyalgia, [SNOMED-CT: 053057583] 12/4/2023 - Active   DM (diabetes mellitus) Type 2, [SNOMED-CT: 091721145] 12/4/2023 - Active   History of breast cancer - Hx, [SNOMED-CT: 621156934] 12/4/2023 - Active   [facility], unspecified, [facility]] 12/4/2023  - Active     HISTORY OF PRESENT ILLNESS    Patient is here for follow-up appointment after left GSV ablation. She also has bilateral lymphedema because of venous reflux disease. She is feeling some improvement in the symptoms in the left leg but not complete relief. She has some pain which is improved now. She denies any swelling.     IMMUNIZATIONS    No data available    PLAN OF CARE    PLAN OF CARE  Planned Care Date   Referral Visit - Graham Recio ([email or web address]) : 1/1/1900   Follow up visit - Brandyn Viera MD 1/1/1900     PROCEDURES    No data available    RESULTS    RESULTS  Name Result Date Location - Ordered By   Nuclear PET 1.Stress EKG is normal. 2.Pt denied chest pain.3.No significant ectopy.1.This is a normal perfusion study, no perfusion defects noted. 2.The left ventricular cavity is noted to be normal on the stress studies. The stress left ventricular ejection fraction was calculated to be 67% and left ventricular global function is normal. The rest left ventricular cavity is noted to be normal. The rest left ventricular ejection fraction was calculated to be 64% and rest left ventricular global function is normal. 3.When compared to the resting ejection fraction (64%), the stress ejection fraction (67%) has increased. 4.The study quality is average. 5.This scan is suggestive of low risk for future cardiovascular events. 12/14/2023 2:45:00 PM Ruben Rush MD   Trans Thoracic Echocardiogram 1.The left ventricle is normal in size and global systolic function. The left ventricular ejection fraction is 59%. Left ventricular diastolic function is indeterminate. There is moderate concentric left ventricular hypertrophy. No regional wall motion abnormalities noted.2.The right ventricle is normal in size. Right ventricular systolic function is normal. 3.No significant valvular regurgitation or stenosis noted. 12/28/2023 8:00:00 AM Alonso Galvan MD   Lower Extremity Venous Ultrasound 1.The  study quality is good. 2.Left leg status post endovenous with VenaSeal closure.3.The left common femoral vein compresses easily with no evidence of deep venous thrombosis.4.The left saphenofemoral junction is patent.5.The left greater saphenous vein is non-compressible from the proximal thigh to the mid calf. There is no evidence of patency by Doppler imaging.6.These findings are consistent with successful VenaSeal closure of the left greater saphenous vein. 3/22/2024 9:00:00 AM Brandyn Viera MD   Renal Vascular Ultrasound 1.The study quality is below average. 2.Technically challenging secondary to bowel gas, habitus, tortuous vessels.3.No evidence of significant renal artery stenosis in the visualized bilateral renal arteries.4.Resistive indicies are .8 or less, which are within normal limits.5.Ectasia of the distal abdominal aorta with a maximum diameter of 2.0 cm.6.Renal veins are patent bilaterally. 1/2/2024 9:45:00 AM Lion Guardado MD     REVIEW OF SYSTEMS    REVIEW OF SYSTEMS  Header Details   Cardiovascular No history of Chest pain, ARBOLEDA, Palpitations, Syncope, PND, Orthopnea, Edema, Claudication   Respiratory No history of SOB, Wheezing, Sputum   Hem/Lymphatic No history of Easy bruising, Blood clots, Hx of blood transfusion, Anemia, Bleeding problems     SOCIAL HISTORY    SOCIAL HISTORY  Social History Element Description Effective Dates   Smoking status Former smoker -     FUNCTIONAL STATUS    No data available    INSTRUCTIONS    INSTRUCTIONS  NAME TYPE DATE   Increased BMI: Provide patient with information regarding diet and lifestyle changes. Goals 4/2/2024     MEDICAL EQUIPMENT    No data available    Goals Sections    No data available    REASON FOR REFERRAL           Health Concerns Section    No data available    COGNITIVE/MENTAL STATUS    No data available    Patient Demographics    Patient Demographics  Patient Address Patient Name Communication   [address], [place] [address] Suki Rosenberg  [phone] (Mobile)     Patient Demographics  Language Race / Ethnicity Marital Status   English - Spoken (Preferred) Black or  /  or  Single     Document Information    Primary Care Provider Other Service Providers Document Coverage Dates   Brandyn Viera  NPI: [id number]  [phone] (Work)  [address], [address]  [place] [address]  [place] [address]  Interpreting Physicians  [facility]  [phone] (Work)  [address] [place] [address] Lynn Arambula  NPI: [id number]  [phone] (Work)  [address], [address]  [place] [address]  [place] [address]  Nurses     Sayra Ritchie  NPI: [id number]  [phone] (Work)  [address], [address]  [place] [address]  [place] [address]  Nurses     Pippa Ewing  NPI: [id number]  [phone] (Work)  [address], [address]  [place] [address]  [place] [address]  Nurses Apr. 02, 2024April 02, 2024      Organization   [facility]  [phone] (Work)  [address], [address]  [place] [address]  [place] [address]     Encounter Providers Encounter Date    Apr. 02, 2024April 02, 2024     Legal Authenticator    Brandyn Viera  NPI: [id number]  [phone] (Work)  [address], [address]  [place] [address]  [place] [address]

## 2025-04-30 NOTE — IMAGING NOTE
[facility]  Outside Information  Continuity of Care Document  12/28/2023  Suki Rosenberg - 54 y.o. Female; born [birth year]  Trans Thoracic Echocardiogram  , generated on Aug. 08, 2024August 08, 2024   Findings    Findings - Right Atrium  The right atrium is normal in size. Right atrial diameter is 3.2 cms.    Findings - Left Ventricle  The left ventricle is normal in size. Left ventricular diastolic dimension is 4.3 cms. Left ventricular systolic dimension is 3.0 cms. Left ventricular diastolic septal thickness is 1.7 cms. Left ventricular diastolic postero basal free wall thickness is 1.2 cms. Global left ventricular systolic function is normal. The left ventricular fractional shortening is 29.4%. The left ventricular ejection fraction is 59%. Left ventricular diastolic function is indeterminate. Noted left ventricular hypertrophy. Concentric left ventricular hypertrophy is present. It is moderate. Left ventricular outflow tract diameter is 2.2 cms. Left ventricular outflow tract VTI is 16.7 cms. Left ventricular outflow tract mean velocity is 0.5 m/s. Left ventricular mean gradient is 1 mmHg. No regional wall motion abnormalities noted.   Findings - Right Ventricle  The right ventricle is normal in size. Right ventricular systolic function is normal. Right ventricular diastolic dimension is 3.5 cms.    Findings - Atrial Septum  The atrial septum is normal.    Findings - Ventricular Septum  The ventricular septum is normal.    Findings - Pulmonary Artery  Pulmonary artery pressure not measured due to insufficient tricuspid regurgitation.   Findings - Pulmonary Vein  The pulmonary vein appears normal.    Findings - IVC  The inferior vena cava is normal.    Findings - Pulmonic Valve  Evidence of pulmonic regurgitation is noted. Trace pulmonic regurgitation. The peak velocity is 0.8 m/s. The mean velocity is 0.5 m/s. The peak trans pulmonic gradient is 3.0 mmHg. The mean trans  pulmonic gradient is 1.0 mmHg.    Findings - Tricuspid Valve  Evidence of tricuspid regurgitation is present. Trace tricuspid regurgitation.    Findings - Mitral Valve  Mobility of the anterior mitral leaflet is normal. Mobility of the posterior mitral leaflet is normal. The mean trans mitral gradient is 1.0 mmHg. Mitral regurgitation is noted. Trace mitral regurgitation. The peak mitral gradient is 3 mmHg.    Findings - Aortic Valve  The aortic valve is tricuspid. Aortic cusp separation measures 2.5 cms . Aortic valve area continuity equation is 3.8 cm². Noted evidence of aortic regurgitation. Trace aortic regurgitation. The trans-aortic mean velocity is 0.7 m/s. The trans-aortic mean gradient is 2.0 mmHg. Aortic valve VTI measures 16.8 cms. LVOT Diameter is 2.2 cms.    Findings - Aorta  Aortic root diameter is 3.4 cms. Ascending aorta diameter is 3.4 cms. The aortic arch is normal. Aortic arch diameter is 3.3 cms.    Findings - Pericardium  The pericardium is normal in appearance with no pericardial effusion noted..    Impressions    Impression - TTE  The left ventricle is normal in size and global systolic function. The left ventricular ejection fraction is 59%. Left ventricular diastolic function is indeterminate. There is moderate concentric left ventricular hypertrophy. No regional wall motion abnormalities noted.   Impression - TTE  The right ventricle is normal in size. Right ventricular systolic function is normal.    Impression - TTE  No significant valvular regurgitation or stenosis noted.   Patient Demographics    Patient Demographics  Patient Address Patient Name Communication   [address], [place] [address] Suki Rosenberg [phone] (Home)     Patient Demographics  Language Race / Ethnicity Marital Status   Unknown Unknown / Unknown Unknown     Document Information    Service Providers Document Coverage Dates   Alonso Galvan MD  [phone] (Work)  [address] [place], [place]  [address] [facility]. [facility] 2023[facility]

## 2025-04-30 NOTE — ED QUICK NOTES
Orders for admission, patient is aware of plan and ready to go upstairs. Any questions, please call ED RN Fred at extension [phone].     Patient Covid vaccination status: Unvaccinated     COVID Test Ordered in ED: None    COVID Suspicion at Admission: N/A    Running Infusions: Medication Infusions[1] None    Mental Status/LOC at time of transport: A&Ox4     Other pertinent information:   CIWA score: N/A   NIH score:  N/A             [1]

## 2025-04-30 NOTE — HISTORICAL OFFICE NOTE
[facility]  Outside Information  Continuity of Care Document  1/18/2024  Suki Rosenberg - 54 y.o. Female; born [birth year]  Summary of episode note  , generated on Aug. 08, 2024August 08, 2024   CHIEF COMPLAINT    CHIEF COMPLAINT  Reason for Visit/Chief Complaint   F/U   Patient is here for follow-up after testing previously seen as a new evaluation regarding chronic edema severe hypertension and family history of amyloid heart disease.Patient was treated in [place] and also in [place] she reports in now has severe hypertension adding multiple medications to get adequate blood pressure control. Discussed the test that shows significant LVH no renal artery stenosis and normal EF with normal perfusion and no old infarct. She has been seen in lymphedema clinic and probably needs to be seen in the vein clinic also. She is disappointed she cannot release more fluid and has congestion in her legs despite being on diuretics but appears more like a local problem and should be seen both in the vein and the lymphedema clinic.She also has significant edema left greater than right and is on chronic anticoagulation. She reports chest pain on the center of the chest that squeezes radiates to the left shoulder and is worse with activity and better with rest. Also gets shortness of breath with this but no PND orthopnea noted. Chest pain is 4 out of 10 in severity and she was given nitroglycerin as needed when she was in [place] which helped. She does not report any recent cardiac evaluation she has an echo from 2017 with normal LV function. She has been treated for also lymphedema with wrapping of the left leg. She also has history of schizophrenia first PE was after childbirth in 1989 and she had a dad on the phone to give the amyloid heart history that he has and also another family member with her. She has a history of left-sided breast cancer treated with chemo and radiation.      PROBLEMS  Reconcile with Patient's ChartPROBLEMS  Problem Effective Dates Date resolved Problem Status   PE (pulmonary embolism), [SNOMED-CT: 646226176] 12/4/2023 - Active   H/O hypercoagulable state, [SNOMED-CT: 34226471922978] 12/4/2023 - Active   Benign hypertension, [SNOMED-CT: 81187538] 12/4/2023 - Active   Fibromyalgia, [SNOMED-CT: 963945232] 12/4/2023 - Active   DM (diabetes mellitus) Type 2, [SNOMED-CT: 367572473] 12/4/2023 - Active   History of breast cancer - Hx, [SNOMED-CT: 395840415] 12/4/2023 - Active   Edema, unspecified, [SNOMED-CT: 301589966] 12/4/2023 - Active     ENCOUNTER DIAGNOSIS    ENCOUNTER DIAGNOSIS  Problem Effective Dates Date resolved Problem Status   PE (pulmonary embolism), [SNOMED-CT: 378625388] 12/4/2023 - Active   H/O hypercoagulable state, [SNOMED-CT: 90204708396442] 12/4/2023 - Active   Benign hypertension, [SNOMED-CT: 32929393] 12/4/2023 - Active   Fibromyalgia, [SNOMED-CT: 229337125] 12/4/2023 - Active   DM (diabetes mellitus) Type 2, [SNOMED-CT: 343026487] 12/4/2023 - Active   History of breast cancer - Hx, [SNOMED-CT: 472834245] 12/4/2023 - Active   Edema, unspecified, [SNOMED-CT: 205940161] 12/4/2023 - Active     VITAL SIGNS    VITAL SIGNS  Date / Time: 1/19/2024   BP Systolic 132 mmHg   BP Diastolic 80 mmHg   Height 62 inches   Weight 220 lbs   Pulse Rate 96 bpm   BSA (Body Surface Area) 2.1 cc/m2   BMI (Body Mass Index) 40.2 cc/m2   Blood Pressure 132 / 80 mmHg     PHYSICAL EXAMINATION    PHYSICAL EXAMINATION  Header Details   Constitutional 98% O2   Vitals Right Arm Sitting  / 80 mmHg, Pulse rate 96 bpm, Regular, Height in 5' 2\", BMI: 40.2, Weight in 220.46 lbs (or) 100 kgs, BSA : 2.14 cc/m²   General Appearance No Acute Distress, Appropriate   Head/Eyes/Ears/Nose/Mouth/Throat Conjunctiva pink, Sclera Clear, Mucous membranes Moist   Neck Normal carotid pulsations   Respiratory Unlabored, Equal bilaterally   Cardiovascular Regular rhythm. Normal and normal S1 and S2    Gastrointestinal Abdomen soft, Non-tender, Normoactive bowel sounds   Musculoskeletal Normal spine   Gait Normal gait   Strength and tone Normal muscle strength   Upper Extremities No clubbing, No cyanosis   Lower Extremities Edema 2+   Skin Warm and dry, No rashes or lesions   Neurologic / Psychiatric Alert and Oriented   Speech Normal speech     ALLERGIES, ADVERSE REACTIONS, ALERTS    ALLERGIES, ADVERSE REACTIONS, ALERTS  Type Substance Reaction Severity Status   Intolerant Iodine and/or iodine compound (substance) [Snomed:528575760], [SNOMED: 086582816] swelling eye Severe Active   Intolerant Latex, Natural Rubber [Snomed:835460485], [SNOMED: 641661030] Eye swelling [Snomed:78440837] Severe Active     MEDICATIONS ADMINISTERED DURING VISIT    No data available    MEDICATIONS  Reconcile with Patient's ChartMEDICATIONS  Medication Start Date Route/Frequency Status   ARIPiprazole 5 mg tablet, [RxNorm: 583671] 12/6/2023 Take 1 tablet orally once a day. Active   carvediloL 25 mg tablet, [RxNorm: 782364] 12/6/2023 Take 1 tablet orally once a day. Active   furosemide 40 mg tablet, [RxNorm: 769079] 12/6/2023 Take 1 tablet orally once a day. Active   insulin glargine-yfgn (U-100) 100 unit/mL (3 mL) subcutaneous pen, [RxNorm: 6161728] 12/6/2023 Inject 22 units (subcutaneous) once a day. Active   Jardiance 25 mg tablet, [RxNorm: 0806355] 12/6/2023 Take 1 tablet orally once a day. Active   losartan 100 mg tablet, [RxNorm: 211336] 12/6/2023 Take 1 tablet orally once a day. Active   metFORMIN 1,000 mg tablet, [RxNorm: 118713] 12/6/2023 Take 1 tablet orally 2 times a day. Active   NIFEdipine ER 30 mg tablet,extended release, [RxNorm: 602758] 12/6/2023 Take 1 tablet orally once a day. Active   NovoLIN R FlexPen 100 unit/mL (3 mL) subcutaneous insulin pen, [RxNorm: 3363341] 12/6/2023 Inject 15 units (subcutaneous) 3 times a day. Active   rosuvastatin 10 mg tablet, [RxNorm: 818796] 12/6/2023 Take 1 tablet orally once a day.  Active   spironolactone 25 mg tablet, [RxNorm: 870239] 12/6/2023 Take 1 tablet orally once a day. Active   Xarelto 20 mg tablet, [RxNorm: 6221034] 12/6/2023 Take 1 tablet orally once a day. Active   zolpidem 10 mg tablet, [RxNorm: 552691] 12/6/2023 Take 1 tablet orally once a day at night as needed. Active     ASSESSMENT    Chest pain, past history  Abnormal EKG but no infarct or scar seen on stress test  History of angina relieved by nitroglycerin unknown details  With lymphedema patient is inappropriate for treadmill unable to walk any significant distance we will get echo shows normal EF and no ischemia on stress test does have significant LVHEdema  Possible lymphedema and has been seen at lymphedema clinic per primary  Refer to vein clinic care for vein treatment options  despite diuretics that she does not have any improved edema but has stable kidney function  Venous insufficiency studies doneFamily history of amyloid heart disease  Evaluate echo familySevere hypertension  On multiple medications  Blood pressure controlled today with multiple medications  LVH on echo but otherwise normal EF  No ischemia on PET scan  No secondary causes with no renal artery stenosis foundPulmonary embolism, recurrent  Lifelong anticoagulation, hypercoagulable state  On XareltoDiabetes  Reports controlled on metformin and insulinPlan  Continue current medications  Refer to vein clinic consult  Monitor blood pressure at home document results  Follow-up with lymphedema clinic  Follow myself 6 monthsIncreased BMI: Provide patient with information regarding diet and lifestyle changes.Increased BMI: Provide patient with information regarding diet and lifestyle changes.     FAMILY HISTORY    FAMILY HISTORY  Relationship Age Diagnosis   Father 0 Unspecified atrial fibrillation   Brother 0 ODALIS in LAD coronary artery, drug-eluting stent DESC     GENERAL STATUS    No data available    PAST MEDICAL HISTORY    PAST MEDICAL HISTORY  Problem  Date diagonsed Date resolved Status   PE (pulmonary embolism), [SNOMED-CT: 471033309] 12/4/2023 - Active   H/O hypercoagulable state, [SNOMED-CT: 71671357218844] 12/4/2023 - Active   Benign hypertension, [SNOMED-CT: 33632531] 12/4/2023 - Active   Fibromyalgia, [SNOMED-CT: 614161483] 12/4/2023 - Active   DM (diabetes mellitus) Type 2, [SNOMED-CT: 076077306] 12/4/2023 - Active   History of breast cancer - Hx, [SNOMED-CT: 912612089] 12/4/2023 - Active   Edema, unspecified, [SNOMED-CT: 430401665] 12/4/2023 - Active     HISTORY OF PRESENT ILLNESS    Patient is here for follow-up after testing previously seen as a new evaluation regarding chronic edema severe hypertension and family history of amyloid heart disease.Patient was treated in [place] and also in [place] she reports in now has severe hypertension adding multiple medications to get adequate blood pressure control. Discussed the test that shows significant LVH no renal artery stenosis and normal EF with normal perfusion and no old infarct. She has been seen in lymphedema clinic and probably needs to be seen in the vein clinic also. She is disappointed she cannot release more fluid and has congestion in her legs despite being on diuretics but appears more like a local problem and should be seen both in the vein and the lymphedema clinic.She also has significant edema left greater than right and is on chronic anticoagulation. She reports chest pain on the center of the chest that squeezes radiates to the left shoulder and is worse with activity and better with rest. Also gets shortness of breath with this but no PND orthopnea noted. Chest pain is 4 out of 10 in severity and she was given nitroglycerin as needed when she was in [place] which helped. She does not report any recent cardiac evaluation she has an echo from 2017 with normal LV function. She has been treated for also lymphedema with wrapping of the left leg. She also has history of schizophrenia first PE  was after childbirth in 1989 and she had a dad on the phone to give the amyloid heart history that he has and also another family member with her. She has a history of left-sided breast cancer treated with chemo and radiation.     IMMUNIZATIONS    No data available    PLAN OF CARE    PLAN OF CARE  Planned Care Date   Referral Visit - Graham Recio ([email or web address]) : 1/1/1900   Follow up visit - Alonso Galvan MD 5/22/2024   Follow up visit - Brandyn Viera MD 5/22/2024     PROCEDURES    No data available    RESULTS    RESULTS  Name Result Date Location - Ordered By   Nuclear PET 1.Stress EKG is normal. 2.Pt denied chest pain.3.No significant ectopy.1.This is a normal perfusion study, no perfusion defects noted. 2.The left ventricular cavity is noted to be normal on the stress studies. The stress left ventricular ejection fraction was calculated to be 67% and left ventricular global function is normal. The rest left ventricular cavity is noted to be normal. The rest left ventricular ejection fraction was calculated to be 64% and rest left ventricular global function is normal. 3.When compared to the resting ejection fraction (64%), the stress ejection fraction (67%) has increased. 4.The study quality is average. 5.This scan is suggestive of low risk for future cardiovascular events. 12/14/2023 2:45:00 PM Ruben Rush MD   Trans Thoracic Echocardiogram 1.The left ventricle is normal in size and global systolic function. The left ventricular ejection fraction is 59%. Left ventricular diastolic function is indeterminate. There is moderate concentric left ventricular hypertrophy. No regional wall motion abnormalities noted.2.The right ventricle is normal in size. Right ventricular systolic function is normal. 3.No significant valvular regurgitation or stenosis noted. 12/28/2023 8:00:00 AM Alonso Galvan MD   Lower Extremity Venous Ultrasound 1.The study quality is below average. 2.Normal compressibility,  augmentation and phasic flow in bilateral lower extremity venous system.3.No evidence of deep venous thrombus in the bilateral lower extremity venous system.4.Exam performed with the patient in reverse Trendelenburg position.5.------------------------6.Reflux noted in the right and left GSV.7.Possible thickened walls vs trace thrombus noted in the left mid thigh GSV.8.No reflux is seen in the right or left SSV.9.-------------------10.There is a  at the right mid calf; 1.6 mm with no observed reflux.11.-------------------------12.There is a  noted in the left mid calf: 2.1 mm with no observed reflux.13.--------------------14.No varicosities are visualized bilaterally. 1/2/2024 10:45:00 AM Brandyn Viera MD   Renal Vascular Ultrasound 1.The study quality is below average. 2.Technically challenging secondary to bowel gas, habitus, tortuous vessels.3.No evidence of significant renal artery stenosis in the visualized bilateral renal arteries.4.Resistive indicies are .8 or less, which are within normal limits.5.Ectasia of the distal abdominal aorta with a maximum diameter of 2.0 cm.6.Renal veins are patent bilaterally. 1/2/2024 9:45:00 AM Lion Guardado MD     REVIEW OF SYSTEMS    REVIEW OF SYSTEMS  Header Details   Eyes No history of Blurry vision, Visual changes, Double vision   Cardiovascular ARBOLEDA, Palpitations  No history of Chest pain, Syncope, PND, Orthopnea, Edema, Claudication   Respiratory No history of SOB, Wheezing, Sputum   Hem/Lymphatic No history of Easy bruising, Blood clots, Hx of blood transfusion, Anemia, Bleeding problems     SOCIAL HISTORY    SOCIAL HISTORY  Social History Element Description Effective Dates   Smoking status Former smoker -     FUNCTIONAL STATUS    No data available    INSTRUCTIONS    INSTRUCTIONS  NAME TYPE DATE   Increased BMI: Provide patient with information regarding diet and lifestyle changes. Goals 1/19/2024     MEDICAL EQUIPMENT    No data available    Goals  Sections    No data available    REASON FOR REFERRAL           Health Concerns Section    No data available    COGNITIVE/MENTAL STATUS    No data available    Patient Demographics    Patient Demographics  Patient Address Patient Name Communication   [address], [place] [address] Suki Rosenberg [phone] (Mobile)     Patient Demographics  Language Race / Ethnicity Marital Status   English - Spoken (Preferred) Black or  /  or  Single     Document Information    Primary Care Provider Other Service Providers Document Coverage Dates   Alonso Galvan  NPI: [id number]  [phone] (Work)  [address], [address], [place] [address]  [place] [address]  Interpreting Physicians  [facility]  [phone] (Work)  [address] [place] [address] Essence Lawrence  NPI: [id number]  [phone] (Work)  [address] [facility], [address], [place] [address]  [place] [address]  Nurses     Cheri Haney  NPI: [id number]  [phone] (Work)  [address] [facility], [address], [place] [address]  [place] [address]  Nurses Jan. 19, 2024January 19, 2024      Organization   [facility]  [phone] (Work)  [address] [place] [address], [address], [place] [address]  [place] [address]     Encounter Providers Encounter Date    Jan. 19, 2024January 19, 2024     Legal Authenticator    Alonso Galvan  NPI: [id number]  [phone] (Work)  [address] [facility] [facility], [address], [place] [address]  [place] [address]

## 2025-04-30 NOTE — PLAN OF CARE
Problem: Patient Centered Care  Goal: Patient preferences are identified and integrated in the patient's plan of care  Description: Interventions:- What would you like us to know as we care for you? Patient lives at home with daughters  - Provide timely, complete, and accurate information to patient/family- Incorporate patient and family knowledge, values, beliefs, and cultural backgrounds into the planning and delivery of care- Encourage patient/family to participate in care and decision-making at the level they choose- Honor patient and family perspectives and choices  Outcome: Progressing     Problem: Diabetes/Glucose Control  Goal: Glucose maintained within prescribed range  Description: INTERVENTIONS:- Monitor Blood Glucose as ordered- Assess for signs and symptoms of hyperglycemia and hypoglycemia- Administer ordered medications to maintain glucose within target range- Assess barriers to adequate nutritional intake and initiate nutrition consult as needed- Instruct patient on self management of diabetes  INTERVENTIONS:- Monitor Blood Glucose as ordered- Assess for signs and symptoms of hyperglycemia and hypoglycemia- Administer ordered medications to maintain glucose within target range- Assess barriers to adequate nutritional intake and initiate nutrition consult as needed- Instruct patient on self management of diabetes  Outcome: Progressing     Problem: Patient/Family Goals  Goal: Patient/Family Long Term Goal  Description: Patient's Long Term Goal: to go home  Interventions:- See additional Care Plan goals for specific interventions  Outcome: Progressing  Goal: Patient/Family Short Term Goal  Description: Patient's Short Term Goal: to feel better  Interventions: - See additional Care Plan goals for specific interventions  Outcome: Progressing     Problem: PAIN - ADULT  Goal: Verbalizes/displays adequate comfort level or patient's stated pain goal  Description: INTERVENTIONS:- Encourage pt to monitor pain  and request assistance- Assess pain using appropriate pain scale- Administer analgesics based on type and severity of pain and evaluate response- Implement non-pharmacological measures as appropriate and evaluate response- Consider cultural and social influences on pain and pain management- Manage/alleviate anxiety- Utilize distraction and/or relaxation techniques- Monitor for opioid side effects- Notify MD/LIP if interventions unsuccessful or patient reports new pain- Anticipate increased pain with activity and pre-medicate as appropriate  Outcome: Progressing     Problem: SAFETY ADULT - FALL  Goal: Free from fall injury  Description: INTERVENTIONS:- Assess pt frequently for physical needs- Identify cognitive and physical deficits and behaviors that affect risk of falls.- [place] fall precautions as indicated by assessment.- Educate pt/family on patient safety including physical limitations- Instruct pt to call for assistance with activity based on assessment- Modify environment to reduce risk of injury- Provide assistive devices as appropriate- Consider OT/PT consult to assist with strengthening/mobility- Encourage toileting schedule  Outcome: Progressing     Problem: CARDIOVASCULAR - ADULT  Goal: Maintains optimal cardiac output and hemodynamic stability  Description: INTERVENTIONS:- Monitor vital signs, rhythm, and trends- Monitor for bleeding, hypotension and signs of decreased cardiac output- Evaluate effectiveness of vasoactive medications to optimize hemodynamic stability- Monitor arterial and/or venous puncture sites for bleeding and/or hematoma- Assess quality of pulses, skin color and temperature- Assess for signs of decreased coronary artery perfusion - ex. Angina- Evaluate fluid balance, assess for edema, trend weights  Outcome: Progressing     Problem: RESPIRATORY - ADULT  Goal: Achieves optimal ventilation and oxygenation  Description: INTERVENTIONS:- Assess for changes in respiratory status- Assess  for changes in mentation and behavior- Position to facilitate oxygenation and minimize respiratory effort- Oxygen supplementation based on oxygen saturation or ABGs- Provide Smoking Cessation handout, if applicable- Encourage broncho-pulmonary hygiene including cough, deep breathe, Incentive Spirometry- Assess the need for suctioning and perform as needed- Assess and instruct to report SOB or any respiratory difficulty- Respiratory Therapy support as indicated- Manage/alleviate anxiety- Monitor for signs/symptoms of CO2 retention  Outcome: Progressing     Problem: METABOLIC/FLUID AND ELECTROLYTES - ADULT  Goal: Glucose maintained within prescribed range  Description: INTERVENTIONS:- Monitor Blood Glucose as ordered- Assess for signs and symptoms of hyperglycemia and hypoglycemia- Administer ordered medications to maintain glucose within target range- Assess barriers to adequate nutritional intake and initiate nutrition consult as needed- Instruct patient on self management of diabetes  INTERVENTIONS:- Monitor Blood Glucose as ordered- Assess for signs and symptoms of hyperglycemia and hypoglycemia- Administer ordered medications to maintain glucose within target range- Assess barriers to adequate nutritional intake and initiate nutrition consult as needed- Instruct patient on self management of diabetes  Outcome: Progressing  Goal: Electrolytes maintained within normal limits  Description: INTERVENTIONS:- Monitor labs and rhythm and assess patient for signs and symptoms of electrolyte imbalances- Administer electrolyte replacement as ordered- Monitor response to electrolyte replacements, including rhythm and repeat lab results as appropriate- Fluid restriction as ordered- Instruct patient on fluid and nutrition restrictions as appropriate  Outcome: Progressing  Goal: Hemodynamic stability and optimal renal function maintained  Description: INTERVENTIONS:- Monitor labs and assess for signs and symptoms of volume  excess or deficit- Monitor intake, output and patient weight- Monitor urine specific gravity, serum osmolarity and serum sodium as indicated or ordered- Monitor response to interventions for patient's volume status, including labs, urine output, blood pressure (other measures as available)- Encourage oral intake as appropriate- Instruct patient on fluid and nutrition restrictions as appropriate  Outcome: Progressing     Problem: SKIN/TISSUE INTEGRITY - ADULT  Goal: Incision(s), wounds(s) or drain site(s) healing without S/S of infection  Description: INTERVENTIONS:- Assess and document risk factors for pressure ulcer development- Assess and document skin integrity- Assess and document dressing/incision, wound bed, drain sites and surrounding tissue- Implement wound care per orders- Initiate isolation precautions as appropriate- Initiate Pressure Ulcer prevention bundle as indicated  Outcome: Progressing   Patient came to ED with SOB with exertion and bilateral lower extremity swelling since 4/23. Patient also states she has a migraine. CT of brain pending. BNP was <35. Troponin was 3. Chest x-ray pending. IV Bumex given in ED. Potassium was 2.9; and Mg 1.4. Electrolytes replaced per protocol. Cardiology on consult and will see in morning.  Patient given Benadryl and Reglan in ED for headache. VSS. Will continue to monitor patient.

## 2025-05-01 ENCOUNTER — APPOINTMENT (OUTPATIENT)
Dept: WOUND CARE | Facility: HOSPITAL | Age: 55
End: 2025-05-01
Attending: NURSE PRACTITIONER
Payer: COMMERCIAL

## 2025-05-01 ENCOUNTER — APPOINTMENT (OUTPATIENT)
Dept: ULTRASOUND IMAGING | Facility: HOSPITAL | Age: 55
End: 2025-05-01
Attending: INTERNAL MEDICINE
Payer: COMMERCIAL

## 2025-05-01 ENCOUNTER — APPOINTMENT (OUTPATIENT)
Dept: GENERAL RADIOLOGY | Facility: HOSPITAL | Age: 55
End: 2025-05-01
Attending: INTERNAL MEDICINE
Payer: COMMERCIAL

## 2025-05-01 LAB
ALBUMIN SERPL-MCNC: 4.1 G/DL (ref 3.2–4.8)
ALBUMIN/GLOB SERPL: 1.4 {RATIO} (ref 1–2)
ALP LIVER SERPL-CCNC: 77 U/L (ref 41–108)
ALT SERPL-CCNC: 26 U/L (ref 10–49)
ANION GAP SERPL CALC-SCNC: 8 MMOL/L (ref 0–18)
AST SERPL-CCNC: 27 U/L (ref ?–34)
BASOPHILS # BLD AUTO: 0.03 X10(3) UL (ref 0–0.2)
BASOPHILS NFR BLD AUTO: 0.6 %
BILIRUB SERPL-MCNC: 0.8 MG/DL (ref 0.3–1.2)
BUN BLD-MCNC: 19 MG/DL (ref 9–23)
BUN/CREAT SERPL: 15.3 (ref 10–20)
CALCIUM BLD-MCNC: 9.4 MG/DL (ref 8.7–10.4)
CHLORIDE SERPL-SCNC: 98 MMOL/L (ref 98–112)
CO2 SERPL-SCNC: 31 MMOL/L (ref 21–32)
CREAT BLD-MCNC: 1.24 MG/DL (ref 0.55–1.02)
DEPRECATED RDW RBC AUTO: 45.1 FL (ref 35.1–46.3)
EGFRCR SERPLBLD CKD-EPI 2021: 52 ML/MIN/1.73M2 (ref 60–?)
EOSINOPHIL # BLD AUTO: 0.19 X10(3) UL (ref 0–0.7)
EOSINOPHIL NFR BLD AUTO: 3.7 %
ERYTHROCYTE [DISTWIDTH] IN BLOOD BY AUTOMATED COUNT: 14 % (ref 11–15)
GLOBULIN PLAS-MCNC: 2.9 G/DL (ref 2–3.5)
GLUCOSE BLD-MCNC: 124 MG/DL (ref 70–99)
GLUCOSE BLDC GLUCOMTR-MCNC: 156 MG/DL (ref 70–99)
GLUCOSE BLDC GLUCOMTR-MCNC: 183 MG/DL (ref 70–99)
GLUCOSE BLDC GLUCOMTR-MCNC: 214 MG/DL (ref 70–99)
GLUCOSE BLDC GLUCOMTR-MCNC: 238 MG/DL (ref 70–99)
HCT VFR BLD AUTO: 38.6 % (ref 35–48)
HGB BLD-MCNC: 12.8 G/DL (ref 12–16)
IMM GRANULOCYTES # BLD AUTO: 0.02 X10(3) UL (ref 0–1)
IMM GRANULOCYTES NFR BLD: 0.4 %
LYMPHOCYTES # BLD AUTO: 2.26 X10(3) UL (ref 1–4)
LYMPHOCYTES NFR BLD AUTO: 43.9 %
MAGNESIUM SERPL-MCNC: 2.1 MG/DL (ref 1.6–2.6)
MCH RBC QN AUTO: 29.4 PG (ref 26–34)
MCHC RBC AUTO-ENTMCNC: 33.2 G/DL (ref 31–37)
MCV RBC AUTO: 88.5 FL (ref 80–100)
MONOCYTES # BLD AUTO: 0.53 X10(3) UL (ref 0.1–1)
MONOCYTES NFR BLD AUTO: 10.3 %
NEUTROPHILS # BLD AUTO: 2.12 X10 (3) UL (ref 1.5–7.7)
NEUTROPHILS # BLD AUTO: 2.12 X10(3) UL (ref 1.5–7.7)
NEUTROPHILS NFR BLD AUTO: 41.1 %
OSMOLALITY SERPL CALC.SUM OF ELEC: 288 MOSM/KG (ref 275–295)
PLATELET # BLD AUTO: 242 10(3)UL (ref 150–450)
POTASSIUM SERPL-SCNC: 4.3 MMOL/L (ref 3.5–5.1)
PROT SERPL-MCNC: 7 G/DL (ref 5.7–8.2)
RBC # BLD AUTO: 4.36 X10(6)UL (ref 3.8–5.3)
SODIUM SERPL-SCNC: 137 MMOL/L (ref 136–145)
WBC # BLD AUTO: 5.2 X10(3) UL (ref 4–11)

## 2025-05-01 PROCEDURE — 99233 SBSQ HOSP IP/OBS HIGH 50: CPT | Performed by: INTERNAL MEDICINE

## 2025-05-01 PROCEDURE — 73610 X-RAY EXAM OF ANKLE: CPT | Performed by: INTERNAL MEDICINE

## 2025-05-01 PROCEDURE — 93925 LOWER EXTREMITY STUDY: CPT | Performed by: INTERNAL MEDICINE

## 2025-05-01 NOTE — PLAN OF CARE
Problem: Patient Centered Care  Goal: Patient preferences are identified and integrated in the patient's plan of care  Description: Interventions:- What would you like us to know as we care for you? - Provide timely, complete, and accurate information to patient/family- Incorporate patient and family knowledge, values, beliefs, and cultural backgrounds into the planning and delivery of care- Encourage patient/family to participate in care and decision-making at the level they choose- Honor patient and family perspectives and choices  Outcome: Progressing     Problem: Diabetes/Glucose Control  Goal: Glucose maintained within prescribed range  Description: INTERVENTIONS:- Monitor Blood Glucose as ordered- Assess for signs and symptoms of hyperglycemia and hypoglycemia- Administer ordered medications to maintain glucose within target range- Assess barriers to adequate nutritional intake and initiate nutrition consult as needed- Instruct patient on self management of diabetes  INTERVENTIONS:- Monitor Blood Glucose as ordered- Assess for signs and symptoms of hyperglycemia and hypoglycemia- Administer ordered medications to maintain glucose within target range- Assess barriers to adequate nutritional intake and initiate nutrition consult as needed- Instruct patient on self management of diabetes  Outcome: Progressing     Problem: PAIN - ADULT  Goal: Verbalizes/displays adequate comfort level or patient's stated pain goal  Description: INTERVENTIONS:- Encourage pt to monitor pain and request assistance- Assess pain using appropriate pain scale- Administer analgesics based on type and severity of pain and evaluate response- Implement non-pharmacological measures as appropriate and evaluate response- Consider cultural and social influences on pain and pain management- Manage/alleviate anxiety- Utilize distraction and/or relaxation techniques- Monitor for opioid side effects- Notify MD/LIP if interventions unsuccessful or  patient reports new pain- Anticipate increased pain with activity and pre-medicate as appropriate  Outcome: Progressing     Problem: SAFETY ADULT - FALL  Goal: Free from fall injury  Description: INTERVENTIONS:- Assess pt frequently for physical needs- Identify cognitive and physical deficits and behaviors that affect risk of falls.- [place] fall precautions as indicated by assessment.- Educate pt/family on patient safety including physical limitations- Instruct pt to call for assistance with activity based on assessment- Modify environment to reduce risk of injury- Provide assistive devices as appropriate- Consider OT/PT consult to assist with strengthening/mobility- Encourage toileting schedule  Outcome: Progressing     Problem: CARDIOVASCULAR - ADULT  Goal: Maintains optimal cardiac output and hemodynamic stability  Description: INTERVENTIONS:- Monitor vital signs, rhythm, and trends- Monitor for bleeding, hypotension and signs of decreased cardiac output- Evaluate effectiveness of vasoactive medications to optimize hemodynamic stability- Monitor arterial and/or venous puncture sites for bleeding and/or hematoma- Assess quality of pulses, skin color and temperature- Assess for signs of decreased coronary artery perfusion - ex. Angina- Evaluate fluid balance, assess for edema, trend weights  Outcome: Progressing     Problem: RESPIRATORY - ADULT  Goal: Achieves optimal ventilation and oxygenation  Description: INTERVENTIONS:- Assess for changes in respiratory status- Assess for changes in mentation and behavior- Position to facilitate oxygenation and minimize respiratory effort- Oxygen supplementation based on oxygen saturation or ABGs- Provide Smoking Cessation handout, if applicable- Encourage broncho-pulmonary hygiene including cough, deep breathe, Incentive Spirometry- Assess the need for suctioning and perform as needed- Assess and instruct to report SOB or any respiratory difficulty- Respiratory Therapy  support as indicated- Manage/alleviate anxiety- Monitor for signs/symptoms of CO2 retention  Outcome: Progressing     Problem: METABOLIC/FLUID AND ELECTROLYTES - ADULT  Goal: Glucose maintained within prescribed range  Description: INTERVENTIONS:- Monitor Blood Glucose as ordered- Assess for signs and symptoms of hyperglycemia and hypoglycemia- Administer ordered medications to maintain glucose within target range- Assess barriers to adequate nutritional intake and initiate nutrition consult as needed- Instruct patient on self management of diabetes  INTERVENTIONS:- Monitor Blood Glucose as ordered- Assess for signs and symptoms of hyperglycemia and hypoglycemia- Administer ordered medications to maintain glucose within target range- Assess barriers to adequate nutritional intake and initiate nutrition consult as needed- Instruct patient on self management of diabetes  Outcome: Progressing  Goal: Electrolytes maintained within normal limits  Description: INTERVENTIONS:- Monitor labs and rhythm and assess patient for signs and symptoms of electrolyte imbalances- Administer electrolyte replacement as ordered- Monitor response to electrolyte replacements, including rhythm and repeat lab results as appropriate- Fluid restriction as ordered- Instruct patient on fluid and nutrition restrictions as appropriate  Outcome: Progressing  Goal: Hemodynamic stability and optimal renal function maintained  Description: INTERVENTIONS:- Monitor labs and assess for signs and symptoms of volume excess or deficit- Monitor intake, output and patient weight- Monitor urine specific gravity, serum osmolarity and serum sodium as indicated or ordered- Monitor response to interventions for patient's volume status, including labs, urine output, blood pressure (other measures as available)- Encourage oral intake as appropriate- Instruct patient on fluid and nutrition restrictions as appropriate  Outcome: Progressing     Problem: SKIN/TISSUE  INTEGRITY - ADULT  Goal: Incision(s), wounds(s) or drain site(s) healing without S/S of infection  Description: INTERVENTIONS:- Assess and document risk factors for pressure ulcer development- Assess and document skin integrity- Assess and document dressing/incision, wound bed, drain sites and surrounding tissue- Implement wound care per orders- Initiate isolation precautions as appropriate- Initiate Pressure Ulcer prevention bundle as indicated  Outcome: Progressing

## 2025-05-01 NOTE — PLAN OF CARE
Problem: Patient Centered Care  Goal: Patient preferences are identified and integrated in the patient's plan of care  Description: Interventions:- What would you like us to know as we care for you? - Provide timely, complete, and accurate information to patient/family- Incorporate patient and family knowledge, values, beliefs, and cultural backgrounds into the planning and delivery of care- Encourage patient/family to participate in care and decision-making at the level they choose- Honor patient and family perspectives and choices  Outcome: Progressing     Problem: Diabetes/Glucose Control  Goal: Glucose maintained within prescribed range  Description: INTERVENTIONS:- Monitor Blood Glucose as ordered- Assess for signs and symptoms of hyperglycemia and hypoglycemia- Administer ordered medications to maintain glucose within target range- Assess barriers to adequate nutritional intake and initiate nutrition consult as needed- Instruct patient on self management of diabetes  INTERVENTIONS:- Monitor Blood Glucose as ordered- Assess for signs and symptoms of hyperglycemia and hypoglycemia- Administer ordered medications to maintain glucose within target range- Assess barriers to adequate nutritional intake and initiate nutrition consult as needed- Instruct patient on self management of diabetes  Outcome: Progressing     Problem: Patient/Family Goals  Goal: Patient/Family Long Term Goal  Description: Patient's Long Term Goal: Interventions:- - See additional Care Plan goals for specific interventions  Outcome: Progressing  Goal: Patient/Family Short Term Goal  Description: Patient's Short Term Goal: Interventions: - - See additional Care Plan goals for specific interventions  Outcome: Progressing     Problem: PAIN - ADULT  Goal: Verbalizes/displays adequate comfort level or patient's stated pain goal  Description: INTERVENTIONS:- Encourage pt to monitor pain and request assistance- Assess pain using appropriate pain  scale- Administer analgesics based on type and severity of pain and evaluate response- Implement non-pharmacological measures as appropriate and evaluate response- Consider cultural and social influences on pain and pain management- Manage/alleviate anxiety- Utilize distraction and/or relaxation techniques- Monitor for opioid side effects- Notify MD/LIP if interventions unsuccessful or patient reports new pain- Anticipate increased pain with activity and pre-medicate as appropriate  Outcome: Progressing     Problem: SAFETY ADULT - FALL  Goal: Free from fall injury  Description: INTERVENTIONS:- Assess pt frequently for physical needs- Identify cognitive and physical deficits and behaviors that affect risk of falls.- [place] fall precautions as indicated by assessment.- Educate pt/family on patient safety including physical limitations- Instruct pt to call for assistance with activity based on assessment- Modify environment to reduce risk of injury- Provide assistive devices as appropriate- Consider OT/PT consult to assist with strengthening/mobility- Encourage toileting schedule  Outcome: Progressing     Problem: CARDIOVASCULAR - ADULT  Goal: Maintains optimal cardiac output and hemodynamic stability  Description: INTERVENTIONS:- Monitor vital signs, rhythm, and trends- Monitor for bleeding, hypotension and signs of decreased cardiac output- Evaluate effectiveness of vasoactive medications to optimize hemodynamic stability- Monitor arterial and/or venous puncture sites for bleeding and/or hematoma- Assess quality of pulses, skin color and temperature- Assess for signs of decreased coronary artery perfusion - ex. Angina- Evaluate fluid balance, assess for edema, trend weights  Outcome: Progressing     Problem: RESPIRATORY - ADULT  Goal: Achieves optimal ventilation and oxygenation  Description: INTERVENTIONS:- Assess for changes in respiratory status- Assess for changes in mentation and behavior- Position to  facilitate oxygenation and minimize respiratory effort- Oxygen supplementation based on oxygen saturation or ABGs- Provide Smoking Cessation handout, if applicable- Encourage broncho-pulmonary hygiene including cough, deep breathe, Incentive Spirometry- Assess the need for suctioning and perform as needed- Assess and instruct to report SOB or any respiratory difficulty- Respiratory Therapy support as indicated- Manage/alleviate anxiety- Monitor for signs/symptoms of CO2 retention  Outcome: Progressing     Problem: METABOLIC/FLUID AND ELECTROLYTES - ADULT  Goal: Glucose maintained within prescribed range  Description: INTERVENTIONS:- Monitor Blood Glucose as ordered- Assess for signs and symptoms of hyperglycemia and hypoglycemia- Administer ordered medications to maintain glucose within target range- Assess barriers to adequate nutritional intake and initiate nutrition consult as needed- Instruct patient on self management of diabetes  INTERVENTIONS:- Monitor Blood Glucose as ordered- Assess for signs and symptoms of hyperglycemia and hypoglycemia- Administer ordered medications to maintain glucose within target range- Assess barriers to adequate nutritional intake and initiate nutrition consult as needed- Instruct patient on self management of diabetes  Outcome: Progressing  Goal: Electrolytes maintained within normal limits  Description: INTERVENTIONS:- Monitor labs and rhythm and assess patient for signs and symptoms of electrolyte imbalances- Administer electrolyte replacement as ordered- Monitor response to electrolyte replacements, including rhythm and repeat lab results as appropriate- Fluid restriction as ordered- Instruct patient on fluid and nutrition restrictions as appropriate  Outcome: Progressing  Goal: Hemodynamic stability and optimal renal function maintained  Description: INTERVENTIONS:- Monitor labs and assess for signs and symptoms of volume excess or deficit- Monitor intake, output and patient  weight- Monitor urine specific gravity, serum osmolarity and serum sodium as indicated or ordered- Monitor response to interventions for patient's volume status, including labs, urine output, blood pressure (other measures as available)- Encourage oral intake as appropriate- Instruct patient on fluid and nutrition restrictions as appropriate  Outcome: Progressing     Problem: SKIN/TISSUE INTEGRITY - ADULT  Goal: Incision(s), wounds(s) or drain site(s) healing without S/S of infection  Description: INTERVENTIONS:- Assess and document risk factors for pressure ulcer development- Assess and document skin integrity- Assess and document dressing/incision, wound bed, drain sites and surrounding tissue- Implement wound care per orders- Initiate isolation precautions as appropriate- Initiate Pressure Ulcer prevention bundle as indicated  Outcome: Progressing

## 2025-05-01 NOTE — PROGRESS NOTES
05/01/25 1403   Wound 04/30/25 Heel Left   Date First Assessed: 04/30/25   Present on Original Admission: Yes  Primary Wound Type: Diabetic Ulcer  Location: Heel  Wound Location Orientation: Left  Wound Description (Comments): dry healed   Wound Image    Site Assessment Dry;Intact;Pink;Fragile   Closure Approximated   Drainage Amount None   Treatments Cleansed;Saline;Site Care  (lotion to dry intact skin)   Dressing Aquacel Foam   Dressing Changed New   Dressing Status Clean;Dry;Intact   Wound Length (cm) 0 cm   Wound Width (cm) 0 cm   Wound Surface Area (cm^2) 0 cm^2   Wound Depth (cm) 0 cm   Wound Volume (cm^3) 0 cm^3   Non-staged Wound Description Not applicable   [place]-wound Assessment Dry;Fragile;Pink   Wound Bed Epithelium (%) 100 %   State of Healing Epithelialized   Wound Odor None   [place] Scale Grade 0   Wound Follow Up   Follow up needed Yes     Nursing consult to see the pt., spoke with the nurse and the pt. is asking to be evaluated as she follows in the wound clinic. The pt. is sitting on the side of the bed, she notes she was last in the wound clinic in early April, she notes she has been following the dressings and staying off her heel. See the wound assessment, left heel diabetic ulcer is healed, no open areas noted, cleansed, border foam applied, Lotion applied to dry intact skin to BLE and feet. Right heel is intact. Recommendations were given to the pt. just in case her wound opens up again, discussed offloading, the pt. notes she has been walking on her toes to avoid pressure. All questions were answered. Spoke with the nurse. Call light in reach.

## 2025-05-01 NOTE — PROGRESS NOTES
[facility] Cardiology Progress Note    Suki Rosenberg Patient Status:  Observation    1970 MRN [id number]   Location [facility] 1W Attending Veena Tyler MD   Hosp Day # 0 PCP Graham Recio DO     Subjective:  Denies cp, sob, richard. BLE swelling improved     Objective:  /76 (BP Location: Right arm)   Pulse 84   Temp 98 °F (36.7 °C) (Oral)   Resp 20   Ht 157.5 cm (5' 2\")   Wt 234 lb 9 oz (106.4 kg)   LMP 10/29/2018   SpO2 97%   BMI 42.90 kg/m²     Telemetry: NSR , 84 bpm       Intake/Output:    Intake/Output Summary (Last 24 hours) at 2025 1201  Last data filed at 2025 1101  Gross per 24 hour   Intake 560 ml   Output 1500 ml   Net -940 ml       Last 3 Weights   25 0500 234 lb 9 oz (106.4 kg)   25 2140 240 lb (108.9 kg)   25 1246 247 lb (112 kg)   25 1305 237 lb 8 oz (107.7 kg)       Labs:  Recent Labs   Lab 25  0645 25  0601   * 152* 124*   BUN 17 13 19   CREATSERUM 0.95 0.92 1.24*   EGFRCR 72 74 52*   CA 7.9* 9.5 9.4    137 137   K 2.9* 4.2  4.2 4.3    101 98   CO2 25.0 26.0 31.0     Recent Labs   Lab 25  0645 25  0601   RBC 4.56 4.55 4.36   HGB 13.2 13.5 12.8   HCT 39.6 39.3 38.6   MCV 86.8 86.4 88.5   MCH 28.9 29.7 29.4   MCHC 33.3 34.4 33.2   RDW 13.9 13.8 14.0   NEPRELIM 2.80 2.71 2.12   WBC 6.7 5.7 5.2   .0 250.0 242.0         Recent Labs   Lab 25/25  0645   TROPHS 3 3       Diagnostics:     25 Echo      1. Left ventricle: The cavity size was normal. Wall thickness was moderately      increased. Systolic function was normal. The estimated ejection fraction      was 55-60%, by biplane method of disks. No diagnostic evidence for      regional wall motion abnormalities. Left ventricular diastolic function      parameters were normal.   2. Right ventricle: Systolic function was mildly reduced.   3. Left atrium: The left atrial volume was  normal.   4. Aortic root: The aortic root was at the upper limits of normal.   Impressions:  This study is compared with previous dated 09/30/2017:         Physical Exam:    General: Alert and oriented x 3. No apparent distress.   HEENT: Normocephalic, anicteric sclera, neck supple, no thyromegaly or adenopathy.  Neck: No JVD, carotids 2+, no bruits.  Cardiac: Regular rate & rhythm. S1, S2 normal. No murmur, pericardial rub, S3, or extra cardiac sounds.  Lungs: Clear without wheezes, rales, rhonchi or dullness.  Normal excursions and effort.  Abdomen: Soft, non-tender. No organosplenomegally, mass or rebound, BS-present.  Extremities: Without clubbing or cyanosis.  No left lower extremity edema, no right lower extremity edema.  Neurologic: Alert and oriented, normal affect. No focal defects  Skin: Warm and dry.       Medications:  Scheduled Medications[1]  Medication Infusions[2]    Assessment:  54 year old female with a history of hypercoagulability on chronic DOAC, PE, former tobacco abuse, hypertension, diabetes who presented to hospital with worsening bilateral lower extremity edema, shortness of breath and chest discomfort.       Exertional chest pain, shortness of breath  - No acute EKG changes . TnI negative x 2   - CXR unremarkable   - Anginal sx have resolved. Consider OP stress test     Acute on chronic HFrEF   - pBNP < 35   - BLE venous doppler negative for DVT   - s/p ivp bumex in ED  - On lasix 40mg ivp daily     History of venous insufficiency, lymphedema status post left GSV ablation  - Diuretics as above     Hypertension  - well controlled on coreg, aldactone & nifedipine ER     Type 2 Diabetes  - HgbA1c = 7.9%  - On insulin per PMD     NAYELI   - Scr 1.24     Heel wounds  - BLE arterial duplex pending     Hypocoagulability -  chronic anticoagulation with Xarelto  Hypomagnesemia,  hypokalemia - resolved. replenish electrolytes per cardiac protocol as needed   History of pulm embolism - on xarelto as  above  Former tobacco abuse  Migraines w/ photosensitivity   Morbid obesity w/ CRISTINO     Plan:    Denies cp, sob, richard. BLE swelling improved.   Net neg UOP ~2L since admission. Scr up to 1.24 today. Dc iv lasix  Consider po bumex tomorrow if renal fx stable (pt reports home lasix wasn't very effective)  BLE venous doppler negative. Pending arterial duplex   Blood pressure well controlled on current regimen     KODAK Sotelo  5/1/2025  12:01 PM  Ph [phone] ([place])  Ph [phone] ([place])           [1]    insulin aspart  1-7 Units Subcutaneous TID CC and HS    carvedilol  25 mg Oral BID with meals    insulin degludec  12 Units Subcutaneous Nightly    NIFEdipine ER  60 mg Oral Daily    rosuvastatin  20 mg Oral Nightly    sertraline  100 mg Oral QPM    spironolactone  50 mg Oral Daily    rivaroxaban  20 mg Oral Daily with food    furosemide  40 mg Intravenous Daily    amitriptyline  75 mg Oral Daily   [2]    Skin Substitute Text: The defect edges were debeveled with a #15 scalpel blade.  Given the location of the defect, shape of the defect and the proximity to free margins a skin substitute graft was deemed most appropriate.  The graft material was trimmed to fit the size of the defect. The graft was then placed in the primary defect and oriented appropriately.

## 2025-05-01 NOTE — PROGRESS NOTES
[facility]  part of [facility]     Hospitalist Progress Note     Suki Rosenberg Patient Status:  Observation    1970 MRN [id number]   Location [facility] 1W Attending Veena Tyler MD   Hosp Day # 0 PCP Graham Recio DO     Subjective:     Patient resting comfortably in bed and in NAD. Denied any active complaints at the time of interview. Continues to have photosensitivity due to her migraine.   No overnight events reported by the nursing staff.       Objective:    Review of Systems:   ROS completed; pertinent positive and negatives stated in subjective.      Vital signs:  Temp:  [98 °F (36.7 °C)-98.2 °F (36.8 °C)] 98 °F (36.7 °C)  Pulse:  [77-84] 84  Resp:  [18-20] 20  BP: (113-129)/(69-82) 129/76  SpO2:  [96 %-98 %] 97 %      Physical Exam:    Gen: NAD AO x3  Chest: good air entry CTABL  CVS: normal s1 and s2 RR  Abd: NABS soft NT ND  Neuro: CN 2-12 grossly intact  Ext: no edema in bilateral LE      Diagnostic Data:    Labs:  Recent Labs   Lab 25  2239 25  0645 25  0601   WBC 6.7 5.7 5.2   HGB 13.2 13.5 12.8   MCV 86.8 86.4 88.5   .0 250.0 242.0       Recent Labs   Lab 25  2240 25  0645 25  0601   * 152* 124*   BUN 17 13 19   CREATSERUM 0.95 0.92 1.24*   CA 7.9* 9.5 9.4   ALB  --  4.4 4.1    137 137   K 2.9* 4.2  4.2 4.3    101 98   CO2 25.0 26.0 31.0   ALKPHO  --  84 77   AST  --  27 27   ALT  --  25 26   BILT  --  0.6 0.8   TP  --  7.2 7.0       Estimated Creatinine Clearance: 41.5 mL/min (A) (based on SCr of 1.24 mg/dL (H)).    No results for input(s): \"PTP\", \"INR\" in the last 168 hours.           Imaging: Imaging data reviewed in Epic.    Medications: Scheduled Medications[1]    Assessment & Plan:     Acute heart failure exacerbation  Imagine reviewed  BNP WNL  Trop and EKG reviewed  Started on Lasix 40 mg IV BID in ER  Echo reviewed - EF 55-60%  Cardiology on consult  Continue IV diuresis  Patient may need  inpatient ischemic eval during hospital stay  LE arterial dopplers pending  Net IO Since Admission: -2,940 mL [05/01/25 1228]  Strict Is and Os, daily weights  NAYELI  ? Due to over diuresis  Monitor renal function  Avoid nephrotoxic agents  Migraine  Unable to use NSAIDs due to Xarelto  Fioricet ordered  Tramadol, Norco PRN  LE edema  LE venous dopplers negative for DVT  Supportive care  Uncontrolled DM  SSI and frequent accuchecks  Hx of PE  Continue xarelto  Hypokalemia/Hypomagnesemia  Electrolyte protocol  Morbid obesity with CRISTINO  Counseled on making healthy lifestyle and dietary changes      Plan of care discussed with patient or family at bedside.      Supplementary Documentation:     Quality:  DVT Prophylaxis: Xarelto  CODE status: Full       Estimated date of discharge: TBD  Discharge is dependent on: clinical stability  At this point Ms. Rosenberg is expected to be discharge to: home                   Veena Tyler MD  Hospitalist    MDM: High, I personally reviewed the available laboratories, imaging. I discussed the case with RN. I ordered laboratories, studies including AM labs.  Medical decision making high, risk is high.       The 21st Century Cures Act makes medical notes like these available to patients in the interest of transparency. Please be advised this is a medical document. Medical documents are intended to carry relevant information, facts as evident, and the clinical opinion of the practitioner. The medical note is intended as peer to peer communication and may appear blunt or direct. It is written in medical language and may contain abbreviations or verbiage that are unfamiliar.        [1]    insulin aspart  1-7 Units Subcutaneous TID CC and HS    carvedilol  25 mg Oral BID with meals    insulin degludec  12 Units Subcutaneous Nightly    NIFEdipine ER  60 mg Oral Daily    rosuvastatin  20 mg Oral Nightly    sertraline  100 mg Oral QPM    spironolactone  50 mg Oral Daily    rivaroxaban  20 mg Oral  Daily with food    furosemide  40 mg Intravenous Daily    amitriptyline  75 mg Oral Daily

## 2025-05-02 VITALS
SYSTOLIC BLOOD PRESSURE: 121 MMHG | DIASTOLIC BLOOD PRESSURE: 85 MMHG | TEMPERATURE: 99 F | RESPIRATION RATE: 18 BRPM | HEIGHT: 62 IN | BODY MASS INDEX: 42.95 KG/M2 | WEIGHT: 233.38 LBS | OXYGEN SATURATION: 97 % | HEART RATE: 86 BPM

## 2025-05-02 LAB
ALBUMIN SERPL-MCNC: 4.5 G/DL (ref 3.2–4.8)
ALBUMIN/GLOB SERPL: 1.6 {RATIO} (ref 1–2)
ALP LIVER SERPL-CCNC: 80 U/L (ref 41–108)
ALT SERPL-CCNC: 24 U/L (ref 10–49)
ANION GAP SERPL CALC-SCNC: 9 MMOL/L (ref 0–18)
AST SERPL-CCNC: 23 U/L (ref ?–34)
BASOPHILS # BLD AUTO: 0.03 X10(3) UL (ref 0–0.2)
BASOPHILS NFR BLD AUTO: 0.6 %
BILIRUB SERPL-MCNC: 0.7 MG/DL (ref 0.3–1.2)
BUN BLD-MCNC: 18 MG/DL (ref 9–23)
BUN/CREAT SERPL: 14.6 (ref 10–20)
CALCIUM BLD-MCNC: 9.7 MG/DL (ref 8.7–10.4)
CHLORIDE SERPL-SCNC: 99 MMOL/L (ref 98–112)
CO2 SERPL-SCNC: 29 MMOL/L (ref 21–32)
CREAT BLD-MCNC: 1.23 MG/DL (ref 0.55–1.02)
DEPRECATED RDW RBC AUTO: 46.9 FL (ref 35.1–46.3)
EGFRCR SERPLBLD CKD-EPI 2021: 53 ML/MIN/1.73M2 (ref 60–?)
EOSINOPHIL # BLD AUTO: 0.21 X10(3) UL (ref 0–0.7)
EOSINOPHIL NFR BLD AUTO: 4 %
ERYTHROCYTE [DISTWIDTH] IN BLOOD BY AUTOMATED COUNT: 13.9 % (ref 11–15)
GLOBULIN PLAS-MCNC: 2.8 G/DL (ref 2–3.5)
GLUCOSE BLD-MCNC: 117 MG/DL (ref 70–99)
GLUCOSE BLDC GLUCOMTR-MCNC: 161 MG/DL (ref 70–99)
GLUCOSE BLDC GLUCOMTR-MCNC: 184 MG/DL (ref 70–99)
GLUCOSE BLDC GLUCOMTR-MCNC: 225 MG/DL (ref 70–99)
GLUCOSE BLDC GLUCOMTR-MCNC: 299 MG/DL (ref 70–99)
GLUCOSE BLDC GLUCOMTR-MCNC: 54 MG/DL (ref 70–99)
GLUCOSE BLDC GLUCOMTR-MCNC: 68 MG/DL (ref 70–99)
HCT VFR BLD AUTO: 40.2 % (ref 35–48)
HGB BLD-MCNC: 13.2 G/DL (ref 12–16)
IMM GRANULOCYTES # BLD AUTO: 0.02 X10(3) UL (ref 0–1)
IMM GRANULOCYTES NFR BLD: 0.4 %
LYMPHOCYTES # BLD AUTO: 2.53 X10(3) UL (ref 1–4)
LYMPHOCYTES NFR BLD AUTO: 48.1 %
MCH RBC QN AUTO: 29.9 PG (ref 26–34)
MCHC RBC AUTO-ENTMCNC: 32.8 G/DL (ref 31–37)
MCV RBC AUTO: 91.2 FL (ref 80–100)
MONOCYTES # BLD AUTO: 0.49 X10(3) UL (ref 0.1–1)
MONOCYTES NFR BLD AUTO: 9.3 %
NEUTROPHILS # BLD AUTO: 1.98 X10 (3) UL (ref 1.5–7.7)
NEUTROPHILS # BLD AUTO: 1.98 X10(3) UL (ref 1.5–7.7)
NEUTROPHILS NFR BLD AUTO: 37.6 %
OSMOLALITY SERPL CALC.SUM OF ELEC: 287 MOSM/KG (ref 275–295)
PLATELET # BLD AUTO: 275 10(3)UL (ref 150–450)
POTASSIUM SERPL-SCNC: 4.4 MMOL/L (ref 3.5–5.1)
PROT SERPL-MCNC: 7.3 G/DL (ref 5.7–8.2)
RBC # BLD AUTO: 4.41 X10(6)UL (ref 3.8–5.3)
SODIUM SERPL-SCNC: 137 MMOL/L (ref 136–145)
WBC # BLD AUTO: 5.3 X10(3) UL (ref 4–11)

## 2025-05-02 PROCEDURE — 99239 HOSP IP/OBS DSCHRG MGMT >30: CPT | Performed by: INTERNAL MEDICINE

## 2025-05-02 RX ORDER — FUROSEMIDE 40 MG/1
40 TABLET ORAL DAILY
Status: SHIPPED | COMMUNITY
Start: 2025-05-04

## 2025-05-02 NOTE — PROGRESS NOTES
Progress Note  Suki Rosenberg Patient Status:  Observation    1970 MRN [id number]   Location [facility] 1W Attending Veena Tyler MD   Hosp Day # 0 PCP Graham Recio DO     Subjective:  Pt c/o migraine this am but getting better. Denies CP, SOB or orthopnea. Denies dizziness/lightheadedness. Leg swelling improved. Concerned that her previous Lasix dose was not effective for her.     Objective:  BP 99/68 (BP Location: Right arm)   Pulse 74   Temp 97.9 °F (36.6 °C) (Axillary)   Resp 18   Ht 5' 2\" (1.575 m)   Wt 233 lb 6 oz (105.9 kg)   LMP 10/29/2018   SpO2 97%   BMI 42.68 kg/m²     Telemetry: NSR    Intake/Output:    Intake/Output Summary (Last 24 hours) at 2025 1117  Last data filed at 2025 1100  Gross per 24 hour   Intake 670 ml   Output 550 ml   Net 120 ml       Last 3 Weights   25 0500 233 lb 6 oz (105.9 kg)   25 0500 234 lb 9 oz (106.4 kg)   25 2140 240 lb (108.9 kg)   25 1246 247 lb (112 kg)   25 1305 237 lb 8 oz (107.7 kg)       Labs:  Recent Labs   Lab 25  0645 25  0601 25  0528   * 124* 117*   BUN 13 19 18   CREATSERUM 0.92 1.24* 1.23*   EGFRCR 74 52* 53*   CA 9.5 9.4 9.7    137 137   K 4.2  4.2 4.3 4.4    98 99   CO2 26.0 31.0 29.0     Recent Labs   Lab 25  0645 25  0601 25  0528   RBC 4.55 4.36 4.41   HGB 13.5 12.8 13.2   HCT 39.3 38.6 40.2   MCV 86.4 88.5 91.2   MCH 29.7 29.4 29.9   MCHC 34.4 33.2 32.8   RDW 13.8 14.0 13.9   NEPRELIM 2.71 2.12 1.98   WBC 5.7 5.2 5.3   .0 242.0 275.0         Recent Labs   Lab 25  2240 25  0645   TROPHS 3 3       Diagnostics:  US ARTERIAL DUPLEX LOWER EXTREMITY BILATERAL (CPT=93925)  Result Date: 2025  CONCLUSION:    1.Right  No evidence of clinically significant arterial stenosis of inflow vasculature .   Multiphasic flow via dominant right anterior tibial artery into the foot on the right.  Multiphasic flow the peroneal artery  distally on the right.  Suboptimal visualization of the posterior tibial artery.    2. Left:  No evidence of inflow disease with 3 vessel multiphasic flow into the foot.    Dictated by (CST): Jerel Stahl MD on 5/01/2025 at 4:18 PM     Finalized by (CST): Jerel Stahl MD on 5/01/2025 at 4:35 PM          XR ANKLE (MIN 3 VIEWS), LEFT (CPT=73610)  Result Date: 5/1/2025  CONCLUSION: Soft tissue edema around the ankle and foot with no underlying osseous abnormality clearly identified.    Dictated by (CST): Umesh Nichols MD on 5/01/2025 at 1:19 PM     Finalized by (CST): Umesh Nichols MD on 5/01/2025 at 1:19 PM           Review of Systems   Cardiovascular:  Negative for chest pain, dyspnea on exertion, leg swelling and orthopnea.   Respiratory:  Negative for cough and shortness of breath.    Neurological:  Positive for headaches.       Physical Exam:    Gen: alert, oriented x 3, NAD  Heent: pupils equal, reactive. Mucous membranes moist.   Neck: no jvd  Cardiac: regular rate and rhythm, normal S1,S2, no murmur, clicks, rub or gallop  Lungs: CTA  Abd: soft, NT/ND +bs  Ext: no edema  Skin: Warm, dry  Neuro: No focal deficits      Medications:    Scheduled Medications[1]  Medication Infusions[2]      Assessment:  Exertional Chest Pain/SOB  Trop WNL x 2  ECG w/o acute ischemic changes  D Dimer WNL  12/2023 PET Stress w/normal perfusion   Can consider OP stress test   Denies further CP  Bilateral LE Edema, Hx Chronic Venous Insufficiency (s/p L GSV Ablation),  Lymphedema  proBNP <35, CXR unremarkable  Echo w/LVEF 55-60%, no RWMA, mildly reduced RV function, IVC normal  BLE US neg for DVT  S/P IV Bumex, IV Lasix - now stopped d/t Cr uptrending  Net -3.2L, wt downtrending  Hx on Lasix 20mg po daily at home   NAYELI - Cr mildly elevated  Diuretics held  Hypertension - well controlled  On coreg, aldactone, nifedipine  LLE Heel Wound  Art Duplex w/o significant stenosis   Hx Prior PE/Hypercoagulability - on  Xarelto  Uncontrolled DMII - A1C 7.9  Former Tobacco Abuse  Hx Migraines    Plan:  BP on low side this am after coreg  - hold off on nifedipine, aldactone this am. Encourage po intake  Appears compensated from volume standpoint. Mild NAYELI. Hold off on additional diuretic today. Pt can resume diuretic with Lasix 40mg po daily on Sunday.   If pt continuing to feel better, ok for discharge from cardiology standpoint. Plan for BMP early next week, F/U visit with [facility] in 1 week with KODAK or Dr Viera. Can adjust diuretics as needed at that visit.     Plan of care discussed with patient, RN.    Zoie Villanueva, APRN  5/2/2025  11:17 AM          =======================================================  Note reviewed and labs reviewed.  Agree with above assessment and plan.  Given mild uptrend in Cr and near euvolemia, will hold diuretic and resume maintenance diuresis over the weekend. Needs close follow-up with BMP and volume assessment next week.   I have personally performed the medical decision making in its entirety.   Robby Jones,            [1]    insulin aspart  1-7 Units Subcutaneous TID CC and HS    carvedilol  25 mg Oral BID with meals    insulin degludec  12 Units Subcutaneous Nightly    NIFEdipine ER  60 mg Oral Daily    rosuvastatin  20 mg Oral Nightly    sertraline  100 mg Oral QPM    spironolactone  50 mg Oral Daily    rivaroxaban  20 mg Oral Daily with food    amitriptyline  75 mg Oral Daily   [2]

## 2025-05-02 NOTE — PROGRESS NOTES
[facility]  part of [facility]     Hospitalist Progress Note     Suki Rosenberg Patient Status:  Observation    1970 MRN [id number]   Location [facility] 1W Attending Veena Tyler MD   Hosp Day # 0 PCP Graham Recio DO     Subjective:     Patient resting comfortably in bed and in NAD. She stated her migraine has improved and that she feels \"almost\" back to her normal self.   No overnight events reported by the nursing staff.       Objective:    Review of Systems:   ROS completed; pertinent positive and negatives stated in subjective.      Vital signs:  Temp:  [97.9 °F (36.6 °C)-98.4 °F (36.9 °C)] 97.9 °F (36.6 °C)  Pulse:  [73-79] 79  Resp:  [18] 18  BP: ()/(67-81) 122/80  SpO2:  [96 %-100 %] 97 %      Physical Exam:    Gen: NAD AO x3  Chest: good air entry CTABL  CVS: normal s1 and s2 RR  Abd: NABS soft NT ND  Neuro: CN 2-12 grossly intact  Ext: no edema in bilateral LE      Diagnostic Data:    Labs:  Recent Labs   Lab 25  2239 25  0645 25  0601 25  0528   WBC 6.7 5.7 5.2 5.3   HGB 13.2 13.5 12.8 13.2   MCV 86.8 86.4 88.5 91.2   .0 250.0 242.0 275.0       Recent Labs   Lab 25  0645 25  0601 25  0528   * 124* 117*   BUN 13 19 18   CREATSERUM 0.92 1.24* 1.23*   CA 9.5 9.4 9.7   ALB 4.4 4.1 4.5    137 137   K 4.2  4.2 4.3 4.4    98 99   CO2 26.0 31.0 29.0   ALKPHO 84 77 80   AST 27 27 23   ALT 25 26 24   BILT 0.6 0.8 0.7   TP 7.2 7.0 7.3       Estimated Creatinine Clearance: 41.8 mL/min (A) (based on SCr of 1.23 mg/dL (H)).    No results for input(s): \"PTP\", \"INR\" in the last 168 hours.           Imaging: Imaging data reviewed in Epic.    Medications: Scheduled Medications[1]    Assessment & Plan:     Acute heart failure exacerbation  NAYELI  Imagine reviewed  BNP WNL  Trop and EKG reviewed  Started on Lasix 40 mg IV BID in ER  Echo reviewed - EF 55-60%  Renal function appears stable at this time  Cardiology  on consult  IV diuresis discontinued  Plan to switch to PO Bumex  Patient may need inpatient ischemic eval during hospital stay  LE arterial dopplers negative for any clinically significant arterial stenosis  Net IO Since Admission: -3,240 mL [05/02/25 0810]  Strict Is and Os, daily weights  Monitor renal function  Avoid nephrotoxic agents  Migraine  Unable to use NSAIDs due to Xarelto  Fioricet ordered  Tramadol, Norco PRN  LE edema  LE venous dopplers negative for DVT  Continue diuresis per cardiology  Supportive care  Uncontrolled DM  SSI and frequent accuchecks  Hx of PE  Continue xarelto  Hypokalemia/Hypomagnesemia  Electrolyte protocol  Morbid obesity with CRISTINO  Counseled on making healthy lifestyle and dietary changes  Disposition  Plan on discharging home with close opt follow up pending cardiac clearance      Plan of care discussed with patient or family at bedside.      Supplementary Documentation:     Quality:  DVT Prophylaxis: Xarelto  CODE status: Full       Estimated date of discharge: TBD  Discharge is dependent on: clinical stability  At this point Ms. Rosenberg is expected to be discharge to: home                   Veena Tyler MD  Hospitalist    MDM: High, I personally reviewed the available laboratories, imaging. I discussed the case with RN. I ordered laboratories, studies including AM labs.  Medical decision making high, risk is high.       The 21st Century Cures Act makes medical notes like these available to patients in the interest of transparency. Please be advised this is a medical document. Medical documents are intended to carry relevant information, facts as evident, and the clinical opinion of the practitioner. The medical note is intended as peer to peer communication and may appear blunt or direct. It is written in medical language and may contain abbreviations or verbiage that are unfamiliar.        [1]    insulin aspart  1-7 Units Subcutaneous TID CC and HS    carvedilol  25 mg Oral BID  with meals    insulin degludec  12 Units Subcutaneous Nightly    NIFEdipine ER  60 mg Oral Daily    rosuvastatin  20 mg Oral Nightly    sertraline  100 mg Oral QPM    spironolactone  50 mg Oral Daily    rivaroxaban  20 mg Oral Daily with food    amitriptyline  75 mg Oral Daily

## 2025-05-02 NOTE — DIETARY NOTE
ADULT NUTRITION INITIAL ASSESSMENT    Pt is at moderate nutrition risk.  Pt does not meet malnutrition criteria.      RECOMMENDATIONS TO MD: See nutrition intervention for ONS (oral nutrition supplements)    ADMITTING DIAGNOSIS:  Hypokalemia [E87.6]  Lower extremity edema [R60.0]  Chest pain of uncertain etiology [R07.9]  PERTINENT PAST MEDICAL HISTORY: Past Medical History[1]    PATIENT STATUS: Initial 05/02/25: Pt assessed due to consult received for \"low appetite/decreased intake\". Chart reviewed, pt admitted with c/o bilateral LLE swelling since 4/23/25 in addition to migraines and hypertension. Discussion with RN, pt was hypoglycemic this morning. Intakes reviewed, 0-100% x 6 meals since admission (38% overall). Pt visited, reports decreased oral intake/appetite due to increased migraine pain with chewing. Pt reports decreased intake/appetite since last Friday or Saturday (~ 1 week). No concerns about weight loss but interested in losing weight - discussed healthy weight loss tips. Current weight 233# 6 oz. EMR weight review, last known weight # on 4/21/25. Per EMR weight review, pt noted weighing 237# 8 oz on 2/12/25 - stable, 247# 10 oz on 1/1/25, 243# 10 oz on 10/8/24 and 236# on 5/15/24. Pt reports some weight fluctuations r/t fluid. Pt diuresed during admission (IV Diuresis discontinued per MD note today (5/2). Pt appears well-nourished. Encouraged small frequent meals with emphasis on high calorie and high protein - discussed choosing softer foods. Discussed adding oral nutritional supplement (ONS) to help maximize nutrition during this time - pt in agreement. Reviewed menu with pt. +ONS per discussion    FOOD/NUTRITION RELATED HISTORY:  Appetite:  decreased appetite PTA x ~1 week per pt  Intake: ~0-100% x6 meals documented since admit  Intake Meeting Needs: No, but oral nutrition supplements (ONS) to maximize  Percent Meals Eaten (last 6 days)       Date/Time Percent Meals Eaten (%)    04/30/25  1000 0 %    04/30/25 1400 0 %    04/30/25 1700 0 %    04/30/25 2100 100 %    05/01/25 1938 100 %    05/02/25 1100 30 %           Food Allergies: No Known Food Allergies (NKFA)  Cultural/Ethnic/Lutheran Preferences: Not Obtained    GASTROINTESTINAL: +BM 4/29/25 and Loss of appetite    MEDICATIONS: reviewed   insulin aspart  1-7 Units Subcutaneous TID CC and HS    carvedilol  25 mg Oral BID with meals    insulin degludec  12 Units Subcutaneous Nightly    NIFEdipine ER  60 mg Oral Daily    rosuvastatin  20 mg Oral Nightly    sertraline  100 mg Oral QPM    spironolactone  50 mg Oral Daily    rivaroxaban  20 mg Oral Daily with food    amitriptyline  75 mg Oral Daily     LABS: reviewed  Recent Labs     04/29/25  2240 04/30/25  0645 05/01/25  0601 05/02/25  0528   * 152* 124* 117*   BUN 17 13 19 18   CREATSERUM 0.95 0.92 1.24* 1.23*   CA 7.9* 9.5 9.4 9.7   MG 1.4* 2.4 2.1  --     137 137 137   K 2.9* 4.2  4.2 4.3 4.4    101 98 99   CO2 25.0 26.0 31.0 29.0   OSMOCALC 296* 287 288 287     WEIGHT HISTORY:  Patient Weight(s) for the past 336 hrs:   Weight   05/02/25 0500 105.9 kg (233 lb 6 oz)   05/01/25 0500 106.4 kg (234 lb 9 oz)   04/29/25 2140 108.9 kg (240 lb)     Wt Readings from Last 10 Encounters:   05/02/25 105.9 kg (233 lb 6 oz)   04/21/25 112 kg (247 lb)   02/12/25 107.7 kg (237 lb 8 oz)   01/15/25 108.4 kg (239 lb)   01/15/25 108.4 kg (239 lb)   01/13/25 105.7 kg (233 lb)   01/08/25 108 kg (238 lb)   01/03/25 112.9 kg (249 lb)   01/03/25 113 kg (249 lb 1.9 oz)   10/08/24 110.5 kg (243 lb 9.6 oz)       ANTHROPOMETRICS:  HT: 157.5 cm (5' 2\")  Wt Readings from Last 1 Encounters:   05/02/25 105.9 kg (233 lb 6 oz)     Last weight: Likely accurate  BMI: Body mass index is 42.68 kg/m².  Dosing Weight: 105.9 kg - taken on 5/2/25 , utilized for anthropometric calculations  BMI CLASSIFICATION: greater than 40 kg/m2 - morbid obesity class III  IBW/lbs (Calculated) Female: 110 lbs             212%  IBW  Usual Body Wt: 235-245 lbs per EMR review      95-99% UBW    NUTRITION RELATED PHYSICAL FINDINGS:  - Nutrition Focused Physical Exam (NFPE): well nourished  - Fluid Accumulation: non-pitting Bilateral Lower extremity per flowsheet review --> See RN documentation for details  - Skin Integrity: intact and RN documentation reviewed per flowsheet review -->See RN documentation for details      NUTRITION DIAGNOSIS/PROBLEM:   Inadequate oral intake related to Decreased ability to consume sufficient energy in setting of migraines as evidenced by decreased po intake reported by pt over last week    NUTRITION INTERVENTION:   NUTRITION PRESCRIPTION:   Estimated Nutrition needs: --dosing wt of 105.9 kg - wt taken on 5/2/25  Calories: 3824-0779 calories/day (15-18 calories per kg Dosing wt)  Protein: 65-90 g protein/day (1.3-1.8 g protein/kg Ideal body wt (IBW)50kg)    - Diet:       Procedures    Carbohydrate controlled diet 1800 kcal/60 grams; Is Patient on Accuchecks? No      - Nutrition Care Plan: Encouraged small frequent meals with emphasis on high calorie/high protein and Initiated ONS (oral nutritional supplements)  - ONS (Oral Nutrition Supplements)/Meals/Snacks: Glucerna (220 calories/ 10 g protein each) BID Chocolate or Strawberry   - Vitamin and mineral supplements: none  - Feeding assistance: meal set up  - Nutrition education: Discussed importance of adequate energy and protein intake    - Coordination of nutrition care: collaboration with other providers  - Discharge and transfer of nutrition care to new setting or provider: monitor plans    MONITOR AND EVALUATE/NUTRITION GOALS:  - Food and Nutrient Intake:      Monitor: adequacy of PO intake and adequacy of supplement intake  - Food and Nutrient Administration:      Monitor: N/A  - Anthropometric Measurement:    Monitor weight  - Nutrition Goals:      allow wt loss due to fluid losses, PO and supplement greater than 75% of needs, good supplement intake, labs  within acceptable limits, euglycemia, minimize lean body mass loss, prevent skin breakdown, maintain true wt within 5%, and improved GI status    DIETITIAN FOLLOW UP: RD to follow and monitor nutrition status    Saima Ramos MS, GIULIA, RDN, LDN  Clinical Dietitian  P: [phone]       [1]   Past Medical History:   Anemia    due to blood loss    Anxiety    Asthma (HCC)    Atrial septal defect (HCC)    Back problem    Bipolar 2 disorder (HCC)    BRCA gene mutation negative in female    Negative 28 gene hereditary breast/gyn cancers panel, report in media tab    Breast cancer (HCC)    L sided, treated with chemo, radiation    Chronic back pain    Chronic constipation    COVID-19 long hauler    Depression    Diabetes (HCC)    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 due to underlying condition, limited to breakdown of skin (HCC)    Essential hypertension    Fibromyalgia    High blood pressure    High cholesterol    History of blood transfusion    4-5 per year    Hx of motion sickness    Hypercoagulable state (HCC)    Incontinence    Lupus    Migraines    Morbid obesity with BMI of 40.0-44.9, adult (HCC)    Muscle weakness    Neuropathy    Non-pressure chronic ulcer of right lower leg, limited to breakdown of skin (HCC)    PE (pulmonary thromboembolism) (HCC)    1999 after child birth and again 2014 (bilateral)    Pneumonia due to organism    PONV (postoperative nausea and vomiting)    Pulmonary embolism (HCC)    Schizophrenia (HCC)    Visual impairment    Glasses

## 2025-05-02 NOTE — PLAN OF CARE
Problem: Diabetes/Glucose Control  Goal: Glucose maintained within prescribed range  Description: INTERVENTIONS:- Monitor Blood Glucose as ordered- Assess for signs and symptoms of hyperglycemia and hypoglycemia- Administer ordered medications to maintain glucose within target range- Assess barriers to adequate nutritional intake and initiate nutrition consult as needed- Instruct patient on self management of diabetes  INTERVENTIONS:- Monitor Blood Glucose as ordered- Assess for signs and symptoms of hyperglycemia and hypoglycemia- Administer ordered medications to maintain glucose within target range- Assess barriers to adequate nutritional intake and initiate nutrition consult as needed- Instruct patient on self management of diabetes  Outcome: Progressing     Problem: PAIN - ADULT  Goal: Verbalizes/displays adequate comfort level or patient's stated pain goal  Description: INTERVENTIONS:- Encourage pt to monitor pain and request assistance- Assess pain using appropriate pain scale- Administer analgesics based on type and severity of pain and evaluate response- Implement non-pharmacological measures as appropriate and evaluate response- Consider cultural and social influences on pain and pain management- Manage/alleviate anxiety- Utilize distraction and/or relaxation techniques- Monitor for opioid side effects- Notify MD/LIP if interventions unsuccessful or patient reports new pain- Anticipate increased pain with activity and pre-medicate as appropriate  Outcome: Progressing     Problem: SAFETY ADULT - FALL  Goal: Free from fall injury  Description: INTERVENTIONS:- Assess pt frequently for physical needs- Identify cognitive and physical deficits and behaviors that affect risk of falls.- [place] fall precautions as indicated by assessment.- Educate pt/family on patient safety including physical limitations- Instruct pt to call for assistance with activity based on assessment- Modify environment to reduce risk  of injury- Provide assistive devices as appropriate- Consider OT/PT consult to assist with strengthening/mobility- Encourage toileting schedule  Outcome: Progressing     Problem: CARDIOVASCULAR - ADULT  Goal: Maintains optimal cardiac output and hemodynamic stability  Description: INTERVENTIONS:- Monitor vital signs, rhythm, and trends- Monitor for bleeding, hypotension and signs of decreased cardiac output- Evaluate effectiveness of vasoactive medications to optimize hemodynamic stability- Monitor arterial and/or venous puncture sites for bleeding and/or hematoma- Assess quality of pulses, skin color and temperature- Assess for signs of decreased coronary artery perfusion - ex. Angina- Evaluate fluid balance, assess for edema, trend weights  Outcome: Progressing     Problem: RESPIRATORY - ADULT  Goal: Achieves optimal ventilation and oxygenation  Description: INTERVENTIONS:- Assess for changes in respiratory status- Assess for changes in mentation and behavior- Position to facilitate oxygenation and minimize respiratory effort- Oxygen supplementation based on oxygen saturation or ABGs- Provide Smoking Cessation handout, if applicable- Encourage broncho-pulmonary hygiene including cough, deep breathe, Incentive Spirometry- Assess the need for suctioning and perform as needed- Assess and instruct to report SOB or any respiratory difficulty- Respiratory Therapy support as indicated- Manage/alleviate anxiety- Monitor for signs/symptoms of CO2 retention  Outcome: Progressing     Problem: METABOLIC/FLUID AND ELECTROLYTES - ADULT  Goal: Glucose maintained within prescribed range  Description: INTERVENTIONS:- Monitor Blood Glucose as ordered- Assess for signs and symptoms of hyperglycemia and hypoglycemia- Administer ordered medications to maintain glucose within target range- Assess barriers to adequate nutritional intake and initiate nutrition consult as needed- Instruct patient on self management of  diabetes  INTERVENTIONS:- Monitor Blood Glucose as ordered- Assess for signs and symptoms of hyperglycemia and hypoglycemia- Administer ordered medications to maintain glucose within target range- Assess barriers to adequate nutritional intake and initiate nutrition consult as needed- Instruct patient on self management of diabetes  Outcome: Progressing  Goal: Electrolytes maintained within normal limits  Description: INTERVENTIONS:- Monitor labs and rhythm and assess patient for signs and symptoms of electrolyte imbalances- Administer electrolyte replacement as ordered- Monitor response to electrolyte replacements, including rhythm and repeat lab results as appropriate- Fluid restriction as ordered- Instruct patient on fluid and nutrition restrictions as appropriate  Outcome: Progressing  Goal: Hemodynamic stability and optimal renal function maintained  Description: INTERVENTIONS:- Monitor labs and assess for signs and symptoms of volume excess or deficit- Monitor intake, output and patient weight- Monitor urine specific gravity, serum osmolarity and serum sodium as indicated or ordered- Monitor response to interventions for patient's volume status, including labs, urine output, blood pressure (other measures as available)- Encourage oral intake as appropriate- Instruct patient on fluid and nutrition restrictions as appropriate  Outcome: Progressing     Problem: SKIN/TISSUE INTEGRITY - ADULT  Goal: Incision(s), wounds(s) or drain site(s) healing without S/S of infection  Description: INTERVENTIONS:- Assess and document risk factors for pressure ulcer development- Assess and document skin integrity- Assess and document dressing/incision, wound bed, drain sites and surrounding tissue- Implement wound care per orders- Initiate isolation precautions as appropriate- Initiate Pressure Ulcer prevention bundle as indicated  Outcome: Progressing

## 2025-05-02 NOTE — PLAN OF CARE
Problem: Patient Centered Care  Goal: Patient preferences are identified and integrated in the patient's plan of care  Description: Interventions:- What would you like us to know as we care for you? Home with family- Provide timely, complete, and accurate information to patient/family- Incorporate patient and family knowledge, values, beliefs, and cultural backgrounds into the planning and delivery of care- Encourage patient/family to participate in care and decision-making at the level they choose- Honor patient and family perspectives and choices  Outcome: Adequate for Discharge     Problem: Diabetes/Glucose Control  Goal: Glucose maintained within prescribed range  Description: INTERVENTIONS:- Monitor Blood Glucose as ordered- Assess for signs and symptoms of hyperglycemia and hypoglycemia- Administer ordered medications to maintain glucose within target range- Assess barriers to adequate nutritional intake and initiate nutrition consult as needed- Instruct patient on self management of diabetes  INTERVENTIONS:- Monitor Blood Glucose as ordered- Assess for signs and symptoms of hyperglycemia and hypoglycemia- Administer ordered medications to maintain glucose within target range- Assess barriers to adequate nutritional intake and initiate nutrition consult as needed- Instruct patient on self management of diabetes  Outcome: Adequate for Discharge     Problem: Patient/Family Goals  Goal: Patient/Family Long Term Goal  Description: Patient's Long Term Goal: home  Interventions:- - See additional Care Plan goals for specific interventions  Outcome: Adequate for Discharge  Goal: Patient/Family Short Term Goal  Description: Patient's Short Term Goal: home  Interventions: - - See additional Care Plan goals for specific interventions  Outcome: Adequate for Discharge     Problem: PAIN - ADULT  Goal: Verbalizes/displays adequate comfort level or patient's stated pain goal  Description: INTERVENTIONS:- Encourage pt to  monitor pain and request assistance- Assess pain using appropriate pain scale- Administer analgesics based on type and severity of pain and evaluate response- Implement non-pharmacological measures as appropriate and evaluate response- Consider cultural and social influences on pain and pain management- Manage/alleviate anxiety- Utilize distraction and/or relaxation techniques- Monitor for opioid side effects- Notify MD/LIP if interventions unsuccessful or patient reports new pain- Anticipate increased pain with activity and pre-medicate as appropriate  Outcome: Adequate for Discharge     Problem: SAFETY ADULT - FALL  Goal: Free from fall injury  Description: INTERVENTIONS:- Assess pt frequently for physical needs- Identify cognitive and physical deficits and behaviors that affect risk of falls.- [place] fall precautions as indicated by assessment.- Educate pt/family on patient safety including physical limitations- Instruct pt to call for assistance with activity based on assessment- Modify environment to reduce risk of injury- Provide assistive devices as appropriate- Consider OT/PT consult to assist with strengthening/mobility- Encourage toileting schedule  Outcome: Adequate for Discharge     Problem: CARDIOVASCULAR - ADULT  Goal: Maintains optimal cardiac output and hemodynamic stability  Description: INTERVENTIONS:- Monitor vital signs, rhythm, and trends- Monitor for bleeding, hypotension and signs of decreased cardiac output- Evaluate effectiveness of vasoactive medications to optimize hemodynamic stability- Monitor arterial and/or venous puncture sites for bleeding and/or hematoma- Assess quality of pulses, skin color and temperature- Assess for signs of decreased coronary artery perfusion - ex. Angina- Evaluate fluid balance, assess for edema, trend weights  Outcome: Adequate for Discharge     Problem: RESPIRATORY - ADULT  Goal: Achieves optimal ventilation and oxygenation  Description: INTERVENTIONS:-  Assess for changes in respiratory status- Assess for changes in mentation and behavior- Position to facilitate oxygenation and minimize respiratory effort- Oxygen supplementation based on oxygen saturation or ABGs- Provide Smoking Cessation handout, if applicable- Encourage broncho-pulmonary hygiene including cough, deep breathe, Incentive Spirometry- Assess the need for suctioning and perform as needed- Assess and instruct to report SOB or any respiratory difficulty- Respiratory Therapy support as indicated- Manage/alleviate anxiety- Monitor for signs/symptoms of CO2 retention  Outcome: Adequate for Discharge     Problem: METABOLIC/FLUID AND ELECTROLYTES - ADULT  Goal: Glucose maintained within prescribed range  Description: INTERVENTIONS:- Monitor Blood Glucose as ordered- Assess for signs and symptoms of hyperglycemia and hypoglycemia- Administer ordered medications to maintain glucose within target range- Assess barriers to adequate nutritional intake and initiate nutrition consult as needed- Instruct patient on self management of diabetes  INTERVENTIONS:- Monitor Blood Glucose as ordered- Assess for signs and symptoms of hyperglycemia and hypoglycemia- Administer ordered medications to maintain glucose within target range- Assess barriers to adequate nutritional intake and initiate nutrition consult as needed- Instruct patient on self management of diabetes  Outcome: Adequate for Discharge  Goal: Electrolytes maintained within normal limits  Description: INTERVENTIONS:- Monitor labs and rhythm and assess patient for signs and symptoms of electrolyte imbalances- Administer electrolyte replacement as ordered- Monitor response to electrolyte replacements, including rhythm and repeat lab results as appropriate- Fluid restriction as ordered- Instruct patient on fluid and nutrition restrictions as appropriate  Outcome: Adequate for Discharge  Goal: Hemodynamic stability and optimal renal function  maintained  Description: INTERVENTIONS:- Monitor labs and assess for signs and symptoms of volume excess or deficit- Monitor intake, output and patient weight- Monitor urine specific gravity, serum osmolarity and serum sodium as indicated or ordered- Monitor response to interventions for patient's volume status, including labs, urine output, blood pressure (other measures as available)- Encourage oral intake as appropriate- Instruct patient on fluid and nutrition restrictions as appropriate  Outcome: Adequate for Discharge     Problem: SKIN/TISSUE INTEGRITY - ADULT  Goal: Incision(s), wounds(s) or drain site(s) healing without S/S of infection  Description: INTERVENTIONS:- Assess and document risk factors for pressure ulcer development- Assess and document skin integrity- Assess and document dressing/incision, wound bed, drain sites and surrounding tissue- Implement wound care per orders- Initiate isolation precautions as appropriate- Initiate Pressure Ulcer prevention bundle as indicated  Outcome: Adequate for Discharge   Vital signs stable, no complaint of pain. Saline lock and telemetry discontinued. Discharge instructions given and discussed with patient. Instructions includes medications to continue taking at home and when to take the next dose, also discussed regarding follow up appointments. Patient verbalized understanding  of all instructions. Discharged home with daughter. Patient in stable condition upon discharge.

## 2025-05-04 NOTE — DISCHARGE SUMMARY
[facility]  part of [facility]    DISCHARGE SUMMARY     Suki Rosenberg Patient Status:  Observation    1970 MRN [id number]   Location [facility] 1W Attending No att. providers found   Hosp Day # 0 PCP Graham Recio DO     Date of Admission: 2025  Date of Discharge: 2025     Discharge Disposition: Home or Self Care    Discharge Diagnosis:     Acute heart failure exacerbation  NAYELI  Migraine  LE edema  Uncontrolled DM  Hx of PE  Hypokalemia/Hypomagnesemia  Morbid obesity with CRISTINO    History of Present Illness:     Suki Rosenberg is a(n) 54 year old female, with past medical history significant for asthma, prior PE currently on anticoagulation and diabetes presents with a complaint of increasing shortness of breath particularly on exertion and increasing lower extremity swelling over the past few weeks.  Describes the onset as gradual with progressive worsening aggravated by minimal exertion with no relieving factors.  Claims she can walk barely 10 steps before she feels like her arm and chest are being squeezed, has to wait before she can get her breath back.  Does admit to a rather sedentary lifestyle, claims she sits at work 4 hours together without getting up.  Her lower extremity swelling has increased rather rheumatically particular in the left of late.  She also complains of severe headaches 2-3 times a week, associated with photophobia and has been diagnosed with migraines in the past.  Claims she takes Elavil with minimal improvement.    Brief Synopsis:     Acute heart failure exacerbation  NAYELI  Imagine reviewed  BNP WNL  Trop and EKG reviewed  Started on Lasix 40 mg IV BID in ER  Echo reviewed - EF 55-60%  Renal function appears stable at this time  Cardiology on consult  IV diuresis discontinued  Discharging on PO Lasix per Cardiology  LE arterial dopplers negative for any clinically significant arterial stenosis  Net IO Since Admission: -3,240 mL [25  0810]  Strict Is and Os, daily weights  Monitor renal function  Avoid nephrotoxic agents  Migraine  Unable to use NSAIDs due to Xarelto  Fioricet ordered  Tramadol, Norco PRN  LE edema  LE venous dopplers negative for DVT  Continue diuresis per cardiology  Supportive care  Uncontrolled DM  SSI and frequent accuchecks  Hx of PE  Continue xarelto  Hypokalemia/Hypomagnesemia  Electrolyte protocol  Morbid obesity with CRISTINO  Counseled on making healthy lifestyle and dietary changes  Patient is to follow up with PCP and Cardiology as opt. Discharge meds ordered by cardiology.  Patient is to remain compliant with all discharge medications and instructions and to follow up as advised.   Patient encouraged to make healthy lifestyle and dietary changes.    Lace+ Score: 61  59-90 High Risk  29-58 Medium Risk  0-28   Low Risk       TCM Follow-Up Recommendation:  LACE > 58: High Risk of readmission after discharge from the hospital.      Procedures during hospitalization:   None     Incidental or significant findings and recommendations (brief descriptions):  None    Lab/Test results pending at Discharge:   None    Consultants:  Consultants         Provider   Role Specialty     Alonso Galvan MD      Consulting Physician Cardiac Electrophysiology            Discharge Medication List:     Discharge Medications        CHANGE how you take these medications        Instructions Prescription details   Basaglar KwikPen 100 UNIT/ML Sopn  Generic drug: insulin glargine  What changed: Another medication with the same name was removed. Continue taking this medication, and follow the directions you see here.      Inject 15 Units into the skin nightly.   Quantity: 15 mL  Refills: 1     Lasix 40 MG Tabs  Generic drug: furosemide  What changed: how much to take      Take 1 tablet (40 mg total) by mouth daily.   Refills: 0            CONTINUE taking these medications        Instructions Prescription details   amitriptyline 75 MG Tabs  Commonly  known as: Elavil      Take 1 tablet (75 mg total) by mouth daily.   Quantity: 90 tablet  Refills: 1     ARIPiprazole 5 MG Tabs  Commonly known as: Abilify       Refills: 0     carvedilol 25 MG Tabs  Commonly known as: Coreg      Take 1 tablet (25 mg total) by mouth 2 (two) times daily with meals.   Quantity: 60 tablet  Refills: 0     ergocalciferol 1.25 MG (42197 UT) Caps  Commonly known as: Vitamin D2      TAKE 1 CAPSULE BY MOUTH 1 TIME A WEEK FOR 12 DOSES   Quantity: 12 capsule  Refills: 0     FreeStyle Meagan 2 Sensor Misc      1 each every 14 (fourteen) days.   Quantity: 2 each  Refills: 3     FreeStyle Meagan 3 Sensor Misc      1 each every 14 (fourteen) days.   Quantity: 2 each  Refills: 5     HYDROcodone-acetaminophen  MG Tabs  Commonly known as: Norco      Take 1 tablet by mouth every 6 (six) hours as needed for Pain.   Quantity: 15 tablet  Refills: 0     insulin aspart 100 Units/mL Sopn  Commonly known as: NovoLOG      Inject 5-7 Units into the skin 3 (three) times daily before meals.   Refills: 0     lidocaine 5 % Ptch  Commonly known as: Lidoderm      Place 1 patch onto the skin daily.   Quantity: 30 patch  Refills: 2     miconazole Nitrate 2 % Crea  Commonly known as: Miconazole 7      Place 1 applicator vaginally nightly.   Quantity: 1 each  Refills: 0     Mounjaro 15 MG/0.5ML Soaj  Generic drug: Tirzepatide      Inject 15 mg into the skin once a week.   Quantity: 2 mL  Refills: 5     NIFEdipine ER 60 MG Tb24  Commonly known as: ADALAT CC      TAKE 1 TABLET(60 MG) BY MOUTH DAILY   Quantity: 90 tablet  Refills: 1     rosuvastatin 20 MG Tabs  Commonly known as: Crestor      Take 1 tablet (20 mg total) by mouth nightly.   Quantity: 90 tablet  Refills: 0     spironolactone 50 MG Tabs  Commonly known as: Aldactone      Take 1 tablet (50 mg total) by mouth daily.   Quantity: 90 tablet  Refills: 1     Xarelto 20 MG Tabs  Generic drug: rivaroxaban      TAKE 1 TABLET BY MOUTH DAILY WITH FOOD.   Quantity: 90  tablet  Refills: 1     zolpidem 10 MG Tabs  Commonly known as: Ambien      Take 1 tablet (10 mg total) by mouth nightly as needed.   Quantity: 30 tablet  Refills: 0            STOP taking these medications      Diethylpropion HCl 25 MG Tabs        sertraline 100 MG Tabs  Commonly known as: Zoloft        traMADol 50 MG Tabs  Commonly known as: Ultram                 [place]    Follow-up appointment:   Brandyn Viera MD  [address]  [address]  [facility] [id number] [phone]    Follow up in 1 week(s)  Office will call you to schedule follow up    Graham Recio DO  [address]  [address]  [facility] [address]  [phone]    Follow up      Appointments for Next 30 Days 5/4/2025 - 6/3/2025        Date Arrival Time Visit Type Length Department Provider     5/6/2025  3:00 PM  CONSULT [3313] 40 min [facility], [facility], [place] Chin Vega MD    Patient Instructions:     Please bring in any pertinent lab or diagnostic test results with you to your appointment.        Location Instructions:     Your appointment is located at [address] [facility] in [place], [place].&nbsp; Please park in the [place] lot, enter through the [facility] entrance, and go to [address]. Note: A $50 fee will be charged for missed appointments or same-day cancellations. Please provide 24 hours' notice if you need to cancel or reschedule your appointment.  Masks are optional for all patients and visitors, unless otherwise indicated.                      Vital signs:       Physical Exam:    Gen: NAD AO x3  Chest: good air entry CTABL  CVS: normal s1 and s2 RR  Abd: NABS soft NT ND  Neuro: CN 2-12 grossly intact  Ext: no edema in bilateral LE    -----------------------------------------------------------------------------------------------  PATIENT DISCHARGE INSTRUCTIONS: See electronic chart    Veena Tyler MD  Hospitalist    Time spent:  > 30 minutes    The 21st Century Cures Act makes  medical notes like these available to patients in the interest of transparency. Please be advised this is a medical document. Medical documents are intended to carry relevant information, facts as evident, and the clinical opinion of the practitioner. The medical note is intended as peer to peer communication and may appear blunt or direct. It is written in medical language and may contain abbreviations or verbiage that are unfamiliar.

## 2025-05-05 ENCOUNTER — PATIENT OUTREACH (OUTPATIENT)
Dept: CASE MANAGEMENT | Age: 55
End: 2025-05-05

## 2025-05-05 NOTE — PROGRESS NOTES
TCM Request   Hospital Follow up for PCP (Discharge 5/2 elm)     PCP   Graham Recio   [address] [place] [id number]  [phone]  Attempt #1:  Left message on voicemail for patient to call transitions specialist back to schedule follow up appointments. Provided Transitions specialist scheduling phone number [phone].

## 2025-05-06 ENCOUNTER — OFFICE VISIT (OUTPATIENT)
Dept: NEPHROLOGY | Facility: CLINIC | Age: 55
End: 2025-05-06

## 2025-05-06 VITALS
WEIGHT: 245 LBS | HEART RATE: 86 BPM | SYSTOLIC BLOOD PRESSURE: 102 MMHG | DIASTOLIC BLOOD PRESSURE: 50 MMHG | BODY MASS INDEX: 45.08 KG/M2 | HEIGHT: 62 IN

## 2025-05-06 DIAGNOSIS — N18.31 STAGE 3A CHRONIC KIDNEY DISEASE (HCC): Primary | ICD-10-CM

## 2025-05-06 PROCEDURE — 99205 OFFICE O/P NEW HI 60 MIN: CPT | Performed by: INTERNAL MEDICINE

## 2025-05-06 PROCEDURE — 3078F DIAST BP <80 MM HG: CPT | Performed by: INTERNAL MEDICINE

## 2025-05-06 PROCEDURE — 3074F SYST BP LT 130 MM HG: CPT | Performed by: INTERNAL MEDICINE

## 2025-05-06 PROCEDURE — 3008F BODY MASS INDEX DOCD: CPT | Performed by: INTERNAL MEDICINE

## 2025-05-06 NOTE — PROGRESS NOTES
05/06/25        Patient: Suki Rosenberg   YOB: 1970   Date of Visit: 5/6/2025       Dear  Dr. Recio, DO,      Thank you for referring Suki Rosenberg to my practice.  Please find my assessment and plan below.      As you know she is a 54-year-old female with a history of longstanding adult onset diabetes mellitus, hypertension, obesity, congestive heart failure, status post 2 pulmonary embolism, ASD, and possible SLE who I now had the pleasure of seeing for what may be acute renal failure.  Laboratory studies have been reviewed in care everywhere and in epic.    Noticed that patient was just recently hospitalized from April 29 through May 2, 2025 for acute exacerbation of her congestive heart failure.  Her creatinine actually was normal at 0.95 at time of admission in April 29, 2025.  Her creatinine did creep up to 1.23 at time of discharge in May 2, 2025 now with an estimated GFR of 53 cc/min.  She currently is on just Lasix 40 mg daily.  She states she still feels comfortable at rest but has dyspnea on exertion.  She still feels as though she has some lower extremity edema.    Her past medical history is significant for adult onset diabetes mellitus since age 19.  She has had longstanding hypertension dating back to at least 1995.  She states her blood pressures have not always been under good control.  She used to live in [place] but moved back to this area couple years ago.  She states she has a hole in her heart but has declined to have it repaired.  She is not aware of any coronary artery disease but is status post 2 pulmonary embolism.  She just had an echocardiogram done in April 30, 2025 that showed a normal left ventricular ejection fraction of 55 to 60%.  Diastolic function likewise was normal.  No significant valvular pathology.  5 the patient does state that she has had a history of systemic lupus erythematosus.  She is not aware of whether or not this affected her kidneys.  She states  she was on prednisone for a while but currently is not on any medications specifically for lupus.  Medications are as listed.  Denies any significant use of nonsteroidals.     Social history quit smoking cigarettes in 2010.  Lives with her family.  Family history is notable for brother who had end-stage renal disease and is now status post renal transplantation.  He may have been a diabetic.  Review of system patient otherwise states she is doing well without any chest pain but still has a sense of dyspnea on exertion and lower extremity edema.  She has a sense that she does not really get the urge to urinate but has good urinary output once urination is initiated.  Otherwise no other GI or urinary tract symptoms.  10 point review of systems is otherwise unremarkable.    Physical exam blood pressure 102/50 with a pulse of 86 and she weighed 245 pounds.  Her neck was supple without JVD.  Lungs were clear.  Heart revealed a regular rate and rhythm with an S4 but no gallops, murmurs or rubs.  Abdomen was soft, flat, nontender without organomegaly, masses or bruits.  Extremities revealed very puffy lower extremities but no true pitting edema.    I therefore informed the patient that she may have acute renal failure as her initial creatinine at the time of this last admission was normal.  This may be just secondary to diuretics.  However need to exclude lupus nephritis.  Diabetes and/or hypertension could also affect her kidney function as well.  Reinforced the importance of maintaining adequate hydration.  Avoid nonsteroidals.  For now continue current medications.  Will order a follow-up CBC, renal panel, urinalysis, urine for microalbumin, urine for Bence-Ferraro protein, sed rate and connective tissue studies were ordered.  She had a renal ultrasound done on January 1, 2025 which was unremarkable.    Further impressions and recommendations will be forthcoming after reviewing the above.  Reinforced importance of  low-salt diabetic diet.  Elevate the legs.  Support hose.  Follow-up with her cardiologist.  Will see a rheumatology consultation as indicated.    Thank you very much for allowing me to participate in the care of your patient.  If you have any questions please feel free to call.           Sincerely,   Chin Vega MD   [facility], [place]  [address]  [facility] [address]    Document electronically generated by:  Chin Vega MD

## 2025-05-06 NOTE — PATIENT INSTRUCTIONS
Please do laboratory studies as ordered.  Follow-up with cardiology.  We will contact you when results are in.

## 2025-05-07 ENCOUNTER — TELEPHONE (OUTPATIENT)
Facility: CLINIC | Age: 55
End: 2025-05-07

## 2025-05-07 NOTE — TELEPHONE ENCOUNTER
Received a call from patient requesting a HFU visit for this week.  Per patient she has discharge 05/02/2025 and needed to follow with PCP in one week .    Please advice .

## 2025-05-12 ENCOUNTER — TELEPHONE (OUTPATIENT)
Dept: WOUND CARE | Facility: HOSPITAL | Age: 55
End: 2025-05-12

## 2025-05-12 NOTE — TELEPHONE ENCOUNTER
Pharmacy called stating they never picked up the gabapentin cream.   They called several times.

## 2025-05-15 ENCOUNTER — TELEPHONE (OUTPATIENT)
Facility: CLINIC | Age: 55
End: 2025-05-15

## 2025-05-15 ENCOUNTER — OFFICE VISIT (OUTPATIENT)
Facility: CLINIC | Age: 55
End: 2025-05-15
Payer: COMMERCIAL

## 2025-05-15 ENCOUNTER — LAB ENCOUNTER (OUTPATIENT)
Dept: LAB | Age: 55
End: 2025-05-15
Attending: FAMILY MEDICINE
Payer: COMMERCIAL

## 2025-05-15 VITALS
BODY MASS INDEX: 43.06 KG/M2 | HEIGHT: 62 IN | HEART RATE: 90 BPM | DIASTOLIC BLOOD PRESSURE: 88 MMHG | SYSTOLIC BLOOD PRESSURE: 136 MMHG | OXYGEN SATURATION: 98 % | WEIGHT: 234 LBS

## 2025-05-15 DIAGNOSIS — E78.5 DYSLIPIDEMIA: ICD-10-CM

## 2025-05-15 DIAGNOSIS — Z91.81 RISK FOR FALLS: ICD-10-CM

## 2025-05-15 DIAGNOSIS — I89.0 LYMPHEDEMA OF BOTH LOWER EXTREMITIES: ICD-10-CM

## 2025-05-15 DIAGNOSIS — L97.509 TYPE 2 DIABETES MELLITUS WITH FOOT ULCER, WITH LONG-TERM CURRENT USE OF INSULIN (HCC): ICD-10-CM

## 2025-05-15 DIAGNOSIS — I50.9 ACUTE ON CHRONIC CONGESTIVE HEART FAILURE, UNSPECIFIED HEART FAILURE TYPE (HCC): Primary | ICD-10-CM

## 2025-05-15 DIAGNOSIS — Z79.4 TYPE 2 DIABETES MELLITUS WITH FOOT ULCER, WITH LONG-TERM CURRENT USE OF INSULIN (HCC): ICD-10-CM

## 2025-05-15 DIAGNOSIS — N18.31 STAGE 3A CHRONIC KIDNEY DISEASE (HCC): ICD-10-CM

## 2025-05-15 DIAGNOSIS — E11.621 TYPE 2 DIABETES MELLITUS WITH FOOT ULCER, WITH LONG-TERM CURRENT USE OF INSULIN (HCC): ICD-10-CM

## 2025-05-15 DIAGNOSIS — Z86.711 HISTORY OF PULMONARY EMBOLISM: ICD-10-CM

## 2025-05-15 DIAGNOSIS — R60.0 PERIPHERAL EDEMA: ICD-10-CM

## 2025-05-15 DIAGNOSIS — E87.6 HYPOKALEMIA: ICD-10-CM

## 2025-05-15 DIAGNOSIS — I10 ESSENTIAL HYPERTENSION: ICD-10-CM

## 2025-05-15 DIAGNOSIS — N17.9 AKI (ACUTE KIDNEY INJURY): ICD-10-CM

## 2025-05-15 LAB
ALBUMIN SERPL-MCNC: 4.8 G/DL (ref 3.2–4.8)
ANION GAP SERPL CALC-SCNC: 11 MMOL/L (ref 0–18)
BASOPHILS # BLD AUTO: 0.04 X10(3) UL (ref 0–0.2)
BASOPHILS NFR BLD AUTO: 0.8 %
BILIRUB UR QL: NEGATIVE
BUN BLD-MCNC: 16 MG/DL (ref 9–23)
BUN/CREAT SERPL: 13.8 (ref 10–20)
C3 SERPL-MCNC: 180.8 MG/DL (ref 90–170)
C4 SERPL-MCNC: 50.2 MG/DL (ref 12–36)
CALCIUM BLD-MCNC: 9.4 MG/DL (ref 8.7–10.4)
CHLORIDE SERPL-SCNC: 101 MMOL/L (ref 98–112)
CO2 SERPL-SCNC: 25 MMOL/L (ref 21–32)
COLOR UR: YELLOW
CREAT BLD-MCNC: 1.16 MG/DL (ref 0.55–1.02)
CREAT UR-SCNC: 287.4 MG/DL
CRP SERPL-MCNC: 2.1 MG/DL (ref ?–1)
DEPRECATED RDW RBC AUTO: 42.2 FL (ref 35.1–46.3)
EGFRCR SERPLBLD CKD-EPI 2021: 56 ML/MIN/1.73M2 (ref 60–?)
EOSINOPHIL # BLD AUTO: 0.13 X10(3) UL (ref 0–0.7)
EOSINOPHIL NFR BLD AUTO: 2.5 %
ERYTHROCYTE [DISTWIDTH] IN BLOOD BY AUTOMATED COUNT: 13.1 % (ref 11–15)
ERYTHROCYTE [SEDIMENTATION RATE] IN BLOOD: 38 MM/HR (ref 0–30)
GLUCOSE BLD-MCNC: 153 MG/DL (ref 70–99)
GLUCOSE UR-MCNC: NORMAL MG/DL
HCT VFR BLD AUTO: 41.2 % (ref 35–48)
HGB BLD-MCNC: 13.7 G/DL (ref 12–16)
HGB UR QL STRIP.AUTO: NEGATIVE
HYALINE CASTS #/AREA URNS AUTO: PRESENT /LPF
IMM GRANULOCYTES # BLD AUTO: 0 X10(3) UL (ref 0–1)
IMM GRANULOCYTES NFR BLD: 0 %
KETONES UR-MCNC: 20 MG/DL
LEUKOCYTE ESTERASE UR QL STRIP.AUTO: NEGATIVE
LYMPHOCYTES # BLD AUTO: 2.33 X10(3) UL (ref 1–4)
LYMPHOCYTES NFR BLD AUTO: 45.7 %
MCH RBC QN AUTO: 29.5 PG (ref 26–34)
MCHC RBC AUTO-ENTMCNC: 33.3 G/DL (ref 31–37)
MCV RBC AUTO: 88.6 FL (ref 80–100)
MICROALBUMIN UR-MCNC: 2 MG/DL
MICROALBUMIN/CREAT 24H UR-RTO: 7 UG/MG (ref ?–30)
MONOCYTES # BLD AUTO: 0.55 X10(3) UL (ref 0.1–1)
MONOCYTES NFR BLD AUTO: 10.8 %
NEUTROPHILS # BLD AUTO: 2.05 X10 (3) UL (ref 1.5–7.7)
NEUTROPHILS # BLD AUTO: 2.05 X10(3) UL (ref 1.5–7.7)
NEUTROPHILS NFR BLD AUTO: 40.2 %
NITRITE UR QL STRIP.AUTO: NEGATIVE
OSMOLALITY SERPL CALC.SUM OF ELEC: 288 MOSM/KG (ref 275–295)
PH UR: 5.5 [PH] (ref 5–8)
PHOSPHATE SERPL-MCNC: 3.2 MG/DL (ref 2.4–5.1)
PLATELET # BLD AUTO: 262 10(3)UL (ref 150–450)
POTASSIUM SERPL-SCNC: 3.9 MMOL/L (ref 3.5–5.1)
PROT UR-MCNC: 20 MG/DL
PROT UR-MCNC: 33.4 MG/DL (ref ?–14)
RBC # BLD AUTO: 4.65 X10(6)UL (ref 3.8–5.3)
RHEUMATOID FACT SERPL-ACNC: 8.1 IU/ML (ref ?–14)
SODIUM SERPL-SCNC: 137 MMOL/L (ref 136–145)
SP GR UR STRIP: 1.03 (ref 1–1.03)
UROBILINOGEN UR STRIP-ACNC: NORMAL
WBC # BLD AUTO: 5.1 X10(3) UL (ref 4–11)

## 2025-05-15 PROCEDURE — 84165 PROTEIN E-PHORESIS SERUM: CPT

## 2025-05-15 PROCEDURE — 82043 UR ALBUMIN QUANTITATIVE: CPT

## 2025-05-15 PROCEDURE — 85652 RBC SED RATE AUTOMATED: CPT

## 2025-05-15 PROCEDURE — 99215 OFFICE O/P EST HI 40 MIN: CPT | Performed by: FAMILY MEDICINE

## 2025-05-15 PROCEDURE — 82570 ASSAY OF URINE CREATININE: CPT

## 2025-05-15 PROCEDURE — 86160 COMPLEMENT ANTIGEN: CPT

## 2025-05-15 PROCEDURE — 3079F DIAST BP 80-89 MM HG: CPT | Performed by: FAMILY MEDICINE

## 2025-05-15 PROCEDURE — 3075F SYST BP GE 130 - 139MM HG: CPT | Performed by: FAMILY MEDICINE

## 2025-05-15 PROCEDURE — 86335 IMMUNFIX E-PHORSIS/URINE/CSF: CPT

## 2025-05-15 PROCEDURE — 82784 ASSAY IGA/IGD/IGG/IGM EACH: CPT

## 2025-05-15 PROCEDURE — 85025 COMPLETE CBC W/AUTO DIFF WBC: CPT

## 2025-05-15 PROCEDURE — 86038 ANTINUCLEAR ANTIBODIES: CPT

## 2025-05-15 PROCEDURE — 86431 RHEUMATOID FACTOR QUANT: CPT

## 2025-05-15 PROCEDURE — 81001 URINALYSIS AUTO W/SCOPE: CPT

## 2025-05-15 PROCEDURE — 84166 PROTEIN E-PHORESIS/URINE/CSF: CPT

## 2025-05-15 PROCEDURE — 86140 C-REACTIVE PROTEIN: CPT

## 2025-05-15 PROCEDURE — 84156 ASSAY OF PROTEIN URINE: CPT

## 2025-05-15 PROCEDURE — 80069 RENAL FUNCTION PANEL: CPT

## 2025-05-15 PROCEDURE — 86334 IMMUNOFIX E-PHORESIS SERUM: CPT

## 2025-05-15 PROCEDURE — 3008F BODY MASS INDEX DOCD: CPT | Performed by: FAMILY MEDICINE

## 2025-05-15 PROCEDURE — 36415 COLL VENOUS BLD VENIPUNCTURE: CPT

## 2025-05-15 NOTE — TELEPHONE ENCOUNTER
Chart notes are open     Please add cane criteria as well    The beneficiary has a mobility limitation that significantly impairs his/her ability to participate in one or more mobility related activities of daily living (MRADL) in the hime. The MRADLSto be considered on this and all other statements in this policy are toileting, feeding, dressing, grooming, and bathing performed in customary locations in the home.    A mobility limitation is one that:  -prevents the beneficiary from accomplishing the MRADL entirely or  -places the beneficiary at reasonable determined heightened risk of morbidity or mortality secondary to the attempts to perform an MRADL or  -prevents the beneficiary from completing the MRADL within a reasonable time frame and   -the beneficiary is able to safely use the cane or crutch

## 2025-05-15 NOTE — TELEPHONE ENCOUNTER
Order Questions    Question Answer   What DME is needed: Cane   Notify staff to enter order in [place]? Yes

## 2025-05-15 NOTE — PROGRESS NOTES
Subjective:   Suki Rosenberg is a 54 year old female who presents for hospital follow up.   She was discharged from Inpatient hospital, [facility]  to Home   Admit Date: 4/29/25  Discharge Date: 5/2/25  Hospital Discharge Diagnosis:   Acute heart failure exacerbation  NAYELI  Migraine  LE edema  Uncontrolled DM  Hx of PE  Hypokalemia/Hypomagnesemia  Morbid obesity with CRISTINO    Interactive contact within 2 business days post discharge first initiated on Date: 5/5/25    I accessed [employer] and/or [employer] Everywhere and personally reviewed the following for the recent hospitalization: provider notes, consults, summaries, labs and other test results and the pertinent findings are documented below.     HPI:   Has been losing balance and she thinks it's because she's swollen.   Would like rx for cane.   Taking furosemide 40mg daily.  Taking all other medications as prescribed.   Missed appt with cardiologist on 5/13/25 due to scheduling conflict.   On waitlist for lymphedema clinic since last year.     Saw nephro Dr. Vega on 5/6/25. Labs ordered.     Last saw larry in 04/2021 and A1c 7.9%.     History/Other:   Current Medications:  Medication Reconciliation:  I am aware of an inpatient discharge within the last 30 days.  The discharge medication list has been reconciled with the patient's current medication list and reviewed by me. See medication list for additions of new medication, and changes to current doses of medications and discontinued medications.  Outpatient Medications Marked as Taking for the 5/15/25 encounter (Office Visit) with Graham Recio, DO   Medication Sig    furosemide (LASIX) 40 MG Oral Tab Take 1 tablet (40 mg total) by mouth daily.    insulin aspart 100 Units/mL Subcutaneous Solution Pen-injector Inject 5-7 Units into the skin 3 (three) times daily before meals.    NIFEDIPINE ER 60 MG Oral Tablet 24 Hr TAKE 1 TABLET(60 MG) BY MOUTH DAILY    amitriptyline 75 MG Oral Tab Take 1 tablet (75 mg total)  by mouth daily.    spironolactone 50 MG Oral Tab Take 1 tablet (50 mg total) by mouth daily.    zolpidem 10 MG Oral Tab Take 1 tablet (10 mg total) by mouth nightly as needed.    Tirzepatide (MOUNJARO) 15 MG/0.5ML Subcutaneous Solution Auto-injector Inject 15 mg into the skin once a week.    Continuous Glucose Sensor (FREESTYLE NAA 3 SENSOR) Does not apply Misc 1 each every 14 (fourteen) days.    insulin glargine (BASAGLAR KWIKPEN) 100 UNIT/ML Subcutaneous Solution Pen-injector Inject 15 Units into the skin nightly.    ERGOCALCIFEROL 1.25 MG (23606 UT) Oral Cap TAKE 1 CAPSULE BY MOUTH 1 TIME A WEEK FOR 12 DOSES    XARELTO 20 MG Oral Tab TAKE 1 TABLET BY MOUTH DAILY WITH FOOD.    lidocaine 5 % External Patch Place 1 patch onto the skin daily.    rosuvastatin 20 MG Oral Tab Take 1 tablet (20 mg total) by mouth nightly.    Continuous Glucose Sensor (FREESTYLE NAA 2 SENSOR) Does not apply Misc 1 each every 14 (fourteen) days.    carvedilol 25 MG Oral Tab Take 1 tablet (25 mg total) by mouth 2 (two) times daily with meals.       Review of Systems:  GENERAL: weight stable, energy stable, no sweating  SKIN: denies any unusual skin lesions  EYES: denies blurred vision or double vision  HEENT: denies nasal congestion, sinus pain or ST  LUNGS: denies shortness of breath with exertion  CARDIOVASCULAR: denies chest pain on exertion or palpitations  GI: denies abdominal pain, denies heartburn, denies diarrhea  MUSCULOSKELETAL: denies pain, normal range of motion of extremities  NEURO: + headaches, denies dizziness, denies weakness  PSYCHE: depression & anxiety  HEMATOLOGIC: denies bruising, denies bleeding  ENDOCRINE: denies thyroid history  ALL/ASTHMA: negative    Objective:   Patient's last menstrual period was 10/29/2018.  Estimated body mass index is 42.8 kg/m² as calculated from the following:    Height as of this encounter: 5' 2\" (1.575 m).    Weight as of this encounter: 234 lb (106.1 kg).   /88   Pulse 90    Ht 5' 2\" (1.575 m)   Wt 234 lb (106.1 kg)   LMP 10/29/2018   SpO2 98%   BMI 42.80 kg/m²    GENERAL: well developed, well nourished, in no apparent distress  SKIN: no rashes, no suspicious lesions  HEENT: atraumatic, normocephalic, ears and throat are clear  EYES: PERRLA, EOMI, conjunctiva are clear  NECK: supple, no adenopathy, no bruits  CHEST: no chest tenderness  LUNGS: clear to auscultation  CARDIO: RRR without murmur  GI: good BS's, no masses, HSM or tenderness  MUSCULOSKELETAL: back is not tender, FROM of the extremities  EXTREMITIES: mild-moderate nonpitting edema in b/l lower legs & feet  NEURO: Oriented times three, cranial nerves are intact, motor and sensory are grossly intact    Assessment & Plan:   1. Acute on chronic congestive heart failure, unspecified heart failure type (HCC) (Primary)  2. NAYELI (acute kidney injury)  3. Peripheral edema  -     Lymphedema Clinic Referral:  PT/OT  Evaluate & Treat Lymphedema  4. Type 2 diabetes mellitus with foot ulcer, with long-term current use of insulin ([facility])  5. History of pulmonary embolism  6. Hypokalemia  7. Essential hypertension  8. Dyslipidemia  9. Lymphedema of both lower extremities  -     Lymphedema Clinic Referral:  PT/OT  Evaluate & Treat Lymphedema  10. Risk for falls  -     DME - External    -Stable since discharge.   -Continue current medications.  -Will complete nephro labs today.   -Urged to reschedule f/u appt with cardiology.   -Needs to see lymphedema clinic. Referral regenerated.   -DME order placed for cane. Patient has a mobility limitation that significantly impairs his/her ability to participate in one or more mobility related activities of daily living (MRADL) in the home, including toileting, dressing, grooming, and bathing performed in customary locations in the home. This prevents her from accomplishing the MRADL entirely, places her at reasonable determined heightened risk of morbidity or mortality secondary to the attempts to  perform an MRADL, prevents her from completing the MRADL within a reasonable time frame. She can safely use the cane.  -Continues to see endo for DM.   -Return & ED precautions reviewed.     A total of 45 minutes were spent with patient and reviewing medical records pertaining to this visit.         No follow-ups on file.

## 2025-05-16 NOTE — TELEPHONE ENCOUNTER
Cane order submitted to [facility] via parachute.     Straight Cane  Straight Cane, Black -   Quantity  1  Supplier  [facility]

## 2025-05-19 ENCOUNTER — OFFICE VISIT (OUTPATIENT)
Dept: WOUND CARE | Facility: HOSPITAL | Age: 55
End: 2025-05-19
Attending: NURSE PRACTITIONER
Payer: COMMERCIAL

## 2025-05-19 VITALS
RESPIRATION RATE: 18 BRPM | HEART RATE: 92 BPM | SYSTOLIC BLOOD PRESSURE: 160 MMHG | TEMPERATURE: 98 F | OXYGEN SATURATION: 98 % | DIASTOLIC BLOOD PRESSURE: 103 MMHG

## 2025-05-19 DIAGNOSIS — L97.429 DIABETIC ULCER OF LEFT HEEL ASSOCIATED WITH TYPE 2 DIABETES MELLITUS, UNSPECIFIED ULCER STAGE (HCC): Primary | ICD-10-CM

## 2025-05-19 DIAGNOSIS — R52 PAIN ASSOCIATED WITH WOUND: ICD-10-CM

## 2025-05-19 DIAGNOSIS — T14.8XXA PAIN ASSOCIATED WITH WOUND: ICD-10-CM

## 2025-05-19 DIAGNOSIS — E11.621 DIABETIC ULCER OF LEFT HEEL ASSOCIATED WITH TYPE 2 DIABETES MELLITUS, UNSPECIFIED ULCER STAGE (HCC): Primary | ICD-10-CM

## 2025-05-19 LAB
ALBUMIN SERPL ELPH-MCNC: 4.2 G/DL (ref 3.75–5.21)
ALBUMIN/GLOB SERPL: 1.36 {RATIO} (ref 1–2)
ALPHA1 GLOB SERPL ELPH-MCNC: 0.26 G/DL (ref 0.19–0.46)
ALPHA2 GLOB SERPL ELPH-MCNC: 0.74 G/DL (ref 0.48–1.05)
B-GLOBULIN SERPL ELPH-MCNC: 0.96 G/DL (ref 0.68–1.23)
GAMMA GLOB SERPL ELPH-MCNC: 1.13 G/DL (ref 0.62–1.7)
IGA SERPL-MCNC: 362.9 MG/DL (ref 40–350)
IGM SERPL-MCNC: 58.5 MG/DL (ref 50–300)
IMMUNOGLOBULIN PNL SER-MCNC: 1363 MG/DL (ref 650–1600)
PROT SERPL-MCNC: 7.3 G/DL (ref 5.7–8.2)

## 2025-05-19 PROCEDURE — 99213 OFFICE O/P EST LOW 20 MIN: CPT | Performed by: NURSE PRACTITIONER

## 2025-05-19 NOTE — PROGRESS NOTES
Subjective   Suki Rosenberg is a 54 year old female.    Chief Complaint   Patient presents with    Wound Recheck     Patient here for a follow-up wound recheck of her left heel     HPI  5/19/2025: Patient is here for her left heel pain, she has dry skin on the scar tissue of previous wound, reports difficulty walking on her left heel due to pain. She also complaining of right toes  deformities.   4/10/2025: Patient is here for left heel wound follow up, she is reporting having left ankle pain started two weeks ago. No wound related concerns.   3/13/2025: Patient is here with her daugher, she stated she had wound vac till yesterday, now has dressing, she continues to have nerve pain. Also reporting new ulceration on right heel.  3/7/2025: patient is here with her daughter and another family member. Home health has been in her place, applied wound vac (per patient in 2.5 hours with noise (indicative of leak). Later on  company called and stated no longer able to provide care due to unsigned contract. Patient has done topical dressing, no fever, no chills.   2/20/2025: Patient is here for follow up wound care on left heel wound. She has been approved for NPWT (wound vac), no new wounds.  2/12/2025: Patient is here with her daughter for right heel wound, her daughters are doing the dressing, pain has somewhat reduced.  2/5/2025: Patient is here with her daughter, her daughter is doing dressing changes, no other changes in her medications or neuropathy treatment plan.   1/22/2025: Patient is here for follow up, she has not seen pain managmetnt services for neuropathy. She is not taking any nerve pain medication.   1/15/2025: Patient is here for follow up, reports not tolerating gabapentin, and lyrica. She had been given Zoloft, she stated she is concern about the side effect and cost. She is complaining of pain in her heel.  1/8/2025: Patient is here for follow up, has been doing dressing, minimum drainage. Patient  has not refill gabapentin paste for pain management. Also, she stated she had Lyrica before as well as gabapentin which caused significant ankle edema. She is not sure if she is currently taking Amitriptyline.   1/3/2025: Patient stated she developed left heel blister and wound again, she has history of heels blister in Dec 2023 and then right heel blister and wound on July 2024. 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 She has nerve pain in the foot, described as pins and needles. She stated on [place] day she walked wrong and blister opened with foul smell. She was admitted to [facility] for left heel wound infection. She was treated with IV antibiotics, MRI showed no bone infection. She was discharged today with home health services for wound care.  Last A1c on 12/28/2024 was 8.0     8/16/2024: Patient is here for follow up, no drainage in the past few days, still pain in the wound area.   8/2/2024: Patient is here for follow up, her daughter has been doing dressing, she reports no drainage from the wound.  7/19/2024: Patient is returning to clinic with new blood blister on right heel, she stated happened few days ago, she opened the blister to drain which reduced the pain. She has her lymphedema pump and using them daily, states wearing her compression stockings as well. She feels her A1c is better managed now came down from 13 to 8.2 on 6/12/2024.     12/11/2023: Patient is here for follow up, tolerated the compression, reports continues to have some pain while walking on left heel, no other concern.   12/4/2023: Patient is 53 year old woman with chronic leg edema and recent bilateral on posterior heel. Patient states she sits for 10-14 hours on chair for her job with very little time to break for walking. She has purchased compression stockings , she has had hard time putting them on or keeping them on.   Patient medical history significant for type two diabetes on Jardiance  25 mg, hx of PE on xarelto 20 mg, neuropathy, fibromyalgia, hx of breast cancer, depression, bipolar, obesity, chronic low back pain with bilateral sciatica, herniated lumbar disc, chronic left knee pain, and osteoarthritis bilateral knees.     Review of Systems   Constitutional:  Positive for activity change. Negative for chills, fatigue and fever.   HENT:  Negative for congestion.    Respiratory:  Negative for shortness of breath.    Cardiovascular:  Positive for leg swelling. Negative for chest pain.   Gastrointestinal:  Positive for constipation.        Hx of constipation    Musculoskeletal:  Positive for arthralgias, back pain and gait problem.   Skin:  Positive for wound.        Left heel wound  Neurological:  Positive for numbness. Negative for weakness.        Feeling numbness on her toes   Psychiatric/Behavioral:          Hx f anxiety, depression and bipolar      Objective  Physical Exam  Vitals reviewed.   Constitutional:       Appearance: Normal appearance. She is obese.   HENT:      Head: Normocephalic.   Cardiovascular:      Rate and Rhythm: Normal rate.      Pulses: Normal pulses.           Dorsalis pedis pulses are 2+ on the left side.        Posterior tibial pulses are 2+ on the left side.   Pulmonary:      Effort: Pulmonary effort is normal.   Abdominal:      General: Bowel sounds are normal.   Musculoskeletal:      Cervical back: Normal range of motion.      Right lower leg: Edema present.      Left lower leg: Edema present.        Feet: left heel dry blister with eschar cover wound in the center  Feet:      Right foot:      Toenail Condition: Right toenails are normal.      Left foot:      Skin integrity: Ulcer present.      Toenail Condition: Left toenails are normal.   Skin:     General: Skin is warm and dry.      Capillary Refill: Capillary refill takes 2 to 3 seconds.      Findings: No erythema.   Neurological:      Mental Status: She is alert and oriented to person, place, and time    Wound  Assessment  Wound 04/30/25 Heel Left (Active)   Date First Assessed: 04/30/25   Present on Original Admission: Yes  Primary Wound Type: Diabetic Ulcer  Location: Heel  Wound Location Orientation: Left  Wound Description (Comments): dry healed      Assessments 1/3/2025  9:56 AM 5/19/2025  3:18 PM   Wound Image          Site Assessment Black;Brown;Pink;Tan;Yellow;Dry;Moist;Edema Clean;Dry;Intact;Epithelialization   Closure Not approximated Approximated   Drainage Amount Small None   Drainage Description Serosanguineous --   Treatments Cleansed;Honey Gel;Dakins Cleansed;Saline   Dressing 4x4s (spandage) --   Dressing Changed New --   Dressing Status Clean;Dry;Intact --   Wound Length (cm) 4.1 cm 0 cm   Wound Width (cm) 6 cm 0 cm   Wound Surface Area (cm^2) 24.6 cm^2 0 cm^2   Wound Depth (cm) 0 cm 0 cm   Wound Volume (cm^3) 0 cm^3 0 cm^3   Margins Attached edges Attached edges   Madelaine-wound Assessment Dry;Painful;Hyperpigmented;Fragile;Edema;Ecchymosis Dry;Callous;Hyperpigmented   Wound Granulation Tissue Pink --   Wound Bed Granulation (%) 10 % 0 %   Wound Bed Epithelium (%) 0 % 100 %   Wound Bed Slough (%) 10 % 0 %   Wound Bed Eschar (%) 80 % 0 %   Wound Bed Fibrin (%) 0 % 0 %   State of Healing Non-healing Epithelialized   Wound Odor Strong None   Pressure Injury Stage Unstageable --       Active Orders   Date Order Priority Status Authorizing Provider   04/10/25 1621 OP Wound Dressing Routine Active Jose M Johnson APRN     - Cleansing:    Cleanse with normal saline or wound cleanser     - Cleansing:    Cleanse with Vashe     - Dressing:    Hydrofera transfer     - Dressing:    Dry gauze     - Dressing:    Conforming roll     - Additional Wound Dressing Information:    tape. zinc oxide to periwound     - Frequency:    Change dressing three times per week       Compression Wrap 01/15/25 Leg Left;Right (Active)   Placement Date: 01/15/25   Location: Leg  Wound Location Orientation: Left;Right      Assessments 1/15/2025   3:40 PM 4/10/2025  3:30 PM   Response to Treatment Well tolerated Well tolerated   Compression Layers Single Single   Compression Product Type Tubigrip/Spanda (Medigrip E cut out on heel) Tubigrip/Spanda (size E)   Dressing Applied Yes Yes   Compression Wrap Location Toes to Knee Toes to Knee   Compression Wrap Status Clean;Dry;Intact Clean;Dry;Intact;Dressing Changed       No associated orders.     Vital Signs    05/19/25 1514   BP: (!) 160/103   Pulse: 92   Resp: 18   Temp: 98.2 °F (36.8 °C)   Allergies  Allergies[1]    Assessment   Left heel wound, pressure injury with diabetes:  -new skin, dry flaky scar tissue  -no erythema in the te-wound  -edema +4 dorsal foot and +2 ankle edema  -painful to touch, described pins and needles pain, classic sign of diabetic neuropathy, reports more pain with application of wound vac     Right heel: no open wound, no erythema     Reviewed note, imagining and labs in Epic and Care Every Where.  Recent labs: 1/3/2025: WBC 6.1, H&H 13.0&38.5, BUN 11, Creatinine 1.42, albumin 4.2  12/30/2024: US of bilateral lower ext, vascular study:  Impression   CONCLUSION: Duplex arterial ultrasound demonstrates patent vessels with triphasic arterial Doppler flow.  Somewhat limited evaluation of the below knee tibial vessels on the left due to overlying calf edema and bandaging in the foot, though the vessels are patent  where visualized.        12/30/2024: MRI of left foot:  Impression   CONCLUSION: Soft tissue and skin ulceration within the posterior heel without abscess.  No osteomyelitis.  6 mm osteochondral lesion of the medial talar dome without collapse of the articular surface.  Split tear of the peroneus brevis with peroneal tenosynovitis.  Mild Achilles tendinosis.  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  Multiple other incidental findings as described in the body of the report.  Dictated by (CST): Marlon Horne MD on  12/30/2024 at 2:44 PM     Encounter Diagnosis  1. Diabetic ulcer of left heel associated with type 2 diabetes mellitus, unspecified ulcer stage (HCC) [E11.621, L97.429]    2. Pain associated with wound      Problem List  Problem List[2]  Plan  Orders  Recommended silicon heel heel protector to support the new skin. Also recommended wide toe box diabetic shoe, showed patient where to purchase her new diabetic shoe.  She will follow up with her PCP or rheumatologist for chronic pain in her heel, neuropathy vs fibromyalgia.   Reviewed pressure injury prevention and reducing pressure on her heels.  Discussed the care of newly healed wound with scar tissue and pressure injury management.  At this time patient has no open wounds, therefore patient will be discharged from [place] wound care clinic. Instructed patient to follow up with PCP for medical management.    Discussed discharge recommendation  with patient and her daughter. They verbalized understanding and agreed to these plan.  Thank you for referring this patient to our clinic and allowing me to be part of his care team.     Total face to face time was 30 min, more than 50% of the time was spent in counseling and/or coordination of care related to his diagnosis and plan of care.   Follow-Up  Return for Discharge from Wound Care clinic..              [1]   Allergies  Allergen Reactions    Latex HIVES and SWELLING    Influenza Vaccines OTHER (SEE COMMENTS)     Pt passed out     Radiology Contrast Iodinated Dyes ITCHING   [2]   Patient Active Problem List  Diagnosis    Iron deficiency anemia due to chronic blood loss    Recurrent pulmonary embolism (HCC)    Menorrhagia with regular cycle    Chronic low back pain with bilateral sciatica    left > right L5 radiculopathies    L5-S1 right mild foraminal & left mild far lateral, L4-5 mild diffuse, L2-3 right mild far lateral bulging discs    Urinary incontinence    Anemia    Anemia, unspecified type    Congenital  anomaly of heart (HCC)    Anemia due to blood loss, acute    Atrial septal defect (HCC)    Hypercoagulable state (HCC)    Lymphedema of both lower extremities    Symptomatic anemia    Microcytic anemia    Other pulmonary embolism without acute cor pulmonale (HCC)    Severe episode of recurrent major depressive disorder, without psychotic features (HCC)    Uterine bleeding    Obesity (BMI 30-39.9)    Acquired trigger finger of right middle finger    Chronic anticoagulation    Chronic pain of left knee    Non-pressure chronic ulcer of left lower leg, limited to breakdown of skin (HCC)    Moderate left ventricular hypertrophy    Non-healing open wound of right heel    Pain associated with wound    BRCA gene mutation negative in female    Morbid obesity with BMI of 40.0-44.9, adult (HCC)    Dyslipidemia    HTN (hypertension), benign    Type 2 diabetes mellitus without complication, with long-term current use of insulin (HCC)    Stress    Diabetic ulcer of left heel associated with type 2 diabetes mellitus, unspecified ulcer stage (HCC)    Diabetic ulcer of left heel associated with type 2 diabetes mellitus, with necrosis of muscle (HCC)    Neuropathy    Pressure injury of right heel, stage 1    Open wound of lesser toe of right foot    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 due to underlying condition, limited to breakdown of skin (HCC)    Hyperglycemia    Hypokalemia    Lower extremity edema    Chest pain of uncertain etiology

## 2025-05-20 LAB — NUCLEAR IGG TITR SER IF: NEGATIVE {TITER}

## 2025-05-21 ENCOUNTER — TELEPHONE (OUTPATIENT)
Facility: CLINIC | Age: 55
End: 2025-05-21

## 2025-05-21 ENCOUNTER — TELEPHONE (OUTPATIENT)
Dept: NEPHROLOGY | Facility: CLINIC | Age: 55
End: 2025-05-21

## 2025-05-21 NOTE — TELEPHONE ENCOUNTER
Dr. Recio,     Patient seeking to work on a remote basis due to current Chronic Ailments; Severe Lymphadenia, Lupus, Fibromyalgia & Diabetes, starting 05/01/2025-04/30/2026, Do You Support?    Thanks,   Bessy LEACH

## 2025-05-21 NOTE — TELEPHONE ENCOUNTER
Received Americans with Disabilities Act via fax for patient.     Type of Leave: Americans with Disabilities Act   Reason for Leave: Severe Lymphadenia /Lupus/ fibromyalgia   Start date of leave: 05/01/2025  End date of leave: 04/30/2026  Accommodations being req; Remote work   Was Fee and Turnaround info Given?: Yes

## 2025-05-22 NOTE — TELEPHONE ENCOUNTER
Dr. Recio,       Please sign off on form if you agree to: Americans with Disabilities Act 05/01/2025-04/30/2025 Work Remote   (place your signature on the first page only)    -From your Inbasket, Highlight the patient and click Chart   -Double click the 05/21/2025 Forms Completion telephone encounter  -Scroll down to the Media section   -Click the blue Hyperlink: Americans with Disabilities Act Dr. Graham Recio 05/22/2025  -Click Acknowledge located in the top right ribbon/menu   -Drag the mouse into the blank space of the document and a + sign will appear. Left click to   electronically sign the document.     Thank you,  Bessy LEACH

## 2025-05-23 NOTE — TELEPHONE ENCOUNTER
Americans with Disabilities Act signed, sending to patient via [employer] message as there is not authorization to enable faxing. Archiving forms.

## 2025-06-17 DIAGNOSIS — G47.00 INSOMNIA, UNSPECIFIED TYPE: ICD-10-CM

## 2025-06-19 RX ORDER — ZOLPIDEM TARTRATE 10 MG/1
10 TABLET ORAL NIGHTLY PRN
Qty: 30 TABLET | Refills: 0 | Status: SHIPPED | OUTPATIENT
Start: 2025-06-19

## 2025-06-19 NOTE — TELEPHONE ENCOUNTER
Please review. Refill failed protocol.     Recent fills each # 30 : 4/29/25 , 2/22/25 , 1/4/25  Last prescription written: 4/21/25  Last office visit:  5/15/2025    Future Appointments   Date Time Provider Department Center   7/9/2025  2:15 PM Miles Mandujano MD [id number] [facility]   7/22/2025 11:30 AM [facility]   8/13/2025  4:20 PM Chin Vega MD [id number] [facility]

## 2025-07-10 RX ORDER — TIRZEPATIDE 15 MG/.5ML
15 INJECTION, SOLUTION SUBCUTANEOUS WEEKLY
Qty: 6 ML | Refills: 1 | Status: SHIPPED | OUTPATIENT
Start: 2025-07-10

## 2025-07-10 NOTE — TELEPHONE ENCOUNTER
Medication Quantity Refills Start End   Tirzepatide (MOUNJARO) 10 MG/0.5ML Subcutaneous Solution Auto-injector 2 mL 5 4/21/2025 --   Sig:   Inject 15 mg into the skin once a week.     Route:   Subcutaneous       New RX requested

## 2025-07-10 NOTE — TELEPHONE ENCOUNTER
Endocrine Refill protocol for oral and injectable diabetic medications    Protocol Criteria:  PASSED      If all below requirements are met, send a 90-day supply with 1 refill per provider protocol.    Verify appointment with Endocrinology completed in the last 6 months or scheduled in the next 3 months.  Verify A1C has been completed within the last 6 months and is below 8.5%     Last completed office visit: 4/21/2025 Jennyfer Mayorga APRN   Next scheduled Follow up:   Future Appointments   Date Time Provider Department Center   7/22/2025 11:30 AM Jennyfer Mayorga APRN [facility]      Last A1c result: Last A1c value was 7.9% done 4/21/2025.

## 2025-07-22 ENCOUNTER — OFFICE VISIT (OUTPATIENT)
Dept: ENDOCRINOLOGY CLINIC | Facility: CLINIC | Age: 55
End: 2025-07-22
Payer: COMMERCIAL

## 2025-07-22 ENCOUNTER — LAB ENCOUNTER (OUTPATIENT)
Dept: LAB | Age: 55
End: 2025-07-22
Payer: COMMERCIAL

## 2025-07-22 VITALS
WEIGHT: 234 LBS | HEART RATE: 91 BPM | HEIGHT: 62 IN | DIASTOLIC BLOOD PRESSURE: 88 MMHG | BODY MASS INDEX: 43.06 KG/M2 | SYSTOLIC BLOOD PRESSURE: 133 MMHG

## 2025-07-22 DIAGNOSIS — E11.65 UNCONTROLLED TYPE 2 DIABETES MELLITUS WITH HYPERGLYCEMIA (HCC): Primary | ICD-10-CM

## 2025-07-22 DIAGNOSIS — E11.65 UNCONTROLLED TYPE 2 DIABETES MELLITUS WITH HYPERGLYCEMIA (HCC): ICD-10-CM

## 2025-07-22 LAB
GLUCOSE BLOOD: 101
HEMOGLOBIN A1C: 7.1 % (ref 4.3–5.6)
TEST STRIP LOT #: NORMAL NUMERIC
TSI SER-ACNC: 1.46 UIU/ML (ref 0.55–4.78)
VIT B12 SERPL-MCNC: 604 PG/ML (ref 211–911)

## 2025-07-22 PROCEDURE — 3079F DIAST BP 80-89 MM HG: CPT

## 2025-07-22 PROCEDURE — 3075F SYST BP GE 130 - 139MM HG: CPT

## 2025-07-22 PROCEDURE — 84443 ASSAY THYROID STIM HORMONE: CPT

## 2025-07-22 PROCEDURE — 3008F BODY MASS INDEX DOCD: CPT

## 2025-07-22 PROCEDURE — 3051F HG A1C>EQUAL 7.0%<8.0%: CPT

## 2025-07-22 PROCEDURE — 99214 OFFICE O/P EST MOD 30 MIN: CPT

## 2025-07-22 PROCEDURE — 82947 ASSAY GLUCOSE BLOOD QUANT: CPT

## 2025-07-22 PROCEDURE — 82607 VITAMIN B-12: CPT

## 2025-07-22 PROCEDURE — 3061F NEG MICROALBUMINURIA REV: CPT

## 2025-07-22 PROCEDURE — 83036 HEMOGLOBIN GLYCOSYLATED A1C: CPT

## 2025-07-22 PROCEDURE — 36415 COLL VENOUS BLD VENIPUNCTURE: CPT

## 2025-07-22 RX ORDER — DULAGLUTIDE 4.5 MG/.5ML
4.5 INJECTION, SOLUTION SUBCUTANEOUS WEEKLY
Qty: 2 ML | Refills: 0 | Status: SHIPPED | OUTPATIENT
Start: 2025-07-22

## 2025-08-28 ENCOUNTER — TELEPHONE (OUTPATIENT)
Facility: CLINIC | Age: 55
End: 2025-08-28

## (undated) DEVICE — GAMMEX® PI HYBRID SIZE 7, STERILE POWDER-FREE SURGICAL GLOVE, POLYISOPRENE AND NEOPRENE BLEND: Brand: GAMMEX

## (undated) DEVICE — SUT PDS II 0 TP-1 Z991G

## (undated) DEVICE — DEVICE FIX.SECURESTRAP 5MM

## (undated) DEVICE — SUT MONOCRYL 3-0 PS-2 Y497G

## (undated) DEVICE — TROCAR: Brand: KII SHIELDED BLADED ACCESS SYSTEM

## (undated) DEVICE — SUT VICRYL 0 UR-6 J603H

## (undated) DEVICE — SOL NACL IRRIG 0.9% 1000ML BTL

## (undated) DEVICE — SUT CHROMIC 2-0 CT 801H

## (undated) DEVICE — ADHESIVE MASTISOL 2/3ML

## (undated) DEVICE — ENSEAL X1 TISSUE SEALER, CURVED JAW, 37 CM SHAFT LENGTH: Brand: ENSEAL

## (undated) DEVICE — SUTURE PASSOR WITH GUIDE

## (undated) DEVICE — 3M™ STERI-STRIP™ REINFORCED ADHESIVE SKIN CLOSURES, R1547, 1/2 IN X 4 IN (12 MM X 100 MM), 6 STRIPS/ENVELOPE: Brand: 3M™ STERI-STRIP™

## (undated) DEVICE — SUT NUROLON 0 MO-6 C545G

## (undated) DEVICE — INSUFFLATION NEEDLE TO ESTABLISH PNEUMOPERITONEUM.: Brand: INSUFFLATION NEEDLE

## (undated) DEVICE — [HIGH FLOW INSUFFLATOR,  DO NOT USE IF PACKAGE IS DAMAGED,  KEEP DRY,  KEEP AWAY FROM SUNLIGHT,  PROTECT FROM HEAT AND RADIOACTIVE SOURCES.]: Brand: PNEUMOSURE

## (undated) DEVICE — LAP CHOLE: Brand: MEDLINE INDUSTRIES, INC.

## (undated) DEVICE — TROCAR: Brand: KII FIOS FIRST ENTRY

## (undated) NOTE — LETTER
7/26/2023      Haile Joaquin MD  Physical Medicine and Rehabilitation  [facility], [address] [place] [address]  Dept: [phone]  Dept Fax: [phone]        RE: Consultation for Yadiel James        Dear Sampson Solis, ,    Thank you very much for the opportunity to see your patient. Attached please find a summary from your patient's recent visit. I appreciate the chance to take care of your patient with you. Please feel free to call me with any questions or concerns. Sincerely,        Malick Clemente.  Lubna Joaquin MD  Electronically Signed on 7/26/2023

## (undated) NOTE — LETTER
5/10/2023      Sonja Sánchez MD  Physical Medicine and Rehabilitation  [address] [address]  Dept: [phone]  Dept Fax: [phone]        RE: Consultation for Magdalena Vaughan        Dear Comfort Dykes DO,    Thank you very much for the opportunity to see your patient. Attached please find a summary from your patient's recent visit. I appreciate the chance to take care of your patient with you. Please feel free to call me with any questions or concerns. Sincerely,        Melba Neves.  Fransisco Sánchez MD  Electronically Signed on 5/10/2023

## (undated) NOTE — LETTER
Patient Name: Suki Rosenberg  YOB: 1970      To whom it may concern,  Mrs. Rosenberg has been in my care since 7/19/2024.  She is able to work, avoid prolong standing, elevate right heel.  Please do not hesitate to call with any question or concerns.    Regards,  Jose M Johnson, DNP, APRN, FNP-BC, CWS  Nurse Practitioner  [facility]   [address] [place] [address] [address] [place] [address] [id number] [phone]  [email or web address]

## (undated) NOTE — [place] [address]  Authorization for Invasive Procedures  Date: 12/28/2024           Time: 1038    I hereby authorize Dr. Jatin Campbell, my physician and his/her assistants (if applicable), which may include medical students, residents, and/or fellows, to perform the following surgical operation/ procedure and administer such anesthesia as may be determined necessary by my physician: incision and drainage on left heel and other nonviable tissue on Suki D Chet  2.   I recognize that during the surgical operation/procedure, unforeseen conditions may necessitate additional or different procedures than those listed above.  I, therefore, further authorize and request that the above-named surgeon, assistants, or designees perform such procedures as are, in their judgment, necessary and desirable.    3.   My surgeon/physician has discussed prior to my surgery the potential benefits, risks and side effects of this procedure; the likelihood of achieving goals; and potential problems that might occur during recuperation.  They also discussed reasonable alternatives to the procedure, including risks, benefits, and side effects related to the alternatives and risks related to not receiving this procedure.  I have had all my questions answered and I acknowledge that no guarantee has been made as to the result that may be obtained.    4.   Should the need arise during my operation/procedure, which includes change of level of care prior to discharge, I also consent to the administration of blood and/or blood products.  Further, I understand that despite careful testing and screening of blood or blood products by collecting agencies, I may still be subject to ill effects as a result of receiving a blood transfusion and/or blood products.  The following are some, but not all, of the potential risks that can occur: fever and allergic reactions, hemolytic reactions, transmission of diseases such as  Hepatitis, AIDS and Cytomegalovirus (CMV) and fluid overload.  In the event that I wish to have an autologous transfusion of my own blood, or a directed donor transfusion, I will discuss this with my physician.   Check only if Refusing Blood or Blood Products  I understand refusal of blood or blood products as deemed necessary by my physician may have serious consequences to my condition to include possible death. I hereby assume responsibility for my refusal and release the hospital, its personnel, and my physicians from any responsibility for the consequences of my refusal.         o  Refuse         5.   I authorize the use of any specimen, organs, tissues, body parts or foreign objects that may be removed from my body during the operation/procedure for diagnosis, research or teaching purposes and their subsequent disposal by hospital authorities.  I also authorize the release of specimen test results and/or written reports to my treating physician on the hospital medical staff or other referring or consulting physicians involved in my care, at the discretion of the Pathologist or my treating physician.    6.   I consent to the photographing or videotaping of the operations or procedures to be performed, including appropriate portions of my body for medical, scientific, or educational purposes, provided my identity is not revealed by the pictures or by descriptive texts accompanying them.  If the procedure has been photographed/videotaped, the surgeon will obtain the original picture, image, videotape or CD.  The hospital will not be responsible for storage, release or maintenance of the picture, image, tape or CD.    7.   I consent to the presence of a  or observers in the operating room as deemed necessary by my physician or their designees.    8.   I recognize that in the event my procedure results in extended X-Ray/fluoroscopy time, I may develop a skin reaction.    9. If I have a Do Not  Attempt Resuscitation (DNAR) order in place, that status will be suspended while in the operating room, procedural suite, and during the recovery period unless otherwise explicitly stated by me (or a person authorized to consent on my behalf). The surgeon or my attending physician will determine when the applicable recovery period ends for purposes of reinstating the DNAR order.  10. Patients having a sterilization procedure: I understand that if the procedure is successful the results will be permanent and it will therefore be impossible for me to inseminate, conceive, or bear children.  I also understand that the procedure is intended to result in sterility, although the result has not been guaranteed.   11. I acknowledge that my physician has explained sedation/analgesia administration to me including the risk and benefits I consent to the administration of sedation/analgesia as may be necessary or desirable in the judgment of my physician.    I CERTIFY THAT I HAVE READ AND FULLY UNDERSTAND THE ABOVE CONSENT TO OPERATION and/or OTHER PROCEDURE.        ____________________________________       _________________________________      ______________________________  Signature of Patient         Signature of Responsible Person        Printed Name of Responsible Person        ____________________________________      _________________________________      ______________________________       Signature of Witness          Relationship to Patient                       Date                                       Time  Patient Name: Suki Rosenberg  : 1970    Reviewed: 2024   Printed: 2024  Medical Record #: [id number] Page 1 of 2             STATEMENT OF PHYSICIAN My signature below affirms that prior to the time of the procedure; I have explained to the patient and/or his/her legal representative, the risks and benefits involved in the proposed treatment and any reasonable alternative to the  proposed treatment. I have also explained the risks and benefits involved in refusal of the proposed treatment and alternatives to the proposed treatment and have answered the patient's questions. If I have a significant financial interest in a co-management agreement or a significant financial interest in any product or implant, or other significant relationship used in this procedure/surgery, I have disclosed this and had a discussion with my patient.     _______________________________________________________________ _____________________________  (Signature of Physician)                                                                                         (Date)                                   (Time)  Patient Name: Suki Rosenberg  : 1970    Reviewed: 2024   Printed: 2024  Medical Record #: [id number] Page 2 of 2

## (undated) NOTE — LETTER
10/15/18          Patty Carrera  :  1970      To Whom It May Concern: This patient was seen in our office on 18 . Work status:  Patient may work 5-6 hours minimum per day.          If this office may be of further assistance, please do no

## (undated) NOTE — LETTER
12/4/2023    Patient Name: Deon Ochoa  YOB: 1970      To whom it may concern,  Ms. Beau Contreras has been in my care since 12/4/2023. He has treatment at [facility] outpatient wound care clinic. Please excuse her for today. She is able to work, avoid prolong standing, allow 10 min of walking in 1-2 hours of standing or sitting to walk or elevate legs. Please do not hesitate to call with any question or concerns. FELICITAS Vanessa, EUNICE, APRN, FNP-BC, CWS  Nurse Practitioner-Wound specialist   [facility]   [address] [facility]  [phone]  [email or web address]

## (undated) NOTE — LETTER
8/30/2017      Nathaniel Gee MD  Physical Medicine and Rehabilitation  [address] [address]  Dept: [phone]  Dept Fax: [phone]        RE: Consultation for [facility]        Dear Margo Santos MD,    Thank

## (undated) NOTE — Clinical Note
2/24/2017      Shantell Sue MD  Physical Medicine and Rehabilitation  [address] [address]  Dept: [phone]  Dept Fax: [phone]        RE: Consultation for [facility]        Dear Abdullahi June MD,    Thank you ve

## (undated) NOTE — LETTER
Date & Time: 4/26/2018, 7:14 PM  Patient: Frantz Ballard  Encounter Provider(s):    On File, E D Attending  Lexus Hernández MD       To Whom It May Concern:    Rea Burciaga was seen and treated in our department on 4/26/2018.  She should not return t

## (undated) NOTE — MR AVS SNAPSHOT
[facility]  [address], [address] [facility]  Dave Robison [phone]               Thank you for choosing us for your health care visit with Kristi Carrera.  Aide Alert, MD.  We are glad to serve you and happy to provide you with this summary of you Normal Orders This Visit    PHYSICAL THERAPY - INTERNAL [[id number] CUSTOM]  Order #:  [id number]         Note to Managed Care Patients: This is the physician order form only and not an authorization for services.   The [facility] refers Leg swelling        Anticoagulated by anticoagulation treatment          Instructions and Information about Your Health    As of October 6th 2014, the Drug Enforcement Agency [facility]) is reclassifying all hydrocodone combination medications from Schedule III individual in advance and they must present an ID as well. · The name of the person picking up your prescription must be documented in your chart. Plan  I will perform bilateral L5 TFESI(s).   She will speak with her hematologist first about when she Generic drug:  insulin detemir   Inject 55 Units into the skin daily. Meclizine HCl 25 MG Tabs   Take by mouth as needed. Commonly known as:  ANTIVERT           MetFORMIN HCl 1000 MG Tabs   Take 1,000 mg by mouth 2 (two) times daily with meals. Your unique Biosceptre Access Code: [email or web address]  Expires: 4/11/2017  3:00 PM    If you have questions, you can call [phone] to talk to our [facility] Staff. Remember, Biosceptre is NOT to be used for urgent needs. For medical emergencies, dial 911.

## (undated) NOTE — LETTER
23    RE: Tama Cranker    : 1970    To Whom It May Concern:    Our patient, Tama Cranker,      _X___Has received treatment in our office on ______2023____________.        _____Has been hospitalized on the following dates ______________________.       _____ Has been ill and unable to work from _____________________________.        _____ May resume work / school on ___________________________________.      _____ May resume work / school with the following restrictions ______________    __________________________________________________________.      _____ May participate in physical education. _____ May participate in a prenatal exercise class / yoga class. _____ Patient is pregnant with a due date of Estimated Date of Delivery: None noted.       _____ Other _____________________________________________________     __________________________________________________________       Lisette Martinez MD

## (undated) NOTE — LETTER
Date & Time: 12/18/2024, 5:52 PM  Patient: Suki Rosenberg  Encounter Provider(s):    Nikita Stoll PA       To Whom It May Concern:    Suki Rosenberg was seen and treated in our department on 12/18/2024.  Please excuse from work until Monday, December 23, 2024.  No restrictions.    If you have any questions or concerns, please do not hesitate to call.        _____________________________  Physician/APC Signature

## (undated) NOTE — LETTER
AUTHORIZATION FOR SURGICAL OPERATION OR OTHER PROCEDURE    1. I hereby authorize Dr. Breana Greene and the [facility] staff assigned to my case to perform the following operation and/or procedure at the [facility]:     Right 3rd finger flexor tendon sheath injection at the site of the A1 pulley under ultrasound guidance    2. My physician has explained the nature and purpose of the operation or other procedure, possible alternative methods of treatment, the risks involved, and the possibility of complication to me. I acknowledge that no guarantee has been made as to the result that may be obtained. 3.  I recognize that, during the course of this operation, or other procedure, unforseen conditions may necessitate additional or different procedure than those listed above. I, therefore, further authorize and request that the above named physician, his/her physician assistants or designees perform such procedures as are, in his/her professional opinion, necessary and desirable. 4.  Any tissue or organs removed in the operation or other procedure may be disposed of by and at the discretion of the [facility] staff and [facility]. 5.  I understand that in the event of a medical emergency, I will be transported by local paramedics to [facility] or other hospital emergency department. 6.  I certify that I have read and fully understand the above consent to operation and/or other procedure. 7.  I acknowledge that my physician has explained sedation/analgesia administration to me including the risks and benefits. I consent to the administration of sedation/analgesia as may be necessary or desirable in the judgement of my physician. Witness signature: ___________________________________________________ Date:  ______/______/_____                    Time:  ________ KRISTI Muir   7/24/1970  [id number]         Patient signature: ___________________________________________________                 Statement of Physician  My signature below affirms that prior to the time of the procedure, I have explained to the patient and/or his/her guardian, the risks and benefits involved in the proposed treatment and any reasonable alternative to the proposed treatment. I have also explained the risks and benefits involved in the refusal of the proposed treatment and have answered the patient's questions.                         Date:  ______/______/_______  Provider                      Signature:  __________________________________________________________       Time:  ___________ ALILLIAN CRISTINA

## (undated) NOTE — LETTER
18          Lauren Manley  :  1970      To Whom It May Concern: This patient was seen in our office on 18 . May return to work effective 18.     If this office may be of further assistance, please do not hesitate to c

## (undated) NOTE — LETTER
Date: 6/6/2023    Patient Name: Sheldon Hnasen          To Whom it may concern: This patient was seen in my clinic today. Please excuse from work.      Sincerely,      Reji Simon, DO

## (undated) NOTE — LETTER
18          Romel   :  1970      To Whom It May Concern: This patient was seen in our office on 18 . Work status:  she can work 5-6 hours minimum out of an 8 hour day if the pain is well controlled.   She cannot work in a co

## (undated) NOTE — LETTER
2/28/2023              Suki Rosenberg        [address] [place] [address]         To Whom it may concern:     This is to certify that Joyce Mancera had an appointment on 2/28/2023 at 9:41 AM with Keri Rosado MD.        Sincerely,      Keri Rosado MD  [address]  [phone]        Document electronically generated by:  Keri Rosado MD

## (undated) NOTE — LETTER
Date & Time: 11/28/2023, 6:10 PM  Patient: Santhosh Vega  Encounter Provider(s):    Charles Butt MD       To Whom It May Concern:    Chapo Drew was seen and treated in our department on 11/28/2023. She should not return to work until 12/03/23 .     If you have any questions or concerns, please do not hesitate to call.        _____________________________  Physician/APC Signature

## (undated) NOTE — LETTER
23          Birgit Moore  :  1970      To Whom It May Concern: This patient was seen in our office on 23. Please excuse patient from work today: 2023. Patient may return to work: 23. If this office may be of further assistance, please do not hesitate to contact us. Sincerely,        Carlita Hernandez M.D.   [address] [place] [address] and [facility]

## (undated) NOTE — LETTER
18          Kain Ortiz  :  1970      To Whom It May Concern: This patient was seen in our office on 18 . If this office may be of further assistance, please do not hesitate to contact us.       Sincerely,        Esme Ugalde

## (undated) NOTE — LETTER
10/4/2023      Paradise Valdez MD  Physical Medicine and Rehabilitation  [address] [facility], [address] [place] [address]  Dept: [phone]  Dept Fax: [phone]        RE: Consultation for Joanna Stark        Dear Yvon Concepcion DO,    Thank you very much for the opportunity to see your patient. Attached please find a summary from your patient's recent visit. I appreciate the chance to take care of your patient with you. Please feel free to call me with any questions or concerns. Sincerely,        Patric Sood.  Tahira Valdez MD  Electronically Signed on 10/4/2023

## (undated) NOTE — LETTER
25          Suki Rosenberg  :  1970      To Whom It May Concern:    This patient was admitted to [facility] on 24 and received medical treatment. She will be able to return to work on 25.    If this office may be of further assistance, please do not hesitate to contact us.      Sincerely,  Jeevan Silva MD  Inpatient Internal Medicine  [phone]

## (undated) NOTE — LETTER
3/14/2023          To Whom It May Concern:    Tex Slaughter is currently under my medical care and was seen in my office today for a consultation appointment. Please excuse Rosaline Granado today, she may return to work on 3/15/2023. If you require additional information please contact our office.         Sincerely,    Jasiel Jain, DO          Document generated by:  Javier Hi RN

## (undated) NOTE — LETTER
05/10/23          Briannalisa Manueli  :  1970      To Whom It May Concern: This patient was seen in our office on 05/10/23 . Work status: Full time    May return to work status per above effective 2023. If this office may be of further assistance, please do not hesitate to contact us.       Sincerely,        Dr. Rebecca Farnsworth

## (undated) NOTE — IP AVS SNAPSHOT
[address]  [address] [facility] ~ [phone]                Discharge Summary   4/10/2017    Bianca Valencia           Admission Information        Provider Department    4/10/2017 Wilber Leal MD [facility] 4w/Sw/Se Natividad Aldana                           TraMADol HCl 50 MG Tabs   Last time this was given:  50 mg on 4/11/2017  6:51 AM   Commonly known as:  ULTRAM        Take 1 tablet (50 mg total) by mouth every 6 (six) hours as needed.     [place] Clam Take 81 mg by mouth. baclofen 20 MG Tabs   Last time this was given:  20 mg on 4/11/2017  9:27 AM   Commonly known as:  LIORESAL        Take 1 tablet (20 mg total) by mouth 2 (two) times daily.     Devika Coelho Please  your prescriptions at the location directed by your doctor or nurse     Bring a paper prescription for each of these medications    - predniSONE 10 MG Tabs  - TraMADol HCl 50 MG Tabs            Immunization History as of 4/11/2017  Reviewed We are concerned for your overall well being:    - If you are a smoker or have smoked in the last 12 months, we encourage you to explore options for quitting.     - If you have concerns related to behavioral health issues or thoughts of harming yourself, provide you with additional printed information. Not all patients will experience these side effects or respond to medications the same. Please call your provider or healthcare team if you have any questions regarding your medications while at home. Use:  Treat high blood sugar   Most common side effects: Low blood sugar:nausea, jitters, sweating, rapid heartbeat    What to report to your healthcare team:  Low blood sugar (less than 70) twice a week or high blood sugar (greater than 200) for more than palpitations           General Nerve Function Medications     gabapentin 300 MG Oral Cap       Use:  Treat conditions such as seizures, headaches, depression, anxiety and other neurologic conditions   Most common side effects:  Dizziness, drowsiness, heada

## (undated) NOTE — LETTER
Patient Name: Suki Rosenberg  YOB: 1970      To whom it may concern,  Mrs. Rosenberg has been in my our clinic (2/12/2025 at 1 pm) for her heel wound.  Please excuse her for this necessary wound care visit, she needs weekly visit to our clinic.   Please do not hesitate to call with any question or concerns.    Regards,  Jose M Johnson, DNP, APRN, FNP-BC, CWS  Nurse Practitioner  [facility]   [address] [place] [id number] [phone]  [email or web address]

## (undated) NOTE — LETTER
Date & Time: 10/16/2023, 3:06 PM  Patient: Daniel Franklin  Encounter Provider(s):    NIKKI Gonzalez       To Whom It May Concern:    Jakob Bear was seen and treated in our department on 10/16/2023. Please excuse from work until Monday, October 23, 2023. No restrictions.         If you have any questions or concerns, please do not hesitate to call.        _____________________________  Physician/APC Signature

## (undated) NOTE — LETTER
Belkis Castorena, NJS:[birth year]    CONSENT FOR PROCEDURE/SEDATION    1. I authorize the performance upon Belkis Castorena  the following: Endometrial biopsy procedure    2.  I authorize Dr. Tru Baires MD (and whomever is designated as the do Relationship to patient: ____________________________________________    Witness: _________________________________________ Date:___________     Physician Signature: _______________________________ Date:___________

## (undated) NOTE — Clinical Note
Dr. Recio,  I evaluated her today, I did not see her taking anything for nerve pain. I want to start her on Lyrica 50 mg three times a day to see if she has less nerve pain around the wound, which allows for more advanced wound care treatment plan. Any contraindication on your part, that she can not take this medication?

## (undated) NOTE — LETTER
[address]  [address] [facility] [address]  Dept: [phone]      February 13, 2018    Patient: Lashae Marin   Date of Visit: 2/13/2018       To Whom It May Concern:    Lorilee Mcburney was seen and treated in our

## (undated) NOTE — LETTER
Patient Name: Suki Rosenberg  YOB: 1970      To whom it may concern,  Mrs. Rosenberg has been in my office for ongoing health related diagnosis on 1/8/2025. She will be here every week for medical management of her condition.     She is able to work, avoid prolong standing, allow 10 min of walking in 1-2 hours of standing or sitting.  Please do not hesitate to call with any question or concerns.    Regards,  Jose M Johnson DNP, CWS  Jose M Johnson DNP, APRN, FNP-BC, CWS  Nurse Practitioner  [facility]   [address] [place] [address] [address] [place] [id number] [phone]  [email or web address]

## (undated) NOTE — LETTER
3/17/2023              Suki Rosenberg        [address] [facility] [address]       To whom it may concern:      Karen Lopez is under my care and scheduled for surgery on 3-,    She will require medical leave and we anticipate four to six weeks of recovery. She will require two to three office visits after her surgery. Please do not hesitate to contact our office if you have any questions or concerns  At [phone].          Sincerely,            Ana Pham MD  [facility], [address]/ [place] [id number] [phone]        Document electronically generated by:  Logan Stevens

## (undated) NOTE — LETTER
Date: May 10, 2023      Patient Name: Christin Nunez      : 1970        Thank you for choosing [facility] as your health care provider. Your physician has deemed the following medical service(s) necessary. However, your insurance plan may not pay for all of your health care and costs and may deny payment for this service. The fact that your insurance plan does not pay for an item or service does not mean you should not receive it. The purpose of this form is to help you make an informed decision about whether or not you want to receive this service(s) that may not be paid for by your insurance plan. CPT Code Description     Cost      Right 3rd finger flexor tendon sheath injection at the site of the A1 pulley under ultrasound guidance    I understand that the above mentioned service(s) or supply may not be covered by my insurance company.  I agree to be financially responsible for the cost of this service or supply in the event of my insurance denies payment as a non-covered benefit.        ______________________________________________________________________  Signature of Patient or Patient's Representative  Relationship  Date    ______________________________________________________________________  Signature of Witness to signing of form   Printed Name

## (undated) NOTE — Clinical Note
2017          Matthew Rodriguez  :  1970      To Whom It May Concern: This patient was seen in our office on 2017 at 12:30p. Work status:   May return to work full-time, no restrictions    May return to work status per above effective 2/

## (undated) NOTE — MR AVS SNAPSHOT
[address]  [facility] [address]  [facility] 1  [phone]               Thank you for choosing us for your health care visit with Fabiana Beltran.  Ella Tran MD.  We are glad to serve you and happy to provide you wit therapy done at [facility], however, your insurance company may require you to have these services done at another facility or to obtain prior authorization. Unauthorized care may be your financial responsibility.    If you have questions, p ? By law, narcotics cannot be faxed or phoned into your pharmacy. The prescription must be signed by the provider, picked up in our office and physically brought to the pharmacy. A 30 day supply with no refills is the maximum allowed.   ? If your prescript approved and is a COVERED benefit. Although the [facility] staff does its due diligence, the insurance carrier gives the disclaimer that \"Although the procedure is authorized, this does not guarantee payment. \"    Ultimately the patient is responsible for payme HYDROcodone-acetaminophen 5-325 MG Tabs   Take 1 tablet by mouth every 6 (six) hours as needed for Pain. Commonly known as:  NORCO           Irbesartan 300 MG Tabs   Take 300 mg by mouth daily.            * insulin detemir 100 UNIT/ML Soln   Inject 40 not sign up before the expiration date, you must request a new code. Your unique [employer] Access Code: [email or web address]  Expires: 7/30/2017 12:34 PM    If you have questions, you can call [phone] to talk to our [facility] Staff.  Remember, [employer] Choose whole grain products Foods high in sodium   Water is best for hydration Fast food.    Eat at home when possible     Tips for increasing your physical activity – Adults who are physically active are less likely to develop some chronic diseases than ad

## (undated) NOTE — LETTER
5/2/2018      Kathleen Stover MD  Physical Medicine and Rehabilitation  [address] [address]  Dept: [phone]  Dept Fax: [phone]        RE: Consultation for Lou August        Dear Charlie Vela MD,    Than

## (undated) NOTE — LETTER
Date: 12/20/2023    Patient Name: Ricki Cardona          To Whom it may concern: This patient was seen in my clinic today. Please excuse from work.        Sincerely,        Salomón Byers, DO

## (undated) NOTE — LETTER
AUTHORIZATION FOR SURGICAL OPERATION OR OTHER PROCEDURE    1. I hereby authorize Dr. Jeremy Hernandez and the [facility] staff assigned to my case to perform the following operation and/or procedure at the [facility]:    Left knee steroid injection under ultrasound     2. My physician has explained the nature and purpose of the operation or other procedure, possible alternative methods of treatment, the risks involved, and the possibility of complication to me. I acknowledge that no guarantee has been made as to the result that may be obtained. 3.  I recognize that, during the course of this operation, or other procedure, unforseen conditions may necessitate additional or different procedure than those listed above. I, therefore, further authorize and request that the above named physician, his/her physician assistants or designees perform such procedures as are, in his/her professional opinion, necessary and desirable. 4.  Any tissue or organs removed in the operation or other procedure may be disposed of by and at the discretion of the [facility] staff and [facility]. 5.  I understand that in the event of a medical emergency, I will be transported by local paramedics to [facility] or other hospital emergency department. 6.  I certify that I have read and fully understand the above consent to operation and/or other procedure. 7.  I acknowledge that my physician has explained sedation/analgesia administration to me including the risks and benefits. I consent to the administration of sedation/analgesia as may be necessary or desirable in the judgement of my physician. Witness signature: ___________________________________________________ Date:  ______/______/_____                    Time:  ________ A. M.  P.M.        Patient Name:  Nabila Sales 7/24/1970 [id number]        (please print)       Patient signature: ___________________________________________________             Relationship to Patient:           []  Parent    Responsible person                          []  Spouse  In case of minor or                    [] Other  _____________   Incompetent name:  __________________________________________________                               (please print)      _____________      Responsible person  In case of minor or  Incompetent signature:  _______________________________________________    Statement of Physician  My signature below affirms that prior to the time of the procedure, I have explained to the patient and/or his/her guardian, the risks and benefits involved in the proposed treatment and any reasonable alternative to the proposed treatment. I have also explained the risks and benefits involved in the refusal of the proposed treatment and have answered the patient's questions.                         Date:  ______/______/_______  Provider                      Signature:  __________________________________________________________       Time:  ___________ A.M    P.M.

## (undated) NOTE — LETTER
18          Jackelin Hannaford  :  1970      To Whom It May Concern: This patient was seen in our office on 18 . If this office may be of further assistance, please do not hesitate to contact us.       Sincerely,        Nathaniel Valenzuela

## (undated) NOTE — Clinical Note
Thanks for the referral  She has Night eating syndrome noted: 1. Morning anorexia 2. Evening hyperphagia 3. Insomnia  She should also have a sleep study  [place]

## (undated) NOTE — LETTER
Date & Time: 4/26/2018, 7:40 PM  Patient: Kain Ortiz  Encounter Provider(s):    On File, E D Attending  Ross Cleveland MD       To Whom It May Concern:    Bette Graham was seen and treated in our department on 4/26/2018.  She {Return to school/s

## (undated) NOTE — LETTER
Jennyfer Mayorga, Aprn  [address]  [place] [address]       05/06/25        Patient: Suki Rosenberg   YOB: 1970   Date of Visit: 5/6/2025       Dear  Dr. Recio, DO,      Thank you for referring Suki Rosenberg to my practice.  Please find my assessment and plan below.      As you know she is a 54-year-old female with a history of longstanding adult onset diabetes mellitus, hypertension, obesity, congestive heart failure, status post 2 pulmonary embolism, ASD, and possible SLE who I now had the pleasure of seeing for what may be acute renal failure.  Laboratory studies have been reviewed in care everywhere and in epic.    Noticed that patient was just recently hospitalized from April 29 through May 2, 2025 for acute exacerbation of her congestive heart failure.  Her creatinine actually was normal at 0.95 at time of admission in April 29, 2025.  Her creatinine did creep up to 1.23 at time of discharge in May 2, 2025 now with an estimated GFR of 53 cc/min.  She currently is on just Lasix 40 mg daily.  She states she still feels comfortable at rest but has dyspnea on exertion.  She still feels as though she has some lower extremity edema.    Her past medical history is significant for adult onset diabetes mellitus since age 19.  She has had longstanding hypertension dating back to at least 1995.  She states her blood pressures have not always been under good control.  She used to live in [place] but moved back to this area couple years ago.  She states she has a hole in her heart but has declined to have it repaired.  She is not aware of any coronary artery disease but is status post 2 pulmonary embolism.  She just had an echocardiogram done in April 30, 2025 that showed a normal left ventricular ejection fraction of 55 to 60%.  Diastolic function likewise was normal.  No significant valvular pathology.  5 the patient does state that she has had a history of systemic lupus  erythematosus.  She is not aware of whether or not this affected her kidneys.  She states she was on prednisone for a while but currently is not on any medications specifically for lupus.  Medications are as listed.  Denies any significant use of nonsteroidals.     Social history quit smoking cigarettes in 2010.  Lives with her family.  Family history is notable for brother who had end-stage renal disease and is now status post renal transplantation.  He may have been a diabetic.  Review of system patient otherwise states she is doing well without any chest pain but still has a sense of dyspnea on exertion and lower extremity edema.  She has a sense that she does not really get the urge to urinate but has good urinary output once urination is initiated.  Otherwise no other GI or urinary tract symptoms.  10 point review of systems is otherwise unremarkable.    Physical exam blood pressure 102/50 with a pulse of 86 and she weighed 245 pounds.  Her neck was supple without JVD.  Lungs were clear.  Heart revealed a regular rate and rhythm with an S4 but no gallops, murmurs or rubs.  Abdomen was soft, flat, nontender without organomegaly, masses or bruits.  Extremities revealed very puffy lower extremities but no true pitting edema.    I therefore informed the patient that she may have acute renal failure as her initial creatinine at the time of this last admission was normal.  This may be just secondary to diuretics.  However need to exclude lupus nephritis.  Diabetes and/or hypertension could also affect her kidney function as well.  Reinforced the importance of maintaining adequate hydration.  Avoid nonsteroidals.  For now continue current medications.  Will order a follow-up CBC, renal panel, urinalysis, urine for microalbumin, urine for Bence-Ferraro protein, sed rate and connective tissue studies were ordered.  She had a renal ultrasound done on January 1, 2025 which was unremarkable.    Further impressions and  recommendations will be forthcoming after reviewing the above.  Reinforced importance of low-salt diabetic diet.  Elevate the legs.  Support hose.  Follow-up with her cardiologist.  Will see a rheumatology consultation as indicated.    Thank you very much for allowing me to participate in the care of your patient.  If you have any questions please feel free to call.           Sincerely,   Chin Vega MD   [facility]  [address]  [facility] [address]    Document electronically generated by:  Chin Vega MD

## (undated) NOTE — LETTER
11/14/2018      Genette Cables A. Viann Meckel, MD  Physical Medicine and Rehabilitation  [address] [address]  Dept: [phone]  Dept Fax: [phone]        RE: Consultation for Winston Costello        Dear Alex Lund MD,    Raoul Aragon

## (undated) NOTE — LETTER
Date: 3/13/2025  Patient name: Suki Rosenberg  YOB: 1970  Medical Record Number: [id number]  Primary Coverage: Payor: CHAR RICARDO PPO / Plan: BLUE CROSS [facility] PPO / Product Type: PPO /   Secondary Coverage:   Insurance ID: [id number]  Patient Address: [address] [place] [id number]  Telephone Information:   Home Phone [phone]   Mobile [phone]         Encounter Date: 3/13/2025  Provider: FELICITAS Ram  Diagnosis:     ICD-10-CM   1. Diabetic ulcer of left heel associated with type 2 diabetes mellitus, unspecified ulcer stage (HCC) [E11.621, L97.429]  E11.621    L97.429   2. Pain associated with wound  T14.8XXA    R52   3. Pressure injury of right heel, stage 1  L89.611   4. Lymphedema of both lower extremities  I89.0   5. 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 due to underlying condition, limited to breakdown of skin ([facility])  E08.621    L97.411   6. Open wound of lesser toe of right foot  S91.104A       Progress Note:  Subjective  Suki Rosenberg is a 54 year old female.    Chief Complaint   Patient presents with    Wound Recheck     Bilateral heel     HPI  3/13/2025: Patient is here with her daugher, she stated she had wound vac till yesterday, now has dressing, she continues to have nerve pain. Also reporting new ulceration on right heel.  3/7/2025: patient is here with her daughter and another family member. [employer] [facility] has been in her place, applied wound vac (per patient in 2.5 hours with noise (indicative of leak). Later on  company called and stated no longer able to provide care due to unsigned contract. Patient has done topical dressing, no fever, no chills.   2/20/2025: Patient is here for follow up wound care on left heel wound. She has been approved for NPWT (wound vac), no new wounds.  2/12/2025: Patient is here with her daughter for right heel wound, her daughters are doing the dressing, pain has somewhat reduced.  2/5/2025: Patient is here  with her daughter, her daughter is doing dressing changes, no other changes in her medications or neuropathy treatment plan.   1/22/2025: Patient is here for follow up, she has not seen pain managmetnt services for neuropathy. She is not taking any nerve pain medication.   1/15/2025: Patient is here for follow up, reports not tolerating gabapentin, and lyrica. She had been given Zoloft, she stated she is concern about the side effect and cost. She is complaining of pain in her heel.  1/8/2025: Patient is here for follow up, has been doing dressing, minimum drainage. Patient has not refill gabapentin paste for pain management. Also, she stated she had Lyrica before as well as gabapentin which caused significant ankle edema. She is not sure if she is currently taking Amitriptyline.   1/3/2025: Patient stated she developed left heel blister and wound again, she has history of heels blister in Dec 2023 and then right heel blister and wound on July 2024. 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 She has nerve pain in the foot, described as pins and needles. She stated on Arabella day she walked wrong and blister opened with foul smell. She was admitted to [facility] for left heel wound infection. She was treated with IV antibiotics, MRI showed no bone infection. She was discharged today with home health services for wound care.  Last A1c on 12/28/2024 was 8.0     8/16/2024: Patient is here for follow up, no drainage in the past few days, still pain in the wound area.   8/2/2024: Patient is here for follow up, her daughter has been doing dressing, she reports no drainage from the wound.  7/19/2024: Patient is returning to clinic with new blood blister on right heel, she stated happened few days ago, she opened the blister to drain which reduced the pain. She has her lymphedema pump and using them daily, states wearing her compression stockings as well. She feels her A1c is better managed  now came down from 13 to 8.2 on 6/12/2024.     12/11/2023: Patient is here for follow up, tolerated the compression, reports continues to have some pain while walking on left heel, no other concern.   12/4/2023: Patient is 53 year old woman with chronic leg edema and recent bilateral on posterior heel. Patient states she sits for 10-14 hours on chair for her job with very little time to break for walking. She has purchased compression stockings , she has had hard time putting them on or keeping them on.   Patient medical history significant for type two diabetes on Jardiance 25 mg, hx of PE on xarelto 20 mg, neuropathy, fibromyalgia, hx of breast cancer, depression, bipolar, obesity, chronic low back pain with bilateral sciatica, herniated lumbar disc, chronic left knee pain, and osteoarthritis bilateral knees.     Review of Systems   Constitutional:  Positive for activity change. Negative for chills, fatigue and fever.   HENT:  Negative for congestion.    Respiratory:  Negative for shortness of breath.    Cardiovascular:  Positive for leg swelling. Negative for chest pain.   Gastrointestinal:  Positive for constipation.        Hx of constipation    Musculoskeletal:  Positive for arthralgias, back pain and gait problem.   Skin:  Positive for wound.        Left heel wound  Neurological:  Positive for numbness. Negative for weakness.        Feeling numbness on her toes   Psychiatric/Behavioral:          Hx f anxiety, depression and bipolar      Objective  Physical Exam  Vitals reviewed.   Constitutional:       Appearance: Normal appearance. She is obese.   HENT:      Head: Normocephalic.   Cardiovascular:      Rate and Rhythm: Normal rate.      Pulses: Normal pulses.           Dorsalis pedis pulses are 2+ on the left side.        Posterior tibial pulses are 2+ on the left side.   Pulmonary:      Effort: Pulmonary effort is normal.   Abdominal:      General: Bowel sounds are normal.   Musculoskeletal:      Cervical  back: Normal range of motion.      Right lower leg: Edema present.      Left lower leg: Edema present.        Feet: left heel dry blister with eschar cover wound in the center  Feet:      Right foot:      Toenail Condition: Right toenails are normal.      Left foot:      Skin integrity: Ulcer present.      Toenail Condition: Left toenails are normal.   Skin:     General: Skin is warm and dry.      Capillary Refill: Capillary refill takes 2 to 3 seconds.      Findings: No erythema.   Neurological:      Mental Status: She is alert and oriented to person, place, and time  Wound Assessment  Wound 01/03/25 Heel Left (Active)   Date First Assessed: 01/03/25   Location: Heel  Wound Location Orientation: Left      Assessments 1/3/2025  9:56 AM 3/13/2025  3:23 PM   Wound Image          Site Assessment Black;Brown;Pink;Tan;Yellow;Dry;Moist;Edema Moist;Pink;Red   Closure Not approximated Not approximated   Drainage Amount Small Moderate   Drainage Description Serosanguineous Serosanguineous;Yellow   Treatments Cleansed;Honey Gel;Dakins Cleansed;Saline;Vashe;Topical (Barrier/Moisturizer/Ointment) (zinc oxide to periwound)   Dressing 4x4s (spandage) Hydrofera transfer;4x4s;Gauze;Kerlix roll;Tape   Dressing Changed New Changed   Dressing Status Clean;Dry;Intact Clean;Dry;Intact   Wound Length (cm) 4.1 cm 2.2 cm   Wound Width (cm) 6 cm 2.5 cm   Wound Surface Area (cm^2) 24.6 cm^2 5.5 cm^2   Wound Depth (cm) 0 cm 0.1 cm   Wound Volume (cm^3) 0 cm^3 0.55 cm^3   Margins Attached edges Attached edges   Madelaine-wound Assessment Dry;Painful;Hyperpigmented;Fragile;Edema;Ecchymosis Dry;Callous;Hyperpigmented   Wound Granulation Tissue Pink Pink;Red   Wound Bed Granulation (%) 10 % 100 %   Wound Bed Epithelium (%) 0 % 0 %   Wound Bed Slough (%) 10 % 0 %   Wound Bed Eschar (%) 80 % 0 %   Wound Bed Fibrin (%) 0 % 0 %   State of Healing Non-healing Fully granulated   Wound Odor Strong None   Pressure Injury Stage Unstageable Stage 3       Active  Orders   Date Order Priority Status Authorizing Provider   03/07/25 0959 OP Wound Dressing Routine Active Jose M Johnson APRN     - Cleansing:    Cleanse with normal saline or wound cleanser     - Dressing:    Thin foam     - Additional Wound Dressing Information:    wound vac sponge, coban, heel cap from foam, stockinette and artiflex     - Frequency:    Change dressing three times per week       Compression Wrap 01/15/25 Leg Left;Right (Active)   Placement Date: 01/15/25   Location: Leg  Wound Location Orientation: Left;Right      Assessments 1/15/2025  3:40 PM 3/13/2025  3:23 PM   Response to Treatment Well tolerated Well tolerated   Compression Layers Single Single   Compression Product Type Tubigrip/Spanda (Medigrip E cut out on heel) Tubigrip/Spanda (size E)   Dressing Applied Yes Yes   Compression Wrap Location Toes to Knee Toes to Knee   Compression Wrap Status Clean;Dry;Intact Clean;Dry;Intact;Dressing Changed       No associated orders.       Wound 03/13/25 2 Heel Right (Active)   Date First Assessed: 03/13/25    Wound Number (Wound Clinic Only): 2  Primary Wound Type: Pressure Injury  Location: Heel  Wound Location Orientation: Right      Assessments 3/13/2025  3:23 PM   Wound Image     Site Assessment Dry;Fragile;Pink   Closure Approximated   Drainage Amount None   Treatments Cleansed;Saline   Dressing Other (Bordered foam)   Dressing Changed New   Dressing Status Clean;Dry;Intact   Margins Flat and Intact   Non-staged Wound Description Not applicable   Te-wound Assessment Clean;Dry;Intact;Purple   Pressure Injury Stage Deep Tissue       No associated orders.     Vital Signs    03/13/25 1521   BP: (!) 149/101   Pulse: 96   Resp: 18   Temp: 97.4 °F (36.3 °C)   Allergies  Allergies[1]  Assessment  Left heel wound, pressure injury with diabetes:  -granular tissue, flat wound edges  -no erythema in the te-wound  -edema +4 dorsal foot and +2 ankle edema  -painful to touch, described pins and needles pain,  classic sign of diabetic neuropathy, reports more pain with application of wound vac    Right 2nd toe, dark scab, pressure injury, no erythema  Right heel wound with dark scab, deep tissue injury, no erythema     Reviewed note, imagining and labs in Epic and Care Every Where.  Recent labs: 1/3/2025: WBC 6.1, H&H 13.0&38.5, BUN 11, Creatinine 1.42, albumin 4.2  12/30/2024: US of bilateral lower ext, vascular study:  Impression   CONCLUSION: Duplex arterial ultrasound demonstrates patent vessels with triphasic arterial Doppler flow.  Somewhat limited evaluation of the below knee tibial vessels on the left due to overlying calf edema and bandaging in the foot, though the vessels are patent  where visualized.        12/30/2024: MRI of left foot:  Impression   CONCLUSION: Soft tissue and skin ulceration within the posterior heel without abscess.  No osteomyelitis.  6 mm osteochondral lesion of the medial talar dome without collapse of the articular surface.  Split tear of the peroneus brevis with peroneal tenosynovitis.  Mild Achilles tendinosis.  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  Multiple other incidental findings as described in the body of the report.  Dictated by (CST): Marlon Horne MD on 12/30/2024 at 2:44 PM     Encounter Diagnosis  1. Diabetic ulcer of left heel associated with type 2 diabetes mellitus, unspecified ulcer stage (HCC) [E11.621, L97.429]    2. Pain associated with wound    3. Pressure injury of right heel, stage 1    4. Lymphedema of both lower extremities    5. 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 due to underlying condition, limited to breakdown of skin (HCC)    6. 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[facility])    Anemia due to blood loss, acute    Atrial septal defect (HCC)    Hypercoagulable state (HCC)    Lymphedema of both lower extremities    Symptomatic anemia    Microcytic anemia    Other pulmonary embolism without acute cor pulmonale (HCC)    Severe episode of recurrent major depressive disorder, without psychotic features (HCC)    Uterine bleeding    Obesity (BMI 30-39.9)    Acquired trigger finger of right middle finger    Chronic anticoagulation    Chronic pain of left knee    Non-pressure chronic ulcer of left lower leg, limited to breakdown of skin (HCC)    Moderate left ventricular hypertrophy    Non-healing open wound of right heel    Pain associated with wound    BRCA gene mutation negative in female    Morbid obesity with BMI of 40.0-44.9, adult ([facility])    Dyslipidemia    HTN (hypertension), benign    Type 2 diabetes mellitus without complication, with long-term current use of insulin (HCC)    Stress    Diabetic ulcer of left heel associated with type 2 diabetes mellitus, unspecified ulcer stage (HCC)    Diabetic ulcer of left heel associated with type 2 diabetes mellitus, with necrosis of muscle (HCC)    Neuropathy    Pressure injury of right heel, stage 1    Open wound of lesser toe of right foot    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 due to underlying condition, limited to breakdown of skin ([facility])   Plan  Orders  No orders of the defined types were placed in this encounter.  As the wound has become granular to the surface of the skin, this wound can heal without the wound vac, and since it is more convenient with topical dressing, discontinue wound vac today, 3/13/2025.  Initiated Hydrofera Blue Transfer  dressing secure with tape or rolled gauze, change 2-3 times a week. May use zinc oxide to te-wound to protect the skin.  Monitor for signs and symptoms of infection, encourage patient to assess skin daily. May use Dakin's solution to reduce foul smell in the foot wound and te wound with each dressing changes.  Continue offload and reduce pressure from the wound bed by elevating heel when sitting on small table or ottoman, heel elevator foams or wedges. For example, using egg crate cushion under her feet to reduce pressure on his heels. Also, may benefit from wide shoe with large toe box.     Recommended nutrition for wound healing and encourage reduce carbohydrate intake, increase protein intake, and glycemic control diet (A1c is 8 which hinders wound healing.      Discussed the treatment plan with patient and her daughter, they verbalized understanding and agreed to these plan.  Total face to face time was 45 min, more than 50% of the time was spent in counseling and/or coordination of care related to BLE wounds.  Note to patient: The 21st Century Cures Act makes medical notes like these available to patients in the interest of transparency. Please be advised this is a medical document. Medical documents are intended to carry relevant information, facts as evident, and the clinical opinion of the practitioner. The medical note is intended as peer to peer communication and may appear blunt or direct. It is written in medical language and may contain abbreviations or verbiage that are unfamiliar.  Follow-Up  Return in about 2 weeks (around 3/27/2025) for APN visit 30 min.              [1]   Allergies  Allergen Reactions    Latex HIVES and SWELLING    Influenza Vaccines OTHER (SEE COMMENTS)     Pt passed out     Radiology Contrast Iodinated Dyes ITCHING     Weekly Wound Education Note    Teaching Provided To: Patient  Training Topics: Dressing, Nutrition, Signs and symptoms of infection, Off-loading, Discharge  instructions, Foot care  Training Method: Demonstration, Explain/Verbal  Training Response: Patient responds and understands        Notes: Right heel: bordered foam  Left heel: Hydrofera blue transfer, Dry gauze, kerlix, tape, Medigrip E to BLE       Wound Treatment Orders:  Orders Placed This Encounter   Procedures    OP Wound Dressing     Medigrip E to lower leg  Follow the attached orders for today's visit     RN to see the patient next visit for on going assessment and treatment of wounds and current wound orders.     Standing Status:   Standing     Number of Occurrences:   4     Standing Expiration Date:   4/10/2025     Order Specific Question:   Cleansing     Answer:   Cleanse with normal saline or wound cleanser     Order Specific Question:   Dressing     Answer:   Bordered foam     Order Specific Question:   Frequency     Answer:   Change dressing two times per week    OP Wound Dressing     Medigrip E to lower leg    Follow the attached orders for today's visit     RN to see the patient next visit for on going assessment and treatment of wounds and current wound orders.     Standing Status:   Standing     Number of Occurrences:   4     Standing Expiration Date:   4/10/2025     Order Specific Question:   Cleansing     Answer:   Cleanse with normal saline or wound cleanser     Order Specific Question:   Dressing     Answer:   Hydrofera transfer     Order Specific Question:   Dressing     Answer:   Dry gauze     Order Specific Question:   Dressing     Answer:   Kerlix     Order Specific Question:   Additional Wound Dressing Information     Answer:   tape     Order Specific Question:   Frequency     Answer:   Change dressing two times per week   Wound Information/Order:  Wound Number: 1 Left heel  Product:Hydrofera transfer as primary dressing, 4x4 gauze for cleansing wound, kerlix, tape  Dispense: 30 days  Dressing Frequency:Change dressing 2x per week    Was a Debridement performed: Yes, Debridement type:  [facility]     KRIS NARANJO DNP, CWS, NPI [id number]

## (undated) NOTE — ED AVS SNAPSHOT
Winston Costello   MRN: [id number]    Department:  [facility]   Date of Visit:  4/26/2018           Disclosure     Insurance plans vary and the physician(s) referred by the ER may not be covered by your plan.  Please contact CARE PHYSICIAN AT ONCE OR RETURN IMMEDIATELY TO THE EMERGENCY DEPARTMENT. If you have been prescribed any medication(s), please fill your prescription right away and begin taking the medication(s) as directed.   If you believe that any of the medications

## (undated) NOTE — LETTER
18          Jesenia Richter  :  1970      To Whom It May Concern: This patient was seen in our office on 18 . Yung Shah If this office may be of further assistance, please do not hesitate to contact us.       Sincerely,        Jose Aponte

## (undated) NOTE — LETTER
17          Marlena Monaco  :  1970      To Whom It May Concern: This patient was seen in our office on 17 .   Work status:  Return to work    May return to work status per above effective 17    If this office may be of further a

## (undated) NOTE — LETTER
8/22/2018      Shantell Sue MD  Physical Medicine and Rehabilitation  [address] [address]  Dept: [phone]  Dept Fax: [phone]        RE: Consultation for Romel Vanessa        Dear Madelaine Ayala MD,    Jeremiah Euceda

## (undated) NOTE — LETTER
Date: 2023      Patient Name: Yadiel James      : 1970        Thank you for choosing [facility] [address] as your health care provider. Your physician has deemed the following medical service(s) necessary. However, your insurance plan may not pay for all of your health care and costs and may deny payment for this service. The fact that your insurance plan does not pay for an item or service does not mean you should not receive it. The purpose of this form is to help you make an informed decision about whether or not you want to receive this service(s) that may not be paid for by your insurance plan. CPT Code Description     Cost     _________ _Left knee steroid injection under ultrasound  _____________      _________ ______________________________ _____________      _________ ______________________________ _____________      I understand that the above mentioned service(s) or supply may not be covered by my insurance company.  I agree to be financially responsible for the cost of this service or supply in the event of my insurance denies payment as a non-covered benefit.        ______________________________________________________________________  Signature of Patient or Patient's Representative  Relationship  Date    ______________________________________________________________________  Signature of Witness to signing of form   Printed Name

## (undated) NOTE — LETTER
5/3/2023          To Whom It May Concern:    Daniel Franklin is currently under my medical care and may return to work on May 8, 2023. Activity is restricted as follows: No strenuous activity, no lifting, bending, reaching, lifting over 15 pounds until May 18, 2023. If you require additional information please contact our office.         Sincerely,    Yoli Rodrigez MD          Document generated by:  Garrison Saleem RN

## (undated) NOTE — LETTER
7/26/2017      Michelle Falk MD  Physical Medicine and Rehabilitation  [address] [address]  Dept: [phone]  Dept Fax: [phone]        RE: Consultation for [facility]        Dear Carol Ann Fowler MD,    Thank

## (undated) NOTE — LETTER
1/24/2018      Layla Shepherd MD  Physical Medicine and Rehabilitation  [address] [address]  Dept: [phone]  Dept Fax: [phone]        RE: Consultation for Todd Huertas        Dear Maira Heard MD,

## (undated) NOTE — Clinical Note
Dr. Recio, I saw her today, the heel is stable however she is in so much pain that advanced treatment can not bed done, removing eschar and starting wound vac. She said she had Lyrica before and cause ankle edema so as gabapentin. She has an upcoming lorne with you. She said she is not sure she is taking Amitriptyline. My next oral tablet is taking duloxetine (Cymbalta) to reduce nerve pain. I was wondering if you are okay with this therapy?

## (undated) NOTE — LETTER
17          Sarithaelvin Jefferson  :  1970      To Whom It May Concern: This patient was seen in our office on 17 . Work status: May return to work 4-5 hrs per day with minimal swatting and bending.  Pt may need to change positions frequent

## (undated) NOTE — LETTER
17          Casi Medina  :  1970      To Whom It May Concern: This patient was seen in our office on 17 . Work status:  Restrictions unchanged. May return to work status per above effective 17.     If this office may be of f

## (undated) NOTE — LETTER
18          Rito Reusing  :  1970      To Whom It May Concern: This patient was seen in our office on 18 . May return to work effective 18.     If this office may be of further assistance, please do not hesitate to con

## (undated) NOTE — LETTER
17          Juan Medel  :  1970      To Whom It May Concern: This patient was seen in our office on 17 .   Work status:  limited walking and frequent changing of positions    If this office may be of further assistance, please do n